# Patient Record
Sex: MALE | Race: WHITE | HISPANIC OR LATINO | Employment: FULL TIME | URBAN - METROPOLITAN AREA
[De-identification: names, ages, dates, MRNs, and addresses within clinical notes are randomized per-mention and may not be internally consistent; named-entity substitution may affect disease eponyms.]

---

## 2018-01-13 NOTE — MISCELLANEOUS
Provider Comments  Provider Comments:   Patient is a no-show for his 940 new patient appointment today        Signatures   Electronically signed by : [facility]; Oct 17 2016  9:46AM EST                       (Author)

## 2018-08-22 ENCOUNTER — HOSPITAL ENCOUNTER (EMERGENCY)
Facility: HOSPITAL | Age: 53
Discharge: HOME | End: 2018-08-22
Attending: EMERGENCY MEDICINE
Payer: COMMERCIAL

## 2018-08-22 VITALS
RESPIRATION RATE: 16 BRPM | WEIGHT: 189.6 LBS | DIASTOLIC BLOOD PRESSURE: 62 MMHG | OXYGEN SATURATION: 97 % | HEART RATE: 74 BPM | TEMPERATURE: 98.4 F | SYSTOLIC BLOOD PRESSURE: 132 MMHG

## 2018-08-22 DIAGNOSIS — B34.9 VIRAL ILLNESS: Primary | ICD-10-CM

## 2018-08-22 PROCEDURE — 99282 EMERGENCY DEPT VISIT SF MDM: CPT

## 2018-08-22 NOTE — DISCHARGE INSTRUCTIONS
Viral Syndrome   WHAT YOU NEED TO KNOW:   Viral syndrome is a term used for a viral infection that has no clear cause  Viruses are spread easily from person to person through the air and on shared items  DISCHARGE INSTRUCTIONS:   Call 911 for the following:   · You have a seizure  · You cannot be woken  · You have chest pain or trouble breathing  Return to the emergency department if:   · You have a stiff neck, a bad headache, and sensitivity to light  · You feel weak, dizzy, or confused  · You stop urinating or urinate a lot less than normal      · You cough up blood or thick, yellow or green, mucus  · You have severe abdominal pain or your abdomen is larger than usual   Contact your healthcare provider if:   · Your symptoms do not get better with treatment, or get worse, after 3 days  · You have a rash or ear pain  · You have burning when you urinate  · You have questions or concerns about your condition or care  Medicines: You may  need any of the following:  · Acetaminophen  decreases pain and fever  It is available without a doctor's order  Ask how much medicine to take and how often to take it  Follow directions  Acetaminophen can cause liver damage if not taken correctly  · NSAIDs , such as ibuprofen, help decrease swelling, pain, and fever  NSAIDs can cause stomach bleeding or kidney problems in certain people  If you take blood thinner medicine, always ask your healthcare provider if NSAIDs are safe for you  Always read the medicine label and follow directions  · Cold medicine  helps decrease swelling, control a cough, and relieve chest or nasal congestion  · Saline nasal spray  helps decrease nasal congestion  · Take your medicine as directed  Contact your healthcare provider if you think your medicine is not helping or if you have side effects  Tell him of her if you are allergic to any medicine   Keep a list of the medicines, vitamins, and herbs you take  Include the amounts, and when and why you take them  Bring the list or the pill bottles to follow-up visits  Carry your medicine list with you in case of an emergency  Manage your symptoms:   · Drink liquids as directed  to prevent dehydration  Ask how much liquid to drink each day and which liquids are best for you  Ask if you should drink an oral rehydration solution (ORS)  An ORS has the right amounts of water, salts, and sugar you need to replace body fluids  This may help prevent dehydration caused by vomiting or diarrhea  Do not drink liquids with caffeine  Drinks with caffeine can make dehydration worse  · Get plenty of rest  to help your body heal  Take naps throughout the day  Ask your healthcare provider when you can return to work and your normal activities  · Use a cool mist humidifier  to help you breathe easier if you have nasal or chest congestion  Ask your healthcare provider how to use a cool mist humidifier  · Eat honey or use cough drops  to help decrease throat discomfort  Ask your healthcare provider how much honey you should eat each day  Cough drops are available without a doctor's order  Follow directions for taking cough drops  · Do not smoke and stay away from others who smoke  Nicotine and other chemicals in cigarettes and cigars can cause lung damage  Smoking can also delay healing  Ask your healthcare provider for information if you currently smoke and need help to quit  E-cigarettes or smokeless tobacco still contain nicotine  Talk to your healthcare provider before you use these products  · Wash your hands frequently  to prevent the spread of germs to others  Use soap and water  Use gel hand  when soap and water are not available  Wash your hands after you use the bathroom, cough, or sneeze  Wash your hands before you prepare or eat food    Follow up with your healthcare provider as directed:  Write down your questions so you remember to ask them during your visits  © 2017 [employer] Information is for End User's use only and may not be sold, redistributed or otherwise used for commercial purposes  All illustrations and images included in CareNotes® are the copyrighted property of A D A M , Inc  or Damion Cortez  The above information is an  only  It is not intended as medical advice for individual conditions or treatments  Talk to your doctor, nurse or pharmacist before following any medical regimen to see if it is safe and effective for you

## 2018-08-22 NOTE — ED PROVIDER NOTES
History  Chief Complaint   Patient presents with    Chills     "Yesterday I just started to feel wierd, no appetite, chills, nauseated  I feel better today but still not right"  Denies abdominal pain, denies v/d       History provided by:  Patient  Malaise - 7 years or greater   Severity:  Moderate  Onset quality:  Gradual  Duration:  2 days  Timing:  Constant  Progression:  Improving  Context: not alcohol use (2)    Relieved by:  Drinking fluids and eating  Worsened by:  Nothing  Ineffective treatments:  None tried  Associated symptoms: nausea    Associated symptoms: no abdominal pain, no chest pain, no cough, no diarrhea, no dizziness, no dysuria, no fever, no headaches, no myalgias, no shortness of breath and no urgency    Associated symptoms comment:  Chills        None       Past Medical History:   Diagnosis Date    Substance abuse        History reviewed  No pertinent surgical history  History reviewed  No pertinent family history  I have reviewed and agree with the history as documented  Social History   Substance Use Topics    Smoking status: Current Every Day Smoker     Types: Cigarettes    Smokeless tobacco: Never Used    Alcohol use No        Review of Systems   Constitutional: Positive for chills and fatigue  Negative for fever  HENT: Negative for rhinorrhea, sore throat and trouble swallowing  Eyes: Negative for pain  Respiratory: Negative for cough, shortness of breath, wheezing and stridor  Cardiovascular: Negative for chest pain and leg swelling  Gastrointestinal: Positive for nausea  Negative for abdominal pain and diarrhea  Endocrine: Negative for polyuria  Genitourinary: Negative for dysuria, flank pain and urgency  Musculoskeletal: Negative for joint swelling, myalgias and neck stiffness  Skin: Negative for rash  Allergic/Immunologic: Negative for immunocompromised state  Neurological: Negative for dizziness, syncope, weakness, numbness and headaches  Psychiatric/Behavioral: Negative for confusion and suicidal ideas  All other systems reviewed and are negative  Physical Exam  Physical Exam   Constitutional: He is oriented to person, place, and time  He appears well-developed and well-nourished  HENT:   Head: Normocephalic and atraumatic  Eyes: EOM are normal  Pupils are equal, round, and reactive to light  Neck: Normal range of motion  Neck supple  Cardiovascular: Normal rate and regular rhythm  Exam reveals no friction rub  No murmur heard  Pulmonary/Chest: Breath sounds normal  No respiratory distress  He has no wheezes  He has no rales  Abdominal: Soft  Bowel sounds are normal  He exhibits no distension  There is no tenderness  Musculoskeletal: Normal range of motion  He exhibits no edema or tenderness  Neurological: He is alert and oriented to person, place, and time  Skin: Skin is warm  No rash noted  Psychiatric: He has a normal mood and affect  Nursing note and vitals reviewed  Vital Signs  ED Triage Vitals [08/22/18 1350]   Temperature Pulse Respirations Blood Pressure SpO2   98 4 °F (36 9 °C) 74 16 132/62 97 %      Temp Source Heart Rate Source Patient Position - Orthostatic VS BP Location FiO2 (%)   Oral Monitor -- -- --      Pain Score       --           Vitals:    08/22/18 1350   BP: 132/62   Pulse: 74       Visual Acuity      ED Medications  Medications - No data to display    Diagnostic Studies  Results Reviewed     None                 No orders to display              Procedures  Procedures       Phone Contacts  ED Phone Contact    ED Course                               MDM  Number of Diagnoses or Management Options  Viral illness: new and requires workup  Diagnosis management comments: 19-year-old male presents emergency department with Tylenol viral illness symptoms with noted nausea intermittent chills no documented fever here in the emergency department no other sick contacts    No active vomiting or diarrhea  The patient looks well hydrated on examination unremarkable exam   His vital signs are stable  Plan will be for outpatient management followup supportive care at this point time likely viral illness as the cause the patient's symptoms  Patient feeling much improved and improving as an outpatient  Will give a note for work and follow-up  Pt re-examined and evaluated after testing and treatment  Spoke with the patient and feeling improved and sxs have resolved  Will discharge home with close f/u with pcp and instructed to return to the ED if sxs worsen or continue  Pt agrees with the plan for discharge and feels comfortable to go home with proper f/u  Advised to return for worsening or additional problems  Diagnostic tests were reviewed and questions answered  Diagnosis, care plan and treatment options were discussed  The patient understand instructions and will follow up as directed  CritCare Time    Disposition  Final diagnoses:   Viral illness     Time reflects when diagnosis was documented in both MDM as applicable and the Disposition within this note     Time User Action Codes Description Comment    8/22/2018  2:34 PM Zain Kelley Add [B34 9] Viral illness       ED Disposition     ED Disposition Condition Comment    Discharge  Aylin Berry discharge to home/self care  Condition at discharge: Stable        Follow-up Information    None         There are no discharge medications for this patient  No discharge procedures on file      ED Provider  Electronically Signed by           Dayanara Lewis DO  08/22/18 3553

## 2018-08-22 NOTE — ED NOTES
Placed pt into exam room    Pt reports, "I just need an excuse for my job and I will get out of here"  Physician made aware of same     Melissa Evans RN  08/22/18 6245

## 2019-06-16 ENCOUNTER — HOSPITAL ENCOUNTER (EMERGENCY)
Facility: HOSPITAL | Age: 54
Discharge: HOME | End: 2019-06-17
Attending: EMERGENCY MEDICINE | Admitting: EMERGENCY MEDICINE
Payer: COMMERCIAL

## 2019-06-16 VITALS
WEIGHT: 184.97 LBS | TEMPERATURE: 98.2 F | HEART RATE: 90 BPM | RESPIRATION RATE: 14 BRPM | OXYGEN SATURATION: 99 % | DIASTOLIC BLOOD PRESSURE: 81 MMHG | SYSTOLIC BLOOD PRESSURE: 176 MMHG

## 2019-06-16 DIAGNOSIS — R42 LIGHTHEADEDNESS: Primary | ICD-10-CM

## 2019-06-16 DIAGNOSIS — R03.0 ELEVATED BLOOD PRESSURE READING: ICD-10-CM

## 2019-06-16 DIAGNOSIS — E11.65 HYPERGLYCEMIA DUE TO TYPE 2 DIABETES MELLITUS (HCC): ICD-10-CM

## 2019-06-16 LAB
ANION GAP SERPL CALCULATED.3IONS-SCNC: 7 MMOL/L (ref 4–13)
ATRIAL RATE: 64 BPM
BASOPHILS # BLD AUTO: 0.06 THOUSANDS/ΜL (ref 0–0.1)
BASOPHILS NFR BLD AUTO: 1 % (ref 0–1)
BUN SERPL-MCNC: 10 MG/DL (ref 5–25)
CALCIUM SERPL-MCNC: 9.3 MG/DL (ref 8.3–10.1)
CHLORIDE SERPL-SCNC: 99 MMOL/L (ref 100–108)
CO2 SERPL-SCNC: 31 MMOL/L (ref 21–32)
CREAT SERPL-MCNC: 0.94 MG/DL (ref 0.6–1.3)
EOSINOPHIL # BLD AUTO: 0.4 THOUSAND/ΜL (ref 0–0.61)
EOSINOPHIL NFR BLD AUTO: 3 % (ref 0–6)
ERYTHROCYTE [DISTWIDTH] IN BLOOD BY AUTOMATED COUNT: 12.5 % (ref 11.6–15.1)
GFR SERPL CREATININE-BSD FRML MDRD: 92 ML/MIN/1.73SQ M
GLUCOSE SERPL-MCNC: 271 MG/DL (ref 65–140)
GLUCOSE SERPL-MCNC: 276 MG/DL (ref 65–140)
HCT VFR BLD AUTO: 43.7 % (ref 36.5–49.3)
HGB BLD-MCNC: 14.9 G/DL (ref 12–17)
IMM GRANULOCYTES # BLD AUTO: 0.05 THOUSAND/UL (ref 0–0.2)
IMM GRANULOCYTES NFR BLD AUTO: 0 % (ref 0–2)
LYMPHOCYTES # BLD AUTO: 3.93 THOUSANDS/ΜL (ref 0.6–4.47)
LYMPHOCYTES NFR BLD AUTO: 30 % (ref 14–44)
MCH RBC QN AUTO: 31 PG (ref 26.8–34.3)
MCHC RBC AUTO-ENTMCNC: 34.1 G/DL (ref 31.4–37.4)
MCV RBC AUTO: 91 FL (ref 82–98)
MONOCYTES # BLD AUTO: 0.76 THOUSAND/ΜL (ref 0.17–1.22)
MONOCYTES NFR BLD AUTO: 6 % (ref 4–12)
NEUTROPHILS # BLD AUTO: 7.76 THOUSANDS/ΜL (ref 1.85–7.62)
NEUTS SEG NFR BLD AUTO: 60 % (ref 43–75)
NRBC BLD AUTO-RTO: 0 /100 WBCS
P AXIS: 51 DEGREES
PLATELET # BLD AUTO: 238 THOUSANDS/UL (ref 149–390)
PMV BLD AUTO: 10 FL (ref 8.9–12.7)
POTASSIUM SERPL-SCNC: 4.3 MMOL/L (ref 3.5–5.3)
PR INTERVAL: 190 MS
QRS AXIS: -13 DEGREES
QRSD INTERVAL: 84 MS
QT INTERVAL: 398 MS
QTC INTERVAL: 410 MS
RBC # BLD AUTO: 4.8 MILLION/UL (ref 3.88–5.62)
SODIUM SERPL-SCNC: 137 MMOL/L (ref 136–145)
T WAVE AXIS: -15 DEGREES
VENTRICULAR RATE: 64 BPM
WBC # BLD AUTO: 12.96 THOUSAND/UL (ref 4.31–10.16)

## 2019-06-16 PROCEDURE — 93005 ELECTROCARDIOGRAM TRACING: CPT

## 2019-06-16 PROCEDURE — 85025 COMPLETE CBC W/AUTO DIFF WBC: CPT | Performed by: EMERGENCY MEDICINE

## 2019-06-16 PROCEDURE — 36415 COLL VENOUS BLD VENIPUNCTURE: CPT | Performed by: EMERGENCY MEDICINE

## 2019-06-16 PROCEDURE — 82948 REAGENT STRIP/BLOOD GLUCOSE: CPT

## 2019-06-16 PROCEDURE — 99283 EMERGENCY DEPT VISIT LOW MDM: CPT | Performed by: EMERGENCY MEDICINE

## 2019-06-16 PROCEDURE — 80048 BASIC METABOLIC PNL TOTAL CA: CPT | Performed by: EMERGENCY MEDICINE

## 2019-06-16 PROCEDURE — 96360 HYDRATION IV INFUSION INIT: CPT

## 2019-06-16 PROCEDURE — 93010 ELECTROCARDIOGRAM REPORT: CPT | Performed by: INTERNAL MEDICINE

## 2019-06-16 PROCEDURE — 99284 EMERGENCY DEPT VISIT MOD MDM: CPT

## 2019-06-16 RX ADMIN — SODIUM CHLORIDE 1000 ML: 0.9 INJECTION, SOLUTION INTRAVENOUS at 23:05

## 2019-06-17 LAB
ATRIAL RATE: 59 BPM
P AXIS: 50 DEGREES
PR INTERVAL: 200 MS
QRS AXIS: -3 DEGREES
QRSD INTERVAL: 86 MS
QT INTERVAL: 422 MS
QTC INTERVAL: 417 MS
T WAVE AXIS: 18 DEGREES
VENTRICULAR RATE: 59 BPM

## 2019-06-17 PROCEDURE — 93010 ELECTROCARDIOGRAM REPORT: CPT | Performed by: INTERNAL MEDICINE

## 2019-06-18 ENCOUNTER — CLINICAL SUPPORT (OUTPATIENT)
Dept: FAMILY MEDICINE CLINIC | Facility: CLINIC | Age: 54
End: 2019-06-18

## 2019-06-18 ENCOUNTER — OFFICE VISIT (OUTPATIENT)
Dept: FAMILY MEDICINE CLINIC | Facility: CLINIC | Age: 54
End: 2019-06-18

## 2019-06-18 VITALS
HEIGHT: 67 IN | OXYGEN SATURATION: 95 % | TEMPERATURE: 97.4 F | SYSTOLIC BLOOD PRESSURE: 118 MMHG | BODY MASS INDEX: 28.25 KG/M2 | RESPIRATION RATE: 16 BRPM | DIASTOLIC BLOOD PRESSURE: 80 MMHG | HEART RATE: 99 BPM | WEIGHT: 180 LBS

## 2019-06-18 DIAGNOSIS — E11.8 TYPE 2 DIABETES MELLITUS WITH COMPLICATION, WITHOUT LONG-TERM CURRENT USE OF INSULIN (HCC): Primary | ICD-10-CM

## 2019-06-18 DIAGNOSIS — R73.09 HIGH GLUCOSE LEVEL: ICD-10-CM

## 2019-06-18 LAB
SL AMB POCT GLUCOSE BLD: 398
SL AMB POCT HEMOGLOBIN AIC: 11.8 (ref ?–6.5)

## 2019-06-18 PROCEDURE — 83036 HEMOGLOBIN GLYCOSYLATED A1C: CPT | Performed by: FAMILY MEDICINE

## 2019-06-18 PROCEDURE — 3725F SCREEN DEPRESSION PERFORMED: CPT | Performed by: STUDENT IN AN ORGANIZED HEALTH CARE EDUCATION/TRAINING PROGRAM

## 2019-06-18 PROCEDURE — 4010F ACE/ARB THERAPY RXD/TAKEN: CPT | Performed by: STUDENT IN AN ORGANIZED HEALTH CARE EDUCATION/TRAINING PROGRAM

## 2019-06-18 PROCEDURE — 99203 OFFICE O/P NEW LOW 30 MIN: CPT | Performed by: STUDENT IN AN ORGANIZED HEALTH CARE EDUCATION/TRAINING PROGRAM

## 2019-06-18 PROCEDURE — 82948 REAGENT STRIP/BLOOD GLUCOSE: CPT | Performed by: FAMILY MEDICINE

## 2019-06-18 RX ORDER — ASPIRIN 81 MG/1
81 TABLET ORAL ONCE
Status: DISCONTINUED | OUTPATIENT
Start: 2019-06-18 | End: 2019-06-18

## 2019-06-18 RX ORDER — ASPIRIN 81 MG/1
81 TABLET ORAL DAILY
Qty: 90 TABLET | Refills: 3 | Status: SHIPPED | OUTPATIENT
Start: 2019-06-18 | End: 2019-09-25 | Stop reason: SDUPTHER

## 2019-06-18 RX ORDER — LISINOPRIL 2.5 MG/1
2.5 TABLET ORAL DAILY
Qty: 90 TABLET | Refills: 3 | Status: SHIPPED | OUTPATIENT
Start: 2019-06-18 | End: 2019-09-25

## 2019-06-25 ENCOUNTER — TELEPHONE (OUTPATIENT)
Dept: FAMILY MEDICINE CLINIC | Facility: CLINIC | Age: 54
End: 2019-06-25

## 2019-06-25 DIAGNOSIS — E11.8 TYPE 2 DIABETES MELLITUS WITH COMPLICATION, WITHOUT LONG-TERM CURRENT USE OF INSULIN (HCC): Primary | ICD-10-CM

## 2019-08-01 ENCOUNTER — OFFICE VISIT (OUTPATIENT)
Dept: FAMILY MEDICINE CLINIC | Facility: CLINIC | Age: 54
End: 2019-08-01

## 2019-08-01 VITALS
DIASTOLIC BLOOD PRESSURE: 66 MMHG | SYSTOLIC BLOOD PRESSURE: 110 MMHG | WEIGHT: 172.4 LBS | HEART RATE: 102 BPM | RESPIRATION RATE: 20 BRPM | BODY MASS INDEX: 27 KG/M2 | TEMPERATURE: 98 F | OXYGEN SATURATION: 98 %

## 2019-08-01 DIAGNOSIS — Z12.11 SCREENING FOR COLON CANCER: Primary | ICD-10-CM

## 2019-08-01 DIAGNOSIS — E11.8 TYPE 2 DIABETES MELLITUS WITH COMPLICATION, WITHOUT LONG-TERM CURRENT USE OF INSULIN (HCC): ICD-10-CM

## 2019-08-01 PROCEDURE — 99213 OFFICE O/P EST LOW 20 MIN: CPT | Performed by: FAMILY MEDICINE

## 2019-08-01 RX ORDER — BUPRENORPHINE HYDROCHLORIDE AND NALOXONE HYDROCHLORIDE 5.7; 1.4 MG/1; MG/1
TABLET, ORALLY DISINTEGRATING SUBLINGUAL
Refills: 0 | Status: ON HOLD | COMMUNITY
Start: 2019-07-31

## 2019-08-01 RX ORDER — ATORVASTATIN CALCIUM 20 MG/1
20 TABLET, FILM COATED ORAL DAILY
Qty: 90 TABLET | Refills: 3 | Status: SHIPPED | OUTPATIENT
Start: 2019-08-01 | End: 2019-09-25

## 2019-08-01 RX ORDER — BLOOD-GLUCOSE METER
EACH MISCELLANEOUS
Status: ON HOLD | COMMUNITY
Start: 2019-06-18

## 2019-08-01 RX ORDER — LANCETS
EACH MISCELLANEOUS
Status: ON HOLD | COMMUNITY
Start: 2019-06-18

## 2019-08-01 RX ORDER — BUPRENORPHINE HYDROCHLORIDE AND NALOXONE HYDROCHLORIDE 8.6; 2.1 MG/1; MG/1
TABLET, ORALLY DISINTEGRATING SUBLINGUAL
Refills: 0 | Status: ON HOLD | COMMUNITY
Start: 2019-07-03

## 2019-08-01 NOTE — ASSESSMENT & PLAN NOTE
Lab Results   Component Value Date    HGBA1C 11 8 (A) 06/18/2019       No results for input(s): POCGLU in the last 72 hours  Blood Sugar Average: Last 72 hrs:     Recently started onnlantus with good results, rarely any hypoglycemia symptoms  There was only 1 episode and fasting hyperglycemia symptoms blood sugar in the 90s, symptoms resolved with glucose  Patient tolerating meds well including metformin  Will increase metformin from 500 b i d  To gradual titrate up to 1000 b i d    Continue lisinopril  Will start Lipitor 20 mg daily  Lipid profile  Microalbumin  Podiatry and Ophthalmology referral

## 2019-08-01 NOTE — PROGRESS NOTES
Assessment/Plan:    Type 2 diabetes mellitus with complication, without long-term current use of insulin (HCC)  Lab Results   Component Value Date    HGBA1C 11 8 (A) 06/18/2019       No results for input(s): POCGLU in the last 72 hours  Blood Sugar Average: Last 72 hrs:     Recently started onnlantus with good results, rarely any hypoglycemia symptoms  There was only 1 episode and fasting hyperglycemia symptoms blood sugar in the 90s, symptoms resolved with glucose  Patient tolerating meds well including metformin  Will increase metformin from 500 b i d  To gradual titrate up to 1000 b i d  Continue lisinopril  Will start Lipitor 20 mg daily  Lipid profile  Microalbumin  Podiatry and Ophthalmology referral       Diagnoses and all orders for this visit:    Screening for colon cancer  -     Ambulatory referral to Gastroenterology; Future    Type 2 diabetes mellitus with complication, without long-term current use of insulin (HCC)  -     atorvastatin (LIPITOR) 20 mg tablet; Take 1 tablet (20 mg total) by mouth daily  -     metFORMIN (GLUCOPHAGE) 1000 MG tablet; Take 1 tablet (1,000 mg total) by mouth 2 (two) times a day with meals Titrate up form your current 500mg pills BID to 1,000mg BID as we discussed  -     Ambulatory referral to Podiatry; Future  -     Ambulatory Referral to Ophthalmology; Future  -     Microalbumin / creatinine urine ratio; Future  -     Lipid panel; Future    Other orders  -     ACCU-CHEK FASTCLIX LANCETS MISC  -     ZUBSOLV 8 6-2 1 MG SUBL; SUBLINGUAL 1 AND 1 /2 TABLETS SUBLINGUALLY DAILY  -     ZUBSOLV 5 7-1 4 MG SUBL; SUBLINGUAL TWO TABLETS SUBLINGUALLY DAILY  -     Blood Glucose Monitoring Suppl (ACCU-CHEK GUIDE) w/Device KIT          Subjective:      Patient ID: Vicki Celaya is a 47 y o  male  Vicki Celaya is a 47 y o  male who presents to follow up with newly diagnosed DM  patient started on latus 10 units HS and metformin 500mg bid    Patient reports feeling overall well his previous symptoms have resolved  Including no more nausea, vomiting, polyuria, polydipsia  He reports his blood sugars on Lantus 10 units at night have been in the range below  He was discussing his better blood sugars and improved symptoms to a colleague of his who recommended to be off of insulin so that he does not get addicted to insulin  Patient took the recommendation and has been off of Lantus for the past 3 days  He reports the past 3 days he has been feeling otherwise well  Home log:  Fasting range in  110's  Mostly in mid 120's to 130's   Lowest symptomatic BS 90's and resolved with food      Patient denies any headache, chest pain, palpitation, shortness of breath, fevers, chills, nausea, vomiting, diarrhea, constipation  Patient denies any urinary symptoms  He presents for his follow up newly diagnosed diabetic visit  He has type 2 diabetes mellitus  The initial diagnosis of diabetes was made 3 months ago  Pertinent negatives for hypoglycemia include no confusion, dizziness, headaches, seizures, sweats or tremors  Associated symptoms  Have completely resolved include fatigue, foot paresthesias, polydipsia, polyphagia and polyuria  Pertinent negatives for diabetes include no blurred vision, no chest pain, no foot ulcerations, no visual change and no weakness  Pertinent negatives for hypoglycemia complications include no blackouts  Pertinent negatives for diabetic complications include no CVA, heart disease, impotence, nephropathy, peripheral neuropathy, PVD or retinopathy  Risk factors for coronary artery disease include diabetes mellitus, dyslipidemia, male sex, tobacco exposure and family history  When asked about current treatments, none were reported  When asked about meal planning, he reported none  He has not had a previous visit with a dietitian  He rarely participates in exercise  He is currently on ACE inhibitor, and metformin, latus, no currently on statin    He does not see a podiatrist Eye exam is not current  Diabetes   He presents for his follow-up diabetic visit  He has type 2 diabetes mellitus  No MedicAlert identification noted  The initial diagnosis of diabetes was made 3 months ago  Pertinent negatives for diabetes include no blurred vision, no chest pain, no fatigue, no foot paresthesias, no foot ulcerations, no polydipsia, no polyphagia, no polyuria, no visual change, no weakness and no weight loss  The following portions of the patient's history were reviewed and updated as appropriate: He  has a past medical history of Diabetes mellitus ([facility] ) and Substance abuse ([place] [id number] )  Patient Active Problem List    Diagnosis Date Noted    High glucose level 06/18/2019    Type 2 diabetes mellitus with complication, without long-term current use of insulin ([place] [id number] ) 06/18/2019     He  has a past surgical history that includes No past surgeries  His family history includes Alcohol abuse in his father; Asthma in his brother; Diabetes in his mother and sister  He  reports that he has been smoking cigarettes  He uses smokeless tobacco  He reports that he does not drink alcohol or use drugs    Current Outpatient Medications   Medication Sig Dispense Refill    ACCU-CHEK FASTCLIX LANCETS MISC       Blood Glucose Monitoring Suppl (ACCU-CHEK GUIDE) w/Device KIT       glucose blood test strip Accu check Vistro test strips (TEST 3XS DAILY) 100 each 0    insulin glargine (BASAGLAR KWIKPEN) 100 units/mL injection pen Inject 10 Units under the skin daily at bedtime 5 pen 10    Needle, Disp, 30G X 5/16" MISC by Does not apply route 3 (three) times a day 50 each 2    ZUBSOLV 5 7-1 4 MG SUBL SUBLINGUAL TWO TABLETS SUBLINGUALLY DAILY  0    ZUBSOLV 8 6-2 1 MG SUBL SUBLINGUAL 1 AND 1 /2 TABLETS SUBLINGUALLY DAILY  0    aspirin (ECOTRIN LOW STRENGTH) 81 mg EC tablet Take 1 tablet (81 mg total) by mouth daily (Patient not taking: Reported on 8/1/2019) 90 tablet 3    atorvastatin (LIPITOR) 20 mg tablet Take 1 tablet (20 mg total) by mouth daily 90 tablet 3    lisinopril (ZESTRIL) 2 5 mg tablet Take 1 tablet (2 5 mg total) by mouth daily 90 tablet 3    metFORMIN (GLUCOPHAGE) 1000 MG tablet Take 1 tablet (1,000 mg total) by mouth 2 (two) times a day with meals Titrate up form your current 500mg pills BID to 1,000mg BID as we discussed 90 tablet 3     No current facility-administered medications for this visit  Current Outpatient Medications on File Prior to Visit   Medication Sig    ACCU-CHEK FASTCLIX LANCETS MISC     Blood Glucose Monitoring Suppl (ACCU-CHEK GUIDE) w/Device KIT     glucose blood test strip Accu check Vistro test strips (TEST 3XS DAILY)    insulin glargine (BASAGLAR KWIKPEN) 100 units/mL injection pen Inject 10 Units under the skin daily at bedtime    Needle, Disp, 30G X 5/16" MISC by Does not apply route 3 (three) times a day    ZUBSOLV 5 7-1 4 MG SUBL SUBLINGUAL TWO TABLETS SUBLINGUALLY DAILY    ZUBSOLV 8 6-2 1 MG SUBL SUBLINGUAL 1 AND 1 /2 TABLETS SUBLINGUALLY DAILY    aspirin (ECOTRIN LOW STRENGTH) 81 mg EC tablet Take 1 tablet (81 mg total) by mouth daily (Patient not taking: Reported on 8/1/2019)    lisinopril (ZESTRIL) 2 5 mg tablet Take 1 tablet (2 5 mg total) by mouth daily    [DISCONTINUED] metFORMIN (GLUCOPHAGE) 500 mg tablet Take 1 tablet (500 mg total) by mouth 2 (two) times a day with meals (Patient not taking: Reported on 8/1/2019)     No current facility-administered medications on file prior to visit  He has No Known Allergies       Review of Systems   Constitutional: Negative  Negative for fatigue and weight loss  HENT: Negative  Eyes: Negative for blurred vision  Respiratory: Negative  Cardiovascular: Negative  Negative for chest pain  Gastrointestinal: Negative  Endocrine: Negative  Negative for polydipsia, polyphagia and polyuria  Genitourinary: Negative  Musculoskeletal: Negative  Skin: Negative  Neurological: Negative  Negative for weakness  Hematological: Negative  Psychiatric/Behavioral: Negative  Objective:      /66 (BP Location: Left arm, Patient Position: Sitting, Cuff Size: Standard)   Pulse 102   Temp 98 °F (36 7 °C) (Temporal)   Resp 20   Wt 78 2 kg (172 lb 6 4 oz)   SpO2 98%   BMI 27 00 kg/m²          Physical Exam   Constitutional: He is oriented to person, place, and time  He appears well-developed and well-nourished  No distress  HENT:   Head: Normocephalic and atraumatic  Mouth/Throat: No oropharyngeal exudate  Eyes: Pupils are equal, round, and reactive to light  Conjunctivae and EOM are normal  Right eye exhibits no discharge  Left eye exhibits no discharge  Neck: Normal range of motion  Neck supple  No JVD present  No thyromegaly present  Cardiovascular: Normal rate, regular rhythm and normal heart sounds  Exam reveals no gallop and no friction rub  Pulmonary/Chest: Effort normal and breath sounds normal  No stridor  No respiratory distress  He has no wheezes  He has no rales  He exhibits no tenderness  Abdominal: Soft  Bowel sounds are normal  He exhibits no distension  There is no tenderness  There is no rebound and no guarding  Musculoskeletal: Normal range of motion  He exhibits no edema, tenderness or deformity  Lymphadenopathy:     He has no cervical adenopathy  Neurological: He is alert and oriented to person, place, and time  No cranial nerve deficit  Skin: Skin is warm  No rash noted  He is not diaphoretic  No erythema  Psychiatric: He has a normal mood and affect   His behavior is normal  Thought content normal

## 2019-08-01 NOTE — LETTER
August 1, 2019     Patient: Carlo Palacios   YOB: 1965   Date of Visit: 8/1/2019       To Whom it May Concern:    Carlo Palacios is under my professional care  He was seen in my office on 8/1/2019  He may return to work on 8/2/19  If you have any questions or concerns, please don't hesitate to call           Sincerely,          Georgia Garza MD        CC: No Recipients

## 2019-08-09 DIAGNOSIS — E11.8 TYPE 2 DIABETES MELLITUS WITH COMPLICATION, WITHOUT LONG-TERM CURRENT USE OF INSULIN (HCC): Primary | ICD-10-CM

## 2019-08-09 NOTE — PROGRESS NOTES
referral to Medical Nutritional Therapy for DM2 , These classes occur at the [facility] office, which [facility]       Called patient and left message

## 2019-09-10 DIAGNOSIS — E11.8 TYPE 2 DIABETES MELLITUS WITH COMPLICATION, WITHOUT LONG-TERM CURRENT USE OF INSULIN (HCC): ICD-10-CM

## 2019-09-11 RX ORDER — BLOOD SUGAR DIAGNOSTIC
STRIP MISCELLANEOUS
Qty: 100 EACH | Refills: 15 | Status: SHIPPED | OUTPATIENT
Start: 2019-09-11 | End: 2019-10-23 | Stop reason: SDUPTHER

## 2019-09-16 ENCOUNTER — TELEPHONE (OUTPATIENT)
Dept: FAMILY MEDICINE CLINIC | Facility: CLINIC | Age: 54
End: 2019-09-16

## 2019-09-16 NOTE — TELEPHONE ENCOUNTER
Patient no showed eye dr appointment  Letter and orders mailed to patient, so they may contact the specialty office and reschedule missed appointment

## 2019-09-25 ENCOUNTER — OFFICE VISIT (OUTPATIENT)
Dept: FAMILY MEDICINE CLINIC | Facility: CLINIC | Age: 54
End: 2019-09-25

## 2019-09-25 VITALS
BODY MASS INDEX: 26.7 KG/M2 | HEART RATE: 95 BPM | DIASTOLIC BLOOD PRESSURE: 74 MMHG | OXYGEN SATURATION: 97 % | RESPIRATION RATE: 16 BRPM | WEIGHT: 170.1 LBS | SYSTOLIC BLOOD PRESSURE: 110 MMHG | TEMPERATURE: 98.3 F | HEIGHT: 67 IN

## 2019-09-25 DIAGNOSIS — E11.8 TYPE 2 DIABETES MELLITUS WITH COMPLICATION, WITHOUT LONG-TERM CURRENT USE OF INSULIN (HCC): Primary | ICD-10-CM

## 2019-09-25 DIAGNOSIS — Z78.9 NEED FOR FOLLOW-UP BY SOCIAL WORKER: ICD-10-CM

## 2019-09-25 LAB — SL AMB POCT HEMOGLOBIN AIC: 6.7 (ref ?–6.5)

## 2019-09-25 PROCEDURE — 3044F HG A1C LEVEL LT 7.0%: CPT | Performed by: FAMILY MEDICINE

## 2019-09-25 PROCEDURE — 3008F BODY MASS INDEX DOCD: CPT | Performed by: FAMILY MEDICINE

## 2019-09-25 PROCEDURE — 83036 HEMOGLOBIN GLYCOSYLATED A1C: CPT | Performed by: FAMILY MEDICINE

## 2019-09-25 PROCEDURE — 99213 OFFICE O/P EST LOW 20 MIN: CPT | Performed by: FAMILY MEDICINE

## 2019-09-25 PROCEDURE — 4010F ACE/ARB THERAPY RXD/TAKEN: CPT | Performed by: STUDENT IN AN ORGANIZED HEALTH CARE EDUCATION/TRAINING PROGRAM

## 2019-09-25 RX ORDER — LISINOPRIL 2.5 MG/1
2.5 TABLET ORAL DAILY
Qty: 90 TABLET | Refills: 5 | Status: SHIPPED | OUTPATIENT
Start: 2019-09-25 | End: 2020-05-08 | Stop reason: SDUPTHER

## 2019-09-25 RX ORDER — ATORVASTATIN CALCIUM 20 MG/1
20 TABLET, FILM COATED ORAL DAILY
Qty: 90 TABLET | Refills: 3 | Status: SHIPPED | OUTPATIENT
Start: 2019-09-25 | End: 2020-05-08 | Stop reason: SDUPTHER

## 2019-09-25 RX ORDER — ASPIRIN 81 MG/1
81 TABLET ORAL DAILY
Qty: 90 TABLET | Refills: 3 | Status: SHIPPED | OUTPATIENT
Start: 2019-09-25 | End: 2020-05-08 | Stop reason: SDUPTHER

## 2019-09-25 NOTE — PROGRESS NOTES
Assessment/Plan:    No problem-specific Assessment & Plan notes found for this encounter  Diagnoses and all orders for this visit:    Type 2 diabetes mellitus with complication, without long-term current use of insulin (HCC)  -     Microalbumin / creatinine urine ratio  -     POCT hemoglobin A1c  -     metFORMIN (GLUCOPHAGE) 1000 MG tablet; Take 1 tablet (1,000 mg total) by mouth 2 (two) times a day with meals Titrate up form your current 500mg pills BID to 1,000mg BID as we discussed  -     lisinopril (ZESTRIL) 2 5 mg tablet; Take 1 tablet (2 5 mg total) by mouth daily  -     atorvastatin (LIPITOR) 20 mg tablet; Take 1 tablet (20 mg total) by mouth daily  -     aspirin (ECOTRIN LOW STRENGTH) 81 mg EC tablet; Take 1 tablet (81 mg total) by mouth daily  -     Ambulatory referral to Podiatry; Future    Need for follow-up by   -     Ambulatory referral to social work care management program; Future      Patient has some memory deficit has difficulties keeping appointments in managing his healthcare  Patient would benefit from assistance from a program that would help him achieve those goals  Patient responds better to texts for  appointment with text to remind of appointments  Subjective:      Patient ID: Ulysses Chavez is a 47 y o  male  Has been dieting and exercising he also has lost significant amount weight  Patient has strong support system the that are diabetic and discussed with them and have gained some tips on control his diabetes  HB A1c 6 7 today from 11 8 over 3 months  To no patient has impaired memory and does missed some appointments  He reports to me that he is no longer with his wife who previously helped him with the schedule and appointments  Diabetes   He presents for his follow-up diabetic visit  He has type 2 diabetes mellitus  The initial diagnosis of diabetes was made 1 year ago  His disease course has been improving   Hypoglycemia symptoms include tremors (lowst BS fasting 90's and symtpoms resolved after eating )  Pertinent negatives for hypoglycemia include no confusion, dizziness, headaches, seizures or sweats  Associated symptoms include foot paresthesias and weight loss (intentional )  Pertinent negatives for diabetes include no blurred vision, no chest pain, no fatigue, no foot ulcerations, no polydipsia, no polyphagia, no polyuria, no visual change and no weakness  (PO intak with sugar ) Symptoms are improving  Diabetic complications include peripheral neuropathy  Pertinent negatives for diabetic complications include no CVA, heart disease, impotence, nephropathy, PVD or retinopathy  Risk factors for coronary artery disease include diabetes mellitus, dyslipidemia, male sex, tobacco exposure, family history and hypertension  When asked about current treatments, none were reported  He is compliant with treatment most of the time  He is currently taking insulin pre-breakfast  Insulin injections are given by patient  Rotation sites for injection include the abdominal wall (10 units of long acting insulin )  When asked about meal planning, he reported none  He has not had a previous visit with a dietitian  He rarely participates in exercise  His home blood glucose trend is decreasing steadily  An ACE inhibitor/angiotensin II receptor blocker is not being taken  He does not see a podiatrist Eye exam is not current  The following portions of the patient's history were reviewed and updated as appropriate: He  has a past medical history of Diabetes mellitus ([facility] ) and Substance abuse ([facility] )  Patient Active Problem List    Diagnosis Date Noted    High glucose level 06/18/2019    Type 2 diabetes mellitus with complication, without long-term current use of insulin ([facility] ) 06/18/2019     He  has a past surgical history that includes No past surgeries    His family history includes Alcohol abuse in his father; Asthma in his brother; Diabetes in his mother and sister  He  reports that he has been smoking cigarettes  He has been smoking about 0 50 packs per day  He has never used smokeless tobacco  He reports that he drank alcohol  He reports that he has current or past drug history  Current Outpatient Medications   Medication Sig Dispense Refill    ACCU-CHEK FASTCLIX LANCETS MISC       ACCU-CHEK GUIDE test strip TEST THREE TIMES DAILY AS DIRECTED 100 each 15    Blood Glucose Monitoring Suppl (ACCU-CHEK GUIDE) w/Device KIT       lisinopril (ZESTRIL) 2 5 mg tablet Take 1 tablet (2 5 mg total) by mouth daily 90 tablet 5    aspirin (ECOTRIN LOW STRENGTH) 81 mg EC tablet Take 1 tablet (81 mg total) by mouth daily 90 tablet 3    atorvastatin (LIPITOR) 20 mg tablet Take 1 tablet (20 mg total) by mouth daily 90 tablet 3    insulin glargine (BASAGLAR KWIKPEN) 100 units/mL injection pen Inject 10 Units under the skin daily at bedtime 5 pen 10    metFORMIN (GLUCOPHAGE) 1000 MG tablet Take 1 tablet (1,000 mg total) by mouth 2 (two) times a day with meals Titrate up form your current 500mg pills BID to 1,000mg BID as we discussed 90 tablet 5    Needle, Disp, 30G X 5/16" MISC by Does not apply route 3 (three) times a day 50 each 2    ZUBSOLV 5 7-1 4 MG SUBL SUBLINGUAL TWO TABLETS SUBLINGUALLY DAILY  0    ZUBSOLV 8 6-2 1 MG SUBL SUBLINGUAL 1 AND 1 /2 TABLETS SUBLINGUALLY DAILY  0     No current facility-administered medications for this visit        Current Outpatient Medications on File Prior to Visit   Medication Sig    ACCU-CHEK FASTCLIX LANCETS MISC     ACCU-CHEK GUIDE test strip TEST THREE TIMES DAILY AS DIRECTED    Blood Glucose Monitoring Suppl (ACCU-CHEK GUIDE) w/Device KIT     [DISCONTINUED] lisinopril (ZESTRIL) 2 5 mg tablet Take 1 tablet (2 5 mg total) by mouth daily    insulin glargine (BASAGLAR KWIKPEN) 100 units/mL injection pen Inject 10 Units under the skin daily at bedtime    Needle, Disp, 30G X 5/16" MISC by Does not apply route 3 (three) times a day  ZUBSOLV 5 7-1 4 MG SUBL SUBLINGUAL TWO TABLETS SUBLINGUALLY DAILY    ZUBSOLV 8 6-2 1 MG SUBL SUBLINGUAL 1 AND 1 /2 TABLETS SUBLINGUALLY DAILY    [DISCONTINUED] aspirin (ECOTRIN LOW STRENGTH) 81 mg EC tablet Take 1 tablet (81 mg total) by mouth daily (Patient not taking: Reported on 8/1/2019)    [DISCONTINUED] atorvastatin (LIPITOR) 20 mg tablet Take 1 tablet (20 mg total) by mouth daily    [DISCONTINUED] metFORMIN (GLUCOPHAGE) 1000 MG tablet Take 1 tablet (1,000 mg total) by mouth 2 (two) times a day with meals Titrate up form your current 500mg pills BID to 1,000mg BID as we discussed     No current facility-administered medications on file prior to visit  He has No Known Allergies       Review of Systems   Constitutional: Positive for weight loss (intentional )  Negative for fatigue  Eyes: Negative for blurred vision  Cardiovascular: Negative for chest pain  Endocrine: Negative for polydipsia, polyphagia and polyuria  Genitourinary: Negative for impotence  Neurological: Positive for tremors (lowst BS fasting 90's and symtpoms resolved after eating )  Negative for dizziness, seizures, weakness and headaches  Psychiatric/Behavioral: Negative for confusion  Objective:      /74 (BP Location: Right arm, Patient Position: Sitting, Cuff Size: Adult)   Pulse 95   Temp 98 3 °F (36 8 °C) (Tympanic)   Resp 16   Ht 5' 7" (1 702 m)   Wt 77 2 kg (170 lb 1 6 oz)   SpO2 97%   BMI 26 64 kg/m²          Physical Exam   Constitutional: He is oriented to person, place, and time  He appears well-developed and well-nourished  No distress  HENT:   Head: Normocephalic and atraumatic  Mouth/Throat: No oropharyngeal exudate  Eyes: Conjunctivae and EOM are normal  Right eye exhibits no discharge  Left eye exhibits no discharge  Neck: Normal range of motion  Neck supple  No JVD present  No thyromegaly present  Cardiovascular: Normal rate, regular rhythm and normal heart sounds   Exam reveals no gallop and no friction rub  Pulmonary/Chest: Effort normal and breath sounds normal  No stridor  No respiratory distress  He has no wheezes  He has no rales  He exhibits no tenderness  Abdominal: Soft  Bowel sounds are normal  He exhibits no distension  There is no tenderness  There is no rebound and no guarding  Musculoskeletal: Normal range of motion  He exhibits no edema, tenderness or deformity  Lymphadenopathy:     He has no cervical adenopathy  Neurological: He is alert and oriented to person, place, and time  No cranial nerve deficit  Skin: Skin is warm  Capillary refill takes less than 2 seconds  No rash noted  He is not diaphoretic  No erythema  Psychiatric: He has a normal mood and affect  His speech is normal and behavior is normal  Thought content is not paranoid and not delusional  He expresses no homicidal and no suicidal ideation  He expresses no suicidal plans and no homicidal plans  He exhibits abnormal recent memory and abnormal remote memory

## 2019-09-26 ENCOUNTER — TELEPHONE (OUTPATIENT)
Dept: FAMILY MEDICINE CLINIC | Facility: CLINIC | Age: 54
End: 2019-09-26

## 2019-09-26 NOTE — TELEPHONE ENCOUNTER
LM on     Podiatry [facility]([phone]) is on 10/10/2019 at 10 am at [address] [place] [address]

## 2019-10-11 ENCOUNTER — PATIENT OUTREACH (OUTPATIENT)
Dept: FAMILY MEDICINE CLINIC | Facility: CLINIC | Age: 54
End: 2019-10-11

## 2019-10-11 NOTE — LETTER
[facility]  [address]  [phone]    Re:    10/23/2019       Dear Octavia Tirado,    We tried to reach you by phone on 10/11/19, 10/15/19, and 10/23/19 and was unfortunately unable to reach you    It is important that you contact the [place] [address] as soon as possible at: Dept: [phone]     Sincerely,         Sil Lovell MSMIKE, [facility]     [phone]

## 2019-10-23 DIAGNOSIS — E11.8 TYPE 2 DIABETES MELLITUS WITH COMPLICATION, WITHOUT LONG-TERM CURRENT USE OF INSULIN (HCC): ICD-10-CM

## 2019-10-23 NOTE — PROGRESS NOTES
CHAU GOVEA received referral from Provider for Pt as Pt required further follow from  at time of visit  CHAU GOVEA attempted outreach to Pt by phone on 10/11/19, 10/15/19, and 10/23/19 however there was no response  CHAU GOVEA left vm for returned call  CHAU GOVEA will also send an unable to reach letter to Pt address due to 3 failed outreach attempts  CHAU GOVEA will close this referral however will remain available for any further assistance as needed

## 2019-10-23 NOTE — TELEPHONE ENCOUNTER
Pharmacy is calling requesting a new script for Guide test scripts  They stated there was an error in the pharmacy and the script has been discarded  Patient has no test strips

## 2019-10-25 NOTE — TELEPHONE ENCOUNTER
Patient no showed 10/10 & 10/22 Podiatry appointment  Letter and orders mailed to patient, so they may contact the specialty office and reschedule missed appointment

## 2019-11-19 DIAGNOSIS — E11.8 TYPE 2 DIABETES MELLITUS WITH COMPLICATION, WITHOUT LONG-TERM CURRENT USE OF INSULIN (HCC): ICD-10-CM

## 2019-12-30 ENCOUNTER — OFFICE VISIT (OUTPATIENT)
Dept: FAMILY MEDICINE CLINIC | Facility: CLINIC | Age: 54
End: 2019-12-30

## 2019-12-30 VITALS
BODY MASS INDEX: 27.51 KG/M2 | SYSTOLIC BLOOD PRESSURE: 112 MMHG | TEMPERATURE: 99.1 F | RESPIRATION RATE: 16 BRPM | WEIGHT: 175.3 LBS | HEART RATE: 52 BPM | OXYGEN SATURATION: 96 % | HEIGHT: 67 IN | DIASTOLIC BLOOD PRESSURE: 60 MMHG

## 2019-12-30 DIAGNOSIS — E11.8 TYPE 2 DIABETES MELLITUS WITH COMPLICATION, WITHOUT LONG-TERM CURRENT USE OF INSULIN (HCC): ICD-10-CM

## 2019-12-30 DIAGNOSIS — Z23 NEED FOR VACCINATION: Primary | ICD-10-CM

## 2019-12-30 LAB
CREAT UR-MCNC: 154 MG/DL
MICROALBUMIN UR-MCNC: 17 MG/L (ref 0–20)
MICROALBUMIN/CREAT 24H UR: 11 MG/G CREATININE (ref 0–30)
SL AMB POCT HEMOGLOBIN AIC: 6.7 (ref ?–6.5)

## 2019-12-30 PROCEDURE — 83036 HEMOGLOBIN GLYCOSYLATED A1C: CPT | Performed by: FAMILY MEDICINE

## 2019-12-30 PROCEDURE — 90472 IMMUNIZATION ADMIN EACH ADD: CPT | Performed by: FAMILY MEDICINE

## 2019-12-30 PROCEDURE — 90471 IMMUNIZATION ADMIN: CPT | Performed by: FAMILY MEDICINE

## 2019-12-30 PROCEDURE — 82043 UR ALBUMIN QUANTITATIVE: CPT | Performed by: STUDENT IN AN ORGANIZED HEALTH CARE EDUCATION/TRAINING PROGRAM

## 2019-12-30 PROCEDURE — 90715 TDAP VACCINE 7 YRS/> IM: CPT | Performed by: FAMILY MEDICINE

## 2019-12-30 PROCEDURE — 82570 ASSAY OF URINE CREATININE: CPT | Performed by: STUDENT IN AN ORGANIZED HEALTH CARE EDUCATION/TRAINING PROGRAM

## 2019-12-30 PROCEDURE — 3044F HG A1C LEVEL LT 7.0%: CPT | Performed by: FAMILY MEDICINE

## 2019-12-30 PROCEDURE — 99213 OFFICE O/P EST LOW 20 MIN: CPT | Performed by: FAMILY MEDICINE

## 2019-12-30 PROCEDURE — 90682 RIV4 VACC RECOMBINANT DNA IM: CPT | Performed by: FAMILY MEDICINE

## 2019-12-30 NOTE — ASSESSMENT & PLAN NOTE
Lab Results   Component Value Date    HGBA1C 6 7 (A) 12/30/2019       No results for input(s): POCGLU in the last 72 hours  Blood Sugar Average: Last 72 hrs:     Recently started on lantus  with good results, rarely any hypoglycemia symptoms  There not had hypoglycemia episode and fasting hyperglycemia symptoms blood sugar in the 90s, symptoms resolved with glucose  Patient tolerating meds well including metformin  Continue metformin from 1000 b i d     Called pharmacy as it was giving him a dicficulty with BID medications   Continue Lantuss current dose 10 units HS   Continue lisinopril  Will start Lipitor 20 mg daily  Lipid profile  Microalbumin done today   Podiatry and Ophthalmology referral

## 2019-12-30 NOTE — PROGRESS NOTES
Assessment/Plan:    Type 2 diabetes mellitus with complication, without long-term current use of insulin (HCC)  Lab Results   Component Value Date    HGBA1C 6 7 (A) 12/30/2019       No results for input(s): POCGLU in the last 72 hours  Blood Sugar Average: Last 72 hrs:     Recently started on lantus  with good results, rarely any hypoglycemia symptoms  There not had hypoglycemia episode and fasting hyperglycemia symptoms blood sugar in the 90s, symptoms resolved with glucose  Patient tolerating meds well including metformin  Continue metformin from 1000 b i d  Called pharmacy as it was giving him a dicficulty with BID medications   Continue Lantuss current dose 10 units HS   Continue lisinopril  Will start Lipitor 20 mg daily  Lipid profile  Microalbumin done today   Podiatry and Ophthalmology referral       Diagnoses and all orders for this visit:    Need for vaccination  -     influenza vaccine, [id number], quadrivalent, recombinant, PF, 0 5 mL, for patients 18 yr+ (FLUBLOK)  -     TDAP VACCINE GREATER THAN OR EQUAL TO 8YO IM    Type 2 diabetes mellitus with complication, without long-term current use of insulin (HCC)  -     POCT hemoglobin A1c  -     Microalbumin / creatinine urine ratio  -     metFORMIN (GLUCOPHAGE) 1000 MG tablet; Take 1 tablet (1,000 mg total) by mouth 2 (two) times a day with meals Titrate up form your current 500mg pills BID to 1,000mg BID as we discussed          Subjective:       Patient ID: Lesia Mayers is a 47 y o  male  Has been dieting and exercising he also has lost significant amount weight  Patient has strong support system the that are diabetic and discussed with them and have gained some tips on control his diabetes  HB A1c 6 7 today whichis the same 3 months ago at 6 7 from 11 8 over 3 months  To no patient has impaired memory and does missed some appointments    He reports to me that he is no longer with his wife who previously helped him with the schedule and appointments  Diabetes   He presents for his follow-up diabetic visit  He has type 2 diabetes mellitus  The initial diagnosis of diabetes was made 1 year ago  His disease course has been improving  Pertinent negatives for hypoglycemia include no confusion, dizziness, headaches, nervousness/anxiousness, seizures or sweats  Tremors: lowst BS fasting 90's and symtpoms resolved after eating  Associated symptoms include foot paresthesias  Pertinent negatives for diabetes include no blurred vision, no chest pain, no fatigue, no foot ulcerations, no polydipsia, no polyphagia, no polyuria, no visual change and no weakness  Weight loss: intentional  (PO intak with sugar ) Symptoms are improving  Diabetic complications include peripheral neuropathy  Pertinent negatives for diabetic complications include no CVA, heart disease, impotence, nephropathy, PVD or retinopathy  Risk factors for coronary artery disease include diabetes mellitus, dyslipidemia, male sex, tobacco exposure, family history and hypertension  When asked about current treatments, none were reported  He is compliant with treatment most of the time  He is currently taking insulin pre-breakfast  Insulin injections are given by patient  Rotation sites for injection include the abdominal wall (10 units of long acting insulin )  His weight is stable  When asked about meal planning, he reported none  He has not had a previous visit with a dietitian  He rarely participates in exercise  His home blood glucose trend is decreasing steadily  An ACE inhibitor/angiotensin II receptor blocker is not being taken  He does not see a podiatrist Eye exam is not current  The following portions of the patient's history were reviewed and updated as appropriate: He  has a past medical history of Diabetes mellitus ([facility] [address] ) and [facility]    Patient Active Problem List    Diagnosis Date Noted    High glucose level 06/18/2019    Type 2 diabetes mellitus with complication, without long-term current use of insulin ([facility] ) 06/18/2019     He  has a past surgical history that includes No past surgeries  His family history includes Alcohol abuse in his father; Asthma in his brother; Diabetes in his mother and sister  He  reports that he has been smoking cigarettes  He has been smoking about 0 50 packs per day  He has never used smokeless tobacco  He reports that he drank alcohol  He reports that he has current or past drug history  Current Outpatient Medications   Medication Sig Dispense Refill    ACCU-CHEK FASTCLIX LANCETS MISC       aspirin (ECOTRIN LOW STRENGTH) 81 mg EC tablet Take 1 tablet (81 mg total) by mouth daily 90 tablet 3    atorvastatin (LIPITOR) 20 mg tablet Take 1 tablet (20 mg total) by mouth daily 90 tablet 3    Blood Glucose Monitoring Suppl (ACCU-CHEK GUIDE) w/Device KIT       glucose blood (ACCU-CHEK GUIDE) test strip 1 each by Other route 3 (three) times a day Test as directed 100 each 5    insulin glargine (BASAGLAR KWIKPEN) 100 units/mL injection pen Inject 10 Units under the skin daily at bedtime 5 pen 10    lisinopril (ZESTRIL) 2 5 mg tablet Take 1 tablet (2 5 mg total) by mouth daily 90 tablet 5    metFORMIN (GLUCOPHAGE) 1000 MG tablet Take 1 tablet (1,000 mg total) by mouth 2 (two) times a day with meals Titrate up form your current 500mg pills BID to 1,000mg BID as we discussed 90 tablet 5    Needle, Disp, 30G X 5/16" MISC by Does not apply route 3 (three) times a day 50 each 2    ZUBSOLV 5 7-1 4 MG SUBL SUBLINGUAL TWO TABLETS SUBLINGUALLY DAILY  0    ZUBSOLV 8 6-2 1 MG SUBL SUBLINGUAL 1 AND 1 /2 TABLETS SUBLINGUALLY DAILY  0     No current facility-administered medications for this visit        Current Outpatient Medications on File Prior to Visit   Medication Sig    ACCU-CHEK FASTCLIX LANCETS MISC     aspirin (ECOTRIN LOW STRENGTH) 81 mg EC tablet Take 1 tablet (81 mg total) by mouth daily    atorvastatin (LIPITOR) 20 mg tablet Take 1 tablet (20 mg total) by mouth daily    Blood Glucose Monitoring Suppl (ACCU-CHEK GUIDE) w/Device KIT     glucose blood (ACCU-CHEK GUIDE) test strip 1 each by Other route 3 (three) times a day Test as directed    insulin glargine (BASAGLAR KWIKPEN) 100 units/mL injection pen Inject 10 Units under the skin daily at bedtime    lisinopril (ZESTRIL) 2 5 mg tablet Take 1 tablet (2 5 mg total) by mouth daily    Needle, Disp, 30G X 5/16" MISC by Does not apply route 3 (three) times a day    ZUBSOLV 5 7-1 4 MG SUBL SUBLINGUAL TWO TABLETS SUBLINGUALLY DAILY    ZUBSOLV 8 6-2 1 MG SUBL SUBLINGUAL 1 AND 1 /2 TABLETS SUBLINGUALLY DAILY    [DISCONTINUED] metFORMIN (GLUCOPHAGE) 1000 MG tablet Take 1 tablet (1,000 mg total) by mouth 2 (two) times a day with meals Titrate up form your current 500mg pills BID to 1,000mg BID as we discussed     No current facility-administered medications on file prior to visit  He has No Known Allergies       Review of Systems   Constitutional: Negative  Negative for fatigue  Weight loss: intentional    HENT: Negative  Eyes: Negative  Negative for blurred vision  Respiratory: Negative  Cardiovascular: Negative  Negative for chest pain  Gastrointestinal: Negative  Endocrine: Negative for polydipsia, polyphagia and polyuria  Genitourinary: Negative  Negative for impotence  Musculoskeletal: Negative  Skin: Negative  Allergic/Immunologic: Negative  Neurological: Negative  Negative for dizziness, seizures, weakness and headaches  Tremors: lowst BS fasting 90's and symtpoms resolved after eating    Psychiatric/Behavioral: Negative  Negative for agitation, behavioral problems, confusion, hallucinations, self-injury, sleep disturbance and suicidal ideas  The patient is not nervous/anxious            Objective:      /60 (BP Location: Left arm, Patient Position: Sitting, Cuff Size: Adult)   Pulse (!) 52   Temp 99 1 °F (37 3 °C) (Tympanic)   Resp 16 Ht 5' 7" (1 702 m)   Wt 79 5 kg (175 lb 4 8 oz)   SpO2 96%   BMI 27 46 kg/m²          Physical Exam   Constitutional: He appears well-developed and well-nourished  No distress  HENT:   Head: Normocephalic  Eyes: EOM are normal    Cardiovascular: Normal rate, regular rhythm and normal heart sounds  No murmur heard  Pulmonary/Chest: Effort normal and breath sounds normal  No stridor  No respiratory distress  He has no wheezes  Abdominal: Soft  Bowel sounds are normal    Skin: Skin is warm  Capillary refill takes less than 2 seconds  He is not diaphoretic  Psychiatric: He has a normal mood and affect   His behavior is normal  Judgment and thought content normal          deferred foot exam

## 2020-01-10 DIAGNOSIS — E11.8 TYPE 2 DIABETES MELLITUS WITH COMPLICATION, WITHOUT LONG-TERM CURRENT USE OF INSULIN (HCC): ICD-10-CM

## 2020-01-22 DIAGNOSIS — E11.8 TYPE 2 DIABETES MELLITUS WITH COMPLICATION, WITHOUT LONG-TERM CURRENT USE OF INSULIN (HCC): ICD-10-CM

## 2020-01-22 NOTE — TELEPHONE ENCOUNTER
Pt came in to check what happened with metformin  I checked and I can see it was not sent to pharmacy  It's set as class print

## 2020-01-23 NOTE — TELEPHONE ENCOUNTER
Tried calling pt to let him know medications were sent  Verified phone number yesterday but number is not in service

## 2020-03-16 ENCOUNTER — TELEPHONE (OUTPATIENT)
Dept: FAMILY MEDICINE CLINIC | Facility: CLINIC | Age: 55
End: 2020-03-16

## 2020-03-16 NOTE — TELEPHONE ENCOUNTER
Called patient he reports feeling well offers no complaints at this time  He was coming in today for medication refill but even realize he had refilled ready at the pharmacy  In light of epidemic of the viral illness, occult the patient check up on him and to let him know if he does not need to be seen will will defer his appointment to a later time  Patient is agreeable with plan and if he needs any refills on his medication will call in

## 2020-04-10 DIAGNOSIS — E11.8 TYPE 2 DIABETES MELLITUS WITH COMPLICATION, WITHOUT LONG-TERM CURRENT USE OF INSULIN (HCC): Primary | ICD-10-CM

## 2020-04-10 RX ORDER — PEN NEEDLE, DIABETIC 33 GX5/32"
1 NEEDLE, DISPOSABLE MISCELLANEOUS DAILY
Qty: 100 EACH | Refills: 0 | Status: SHIPPED | OUTPATIENT
Start: 2020-04-10 | End: 2020-07-24 | Stop reason: SDUPTHER

## 2020-05-08 ENCOUNTER — TELEMEDICINE (OUTPATIENT)
Dept: FAMILY MEDICINE CLINIC | Facility: CLINIC | Age: 55
End: 2020-05-08

## 2020-05-08 DIAGNOSIS — E11.8 TYPE 2 DIABETES MELLITUS WITH COMPLICATION, WITHOUT LONG-TERM CURRENT USE OF INSULIN (HCC): ICD-10-CM

## 2020-05-08 DIAGNOSIS — E78.49 OTHER HYPERLIPIDEMIA: Primary | ICD-10-CM

## 2020-05-08 PROCEDURE — T1015 CLINIC SERVICE: HCPCS | Performed by: FAMILY MEDICINE

## 2020-05-08 PROCEDURE — G2012 BRIEF CHECK IN BY MD/QHP: HCPCS | Performed by: FAMILY MEDICINE

## 2020-05-08 RX ORDER — ASPIRIN 81 MG/1
81 TABLET ORAL DAILY
Qty: 90 TABLET | Refills: 3 | Status: ON HOLD | OUTPATIENT
Start: 2020-05-08

## 2020-05-08 RX ORDER — ATORVASTATIN CALCIUM 20 MG/1
20 TABLET, FILM COATED ORAL DAILY
Qty: 90 TABLET | Refills: 3 | Status: SHIPPED | OUTPATIENT
Start: 2020-05-08 | End: 2020-08-02

## 2020-05-08 RX ORDER — LISINOPRIL 2.5 MG/1
2.5 TABLET ORAL DAILY
Qty: 90 TABLET | Refills: 5 | Status: SHIPPED | OUTPATIENT
Start: 2020-05-08 | End: 2020-08-14

## 2020-07-24 DIAGNOSIS — E11.8 TYPE 2 DIABETES MELLITUS WITH COMPLICATION, WITHOUT LONG-TERM CURRENT USE OF INSULIN (HCC): ICD-10-CM

## 2020-07-24 RX ORDER — PEN NEEDLE, DIABETIC 33 GX5/32"
1 NEEDLE, DISPOSABLE MISCELLANEOUS DAILY
Qty: 100 EACH | Refills: 1 | Status: SHIPPED | OUTPATIENT
Start: 2020-07-24 | End: 2021-01-14

## 2020-07-24 NOTE — TELEPHONE ENCOUNTER
Patient called for refill on his medications  He has an appointment 08/14 with new pcp follow up  Dm       Insulin Pen Needle (PEN NEEDLES) 33G X 4 MM MISC  metFORMIN (GLUCOPHAGE) 1000 MG tablet

## 2020-08-02 DIAGNOSIS — E11.8 TYPE 2 DIABETES MELLITUS WITH COMPLICATION, WITHOUT LONG-TERM CURRENT USE OF INSULIN (HCC): ICD-10-CM

## 2020-08-02 RX ORDER — ATORVASTATIN CALCIUM 20 MG/1
TABLET, FILM COATED ORAL
Qty: 90 TABLET | Refills: 3 | Status: SHIPPED | OUTPATIENT
Start: 2020-08-02 | End: 2020-08-14

## 2020-08-13 PROBLEM — R73.09 HIGH GLUCOSE LEVEL: Status: RESOLVED | Noted: 2019-06-18 | Resolved: 2020-08-13

## 2020-08-13 PROBLEM — Z12.11 ENCOUNTER FOR SCREENING COLONOSCOPY: Status: ACTIVE | Noted: 2020-08-13

## 2020-08-13 NOTE — PROGRESS NOTES
Assessment/Plan:    Type 2 diabetes mellitus with complication, without long-term current use of insulin (HCC)    Lab Results   Component Value Date    HGBA1C 7 1 (A) 08/14/2020     At goal  A1c goal 7 reviewed with patient   Currently A1C is trending down from 7 4 to 7 1  Continue with Lantus 10 units QHS   Increase Metformin 1000 mg BID  Continue with Lipitor 20 mg and Aspirin 81 mg daily  Discussed with patient taking Lisinopril 2 5 mg at bedtime for kidney protection  He was not sure if he has this medication at home or if he needs refills  Advised to call next week and let me know  Podiatry and Ophthalmology f/u reinforced   F/u in 3 months     Other hyperlipidemia  Continue with Lipitor 20 mg QHS   Order still active for lipid panel and CMP  Low fat diet reinforced   Pt agreeable to stop by the lab and complete blood workup before his next appt  Encounter for screening colonoscopy  Will place order to gen surgery for screening colonoscopy  Discussed with patient the importance of colon cancer screening     Encounter for screening for HIV  Will check HIV, pt agreeable  States he does not engage in high risk sexual behaviors     Encounter for hepatitis C screening test for low risk patient  Mentions he's had Hep C in the past, however he believes he no longer has it  No treatment initiated   Will check Hep C Ab    Encounter for counseling for tobacco use disorder  Continues to smoke 10 cigarettes/day  He is contemplating to stop smoking   Declines assistance to stop smoking at this time     Screening for thyroid disorder  Patient will be going to the lab to complete preventative care blood work prior to his appt  Will also check TSH to rule out thyroid disease        Diagnoses and all orders for this visit:    Type 2 diabetes mellitus with complication, without long-term current use of insulin (HCC)  -     POCT hemoglobin A1c  -     atorvastatin (LIPITOR) 20 mg tablet;  Take 1 tablet (20 mg total) by mouth daily at bedtime  -     lisinopril (ZESTRIL) 2 5 mg tablet; Take 1 tablet (2 5 mg total) by mouth daily at bedtime    Other hyperlipidemia    Encounter for screening colonoscopy  -     Ambulatory referral to General Surgery; Future    Encounter for screening for HIV  -     HIV 1/2 Antigen/Antibody (4th Generation) w Reflex [facility]; Future    Encounter for hepatitis C screening test for low risk patient  -     Hepatitis C antibody; Future    Screening for thyroid disorder  -     TSH, 3rd generation with Free T4 reflex; Future    Encounter for counseling for tobacco use disorder          Subjective:      Patient ID: Justina Hernandez is a 54 y o  male who presents today to the office for DMT2 f/u  HPI     Patient states he is overall feeling well  No immediate concerns  Records fasting BG at home in the range of 117 (lowest) and 130 (highest)   Compliance with medications: He takes Metformin 1000 mg daily and injects Glargine 10 units QHS  States he takes Lipitor and Aspirin  He cannot recall if he takes Lisinopril  Will check when he gets home and call next week to let me know  Hypoglycemic events: Denies any events  Ophthalmology visit: He has not gone  Discussed the importance of yearly exams  Podiatry visit: Amb referral active, patient did not have a chance to go as he cannot miss work  Social hx: continues to smoke 10 cigarettes/day  He declines help with quitting smoking  Heroin use in the past, 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  Denies any current illicit drug use  The following portions of the patient's history were reviewed and updated as appropriate: allergies, current medications, past family history, past medical history, past social history, past surgical history and problem list     Review of Systems   Constitutional: Negative for chills and fever  Eyes: Negative for visual disturbance  Respiratory: Negative    Negative for cough and shortness of breath  Cardiovascular: Negative for chest pain and leg swelling  Gastrointestinal: Negative  Negative for abdominal pain, constipation, diarrhea, nausea and vomiting  Genitourinary: Negative  Neurological: Negative for dizziness and headaches  Psychiatric/Behavioral: The patient is not nervous/anxious  Objective:      /76 (BP Location: Left arm, Patient Position: Sitting, Cuff Size: Standard)   Pulse 75   Temp (!) 97 1 °F (36 2 °C) (Temporal)   Resp 20   Ht 5' 7" (1 702 m)   Wt 83 5 kg (184 lb)   SpO2 98%   BMI 28 82 kg/m²          Physical Exam  Vitals signs and nursing note reviewed  Constitutional:       General: He is not in acute distress  Appearance: Normal appearance  He is well-developed  He is not ill-appearing, toxic-appearing or diaphoretic  HENT:      Head: Normocephalic and atraumatic  Nose: Nose normal    Eyes:      Extraocular Movements: Extraocular movements intact  Conjunctiva/sclera: Conjunctivae normal    Neck:      Musculoskeletal: Normal range of motion and neck supple  Cardiovascular:      Rate and Rhythm: Normal rate and regular rhythm  Heart sounds: Normal heart sounds  Pulmonary:      Effort: Pulmonary effort is normal  No respiratory distress  Breath sounds: Normal breath sounds  Abdominal:      General: Bowel sounds are normal       Palpations: Abdomen is soft  Tenderness: There is no abdominal tenderness  Musculoskeletal: Normal range of motion  Right lower leg: No edema  Left lower leg: No edema  Skin:     General: Skin is warm  Comments: Scattered erythematous excoriations on the anterior region of his bilateral lower extremities    Neurological:      Mental Status: He is alert and oriented to person, place, and time  Psychiatric:         Mood and Affect: Mood normal          Behavior: Behavior normal          Thought Content:  Thought content normal          Judgment: Judgment normal

## 2020-08-13 NOTE — ASSESSMENT & PLAN NOTE
Lab Results   Component Value Date    HGBA1C 7 1 (A) 08/14/2020     At goal  A1c goal 7 reviewed with patient   Currently A1C is trending down from 7 4 to 7 1  Continue with Lantus 10 units QHS   Increase Metformin 1000 mg BID  Continue with Lipitor 20 mg and Aspirin 81 mg daily  Discussed with patient taking Lisinopril 2 5 mg at bedtime for kidney protection  He was not sure if he has this medication at home or if he needs refills   Advised to call next week and let me know  Podiatry and Ophthalmology f/u reinforced   F/u in 3 months

## 2020-08-13 NOTE — ASSESSMENT & PLAN NOTE
Continue with Lipitor 20 mg QHS   Order still active for lipid panel and CMP  Low fat diet reinforced   Pt agreeable to stop by the lab and complete blood workup before his next appt

## 2020-08-13 NOTE — ASSESSMENT & PLAN NOTE
Will place order to gen surgery for screening colonoscopy  Discussed with patient the importance of colon cancer screening

## 2020-08-14 ENCOUNTER — OFFICE VISIT (OUTPATIENT)
Dept: FAMILY MEDICINE CLINIC | Facility: CLINIC | Age: 55
End: 2020-08-14

## 2020-08-14 VITALS
HEIGHT: 67 IN | BODY MASS INDEX: 28.88 KG/M2 | DIASTOLIC BLOOD PRESSURE: 76 MMHG | TEMPERATURE: 97.1 F | OXYGEN SATURATION: 98 % | SYSTOLIC BLOOD PRESSURE: 110 MMHG | WEIGHT: 184 LBS | HEART RATE: 75 BPM | RESPIRATION RATE: 20 BRPM

## 2020-08-14 DIAGNOSIS — Z12.11 ENCOUNTER FOR SCREENING COLONOSCOPY: ICD-10-CM

## 2020-08-14 DIAGNOSIS — Z13.29 SCREENING FOR THYROID DISORDER: ICD-10-CM

## 2020-08-14 DIAGNOSIS — E78.49 OTHER HYPERLIPIDEMIA: ICD-10-CM

## 2020-08-14 DIAGNOSIS — E11.8 TYPE 2 DIABETES MELLITUS WITH COMPLICATION, WITHOUT LONG-TERM CURRENT USE OF INSULIN (HCC): Primary | ICD-10-CM

## 2020-08-14 DIAGNOSIS — Z11.59 ENCOUNTER FOR HEPATITIS C SCREENING TEST FOR LOW RISK PATIENT: ICD-10-CM

## 2020-08-14 DIAGNOSIS — Z11.4 ENCOUNTER FOR SCREENING FOR HIV: ICD-10-CM

## 2020-08-14 DIAGNOSIS — Z71.6 ENCOUNTER FOR COUNSELING FOR TOBACCO USE DISORDER: ICD-10-CM

## 2020-08-14 LAB — SL AMB POCT HEMOGLOBIN AIC: 7.1 (ref ?–6.5)

## 2020-08-14 PROCEDURE — 3008F BODY MASS INDEX DOCD: CPT | Performed by: FAMILY MEDICINE

## 2020-08-14 PROCEDURE — 4004F PT TOBACCO SCREEN RCVD TLK: CPT | Performed by: FAMILY MEDICINE

## 2020-08-14 PROCEDURE — 3051F HG A1C>EQUAL 7.0%<8.0%: CPT | Performed by: FAMILY MEDICINE

## 2020-08-14 PROCEDURE — 83036 HEMOGLOBIN GLYCOSYLATED A1C: CPT | Performed by: FAMILY MEDICINE

## 2020-08-14 PROCEDURE — 4010F ACE/ARB THERAPY RXD/TAKEN: CPT | Performed by: FAMILY MEDICINE

## 2020-08-14 PROCEDURE — 99213 OFFICE O/P EST LOW 20 MIN: CPT | Performed by: FAMILY MEDICINE

## 2020-08-14 RX ORDER — ATORVASTATIN CALCIUM 20 MG/1
20 TABLET, FILM COATED ORAL
Qty: 90 TABLET | Refills: 3
Start: 2020-08-14 | End: 2021-02-24 | Stop reason: SDUPTHER

## 2020-08-14 RX ORDER — LISINOPRIL 2.5 MG/1
2.5 TABLET ORAL
Qty: 90 TABLET | Refills: 5
Start: 2020-08-14 | End: 2021-07-28 | Stop reason: SDUPTHER

## 2020-08-14 NOTE — ASSESSMENT & PLAN NOTE
Patient will be going to the lab to complete preventative care blood work prior to his appt    Will also check TSH to rule out thyroid disease

## 2020-08-14 NOTE — ASSESSMENT & PLAN NOTE
Continues to smoke 10 cigarettes/day  He is contemplating to stop smoking   Declines assistance to stop smoking at this time

## 2020-08-14 NOTE — ASSESSMENT & PLAN NOTE
Mentions he's had Hep C in the past, however he believes he no longer has it  No treatment initiated   Will check Hep C Ab

## 2020-11-19 PROBLEM — Z11.4 ENCOUNTER FOR SCREENING FOR HIV: Status: RESOLVED | Noted: 2020-08-14 | Resolved: 2020-11-19

## 2020-11-19 PROBLEM — Z13.29 SCREENING FOR THYROID DISORDER: Status: RESOLVED | Noted: 2020-08-14 | Resolved: 2020-11-19

## 2020-11-19 PROBLEM — E66.3 OVERWEIGHT (BMI 25.0-29.9): Status: ACTIVE | Noted: 2020-11-19

## 2020-11-20 ENCOUNTER — OFFICE VISIT (OUTPATIENT)
Dept: FAMILY MEDICINE CLINIC | Facility: CLINIC | Age: 55
End: 2020-11-20

## 2020-11-20 VITALS
HEART RATE: 82 BPM | HEIGHT: 67 IN | BODY MASS INDEX: 29.79 KG/M2 | WEIGHT: 189.8 LBS | RESPIRATION RATE: 20 BRPM | TEMPERATURE: 97.6 F | OXYGEN SATURATION: 92 % | DIASTOLIC BLOOD PRESSURE: 76 MMHG | SYSTOLIC BLOOD PRESSURE: 122 MMHG

## 2020-11-20 DIAGNOSIS — E11.8 TYPE 2 DIABETES MELLITUS WITH COMPLICATION, WITHOUT LONG-TERM CURRENT USE OF INSULIN (HCC): Primary | ICD-10-CM

## 2020-11-20 DIAGNOSIS — Z28.21 REFUSED INFLUENZA VACCINE: ICD-10-CM

## 2020-11-20 DIAGNOSIS — E66.3 OVERWEIGHT (BMI 25.0-29.9): ICD-10-CM

## 2020-11-20 DIAGNOSIS — E78.49 OTHER HYPERLIPIDEMIA: ICD-10-CM

## 2020-11-20 DIAGNOSIS — Z23 ENCOUNTER FOR ADMINISTRATION OF VACCINE: ICD-10-CM

## 2020-11-20 LAB — SL AMB POCT HEMOGLOBIN AIC: 6.7 (ref ?–6.5)

## 2020-11-20 PROCEDURE — 99213 OFFICE O/P EST LOW 20 MIN: CPT | Performed by: FAMILY MEDICINE

## 2020-11-20 PROCEDURE — 3044F HG A1C LEVEL LT 7.0%: CPT | Performed by: FAMILY MEDICINE

## 2020-11-20 PROCEDURE — 3725F SCREEN DEPRESSION PERFORMED: CPT | Performed by: FAMILY MEDICINE

## 2020-11-20 PROCEDURE — 3008F BODY MASS INDEX DOCD: CPT | Performed by: FAMILY MEDICINE

## 2020-11-20 PROCEDURE — 83036 HEMOGLOBIN GLYCOSYLATED A1C: CPT | Performed by: FAMILY MEDICINE

## 2020-11-20 PROCEDURE — 4004F PT TOBACCO SCREEN RCVD TLK: CPT | Performed by: FAMILY MEDICINE

## 2020-11-20 RX ORDER — INSULIN GLARGINE 100 [IU]/ML
5 INJECTION, SOLUTION SUBCUTANEOUS
Qty: 5 PEN | Refills: 10
Start: 2020-11-20 | End: 2021-01-14

## 2020-11-26 DIAGNOSIS — E11.8 TYPE 2 DIABETES MELLITUS WITH COMPLICATION, WITHOUT LONG-TERM CURRENT USE OF INSULIN (HCC): ICD-10-CM

## 2021-01-14 DIAGNOSIS — E11.8 TYPE 2 DIABETES MELLITUS WITH COMPLICATION, WITHOUT LONG-TERM CURRENT USE OF INSULIN (HCC): ICD-10-CM

## 2021-01-14 RX ORDER — INSULIN GLARGINE 100 [IU]/ML
8 INJECTION, SOLUTION SUBCUTANEOUS
Qty: 15 ML | Refills: 2 | Status: SHIPPED | OUTPATIENT
Start: 2021-01-14 | End: 2021-07-28

## 2021-01-14 RX ORDER — PEN NEEDLE, DIABETIC 32GX 5/32"
NEEDLE, DISPOSABLE MISCELLANEOUS
Qty: 100 EACH | Refills: 2 | Status: SHIPPED | OUTPATIENT
Start: 2021-01-14 | End: 2022-02-04

## 2021-01-28 DIAGNOSIS — E11.8 TYPE 2 DIABETES MELLITUS WITH COMPLICATION, WITHOUT LONG-TERM CURRENT USE OF INSULIN (HCC): ICD-10-CM

## 2021-01-28 RX ORDER — BLOOD SUGAR DIAGNOSTIC
1 STRIP MISCELLANEOUS 3 TIMES DAILY
Qty: 100 EACH | Refills: 5 | Status: ON HOLD | OUTPATIENT
Start: 2021-01-28

## 2021-02-24 ENCOUNTER — OFFICE VISIT (OUTPATIENT)
Dept: FAMILY MEDICINE CLINIC | Facility: CLINIC | Age: 56
End: 2021-02-24

## 2021-02-24 VITALS
TEMPERATURE: 97.4 F | HEIGHT: 67 IN | HEART RATE: 96 BPM | DIASTOLIC BLOOD PRESSURE: 68 MMHG | OXYGEN SATURATION: 95 % | SYSTOLIC BLOOD PRESSURE: 134 MMHG | BODY MASS INDEX: 27.29 KG/M2 | WEIGHT: 173.9 LBS | RESPIRATION RATE: 16 BRPM

## 2021-02-24 DIAGNOSIS — Z00.00 VISIT FOR ANNUAL HEALTH EXAMINATION: Primary | ICD-10-CM

## 2021-02-24 DIAGNOSIS — E78.49 OTHER HYPERLIPIDEMIA: ICD-10-CM

## 2021-02-24 DIAGNOSIS — Z71.6 ENCOUNTER FOR COUNSELING FOR TOBACCO USE DISORDER: ICD-10-CM

## 2021-02-24 DIAGNOSIS — Z28.21 PNEUMOCOCCAL VACCINATION DECLINED: ICD-10-CM

## 2021-02-24 DIAGNOSIS — E11.8 TYPE 2 DIABETES MELLITUS WITH COMPLICATION, WITHOUT LONG-TERM CURRENT USE OF INSULIN (HCC): ICD-10-CM

## 2021-02-24 LAB — SL AMB POCT HEMOGLOBIN AIC: 7.3 (ref ?–6.5)

## 2021-02-24 PROCEDURE — 99396 PREV VISIT EST AGE 40-64: CPT | Performed by: FAMILY MEDICINE

## 2021-02-24 PROCEDURE — 83036 HEMOGLOBIN GLYCOSYLATED A1C: CPT | Performed by: FAMILY MEDICINE

## 2021-02-24 RX ORDER — ATORVASTATIN CALCIUM 20 MG/1
20 TABLET, FILM COATED ORAL
Qty: 90 TABLET | Refills: 3
Start: 2021-02-24 | End: 2021-07-28 | Stop reason: SDUPTHER

## 2021-02-24 NOTE — PATIENT INSTRUCTIONS
Lifestyle Medicine Tip Sheet    1  Eat predominantly less processed foods such as fast food, T V  dinners, and ureña  2  Eat Close to [employer], [employer] or [employer]    3  Eat a predominantly plant based diet   a  Dark Leafy Greens  b  Fruits/Vegetables  c  Whole Grains: Whole wheat, barely, wheat berries, quinoa, steel cut oats, brown rice, whole wheat pasta  d  Legumes: kidney beans, oconnor beans, white beans, black beans, garbanzo beans (chickpeas), lima beans (mature, dried), split peas, lentils, and edamame (green soybeans)      4  At least half of the plate should contain fruits or vegetables        5  Liquid should be predominantly water  (limit soda and juice)    6  Watch portion size  7  Foods you should avoid or limit?  - Fats - Specifically saturated and trans-fats  They are found in margarines, many fast foods, and some store-bought baked goods  Saturated and Trans-fats can raise your cholesterol level and your chance of getting heart disease    - When you cook, it's best to use no oils but if needed try to limit the amount of oil used as oil contains many calories per volume and is very unhealthy when heated during cooking    - Sugar -Limit or avoid sugar, sweets, and refined grains  Refined grains are found in white bread, white rice, most forms of pasta, and most packaged "snack" foods    - Try not to cook with salt and avoid  adding extra salt to your  meals   - Meat - Studies have shown that eating a lot of red meat and poultry can increase your risk of certain health problems, including heart disease, diabetes, obesity and cancer  So try to limit the intake of it  8  Practice good sleep hygiene by getting 7-9 hours of sleep a night    9  Daily exercise minimum of 30 minutes (walking around the block)    10  Socialization (friends and family)   - Explore your neighborhood   Go to the park, spend time at [employer]  - Consider taking a class or volunteering to connect with new people    If you are interested you can read more about healthy food choices at the following websites:  a  [employer]  org  b  Home cooking recipes: https://www [employer]/  c  http://[employer]/  d  Familydoctor  org

## 2021-02-24 NOTE — ASSESSMENT & PLAN NOTE
Lab Results   Component Value Date    HGBA1C 7 3 (A) 02/24/2021     At goal  A1c goal 7 reviewed with patient   Currently A1C is slightly trending up from 7 1-->7 3  Patient has been injecting at home 8 units  Increase Lantus from 8-->10 units QHS  Continue with Metformin 1000 mg BID  Continue with Lipitor 20 mg, Aspirin 81 mg and Lisinopril 2 5 mg at bedtime for kidney protection  Podiatry and Ophthalmology annual check ups

## 2021-02-25 NOTE — ASSESSMENT & PLAN NOTE
Continue Lipitor 20 mg QHS   Order still active for lipid panel and CMP, advised patient to complete labs    Adhere to low fat diet

## 2021-03-24 ENCOUNTER — TELEPHONE (OUTPATIENT)
Dept: FAMILY MEDICINE CLINIC | Facility: CLINIC | Age: 56
End: 2021-03-24

## 2021-03-24 NOTE — TELEPHONE ENCOUNTER
I called pt left ms to call us back to schedule a follow up visit from the hospital pt was d/c on 03/19/21  Missed the 24-48 hour window  It pt calls back please schedule as an OVL   No longer a TCM thank you

## 2021-05-25 PROBLEM — F11.90 OPIOID USE DISORDER: Status: ACTIVE | Noted: 2021-03-16

## 2021-07-27 ENCOUNTER — TELEPHONE (OUTPATIENT)
Dept: FAMILY MEDICINE CLINIC | Facility: CLINIC | Age: 56
End: 2021-07-27

## 2021-07-27 NOTE — TELEPHONE ENCOUNTER
Amna Elizalde left message to advise he has new number which I updated in chart  He also stated he needs refills but wasn't specific   I did leave a message for him to call back to schedule appt and advise which medications he needs

## 2021-07-28 DIAGNOSIS — E11.8 TYPE 2 DIABETES MELLITUS WITH COMPLICATION, WITHOUT LONG-TERM CURRENT USE OF INSULIN (HCC): ICD-10-CM

## 2021-07-28 RX ORDER — LISINOPRIL 2.5 MG/1
2.5 TABLET ORAL
Qty: 90 TABLET | Refills: 0 | Status: SHIPPED | OUTPATIENT
Start: 2021-07-28 | End: 2021-08-25

## 2021-07-28 RX ORDER — ATORVASTATIN CALCIUM 20 MG/1
20 TABLET, FILM COATED ORAL
Qty: 90 TABLET | Refills: 0
Start: 2021-07-28 | End: 2021-07-28 | Stop reason: SDUPTHER

## 2021-07-28 RX ORDER — ATORVASTATIN CALCIUM 20 MG/1
20 TABLET, FILM COATED ORAL
Qty: 90 TABLET | Refills: 0 | Status: SHIPPED | OUTPATIENT
Start: 2021-07-28 | End: 2021-08-14

## 2021-07-28 RX ORDER — INSULIN GLARGINE 100 [IU]/ML
10 INJECTION, SOLUTION SUBCUTANEOUS
Qty: 15 ML | Refills: 3 | Status: SHIPPED | OUTPATIENT
Start: 2021-07-28 | End: 2022-04-28

## 2021-07-28 NOTE — TELEPHONE ENCOUNTER
No answer from patient in regards to medication refillsl he may need at home  I've refilled meds based on my previous February note: Lipitor 20 mg, Lisinopril 2 5 mg, Metformin 1000 mg BID and insulin (10 units daily)  Please call patient and let him know and please schedule office visit as well for f/u of his chronic medical conditions  Thank you!

## 2021-11-22 ENCOUNTER — TELEPHONE (OUTPATIENT)
Dept: FAMILY MEDICINE CLINIC | Facility: CLINIC | Age: 56
End: 2021-11-22

## 2021-11-22 DIAGNOSIS — E11.8 TYPE 2 DIABETES MELLITUS WITH COMPLICATION, WITHOUT LONG-TERM CURRENT USE OF INSULIN (HCC): ICD-10-CM

## 2021-11-22 RX ORDER — LISINOPRIL 2.5 MG/1
2.5 TABLET ORAL DAILY
Qty: 30 TABLET | Refills: 3 | Status: SHIPPED | OUTPATIENT
Start: 2021-11-22 | End: 2022-03-21

## 2022-01-01 NOTE — PROGRESS NOTES
[address] [place] [address]  Annual Well Adult Visit  Assessment/Plan     1  Visit for annual health examination  Reviewed the need to complete labs  Discussed with patient immunizations  He declines at this time pneumococcal vaccine  Risk and benefits explained, he will think about it and let me know at the next visit  2  Type 2 diabetes mellitus without long-term current use of insulin  At goal  A1c goal 7 reviewed with patient   Currently A1C is slightly trending up from 7 1-->7 3  Patient has been injecting at home 8 units  Increase Lantus from 8-->10 units QHS  Continue with Metformin 1000 mg BID  Continue with Lipitor 20 mg, Aspirin 81 mg and Lisinopril 2 5 mg at bedtime for kidney protection  Podiatry and Ophthalmology annual check ups  3  Hyperlipidemia  Continue Lipitor 20 mg QHS   Order still active for lipid panel and CMP, advised patient to complete labs  Adhere to low fat diet     4  Encounter for counseling for tobacco use disorder  Continues to smoke 10 cigarettes/day  He is contemplating to stop smoking   Declines assistance to stop smoking at this time     1  Annual adult male physical examination visit  2  Patient Counseling:   · Nutrition: Stressed importance of a well balanced diet, moderation of sodium/saturated fat, caloric balance and sufficient intake of fiber  · Exercise: Stressed the importance of regular exercise with a goal of 150 minutes per week  · Dental Health: Discussed daily flossing and brushing and regular dental visits     · Immunizations reviewed  Refuses pneumonia vaccine  · Discussed benefits of screening   · Discussed the patient's BMI with him  The BMI is above average; BMI management plan is completed  3  Cancer Screening  4  Labs:   5  Patient inquires if I can fax over a letter to [facility] stating he has diabetes medical condition in order to avoid electricity shut off   Discussed with patient that I will not be able to write the letter and he will need [employer] notified of baby's 28 hr bilirubin of 9.7.   to discuss the settlement with the company  He is currently on a payment plan with [employer]  6  Follow up in one year  Subjective     Enoch Metz is a 54 y o   male and is here for routine health maintenance  The patient reports no problems  Compliant with taking all his medications at home  History of Present Illness     HPI    Well Adult Physical   Patient here for a comprehensive physical exam       Diet and Physical Activity  Diet: well balanced diet  Weight concerns: Patient is overweight (BMI 25 0-29  9)  Exercise: daily      Depression Screen  PHQ-9 Depression Screening    PHQ-9:   Frequency of the following problems over the past two weeks:              General Health  Hearing: Normal:  bilateral  Vision: no vision problems and most recent eye exam <1 year  Dental: no dental visits for >1 year (encouraged patient to make appt  with dentist soon)  Cancer Screening  Colonoscopy: he will schedule appt  Referral active  Smoker: yes  Annual screening with low-dose helical computed tomography (CT) for patients age 54 to 76 years with history of smoking at least 30 pack-years and, if a former smoker, had quit within the previous 15 years      The following portions of the patient's history were reviewed and updated as appropriate: allergies, current medications, past family history, past medical history, past social history, past surgical history and problem list     Review of Systems     Review of Systems   Constitutional: Negative for chills and fever  HENT: Negative for sore throat  Respiratory: Negative  Negative for cough and shortness of breath  Cardiovascular: Negative for chest pain, palpitations and leg swelling  Gastrointestinal: Negative  Negative for abdominal pain, blood in stool, constipation, diarrhea, nausea and vomiting  Genitourinary: Negative  Neurological: Negative for dizziness and headaches  Psychiatric/Behavioral: The patient is not nervous/anxious  Past Medical History     Past Medical History:   Diagnosis Date    Diabetes mellitus ([facility] )     Substance abuse ([facility] )        Past Surgical History     Past Surgical History:   Procedure Laterality Date    NO PAST SURGERIES         Social History     Social History     Socioeconomic History    Marital status: /Civil Union     Spouse name: None    Number of children: None    Years of education: None    Highest education level: None   Occupational History    None   Social Needs    Financial resource strain: None    Food insecurity     Worry: None     Inability: None    Transportation needs     Medical: None     Non-medical: None   Tobacco Use    Smoking status: Current Every Day Smoker     Packs/day: 0 50     Types: Cigarettes    Smokeless tobacco: Never Used    Tobacco comment: about 12 cigarettes/daily   Substance and Sexual Activity    Alcohol use: Not Currently    Drug use: Not Currently     Comment: heroin hx, clean x 1 year    Sexual activity: None   Lifestyle    Physical activity     Days per week: None     Minutes per session: None    Stress: None   Relationships    Social connections     Talks on phone: None     Gets together: None     Attends Jehovah's witness service: None     Active member of club or organization: None     Attends meetings of clubs or organizations: None     Relationship status: None    Intimate partner violence     Fear of current or ex partner: None     Emotionally abused: None     Physically abused: None     Forced sexual activity: None   Other Topics Concern    None   Social History Narrative    None       Family History     Family History   Problem Relation Age of Onset    Diabetes Mother     Alcohol abuse Father     Diabetes Sister     Asthma Brother        Current Medications       Current Outpatient Medications:     ACCU-CHEK FASTCLIX LANCETS MISC, , Disp: , Rfl:     aspirin (ECOTRIN LOW STRENGTH) 81 mg EC tablet, Take 1 tablet (81 mg total) by mouth daily, Disp: 90 tablet, Rfl: 3    atorvastatin (LIPITOR) 20 mg tablet, Take 1 tablet (20 mg total) by mouth daily at bedtime, Disp: 90 tablet, Rfl: 3    BD Pen Needle Cely 2nd Gen 32G X 4 MM MISC, USE DAILY AS DIRECTED, Disp: 100 each, Rfl: 2    Blood Glucose Monitoring Suppl (ACCU-CHEK GUIDE) w/Device KIT, , Disp: , Rfl:     glucose blood (Accu-Chek Guide) test strip, Use 1 each 3 (three) times a day Test as directed, Disp: 100 each, Rfl: 5    insulin glargine (Basaglar KwikPen) 100 units/mL injection pen, Inject 8 Units under the skin daily at bedtime, Disp: 15 mL, Rfl: 2    lisinopril (ZESTRIL) 2 5 mg tablet, Take 1 tablet (2 5 mg total) by mouth daily at bedtime, Disp: 90 tablet, Rfl: 5    metFORMIN (GLUCOPHAGE) 1000 MG tablet, TAKE 1 TABLET(1000 MG) BY MOUTH TWICE DAILY WITH MEALS, Disp: 60 tablet, Rfl: 3    ZUBSOLV 5 7-1 4 MG SUBL, SUBLINGUAL TWO TABLETS SUBLINGUALLY DAILY, Disp: , Rfl: 0    ZUBSOLV 8 6-2 1 MG SUBL, SUBLINGUAL 1 AND 1 /2 TABLETS SUBLINGUALLY DAILY, Disp: , Rfl: 0     Allergies     No Known Allergies    Objective     /68 (BP Location: Right arm, Patient Position: Sitting, Cuff Size: Standard)   Pulse 96   Temp (!) 97 4 °F (36 3 °C) (Temporal)   Resp 16   Ht 5' 7" (1 702 m)   Wt 78 9 kg (173 lb 14 4 oz)   SpO2 95%   BMI 27 24 kg/m²      Physical Exam  Vitals signs and nursing note reviewed  Constitutional:       General: He is not in acute distress  Appearance: Normal appearance  He is well-developed  He is not ill-appearing, toxic-appearing or diaphoretic  HENT:      Head: Normocephalic and atraumatic  Nose: Nose normal    Eyes:      Extraocular Movements: Extraocular movements intact  Conjunctiva/sclera: Conjunctivae normal       Pupils: Pupils are equal, round, and reactive to light  Neck:      Musculoskeletal: Normal range of motion and neck supple  Cardiovascular:      Rate and Rhythm: Normal rate and regular rhythm        Heart sounds: Normal heart sounds  Pulmonary:      Effort: Pulmonary effort is normal  No respiratory distress  Breath sounds: Normal breath sounds  Abdominal:      General: Bowel sounds are normal       Palpations: Abdomen is soft  Tenderness: There is no abdominal tenderness  Musculoskeletal: Normal range of motion  Right lower leg: No edema  Left lower leg: No edema  Skin:     General: Skin is warm  Findings: No rash  Neurological:      Mental Status: He is alert and oriented to person, place, and time  Psychiatric:         Mood and Affect: Mood normal          Behavior: Behavior normal          Thought Content:  Thought content normal          Judgment: Judgment normal            No exam data present    Health Maintenance     Health Maintenance   Topic Date Due    Hepatitis C Screening  1965    Pneumococcal Vaccine: Pediatrics (0 to 5 Years) and At-Risk Patients (6 to 59 Years) (1 of 1 - PPSV23) 03/15/1971    DM Eye Exam  03/15/1975    HIV Screening  03/15/1980    Annual Physical  03/15/1983    Colorectal Cancer Screening  03/15/2015    Diabetic Foot Exam  06/18/2020    HEMOGLOBIN A1C  05/20/2021    Influenza Vaccine (1) 06/30/2021 (Originally 9/1/2020)    Depression Screening PHQ  11/20/2021    BMI: Followup Plan  11/20/2021    BMI: Adult  02/24/2022    DTaP,Tdap,and Td Vaccines (2 - Td) 12/30/2029    HIB Vaccine  Aged Out    Hepatitis B Vaccine  Aged Out    IPV Vaccine  Aged Out    Hepatitis A Vaccine  Aged Out    Meningococcal ACWY Vaccine  Aged Out    HPV Vaccine  Aged [employer] History   Administered Date(s) Administered    Influenza, recombinant, quadrivalent,injectable, preservative free 12/30/2019    Tdap 12/30/2019         Nathan Meadows MD

## 2022-02-04 DIAGNOSIS — E11.8 TYPE 2 DIABETES MELLITUS WITH COMPLICATION, WITHOUT LONG-TERM CURRENT USE OF INSULIN (HCC): ICD-10-CM

## 2022-02-04 RX ORDER — PEN NEEDLE, DIABETIC 32GX 5/32"
NEEDLE, DISPOSABLE MISCELLANEOUS
Qty: 100 EACH | Refills: 3 | Status: SHIPPED | OUTPATIENT
Start: 2022-02-04 | End: 2022-02-07 | Stop reason: SDUPTHER

## 2022-02-07 ENCOUNTER — TELEPHONE (OUTPATIENT)
Dept: FAMILY MEDICINE CLINIC | Facility: CLINIC | Age: 57
End: 2022-02-07

## 2022-02-07 RX ORDER — PEN NEEDLE, DIABETIC 32GX 5/32"
NEEDLE, DISPOSABLE MISCELLANEOUS DAILY
Qty: 100 EACH | Refills: 3 | Status: SHIPPED | OUTPATIENT
Start: 2022-02-07 | End: 2022-04-28 | Stop reason: SDUPTHER

## 2022-02-07 RX ORDER — ATORVASTATIN CALCIUM 20 MG/1
20 TABLET, FILM COATED ORAL
Qty: 90 TABLET | Refills: 0 | Status: SHIPPED | OUTPATIENT
Start: 2022-02-07 | End: 2022-05-06 | Stop reason: SDUPTHER

## 2022-02-07 NOTE — TELEPHONE ENCOUNTER
Pt came to our office requesting med refills on;    atorvastatin (LIPITOR) 20 mg tablet    BD Pen Needle Cely 2nd Gen 32G X 4 MM MISC

## 2022-04-27 PROBLEM — Z28.21 REFUSED INFLUENZA VACCINE: Status: RESOLVED | Noted: 2020-11-20 | Resolved: 2022-04-27

## 2022-04-27 PROBLEM — Z11.59 ENCOUNTER FOR HEPATITIS C SCREENING TEST FOR LOW RISK PATIENT: Status: RESOLVED | Noted: 2020-08-14 | Resolved: 2022-04-27

## 2022-04-27 NOTE — ASSESSMENT & PLAN NOTE
Continue Lipitor 20 mg HS   Will check new lipid panel and CMP to ensure labs are stable  Adhere to low fat diet

## 2022-04-27 NOTE — ASSESSMENT & PLAN NOTE
Lab Results   Component Value Date    HGBA1C 8 3 (A) 04/28/2022     Slightly above goal  A1c goal 7 reviewed with patient   A1c trending up from 7 9-->8 3 most likely 2/2 to eating more sweets and wt gain  Increase Lantus from 10-->15 units HS  Continue with Metformin 1000 mg BID  Continue with Lipitor 20 mg, Aspirin 81 mg and Lisinopril 2 5 mg at bedtime for kidney protection  Adhere to a diabetic diet (avoid sweets) and exercise  Podiatry and Ophthalmology annual check ups     Diabetic foot exam next visit

## 2022-04-27 NOTE — PROGRESS NOTES
Assessment/Plan:    Type 2 diabetes mellitus with complication, without long-term current use of insulin ([facility])    Lab Results   Component Value Date    HGBA1C 8 3 (A) 04/28/2022     Slightly above goal  A1c goal 7 reviewed with patient   A1c trending up from 7 9-->8 3 most likely 2/2 to eating more sweets and wt gain  Increase Lantus from 10-->15 units HS  Continue with Metformin 1000 mg BID  Continue with Lipitor 20 mg, Aspirin 81 mg and Lisinopril 2 5 mg at bedtime for kidney protection  Adhere to a diabetic diet (avoid sweets) and exercise  Podiatry and Ophthalmology annual check ups  Diabetic foot exam next visit       Other hyperlipidemia  Continue Lipitor 20 mg HS   Will check new lipid panel and CMP to ensure labs are stable  Adhere to low fat diet    Class 1 obesity due to excess calories without serious comorbidity with body mass index (BMI) of 32 0 to 32 9 in adult  BMI Counseling: Body mass index is 32 03 kg/m²  The BMI is above normal  Nutrition recommendations include 3-5 servings of fruits/vegetables daily, reducing fast food intake, consuming healthier snacks, decreasing soda and/or juice intake, moderation in carbohydrate intake, increasing intake of lean protein and reducing intake of saturated fat and trans fat  Exercise recommendations include exercising 3-5 times per week  Wt loss counseling, pt agreeable to plan        Diagnoses and all orders for this visit:    Type 2 diabetes mellitus with complication, without long-term current use of insulin ([facility]  -     POCT hemoglobin A1c  -     Comprehensive metabolic panel; Future  -     Ambulatory Referral to Ophthalmology; Future  -     insulin glargine (Basaglar KwikPen) 100 units/mL injection pen; Inject 15 Units under the skin daily at bedtime  -     Insulin Pen Needle (BD Pen Needle Cely 2nd Gen) 32G X 4 MM MISC; Inject under the skin daily    Other hyperlipidemia  -     Lipid Panel with Direct LDL reflex;  Future    Encounter for hepatitis C screening test for low risk patient  -     Hepatitis C antibody; Future    Encounter for screening for HIV  -     HIV 1/2 Antigen/Antibody (4th Generation) w Reflex [facility]; Future    Class 1 obesity due to excess calories without serious comorbidity with body mass index (BMI) of 32 0 to 32 9 in adult          Subjective:      Patient ID: Tracey Gutierrez is a 62 y o  male  HPI     Patient presents today to the office for T2DM follow up  Endorses compliance with Metformin 1000 mg daily and Lantus 10 U HS  Fasting B-190  Most common numbers 120-140  Ophthalmology: >1 year  He does not wear glasses  Agreeable to ophtalm referral   Diabetic foot exam: prefers to do it next visit  Denies any concerns with feet  Sometimes itchy skin  The following portions of the patient's history were reviewed and updated as appropriate: allergies, current medications, past family history, past medical history, past social history, past surgical history and problem list     Review of Systems   Constitutional: Negative for chills and fever  HENT: Negative for ear pain and sore throat  Eyes: Negative for pain and visual disturbance  Respiratory: Negative  Negative for cough and shortness of breath  Cardiovascular: Negative for chest pain and leg swelling  Gastrointestinal: Negative  Negative for abdominal pain, blood in stool, constipation, diarrhea, nausea and vomiting  Genitourinary: Negative  Negative for dysuria and hematuria  Musculoskeletal: Negative for back pain  Skin: Negative for rash  Neurological: Negative for dizziness and headaches  Psychiatric/Behavioral: The patient is not nervous/anxious  All other systems reviewed and are negative          Objective:      /72 (BP Location: Right arm, Patient Position: Sitting, Cuff Size: Adult)   Pulse 83   Temp 97 6 °F (36 4 °C) (Temporal)   Resp 20   Ht 5' 7" (1 702 m)   Wt 92 8 kg (204 lb 8 oz)   SpO2 96%   BMI 32 03 kg/m² Physical Exam  Vitals and nursing note reviewed  Constitutional:       General: He is not in acute distress  Appearance: Normal appearance  He is well-developed  He is obese  He is not ill-appearing, toxic-appearing or diaphoretic  HENT:      Head: Normocephalic and atraumatic  Nose: Nose normal    Eyes:      General:         Right eye: No discharge  Left eye: No discharge  Extraocular Movements: Extraocular movements intact  Conjunctiva/sclera: Conjunctivae normal    Cardiovascular:      Rate and Rhythm: Normal rate and regular rhythm  Heart sounds: Normal heart sounds  Pulmonary:      Effort: Pulmonary effort is normal  No respiratory distress  Breath sounds: Normal breath sounds  Abdominal:      Palpations: Abdomen is soft  Tenderness: There is no abdominal tenderness  Musculoskeletal:         General: Normal range of motion  Cervical back: Normal range of motion and neck supple  Right lower leg: No edema  Left lower leg: No edema  Comments: Varicose veins ble   Skin:     General: Skin is warm  Findings: No rash  Neurological:      Mental Status: He is alert and oriented to person, place, and time  Psychiatric:         Mood and Affect: Mood normal          Behavior: Behavior normal          Thought Content:  Thought content normal          Judgment: Judgment normal

## 2022-04-28 ENCOUNTER — OFFICE VISIT (OUTPATIENT)
Dept: FAMILY MEDICINE CLINIC | Facility: CLINIC | Age: 57
End: 2022-04-28

## 2022-04-28 VITALS
HEIGHT: 67 IN | SYSTOLIC BLOOD PRESSURE: 110 MMHG | BODY MASS INDEX: 32.1 KG/M2 | HEART RATE: 83 BPM | DIASTOLIC BLOOD PRESSURE: 72 MMHG | WEIGHT: 204.5 LBS | OXYGEN SATURATION: 96 % | RESPIRATION RATE: 20 BRPM | TEMPERATURE: 97.6 F

## 2022-04-28 DIAGNOSIS — E11.8 TYPE 2 DIABETES MELLITUS WITH COMPLICATION, WITHOUT LONG-TERM CURRENT USE OF INSULIN (HCC): Primary | ICD-10-CM

## 2022-04-28 DIAGNOSIS — Z11.4 ENCOUNTER FOR SCREENING FOR HIV: ICD-10-CM

## 2022-04-28 DIAGNOSIS — Z11.59 ENCOUNTER FOR HEPATITIS C SCREENING TEST FOR LOW RISK PATIENT: ICD-10-CM

## 2022-04-28 DIAGNOSIS — E78.49 OTHER HYPERLIPIDEMIA: ICD-10-CM

## 2022-04-28 DIAGNOSIS — E66.09 CLASS 1 OBESITY DUE TO EXCESS CALORIES WITHOUT SERIOUS COMORBIDITY WITH BODY MASS INDEX (BMI) OF 32.0 TO 32.9 IN ADULT: ICD-10-CM

## 2022-04-28 PROBLEM — E66.811 CLASS 1 OBESITY DUE TO EXCESS CALORIES WITHOUT SERIOUS COMORBIDITY WITH BODY MASS INDEX (BMI) OF 32.0 TO 32.9 IN ADULT: Status: ACTIVE | Noted: 2020-11-19

## 2022-04-28 LAB — SL AMB POCT HEMOGLOBIN AIC: 8.3 (ref ?–6.5)

## 2022-04-28 PROCEDURE — 83036 HEMOGLOBIN GLYCOSYLATED A1C: CPT | Performed by: FAMILY MEDICINE

## 2022-04-28 PROCEDURE — 99213 OFFICE O/P EST LOW 20 MIN: CPT | Performed by: FAMILY MEDICINE

## 2022-04-28 RX ORDER — INSULIN GLARGINE 100 [IU]/ML
15 INJECTION, SOLUTION SUBCUTANEOUS
Qty: 15 ML | Refills: 3 | Status: ON HOLD | OUTPATIENT
Start: 2022-04-28

## 2022-04-28 RX ORDER — PEN NEEDLE, DIABETIC 32GX 5/32"
NEEDLE, DISPOSABLE MISCELLANEOUS DAILY
Qty: 100 EACH | Refills: 3 | Status: ON HOLD | OUTPATIENT
Start: 2022-04-28

## 2022-04-28 NOTE — PATIENT INSTRUCTIONS
Lifestyle Medicine Tip Sheet    1  Eat predominantly less processed foods such as fast food, T V  dinners, and ureña  2  Eat Close to [employer], [employer] or [employer]    3  Eat a predominantly plant based diet   a  Dark Leafy Greens  b  Fruits/Vegetables  c  Whole Grains: Whole wheat, barely, wheat berries, quinoa, steel cut oats, brown rice, whole wheat pasta  d  Legumes: kidney beans, oconnor beans, white beans, black beans, garbanzo beans (chickpeas), lima beans (mature, dried), split peas, lentils, and edamame (green soybeans)      4  At least half of the plate should contain fruits or vegetables        5  Liquid should be predominantly water  (limit soda and juice)    6  Watch portion size  7  Foods you should avoid or limit?  - Fats - Specifically saturated and trans-fats  They are found in margarines, many fast foods, and some store-bought baked goods  Saturated and Trans-fats can raise your cholesterol level and your chance of getting heart disease    - When you cook, it's best to use no oils but if needed try to limit the amount of oil used as oil contains many calories per volume and is very unhealthy when heated during cooking    - Sugar -Limit or avoid sugar, sweets, and refined grains  Refined grains are found in white bread, white rice, most forms of pasta, and most packaged "snack" foods    - Try not to cook with salt and avoid  adding extra salt to your  meals   - Meat - Studies have shown that eating a lot of red meat and poultry can increase your risk of certain health problems, including heart disease, diabetes, obesity and cancer  So try to limit the intake of it  8  Practice good sleep hygiene by getting 7-9 hours of sleep a night    9  Daily exercise minimum of 30 minutes (walking around the block)    10  Socialization (friends and family)   - Explore your neighborhood   Go to the park, spend time at [employer]  - Consider taking a class or volunteering to connect with new people    If you are interested you can read more about healthy food choices at the following websites:  a  NutritionBoom.fm  org  b  Home cooking recipes: https://www [employer]/  c  http://rbeel info/  d  Familydoctor  org

## 2022-04-28 NOTE — ASSESSMENT & PLAN NOTE
BMI Counseling: Body mass index is 32 03 kg/m²  The BMI is above normal  Nutrition recommendations include 3-5 servings of fruits/vegetables daily, reducing fast food intake, consuming healthier snacks, decreasing soda and/or juice intake, moderation in carbohydrate intake, increasing intake of lean protein and reducing intake of saturated fat and trans fat  Exercise recommendations include exercising 3-5 times per week     Wt loss counseling, pt agreeable to plan

## 2022-05-06 DIAGNOSIS — E11.8 TYPE 2 DIABETES MELLITUS WITH COMPLICATION, WITHOUT LONG-TERM CURRENT USE OF INSULIN (HCC): ICD-10-CM

## 2022-05-06 RX ORDER — ATORVASTATIN CALCIUM 20 MG/1
TABLET, FILM COATED ORAL
Qty: 90 TABLET | Refills: 0 | Status: ON HOLD | OUTPATIENT
Start: 2022-05-06

## 2022-06-19 DIAGNOSIS — E11.8 TYPE 2 DIABETES MELLITUS WITH COMPLICATION, WITHOUT LONG-TERM CURRENT USE OF INSULIN (HCC): ICD-10-CM

## 2022-06-21 RX ORDER — LISINOPRIL 2.5 MG/1
TABLET ORAL
Qty: 30 TABLET | Refills: 3 | Status: ON HOLD | OUTPATIENT
Start: 2022-06-21

## 2022-08-05 ENCOUNTER — APPOINTMENT (EMERGENCY)
Dept: RADIOLOGY | Facility: HOSPITAL | Age: 57
DRG: 316 | End: 2022-08-05
Payer: COMMERCIAL

## 2022-08-05 ENCOUNTER — HOSPITAL ENCOUNTER (EMERGENCY)
Facility: HOSPITAL | Age: 57
DRG: 316 | End: 2022-08-06
Attending: EMERGENCY MEDICINE
Payer: COMMERCIAL

## 2022-08-05 DIAGNOSIS — F19.90 IVDU (INTRAVENOUS DRUG USER): ICD-10-CM

## 2022-08-05 DIAGNOSIS — M00.831 ARTHRITIS OF RIGHT WRIST DUE TO OTHER BACTERIA (HCC): Primary | ICD-10-CM

## 2022-08-05 LAB
ALBUMIN SERPL BCP-MCNC: 3.5 G/DL (ref 3.5–5)
ALP SERPL-CCNC: 85 U/L (ref 46–116)
ALT SERPL W P-5'-P-CCNC: 32 U/L (ref 12–78)
ANION GAP SERPL CALCULATED.3IONS-SCNC: 5 MMOL/L (ref 4–13)
APTT PPP: 36 SECONDS (ref 23–37)
AST SERPL W P-5'-P-CCNC: 43 U/L (ref 5–45)
BASOPHILS # BLD AUTO: 0.03 THOUSANDS/ΜL (ref 0–0.1)
BASOPHILS NFR BLD AUTO: 0 % (ref 0–1)
BILIRUB DIRECT SERPL-MCNC: 0.08 MG/DL (ref 0–0.2)
BILIRUB SERPL-MCNC: 0.56 MG/DL (ref 0.2–1)
BUN SERPL-MCNC: 15 MG/DL (ref 5–25)
CALCIUM SERPL-MCNC: 9.1 MG/DL (ref 8.3–10.1)
CHLORIDE SERPL-SCNC: 94 MMOL/L (ref 96–108)
CO2 SERPL-SCNC: 29 MMOL/L (ref 21–32)
CREAT SERPL-MCNC: 0.85 MG/DL (ref 0.6–1.3)
CRP SERPL HS-MCNC: >90 MG/L
EOSINOPHIL # BLD AUTO: 0.05 THOUSAND/ΜL (ref 0–0.61)
EOSINOPHIL NFR BLD AUTO: 0 % (ref 0–6)
ERYTHROCYTE [DISTWIDTH] IN BLOOD BY AUTOMATED COUNT: 13.1 % (ref 11.6–15.1)
ERYTHROCYTE [SEDIMENTATION RATE] IN BLOOD: 78 MM/HOUR (ref 0–19)
GFR SERPL CREATININE-BSD FRML MDRD: 96 ML/MIN/1.73SQ M
GLUCOSE SERPL-MCNC: 151 MG/DL (ref 65–140)
HCT VFR BLD AUTO: 40.8 % (ref 36.5–49.3)
HGB BLD-MCNC: 13.6 G/DL (ref 12–17)
IMM GRANULOCYTES # BLD AUTO: 0.07 THOUSAND/UL (ref 0–0.2)
IMM GRANULOCYTES NFR BLD AUTO: 0 % (ref 0–2)
INR PPP: 1.03 (ref 0.84–1.19)
LACTATE SERPL-SCNC: 0.6 MMOL/L (ref 0.5–2)
LYMPHOCYTES # BLD AUTO: 2.35 THOUSANDS/ΜL (ref 0.6–4.47)
LYMPHOCYTES NFR BLD AUTO: 13 % (ref 14–44)
MCH RBC QN AUTO: 29.8 PG (ref 26.8–34.3)
MCHC RBC AUTO-ENTMCNC: 33.3 G/DL (ref 31.4–37.4)
MCV RBC AUTO: 90 FL (ref 82–98)
MONOCYTES # BLD AUTO: 1.29 THOUSAND/ΜL (ref 0.17–1.22)
MONOCYTES NFR BLD AUTO: 7 % (ref 4–12)
NEUTROPHILS # BLD AUTO: 14.52 THOUSANDS/ΜL (ref 1.85–7.62)
NEUTS SEG NFR BLD AUTO: 80 % (ref 43–75)
NRBC BLD AUTO-RTO: 0 /100 WBCS
PLATELET # BLD AUTO: 306 THOUSANDS/UL (ref 149–390)
PMV BLD AUTO: 9.3 FL (ref 8.9–12.7)
POTASSIUM SERPL-SCNC: 5.1 MMOL/L (ref 3.5–5.3)
PROT SERPL-MCNC: 8.9 G/DL (ref 6.4–8.4)
PROTHROMBIN TIME: 13.5 SECONDS (ref 11.6–14.5)
RBC # BLD AUTO: 4.56 MILLION/UL (ref 3.88–5.62)
SODIUM SERPL-SCNC: 128 MMOL/L (ref 135–147)
WBC # BLD AUTO: 18.31 THOUSAND/UL (ref 4.31–10.16)

## 2022-08-05 PROCEDURE — 87154 CUL TYP ID BLD PTHGN 6+ TRGT: CPT | Performed by: EMERGENCY MEDICINE

## 2022-08-05 PROCEDURE — 87040 BLOOD CULTURE FOR BACTERIA: CPT | Performed by: EMERGENCY MEDICINE

## 2022-08-05 PROCEDURE — 96361 HYDRATE IV INFUSION ADD-ON: CPT

## 2022-08-05 PROCEDURE — 85025 COMPLETE CBC W/AUTO DIFF WBC: CPT | Performed by: EMERGENCY MEDICINE

## 2022-08-05 PROCEDURE — 85652 RBC SED RATE AUTOMATED: CPT | Performed by: EMERGENCY MEDICINE

## 2022-08-05 PROCEDURE — 99284 EMERGENCY DEPT VISIT MOD MDM: CPT

## 2022-08-05 PROCEDURE — 85730 THROMBOPLASTIN TIME PARTIAL: CPT | Performed by: EMERGENCY MEDICINE

## 2022-08-05 PROCEDURE — 73110 X-RAY EXAM OF WRIST: CPT

## 2022-08-05 PROCEDURE — 80048 BASIC METABOLIC PNL TOTAL CA: CPT | Performed by: EMERGENCY MEDICINE

## 2022-08-05 PROCEDURE — 96375 TX/PRO/DX INJ NEW DRUG ADDON: CPT

## 2022-08-05 PROCEDURE — 83605 ASSAY OF LACTIC ACID: CPT | Performed by: EMERGENCY MEDICINE

## 2022-08-05 PROCEDURE — 99285 EMERGENCY DEPT VISIT HI MDM: CPT | Performed by: EMERGENCY MEDICINE

## 2022-08-05 PROCEDURE — 85610 PROTHROMBIN TIME: CPT | Performed by: EMERGENCY MEDICINE

## 2022-08-05 PROCEDURE — 36415 COLL VENOUS BLD VENIPUNCTURE: CPT | Performed by: EMERGENCY MEDICINE

## 2022-08-05 PROCEDURE — 80076 HEPATIC FUNCTION PANEL: CPT | Performed by: EMERGENCY MEDICINE

## 2022-08-05 PROCEDURE — 86141 C-REACTIVE PROTEIN HS: CPT | Performed by: EMERGENCY MEDICINE

## 2022-08-05 PROCEDURE — 87186 SC STD MICRODIL/AGAR DIL: CPT | Performed by: EMERGENCY MEDICINE

## 2022-08-05 PROCEDURE — 84145 PROCALCITONIN (PCT): CPT | Performed by: EMERGENCY MEDICINE

## 2022-08-05 RX ORDER — LIDOCAINE HYDROCHLORIDE AND EPINEPHRINE 10; 10 MG/ML; UG/ML
10 INJECTION, SOLUTION INFILTRATION; PERINEURAL ONCE
Status: COMPLETED | OUTPATIENT
Start: 2022-08-05 | End: 2022-08-05

## 2022-08-05 RX ORDER — KETOROLAC TROMETHAMINE 30 MG/ML
15 INJECTION, SOLUTION INTRAMUSCULAR; INTRAVENOUS ONCE
Status: COMPLETED | OUTPATIENT
Start: 2022-08-05 | End: 2022-08-05

## 2022-08-05 RX ADMIN — KETOROLAC TROMETHAMINE 15 MG: 30 INJECTION, SOLUTION INTRAMUSCULAR at 22:22

## 2022-08-05 RX ADMIN — SODIUM CHLORIDE 1000 ML: 0.9 INJECTION, SOLUTION INTRAVENOUS at 22:21

## 2022-08-05 RX ADMIN — LIDOCAINE HYDROCHLORIDE,EPINEPHRINE BITARTRATE 10 ML: 10; .01 INJECTION, SOLUTION INFILTRATION; PERINEURAL at 23:05

## 2022-08-06 ENCOUNTER — ANESTHESIA EVENT (EMERGENCY)
Dept: PERIOP | Facility: HOSPITAL | Age: 57
DRG: 316 | End: 2022-08-06
Payer: COMMERCIAL

## 2022-08-06 ENCOUNTER — APPOINTMENT (EMERGENCY)
Dept: RADIOLOGY | Facility: HOSPITAL | Age: 57
DRG: 316 | End: 2022-08-06
Payer: COMMERCIAL

## 2022-08-06 ENCOUNTER — ANESTHESIA (EMERGENCY)
Dept: PERIOP | Facility: HOSPITAL | Age: 57
DRG: 316 | End: 2022-08-06
Payer: COMMERCIAL

## 2022-08-06 ENCOUNTER — HOSPITAL ENCOUNTER (INPATIENT)
Facility: HOSPITAL | Age: 57
LOS: 11 days | Discharge: OTHER INSTITUTION | DRG: 316 | End: 2022-08-17
Attending: ORTHOPAEDIC SURGERY | Admitting: FAMILY MEDICINE
Payer: COMMERCIAL

## 2022-08-06 VITALS
OXYGEN SATURATION: 99 % | HEIGHT: 66 IN | HEART RATE: 55 BPM | RESPIRATION RATE: 16 BRPM | BODY MASS INDEX: 28.56 KG/M2 | TEMPERATURE: 99.5 F | DIASTOLIC BLOOD PRESSURE: 72 MMHG | SYSTOLIC BLOOD PRESSURE: 158 MMHG | WEIGHT: 177.69 LBS

## 2022-08-06 DIAGNOSIS — F11.10 HEROIN ABUSE (HCC): ICD-10-CM

## 2022-08-06 DIAGNOSIS — E11.8 TYPE 2 DIABETES MELLITUS WITH COMPLICATION, WITHOUT LONG-TERM CURRENT USE OF INSULIN (HCC): ICD-10-CM

## 2022-08-06 DIAGNOSIS — M00.831 ARTHRITIS OF RIGHT WRIST DUE TO OTHER BACTERIA (HCC): ICD-10-CM

## 2022-08-06 DIAGNOSIS — M25.531 RIGHT WRIST PAIN: Primary | ICD-10-CM

## 2022-08-06 DIAGNOSIS — F11.90 OPIOID USE DISORDER: ICD-10-CM

## 2022-08-06 LAB
GLUCOSE SERPL-MCNC: 107 MG/DL (ref 65–140)
PROCALCITONIN SERPL-MCNC: 0.1 NG/ML

## 2022-08-06 PROCEDURE — 99219 PR INITIAL OBSERVATION CARE/DAY 50 MINUTES: CPT | Performed by: FAMILY MEDICINE

## 2022-08-06 PROCEDURE — 87206 SMEAR FLUORESCENT/ACID STAI: CPT | Performed by: ORTHOPAEDIC SURGERY

## 2022-08-06 PROCEDURE — 25040 ARTHRT RDCRPL/MIDCARPL JT: CPT | Performed by: ORTHOPAEDIC SURGERY

## 2022-08-06 PROCEDURE — 96361 HYDRATE IV INFUSION ADD-ON: CPT

## 2022-08-06 PROCEDURE — 87077 CULTURE AEROBIC IDENTIFY: CPT | Performed by: ORTHOPAEDIC SURGERY

## 2022-08-06 PROCEDURE — 87186 SC STD MICRODIL/AGAR DIL: CPT | Performed by: EMERGENCY MEDICINE

## 2022-08-06 PROCEDURE — 99255 IP/OBS CONSLTJ NEW/EST HI 80: CPT | Performed by: ORTHOPAEDIC SURGERY

## 2022-08-06 PROCEDURE — 82948 REAGENT STRIP/BLOOD GLUCOSE: CPT

## 2022-08-06 PROCEDURE — 99284 EMERGENCY DEPT VISIT MOD MDM: CPT

## 2022-08-06 PROCEDURE — 96365 THER/PROPH/DIAG IV INF INIT: CPT

## 2022-08-06 PROCEDURE — 87075 CULTR BACTERIA EXCEPT BLOOD: CPT | Performed by: ORTHOPAEDIC SURGERY

## 2022-08-06 PROCEDURE — 96366 THER/PROPH/DIAG IV INF ADDON: CPT

## 2022-08-06 PROCEDURE — 87186 SC STD MICRODIL/AGAR DIL: CPT | Performed by: ORTHOPAEDIC SURGERY

## 2022-08-06 PROCEDURE — 87070 CULTURE OTHR SPECIMN AEROBIC: CPT | Performed by: ORTHOPAEDIC SURGERY

## 2022-08-06 PROCEDURE — 87102 FUNGUS ISOLATION CULTURE: CPT | Performed by: ORTHOPAEDIC SURGERY

## 2022-08-06 PROCEDURE — 87116 MYCOBACTERIA CULTURE: CPT | Performed by: ORTHOPAEDIC SURGERY

## 2022-08-06 PROCEDURE — 87070 CULTURE OTHR SPECIMN AEROBIC: CPT | Performed by: EMERGENCY MEDICINE

## 2022-08-06 PROCEDURE — 87205 SMEAR GRAM STAIN: CPT | Performed by: ORTHOPAEDIC SURGERY

## 2022-08-06 PROCEDURE — 96367 TX/PROPH/DG ADDL SEQ IV INF: CPT

## 2022-08-06 PROCEDURE — 0R9N0ZZ DRAINAGE OF RIGHT WRIST JOINT, OPEN APPROACH: ICD-10-PCS | Performed by: ORTHOPAEDIC SURGERY

## 2022-08-06 PROCEDURE — 87205 SMEAR GRAM STAIN: CPT | Performed by: EMERGENCY MEDICINE

## 2022-08-06 RX ORDER — ACETAMINOPHEN 325 MG/1
975 TABLET ORAL EVERY 8 HOURS SCHEDULED
Status: DISCONTINUED | OUTPATIENT
Start: 2022-08-06 | End: 2022-08-17 | Stop reason: HOSPADM

## 2022-08-06 RX ORDER — SODIUM CHLORIDE AND POTASSIUM CHLORIDE .9; .15 G/100ML; G/100ML
100 SOLUTION INTRAVENOUS CONTINUOUS
Status: DISCONTINUED | OUTPATIENT
Start: 2022-08-06 | End: 2022-08-07

## 2022-08-06 RX ORDER — PROPOFOL 10 MG/ML
INJECTION, EMULSION INTRAVENOUS AS NEEDED
Status: DISCONTINUED | OUTPATIENT
Start: 2022-08-06 | End: 2022-08-06

## 2022-08-06 RX ORDER — ATORVASTATIN CALCIUM 20 MG/1
20 TABLET, FILM COATED ORAL
Status: DISCONTINUED | OUTPATIENT
Start: 2022-08-06 | End: 2022-08-17 | Stop reason: HOSPADM

## 2022-08-06 RX ORDER — SODIUM CHLORIDE 9 MG/ML
75 INJECTION, SOLUTION INTRAVENOUS CONTINUOUS
Status: CANCELLED | OUTPATIENT
Start: 2022-08-06

## 2022-08-06 RX ORDER — MIDAZOLAM HYDROCHLORIDE 2 MG/2ML
INJECTION, SOLUTION INTRAMUSCULAR; INTRAVENOUS AS NEEDED
Status: DISCONTINUED | OUTPATIENT
Start: 2022-08-06 | End: 2022-08-06

## 2022-08-06 RX ORDER — ONDANSETRON 2 MG/ML
4 INJECTION INTRAMUSCULAR; INTRAVENOUS EVERY 6 HOURS PRN
Status: DISCONTINUED | OUTPATIENT
Start: 2022-08-06 | End: 2022-08-17 | Stop reason: HOSPADM

## 2022-08-06 RX ORDER — LISINOPRIL 2.5 MG/1
2.5 TABLET ORAL DAILY
Status: DISCONTINUED | OUTPATIENT
Start: 2022-08-07 | End: 2022-08-17 | Stop reason: HOSPADM

## 2022-08-06 RX ORDER — SODIUM CHLORIDE 9 MG/ML
INJECTION, SOLUTION INTRAVENOUS CONTINUOUS PRN
Status: DISCONTINUED | OUTPATIENT
Start: 2022-08-06 | End: 2022-08-06

## 2022-08-06 RX ORDER — HYDROMORPHONE HCL/PF 1 MG/ML
SYRINGE (ML) INJECTION AS NEEDED
Status: DISCONTINUED | OUTPATIENT
Start: 2022-08-06 | End: 2022-08-06

## 2022-08-06 RX ORDER — OXYCODONE HYDROCHLORIDE 5 MG/1
5 TABLET ORAL EVERY 4 HOURS PRN
Status: DISCONTINUED | OUTPATIENT
Start: 2022-08-06 | End: 2022-08-08

## 2022-08-06 RX ORDER — ONDANSETRON 2 MG/ML
4 INJECTION INTRAMUSCULAR; INTRAVENOUS ONCE AS NEEDED
Status: DISCONTINUED | OUTPATIENT
Start: 2022-08-06 | End: 2022-08-06 | Stop reason: HOSPADM

## 2022-08-06 RX ORDER — MAGNESIUM HYDROXIDE 1200 MG/15ML
LIQUID ORAL AS NEEDED
Status: DISCONTINUED | OUTPATIENT
Start: 2022-08-06 | End: 2022-08-06 | Stop reason: HOSPADM

## 2022-08-06 RX ORDER — DEXMEDETOMIDINE HYDROCHLORIDE 100 UG/ML
INJECTION, SOLUTION INTRAVENOUS AS NEEDED
Status: DISCONTINUED | OUTPATIENT
Start: 2022-08-06 | End: 2022-08-06

## 2022-08-06 RX ORDER — CEFAZOLIN SODIUM 1 G/3ML
INJECTION, POWDER, FOR SOLUTION INTRAMUSCULAR; INTRAVENOUS AS NEEDED
Status: DISCONTINUED | OUTPATIENT
Start: 2022-08-06 | End: 2022-08-06

## 2022-08-06 RX ORDER — OXYCODONE HYDROCHLORIDE 5 MG/1
2.5 TABLET ORAL EVERY 4 HOURS PRN
Status: DISCONTINUED | OUTPATIENT
Start: 2022-08-06 | End: 2022-08-08

## 2022-08-06 RX ORDER — SODIUM CHLORIDE, SODIUM LACTATE, POTASSIUM CHLORIDE, CALCIUM CHLORIDE 600; 310; 30; 20 MG/100ML; MG/100ML; MG/100ML; MG/100ML
125 INJECTION, SOLUTION INTRAVENOUS CONTINUOUS
Status: DISCONTINUED | OUTPATIENT
Start: 2022-08-06 | End: 2022-08-06

## 2022-08-06 RX ORDER — DEXAMETHASONE SODIUM PHOSPHATE 4 MG/ML
10 INJECTION, SOLUTION INTRA-ARTICULAR; INTRALESIONAL; INTRAMUSCULAR; INTRAVENOUS; SOFT TISSUE ONCE AS NEEDED
Status: DISCONTINUED | OUTPATIENT
Start: 2022-08-06 | End: 2022-08-06 | Stop reason: HOSPADM

## 2022-08-06 RX ORDER — LIDOCAINE HYDROCHLORIDE 10 MG/ML
INJECTION, SOLUTION EPIDURAL; INFILTRATION; INTRACAUDAL; PERINEURAL AS NEEDED
Status: DISCONTINUED | OUTPATIENT
Start: 2022-08-06 | End: 2022-08-06

## 2022-08-06 RX ORDER — BUPIVACAINE HYDROCHLORIDE 2.5 MG/ML
INJECTION, SOLUTION EPIDURAL; INFILTRATION; INTRACAUDAL AS NEEDED
Status: DISCONTINUED | OUTPATIENT
Start: 2022-08-06 | End: 2022-08-06 | Stop reason: HOSPADM

## 2022-08-06 RX ORDER — INSULIN LISPRO 100 [IU]/ML
1-6 INJECTION, SOLUTION INTRAVENOUS; SUBCUTANEOUS
Status: DISCONTINUED | OUTPATIENT
Start: 2022-08-06 | End: 2022-08-17 | Stop reason: HOSPADM

## 2022-08-06 RX ORDER — CEFAZOLIN SODIUM 2 G/50ML
2000 SOLUTION INTRAVENOUS EVERY 8 HOURS
Status: DISCONTINUED | OUTPATIENT
Start: 2022-08-06 | End: 2022-08-17 | Stop reason: HOSPADM

## 2022-08-06 RX ORDER — FENTANYL CITRATE/PF 50 MCG/ML
50 SYRINGE (ML) INJECTION
Status: DISCONTINUED | OUTPATIENT
Start: 2022-08-06 | End: 2022-08-06 | Stop reason: HOSPADM

## 2022-08-06 RX ORDER — HYDROMORPHONE HCL/PF 1 MG/ML
1 SYRINGE (ML) INJECTION
Status: COMPLETED | OUTPATIENT
Start: 2022-08-06 | End: 2022-08-06

## 2022-08-06 RX ORDER — KETOROLAC TROMETHAMINE 30 MG/ML
30 INJECTION, SOLUTION INTRAMUSCULAR; INTRAVENOUS EVERY 6 HOURS PRN
Status: DISCONTINUED | OUTPATIENT
Start: 2022-08-06 | End: 2022-08-08

## 2022-08-06 RX ADMIN — HYDROMORPHONE HYDROCHLORIDE 1 MG: 1 INJECTION, SOLUTION INTRAMUSCULAR; INTRAVENOUS; SUBCUTANEOUS at 17:05

## 2022-08-06 RX ADMIN — HYDROMORPHONE HYDROCHLORIDE 1 MG: 1 INJECTION, SOLUTION INTRAMUSCULAR; INTRAVENOUS; SUBCUTANEOUS at 14:46

## 2022-08-06 RX ADMIN — VANCOMYCIN HYDROCHLORIDE 1250 MG: 1 INJECTION, POWDER, LYOPHILIZED, FOR SOLUTION INTRAVENOUS at 01:59

## 2022-08-06 RX ADMIN — DEXMEDETOMIDINE HYDROCHLORIDE 8 MCG: 100 INJECTION, SOLUTION INTRAVENOUS at 16:02

## 2022-08-06 RX ADMIN — OXYCODONE HYDROCHLORIDE 5 MG: 5 TABLET ORAL at 18:26

## 2022-08-06 RX ADMIN — CEFAZOLIN SODIUM 2000 MG: 2 SOLUTION INTRAVENOUS at 23:44

## 2022-08-06 RX ADMIN — CEFEPIME HYDROCHLORIDE 2000 MG: 2 INJECTION, POWDER, FOR SOLUTION INTRAVENOUS at 01:14

## 2022-08-06 RX ADMIN — HYDROMORPHONE HYDROCHLORIDE 1 MG: 1 INJECTION, SOLUTION INTRAMUSCULAR; INTRAVENOUS; SUBCUTANEOUS at 16:47

## 2022-08-06 RX ADMIN — VANCOMYCIN HYDROCHLORIDE 1750 MG: 10 INJECTION, POWDER, LYOPHILIZED, FOR SOLUTION INTRAVENOUS at 19:13

## 2022-08-06 RX ADMIN — SODIUM CHLORIDE AND POTASSIUM CHLORIDE 100 ML/HR: .9; .15 SOLUTION INTRAVENOUS at 21:48

## 2022-08-06 RX ADMIN — ATORVASTATIN CALCIUM 20 MG: 20 TABLET, FILM COATED ORAL at 21:47

## 2022-08-06 RX ADMIN — ACETAMINOPHEN 975 MG: 325 TABLET ORAL at 21:47

## 2022-08-06 RX ADMIN — SODIUM CHLORIDE: 0.9 INJECTION, SOLUTION INTRAVENOUS at 14:35

## 2022-08-06 RX ADMIN — MIDAZOLAM 2 MG: 1 INJECTION INTRAMUSCULAR; INTRAVENOUS at 14:35

## 2022-08-06 RX ADMIN — PROPOFOL 200 MG: 10 INJECTION, EMULSION INTRAVENOUS at 14:42

## 2022-08-06 RX ADMIN — Medication 30 MG: at 14:46

## 2022-08-06 RX ADMIN — Medication 20 MG: at 14:42

## 2022-08-06 RX ADMIN — LIDOCAINE HYDROCHLORIDE 100 MG: 10 INJECTION, SOLUTION EPIDURAL; INFILTRATION; INTRACAUDAL; PERINEURAL at 14:42

## 2022-08-06 RX ADMIN — CEFAZOLIN 2000 MG: 1 INJECTION, POWDER, FOR SOLUTION INTRAMUSCULAR; INTRAVENOUS at 14:53

## 2022-08-06 RX ADMIN — HYDROMORPHONE HYDROCHLORIDE 1 MG: 1 INJECTION, SOLUTION INTRAMUSCULAR; INTRAVENOUS; SUBCUTANEOUS at 16:04

## 2022-08-06 RX ADMIN — HYDROMORPHONE HYDROCHLORIDE 1 MG: 1 INJECTION, SOLUTION INTRAMUSCULAR; INTRAVENOUS; SUBCUTANEOUS at 16:34

## 2022-08-06 RX ADMIN — Medication 50 MCG: at 16:20

## 2022-08-06 RX ADMIN — Medication 50 MCG: at 16:26

## 2022-08-06 NOTE — ASSESSMENT & PLAN NOTE
Lab Results   Component Value Date    HGBA1C 8 3 (A) 04/28/2022       Recent Labs     08/06/22  1629   POCGLU 107       Blood Sugar Average: Last 72 hrs:  (P) 107     PTA meds:  Lantus 15 units HS, Metformin 1000 mg BID    - Hold both for SSI

## 2022-08-06 NOTE — ANESTHESIA POSTPROCEDURE EVALUATION
Post-Op Assessment Note    CV Status:  Stable  Pain Score: 0    Pain management: adequate     Mental Status:  Alert and awake   Hydration Status:  Euvolemic   PONV Controlled:  Controlled   Airway Patency:  Patent      Post Op Vitals Reviewed: Yes      Staff: CRNA         No complications documented      BP   155/92   Temp  87   Pulse  97 7   Resp   16   SpO2   100

## 2022-08-06 NOTE — ED PROVIDER NOTES
History  Chief Complaint   Patient presents with    Hand Swelling     R hand swelling since yesterday  No known injury  This is a 79-year-old male with a history of diabetes, hyperlipidemia, IVDU who presents with right wrist pain and swelling  Starting yesterday after work, patient started to experience right wrist swelling, redness, pain  Pain is made worse with movement  He has not taken anything for the pain  Denies any inciting event or injury  States that he does have a history of similar pain  Issue started approximately 1 year ago  Patient was admitted to [facility] in March 2021 due to Klebsiella sepsis  Patient had right hand swelling at that time  However, it was thought that his sepsis was due to bilateral pyelonephritis seen on CT scan  Patient does have a history of IV drug use  He had an unremarkable echocardiogram at that time  Patient had x-rays then performed May 2021 which are unremarkable  States that he has been experiencing intermittent right wrist swelling since this time  He was wearing a wrist brace until approximately 4 months ago  The brace was a Velcro brace that he can take on and off  Most recently, patient had similar swelling approximately 1 month ago  States that he was applying hydrocortisone cream in the symptoms resolved  Denies any history of gout  Denies fever/chills, nausea/vomiting, lightheadedness/dizziness, numbness/weakness, headache, change in vision, URI symptoms, neck pain, chest pain, palpitations, shortness of breath, cough, back pain, flank pain, abdominal pain, diarrhea, hematochezia, melena, dysuria, hematuria  Of note, patient still does use IV heroin  Last used heroin approximately 1 week ago  States that he injected in the dorsum of the right wrist   However, did not have any issues with this wrist until yesterday  Patient does not lick needles prior to use            Prior to Admission Medications   Prescriptions Last Dose Informant Patient Reported? Taking? ACCU-CHEK FASTCLIX LANCETS MISC   Yes No   Blood Glucose Monitoring Suppl (ACCU-CHEK GUIDE) w/Device KIT   Yes No   Insulin Pen Needle (BD Pen Needle Cely 2nd Gen) 32G X 4 MM MISC   No No   Sig: Inject under the skin daily   ZUBSOLV 5 7-1 4 MG SUBL   Yes No   Sig: SUBLINGUAL TWO TABLETS SUBLINGUALLY DAILY   ZUBSOLV 8 6-2 1 MG SUBL   Yes No   Sig: SUBLINGUAL 1 AND 1 /2 TABLETS SUBLINGUALLY DAILY   aspirin (ECOTRIN LOW STRENGTH) 81 mg EC tablet   No No   Sig: Take 1 tablet (81 mg total) by mouth daily   atorvastatin (LIPITOR) 20 mg tablet   No No   Sig: TAKE 1 TABLET(20 MG) BY MOUTH DAILY AT BEDTIME   glucose blood (Accu-Chek Guide) test strip   No No   Sig: Use 1 each 3 (three) times a day Test as directed   insulin glargine (Basaglar KwikPen) 100 units/mL injection pen   No No   Sig: Inject 15 Units under the skin daily at bedtime   lisinopril (ZESTRIL) 2 5 mg tablet   No No   Sig: TAKE 1 TABLET(2 5 MG) BY MOUTH DAILY   metFORMIN (GLUCOPHAGE) 1000 MG tablet   No No   Sig: TAKE 1 TABLET(1000 MG) BY MOUTH TWICE DAILY WITH MEALS      Facility-Administered Medications: None       Past Medical History:   Diagnosis Date    Diabetes mellitus (HCC)     Substance abuse (HCC)        Past Surgical History:   Procedure Laterality Date    NO PAST SURGERIES         Family History   Problem Relation Age of Onset    Diabetes Mother     Alcohol abuse Father     Diabetes Sister     Asthma Brother      I have reviewed and agree with the history as documented      E-Cigarette/Vaping     E-Cigarette/Vaping Substances     Social History     Tobacco Use    Smoking status: Current Every Day Smoker     Packs/day: 0 50     Types: Cigarettes    Smokeless tobacco: Never Used    Tobacco comment: about 12 cigarettes/daily   Substance Use Topics    Alcohol use: Not Currently    Drug use: Not Currently     Comment: heroin hx, clean x 1 year       Review of Systems   Constitutional: Negative for chills, fatigue and fever  HENT: Negative for rhinorrhea, sore throat and trouble swallowing  Eyes: Negative for photophobia and visual disturbance  Respiratory: Negative for cough, chest tightness and shortness of breath  Cardiovascular: Negative for chest pain, palpitations and leg swelling  Gastrointestinal: Negative for abdominal pain, blood in stool, diarrhea, nausea and vomiting  Endocrine: Negative for polyuria  Genitourinary: Negative for dysuria, flank pain and hematuria  Musculoskeletal: Positive for arthralgias  Negative for back pain and neck pain  Skin: Negative for color change and rash  Allergic/Immunologic: Negative for immunocompromised state  Neurological: Negative for dizziness, weakness, light-headedness, numbness and headaches  All other systems reviewed and are negative  Physical Exam  Physical Exam  Constitutional:       General: He is not in acute distress  Appearance: Normal appearance  He is well-developed  HENT:      Mouth/Throat:      Pharynx: Uvula midline  Eyes:      General: Lids are normal       Conjunctiva/sclera: Conjunctivae normal       Pupils: Pupils are equal, round, and reactive to light  Neck:      Thyroid: No thyroid mass or thyromegaly  Trachea: Trachea normal    Cardiovascular:      Rate and Rhythm: Normal rate and regular rhythm  Heart sounds: Normal heart sounds  No murmur heard  Pulmonary:      Effort: Pulmonary effort is normal       Breath sounds: Normal breath sounds  Abdominal:      General: Bowel sounds are normal       Palpations: Abdomen is soft  Tenderness: There is no abdominal tenderness  There is no guarding or rebound  Musculoskeletal:      Comments: Swelling, erythema noted to the right wrist and extending into the hand  Right wrist is diffusely tender, mainly over the radial aspect  Wrist is warm    Severe pain with small movements of the right wrist   Radial, ulnar, median nerve sensation fully intact  Radial, ulnar, median, ain nerve motor function fully intact  Right hand is pink, warm, perfusing well  Radial pulse is 2 +  Skin:     General: Skin is warm and dry  Neurological:      Mental Status: He is alert  Psychiatric:         Speech: Speech normal          Behavior: Behavior normal  Behavior is cooperative  Thought Content: Thought content normal          Vital Signs  ED Triage Vitals [08/05/22 2010]   Temperature Pulse Respirations Blood Pressure SpO2   99 5 °F (37 5 °C) 84 18 155/82 98 %      Temp Source Heart Rate Source Patient Position - Orthostatic VS BP Location FiO2 (%)   Oral Monitor Sitting Left arm --      Pain Score       8           Vitals:    08/05/22 2223 08/05/22 2330 08/06/22 0030 08/06/22 0130   BP: 131/67 127/68 120/59 119/55   Pulse: 61 66 (!) 52 (!) 50   Patient Position - Orthostatic VS: Lying Lying Lying Lying         Visual Acuity      ED Medications  Medications   vancomycin (VANCOCIN) 1,250 mg in sodium chloride 0 9 % 250 mL IVPB (1,250 mg Intravenous New Bag 8/6/22 0159)   ketorolac (TORADOL) injection 15 mg (15 mg Intravenous Given 8/5/22 2222)   sodium chloride 0 9 % bolus 1,000 mL (0 mL Intravenous Stopped 8/6/22 0159)   lidocaine-epinephrine (XYLOCAINE/EPINEPHRINE) 1 %-1:100,000 injection 10 mL (10 mL Infiltration Given by Other 8/5/22 2305)   cefepime (MAXIPIME) 2 g/50 mL dextrose IVPB (0 mg Intravenous Stopped 8/6/22 0157)       Diagnostic Studies  Results Reviewed     Procedure Component Value Units Date/Time    Synovial fluid, Culture and Gram stain [[id number]] Collected: 08/06/22 0137    Lab Status: In process Specimen:  Body Fluid from Joint, Right Wrist Updated: 08/06/22 0140    Hepatic function panel [[id number]]  (Abnormal) Collected: 08/05/22 2155    Lab Status: Final result Specimen: Blood from Arm, Left Updated: 08/05/22 2325     Total Bilirubin 0 56 mg/dL      Bilirubin, Direct 0 08 mg/dL      Alkaline Phosphatase 85 U/L      AST 43 U/L ALT 32 U/L      Total Protein 8 9 g/dL      Albumin 3 5 g/dL     High sensitivity CRP [[id number]] Collected: 08/05/22 2155    Lab Status: Final result Specimen: Blood from Arm, Left Updated: 08/05/22 2312     CRP, High Sensitivity >90 00 mg/L     Narrative:            HsCRP Level       Relative Risk           <1 0 mg/L          Low           1 0 to 3 0 mg/L    Average           >3 0 mg/L          High        Protime-INR [[id number]]  (Normal) Collected: 08/05/22 2253    Lab Status: Final result Specimen: Blood from Arm, Left Updated: 08/05/22 2312     Protime 13 5 seconds      INR 1 03    APTT [[id number]]  (Normal) Collected: 08/05/22 2253    Lab Status: Final result Specimen: Blood from Arm, Left Updated: 08/05/22 2312     PTT 36 seconds     Basic metabolic panel [[id number]]  (Abnormal) Collected: 08/05/22 2155    Lab Status: Final result Specimen: Blood from Arm, Left Updated: 08/05/22 2312     Sodium 128 mmol/L      Potassium 5 1 mmol/L      Chloride 94 mmol/L      CO2 29 mmol/L      ANION GAP 5 mmol/L      BUN 15 mg/dL      Creatinine 0 85 mg/dL      Glucose 151 mg/dL      Calcium 9 1 mg/dL      eGFR 96 ml/min/1 73sq m     Narrative:      [place] guidelines for Chronic Kidney Disease (CKD):     Stage 1 with normal or high GFR (GFR > 90 mL/min/1 73 square meters)    Stage 2 Mild CKD (GFR = 60-89 mL/min/1 73 square meters)    Stage 3A Moderate CKD (GFR = 45-59 mL/min/1 73 square meters)    Stage 3B Moderate CKD (GFR = 30-44 mL/min/1 73 square meters)    Stage 4 Severe CKD (GFR = 15-29 mL/min/1 73 square meters)    Stage 5 End Stage CKD (GFR <15 mL/min/1 73 square meters)  Note: GFR calculation is accurate only with a steady state creatinine    Lactic acid [[id number]]  (Normal) Collected: 08/05/22 2215    Lab Status: Final result Specimen: Blood from Hand, Left Updated: 08/05/22 2245     LACTIC ACID 0 6 mmol/L     Narrative:      Result may be elevated if tourniquet was used during collection  Blood culture #2 [[id number]] Collected: 08/05/22 2217    Lab Status: In process Specimen: Blood from Arm, Left Updated: 08/05/22 2226    Blood culture #1 [[id number]] Collected: 08/05/22 2215    Lab Status: In process Specimen: Blood from Hand, Left Updated: 08/05/22 2226    Sedimentation rate, automated [[id number]]  (Abnormal) Collected: 08/05/22 2155    Lab Status: Final result Specimen: Blood from Arm, Left Updated: 08/05/22 2213     Sed Rate 78 mm/hour     Procalcitonin [[id number]]     Lab Status: No result Specimen: Blood     CBC and differential [[id number]]  (Abnormal) Collected: 08/05/22 2155    Lab Status: Final result Specimen: Blood from Arm, Left Updated: 08/05/22 2207     WBC 18 31 Thousand/uL      RBC 4 56 Million/uL      Hemoglobin 13 6 g/dL      Hematocrit 40 8 %      MCV 90 fL      MCH 29 8 pg      MCHC 33 3 g/dL      RDW 13 1 %      MPV 9 3 fL      Platelets 574 Thousands/uL      nRBC 0 /100 WBCs      Neutrophils Relative 80 %      Immat GRANS % 0 %      Lymphocytes Relative 13 %      Monocytes Relative 7 %      Eosinophils Relative 0 %      Basophils Relative 0 %      Neutrophils Absolute 14 52 Thousands/µL      Immature Grans Absolute 0 07 Thousand/uL      Lymphocytes Absolute 2 35 Thousands/µL      Monocytes Absolute 1 29 Thousand/µL      Eosinophils Absolute 0 05 Thousand/µL      Basophils Absolute 0 03 Thousands/µL                  XR wrist 3+ views RIGHT   ED Interpretation by Francisco Klein MD (08/05 6849)   Bony erosions of the distal radius and carpal bones as interpreted by myself  Septic arthritis versus inflammatory arthritis?                  Procedures  Arthrocentesis    Date/Time: 8/6/2022 12:58 AM  Performed by: Francisco Klein MD  Authorized by: Francisco Klein MD     Location:  ED  Verbal consent obtained?: Yes    Risks and benefits: Risks, benefits and alternatives were discussed    Consent given by:  Patient  Patient states understanding of procedure being performed: Yes    Patient identity confirmed:  Verbally with patient  Indications:  Possible septic joint  Body area: Wrist  Joint:  Right wrist  Local anesthesia used?: Yes    Anesthesia:  Local infiltration  Local anesthetic:  Lidocaine 1% with epinephrine  Preparation: Patient was prepped and draped in usual sterile fashion    Needle size:  18 G  Ultrasound guidance: Yes    Approach:  Posterior  Aspirate amount (ml):  3  Aspirate:  Purulent  Patient tolerance:  Patient tolerated the procedure well with no immediate complications             ED Course  ED Course as of 08/06/22 0237   Sat Aug 06, 2022   0143 Reached out to Dr Calvin Douglass with hand surgery  Awaiting response  SBIRT 22yo+    Flowsheet Row Most Recent Value   SBIRT (25 yo +)    In order to provide better care to our patients, we are screening all of our patients for alcohol and drug use  Would it be okay to ask you these screening questions? 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 ([facility] )     8/6/2022 12:58 AM Danial Grajeda Add [F19 90] IVDU (intravenous drug user)       ED Disposition     ED Disposition   Transfer to Another Facility-In Network    Condition   --    Date/Time   Sat Aug 6, 2022 12:58 AM    Comment   Janett Bonner should be transferred out to [facility]             MD Documentation    Flowsheet Row Most Recent Value   Patient Condition The patient has been stabilized such that within reasonable medical probability, no material deterioration of the patient condition or the condition of the unborn child(italia) is likely to result from the transfer   Reason for Transfer Level of Care needed not available at this facility   Benefits of Transfer Specialized equipment and/or services available at the receiving facility (Include comment)________________________   Risks of Transfer Potential for delay in receiving treatment, Potential deterioration of medical condition, Loss of IV, Increased discomfort during transfer, Possible worsening of condition or death during transfer   Accepting Physician Diallo Ojeda MD, Dr   Provider Certification General risk, such as traffic hazards, adverse weather conditions, rough terrain or turbulence, possible failure of equipment (including vehicle or aircraft), or consequences of actions of persons outside the control of the transport personnel, Unanticipated needs of medical equipment and personnel during transport, Risk of worsening condition, The possibility of a transport vehicle being unavailable      RN Documentation    72 Diallo Royal      Follow-up Information    None         Patient's Medications   Discharge Prescriptions    No medications on file       No discharge procedures on file      PDMP Review     None          ED Provider  Electronically Signed by           Kishore Fernandez MD  08/06/22 5011

## 2022-08-06 NOTE — EMTALA/ACUTE CARE TRANSFER
[facility] 1076  [address] [facility] [address]  Dept: [phone]      EMTALA TRANSFER CONSENT    NAME Elvis PEREIRA 1965                              MRN [id number]    I have been informed of my rights regarding examination, treatment, and transfer   by Dr Bryce Hernandez MD    Benefits: Specialized equipment and/or services available at the receiving facility (Include comment)________________________    Risks: Potential for delay in receiving treatment, Potential deterioration of medical condition, Loss of IV, Increased discomfort during transfer, Possible worsening of condition or death during transfer      Consent for Transfer:  I acknowledge that my medical condition has been evaluated and explained to me by the emergency department physician or other qualified medical person and/or my attending physician, who has recommended that I be transferred to the service of  Accepting Physician: Dr Stefania Jaun at [address] [facility] Name, [address] : [facility]  The above potential benefits of such transfer, the potential risks associated with such transfer, and the probable risks of not being transferred have been explained to me, and I fully understand them  The doctor has explained that, in my case, the benefits of transfer outweigh the risks  I agree to be transferred  I authorize the performance of emergency medical procedures and treatments upon me in both transit and upon arrival at the receiving facility  Additionally, I authorize the release of any and all medical records to the receiving facility and request they be transported with me, if possible  I understand that the safest mode of transportation during a medical emergency is an ambulance and that the Hospital advocates the use of this mode of transport   Risks of traveling to the receiving facility by car, including absence of medical control, life sustaining equipment, such as oxygen, and medical personnel has been explained to me and I fully understand them  (OSWALDO CORRECT BOX BELOW)  [ x ]  I consent to the stated transfer and to be transported by ambulance/helicopter  [  ]  I consent to the stated transfer, but refuse transportation by ambulance and accept full responsibility for my transportation by car  I understand the risks of non-ambulance transfers and I exonerate the Hospital and its staff from any deterioration in my condition that results from this refusal     X___________________________________________    DATE  22  TIME________  Signature of patient or legally responsible individual signing on patient behalf           RELATIONSHIP TO PATIENT_________________________          Provider Certification    NAME Dhruv Garcia                                         1965                              MRN [id number]    A medical screening exam was performed on the above named patient  Based on the examination:    Condition Necessitating Transfer The primary encounter diagnosis was 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 ([facility] [address] )  A diagnosis of IVDU (intravenous drug user) was also pertinent to this visit      Patient Condition: The patient has been stabilized such that within reasonable medical probability, no material deterioration of the patient condition or the condition of the unborn child(italia) is likely to result from the transfer    Reason for Transfer: Level of Care needed not available at this facility    Transfer Requirements: [place]   · Space available and qualified personnel available for treatment as acknowledged by    · Agreed to accept transfer and to provide appropriate medical treatment as acknowledged by       Dr Shailesh Appiah  · Appropriate medical records of the examination and treatment of the patient are provided at the time of transfer   [address] _______  · Transfer will be performed by qualified personnel from    and appropriate transfer equipment as required, including the use of necessary and appropriate life support measures  Provider Certification: I have examined the patient and explained the following risks and benefits of being transferred/refusing transfer to the patient/family:  General risk, such as traffic hazards, adverse weather conditions, rough terrain or turbulence, possible failure of equipment (including vehicle or aircraft), or consequences of actions of persons outside the control of the transport personnel, Unanticipated needs of medical equipment and personnel during transport, Risk of worsening condition, The possibility of a transport vehicle being unavailable      Based on these reasonable risks and benefits to the patient and/or the unborn child(italia), and based upon the information available at the time of the patients examination, I certify that the medical benefits reasonably to be expected from the provision of appropriate medical treatments at another medical facility outweigh the increasing risks, if any, to the individuals medical condition, and in the case of labor to the unborn child, from effecting the transfer      X____________________________________________ DATE 08/06/22        TIME_______      ORIGINAL - SEND TO MEDICAL RECORDS   COPY - SEND WITH PATIENT DURING TRANSFER

## 2022-08-06 NOTE — ANESTHESIA PREPROCEDURE EVALUATION
Procedure:  DEBRIDEMENT RIGHT UPPER EXTREMITY ([address]) SEPTIC WRIST (Right Arm Lower)    Relevant Problems   CARDIO   (+) Other hyperlipidemia      ENDO   (+) Type 2 diabetes mellitus with complication, without long-term current use of insulin (HCC)        Physical Exam    Airway    Mallampati score: III  TM Distance: >3 FB  Neck ROM: full     Dental       Cardiovascular      Pulmonary      Other Findings  Very poor dentition with multiple chipped and cracked teeth  Anesthesia Plan  ASA Score- 3     Anesthesia Type- IV sedation with anesthesia with ASA Monitors  Additional Monitors:   Airway Plan:     Comment: Active heroin user - last use yesterday  Plan sedation with GA backup    Plan Factors-Exercise tolerance (METS): >4 METS  Chart reviewed  Patient is not a current smoker  Patient did not smoke on day of surgery  Obstructive sleep apnea risk education given perioperatively  Induction- intravenous  Postoperative Plan-     Informed Consent- Anesthetic plan and risks discussed with patient  I personally reviewed this patient with the CRNA  Discussed and agreed on the Anesthesia Plan with the EMLIEE [place] Anesthesia plan and consent discussed with Pradeep Chaidez who expressed understanding and agreement  Risks/benefits and alternatives discussed with patient including possible PONV, sore throat, damage to teeth/lips/gums and possibility of rare anesthetic and surgical emergencies

## 2022-08-06 NOTE — ASSESSMENT & PLAN NOTE
Presented to [facility] ED, R wrist swelling and erythema x1 day, painful  VSS normal, no SIRS met besides WBC 18k  CRP >90, ESR 78, wrist aspiration gram stain 4+ polys and 2+ GPCs, HbA1c 8 3  Did receive 1 dose vancomycin and cefepime in ED  Procal negative  - transfer to [facility] for washout, status post washout postop day 1    - consult ortho  - continue vanc and Ancef  -monitor vital signs  -pain control  -follow-up on labs from washout

## 2022-08-06 NOTE — QUICK NOTE
Pt arrived from [place] ED to [address] [place] [address] ED, went to OR pool for R hand debridement  Pt cleared and stable for washout of septic wrist   Will evaluate further the patient status post debridement on the floor      Synovial fluid grew 4+ polys and 2 + GPCs    Ousmane Gallegos DO  Family Medicine Resident, PGY3  1:56 PM

## 2022-08-06 NOTE — PROGRESS NOTES
Vancomycin Assessment    Violet Cox is a 62 y o  male who is currently receiving vancomycin 1750 mg IV Q12H for skin-soft tissue infection     Relevant clinical data and objective history reviewed:  Creatinine   Date Value Ref Range Status   08/05/2022 0 85 0 60 - 1 30 mg/dL Final     Comment:     Standardized to IDMS reference method   06/16/2019 0 94 0 60 - 1 30 mg/dL Final     Comment:     Standardized to IDMS reference method     /65   Pulse (!) 54   Temp 97 7 °F (36 5 °C) (Temporal)   Resp 18   Ht 5' 7" (1 702 m)   Wt 81 6 kg (180 lb)   SpO2 100%   BMI 28 19 kg/m²   No intake/output data recorded  Lab Results   Component Value Date/Time    BUN 15 08/05/2022 09:55 PM    WBC 18 31 (H) 08/05/2022 09:55 PM    HGB 13 6 08/05/2022 09:55 PM    HCT 40 8 08/05/2022 09:55 PM    MCV 90 08/05/2022 09:55 PM     08/05/2022 09:55 PM     Temp Readings from Last 3 Encounters:   08/06/22 97 7 °F (36 5 °C) (Temporal)   08/05/22 99 5 °F (37 5 °C) (Oral)   04/28/22 97 6 °F (36 4 °C) (Temporal)     Vancomycin Days of Therapy: Day 2    Assessment/Plan  The patient is currently on vancomycin utilizing scheduled dosing based on actual body weight  Baseline risks associated with therapy include:  None   The patient is currently receiving 1750 mg IV Q12H and is clinically appropriate and dose will be continued  Pharmacy will also follow closely for s/sx of nephrotoxicity, infusion reactions, and appropriateness of therapy  BMP and CBC will be ordered per protocol  Plan for trough as patient approaches steady state, prior to the 4th  dose at approximately 0630 on 8-8-2022  Due to infection severity, will target a trough of 15-20 (appropriate for most indications)   Pharmacy will continue to follow the patients culture results and clinical progress daily      Joe Graves, Pharmacist

## 2022-08-06 NOTE — OP NOTE
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 ([address] ) [M00 831]    Procedure(s) (LRB):  DEBRIDEMENT RIGHT UPPER EXTREMITY ([address]) SEPTIC WRIST (Right)    Specimen(s):  ID Type Source Tests Collected by Time Destination   A :  Tissue Joint, Right Wrist ANAEROBIC CULTURE AND GRAM STAIN, FUNGAL CULTURE, CULTURE, TISSUE AND GRAM STAIN, AFB CULTURE WITH STAIN Stephanie Santos MD 2022 1509        Estimated Blood Loss:   Minimal    Drains:  * No LDAs found *    Anesthesia Type:   General    Operative Indications:  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 ([address] ) [M00 831]      Operative Findings:  Significant purulence within the wrist joint and synovitis     Complications:   None    Procedure and Technique:  The patient's operative site, laterality, procedure, consent were verified in the preoperative area and the patient was transitioned to the operating room  General anesthesia was provided by the anesthesia team  A tourniquet was applied to the right arm and the arm was preppeed in the normal sterile fashion  A universal time out was performed identifying the patient, operative site, and procedure again  The right arm was exsanguinated and tourniquet taken up  A longitudinal incision overlying the dorsal wrist was made in line with the long finger metacarpal, incision was taken down to the level of the wrist joint between the 3rd and 4th extensor compartments  A small vent was made into the extensor retinaculum to aid in tendon retraction  The joint capsule was incised and immediately significant purulent fluid was noted    At this time cultures were obtained  The joint was copiously irrigated with 3L of sterile saline  The joint capsule and retinaculum were repaired and the skin closed with monocryl and nylon sutures, sterile dressings and a volar splint were applied  Dr Nathan Chapin was present for all key portions of the procedure       Patient Disposition:  PACU       SIGNATURE: Ashia Guerrero MD  DATE: August 6, 2022  TIME: 6:58 PM

## 2022-08-06 NOTE — EMTALA/ACUTE CARE TRANSFER
[facility] [address]  [address] [facility] [address]  Dept: [phone]      EMTALA TRANSFER CONSENT    NAME Andrea Quevedo                                         1965                              MRN [id number]    I have been informed of my rights regarding examination, treatment, and transfer   by Dr Kayla Cornejo DO    Benefits: Specialized equipment and/or services available at the receiving facility (Include comment)________________________    Risks: Potential for delay in receiving treatment, Potential deterioration of medical condition, Loss of IV, Increased discomfort during transfer, Possible worsening of condition or death during transfer      Consent for Transfer:  I acknowledge that my medical condition has been evaluated and explained to me by the emergency department physician or other qualified medical person and/or my attending physician, who has recommended that I be transferred to the service of  Accepting Physician: Dr Pérez [place] at [address] [place] [address] Name, [address] : [facility]  The above potential benefits of such transfer, the potential risks associated with such transfer, and the probable risks of not being transferred have been explained to me, and I fully understand them  The doctor has explained that, in my case, the benefits of transfer outweigh the risks  I agree to be transferred  I authorize the performance of emergency medical procedures and treatments upon me in both transit and upon arrival at the receiving facility  Additionally, I authorize the release of any and all medical records to the receiving facility and request they be transported with me, if possible  I understand that the safest mode of transportation during a medical emergency is an ambulance and that the Hospital advocates the use of this mode of transport   Risks of traveling to the receiving facility by car, including absence of medical control, life sustaining equipment, such as oxygen, and medical personnel has been explained to me and I fully understand them  (OSWALDO CORRECT BOX BELOW)  [  ]  I consent to the stated transfer and to be transported by ambulance/helicopter  [  ]  I consent to the stated transfer, but refuse transportation by ambulance and accept full responsibility for my transportation by car  I understand the risks of non-ambulance transfers and I exonerate the Hospital and its staff from any deterioration in my condition that results from this refusal     X___________________________________________    DATE  22  TIME________  Signature of patient or legally responsible individual signing on patient behalf           RELATIONSHIP TO PATIENT_________________________          Provider Certification    NAME Ami Dickerson                                        [facility] 1965                              MRN [id number]    A medical screening exam was performed on the above named patient  Based on the examination:    Condition Necessitating Transfer The primary encounter diagnosis was 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 ([facility] [address] )  A diagnosis of IVDU (intravenous drug user) was also pertinent to this visit      Patient Condition: The patient has been stabilized such that within reasonable medical probability, no material deterioration of the patient condition or the condition of the unborn child(italia) is likely to result from the transfer    Reason for Transfer: Level of Care needed not available at this facility    Transfer Requirements: [place]   · Space available and qualified personnel available for treatment as acknowledged by    · Agreed to accept transfer and to provide appropriate medical treatment as acknowledged by       Dr Santos Briscoe  · Appropriate medical records of the examination and treatment of the patient are provided at the time of transfer   [address] _______  · Transfer will be performed by qualified personnel from    and appropriate transfer equipment as required, including the use of necessary and appropriate life support measures  Provider Certification: I have examined the patient and explained the following risks and benefits of being transferred/refusing transfer to the patient/family:  General risk, such as traffic hazards, adverse weather conditions, rough terrain or turbulence, possible failure of equipment (including vehicle or aircraft), or consequences of actions of persons outside the control of the transport personnel, Unanticipated needs of medical equipment and personnel during transport, Risk of worsening condition, The possibility of a transport vehicle being unavailable      Based on these reasonable risks and benefits to the patient and/or the unborn child(italia), and based upon the information available at the time of the patients examination, I certify that the medical benefits reasonably to be expected from the provision of appropriate medical treatments at another medical facility outweigh the increasing risks, if any, to the individuals medical condition, and in the case of labor to the unborn child, from effecting the transfer      X____________________________________________ DATE 08/06/22        TIME_______      ORIGINAL - SEND TO MEDICAL RECORDS   COPY - SEND WITH PATIENT DURING TRANSFER

## 2022-08-06 NOTE — H&P
Latricia Villatoro 1965, 62 y o  male MRN: [id number]  Unit/Bed#: -01 Encounter: [id number]  Primary Care Provider: Brandin Rodriguez MD   Date and time admitted to hospital: 8/6/2022 12:22 PM    * Right wrist pain  Overview  Pt presents to [facility] ED with 2 days of R wrist pain and swelling  Hx of injecting heroin 1 week ago  I&D + aspiration performed in ED  Imaging shows bony erosions  Pt transferred to [facility] on 8/6/22 for consult w hand surgery and debridement in OR  Assessment & Plan  Presented to [facility] ED, R wrist swelling and erythema x1 day, painful  VSS normal, no SIRS met besides WBC 18k  CRP >90, ESR 78, wrist aspiration gram stain 4+ polys and 2+ GPCs, HbA1c 8 3  Did receive 1 dose vancomycin and cefepime in ED  Procal negative  - transfer to [facility] for washout, status post washout postop day 1    - consult ortho  - continue vanc and Ancef  -monitor vital signs  -pain control  -follow-up on labs from washout  Opioid use disorder  Assessment & Plan  Hx of heroin injected one week ago into right wrist  Hx of opioid abuse    Other hyperlipidemia  Assessment & Plan  Stable  Continue atorvastatin 20 mg daily    Type 2 diabetes mellitus with complication, without long-term current use of insulin [facility])  Assessment & Plan  Lab Results   Component Value Date    HGBA1C 8 3 (A) 04/28/2022       Recent Labs     08/06/22  1629   POCGLU 107       Blood Sugar Average: Last 72 hrs:  (P) 107     PTA meds:  Lantus 15 units HS, Metformin 1000 mg BID    - Hold both for SSI      DVT Prophylaxis- low risk, SCDs ok  S/p washout as well  Diet- House regular  Code Status- Level 1  Disposition- OR debridement, POD#0    Discussed with attending physician, Dr Pascual Joel, and [facility] team   SUBJECTIVE  HPI:  62 y o  male with past medical history significant for IVDU/DM2 presenting to [facility] ED with 2 days of R wrist pain and swelling  Hx of injecting heroin 1 week ago  I&D + aspiration performed in ED  Imaging shows bony erosions  Pt transferred to [facility] on 8/6/22 for consult w hand surgery and debridement in OR  POD#0  Per ortho eval initially at [facility], "Pain is sharp in character, Located wrist, acute in onset, constant in duration, severe in intensity, Exacerbating factors wrist movement, Remitting factors immobilization, nonradiating, no numbness or tingling, no open wounds noted"  ED: s/p 1 dose Vanc/Cef, 1 dose in OR w/ Ancef, pain control, xray of right wrist        Review of Systems   Constitutional: Positive for appetite change and chills  Negative for fever  HENT: Negative for ear pain and sore throat  Eyes: Negative for pain and visual disturbance  Respiratory: Negative for cough and shortness of breath  Cardiovascular: Negative for chest pain and palpitations  Gastrointestinal: Negative for abdominal pain and vomiting  Genitourinary: Negative for dysuria and hematuria  Musculoskeletal: Positive for arthralgias, joint swelling and myalgias  Negative for back pain  Skin: Positive for pallor and wound  Negative for color change and rash  Neurological: Negative for seizures and syncope  All other systems reviewed and are negative          Historical Information   Past Medical History:   Diagnosis Date    Diabetes mellitus ([facility] )     Substance abuse ([facility] )      Past Surgical History:   Procedure Laterality Date    NO PAST SURGERIES       Social History   Social History     Substance and Sexual Activity   Alcohol Use Not Currently     Social History     Substance and Sexual Activity   Drug Use Not Currently    Comment: heroin hx, clean x 1 year     Social History     Tobacco Use   Smoking Status Current Every Day Smoker    Packs/day: 0 50    Types: Cigarettes   Smokeless Tobacco Never Used   Tobacco Comment    about 12 cigarettes/daily     Family History:   Family History   Problem Relation Age of Onset    Diabetes Mother     Alcohol abuse Father  Diabetes Sister     Asthma Brother        Medications:  Meds/Allergies   PTA meds:   Prior to Admission Medications   Prescriptions Last Dose Informant Patient Reported? Taking?    ACCU-CHEK FASTCLIX LANCETS MISC   Yes No   Blood Glucose Monitoring Suppl (ACCU-CHEK GUIDE) w/Device KIT   Yes No   Insulin Pen Needle (BD Pen Needle Cely 2nd Gen) 32G X 4 MM MISC 8/5/2022 at Unknown time  No Yes   Sig: Inject under the skin daily   ZUBSOLV 5 7-1 4 MG SUBL Not Taking at Unknown time  Yes No   Sig: SUBLINGUAL TWO TABLETS SUBLINGUALLY DAILY   Patient not taking: Reported on 8/6/2022   ZUBSOLV 8 6-2 1 MG SUBL Not Taking at Unknown time  Yes No   Sig: SUBLINGUAL 1 AND 1 /2 TABLETS SUBLINGUALLY DAILY   Patient not taking: Reported on 8/6/2022   aspirin (ECOTRIN LOW STRENGTH) 81 mg EC tablet 8/5/2022 at Unknown time  No Yes   Sig: Take 1 tablet (81 mg total) by mouth daily   atorvastatin (LIPITOR) 20 mg tablet 8/5/2022 at Unknown time  No Yes   Sig: TAKE 1 TABLET(20 MG) BY MOUTH DAILY AT BEDTIME   glucose blood (Accu-Chek Guide) test strip   No No   Sig: Use 1 each 3 (three) times a day Test as directed   insulin glargine (Basaglar KwikPen) 100 units/mL injection pen 8/5/2022 at Unknown time  No Yes   Sig: Inject 15 Units under the skin daily at bedtime   lisinopril (ZESTRIL) 2 5 mg tablet 8/5/2022 at Unknown time  No Yes   Sig: TAKE 1 TABLET(2 5 MG) BY MOUTH DAILY   metFORMIN (GLUCOPHAGE) 1000 MG tablet 8/5/2022 at Unknown time  No Yes   Sig: TAKE 1 TABLET(1000 MG) BY MOUTH TWICE DAILY WITH MEALS      Facility-Administered Medications: None     No Known Allergies    OBJECTIVE  Vitals:   Vitals:    08/06/22 1608 08/06/22 1615 08/06/22 1630 08/06/22 1700   BP: 155/92 142/74 154/70 149/65   Pulse: 60 56 (!) 54 (!) 54   Resp: 18 17 13 18   Temp: 97 7 °F (36 5 °C)      TempSrc: Temporal      SpO2: 100% 100% 100% 100%   Weight:       Height:             Intake/Output Summary (Last 24 hours) at 8/6/2022 8719  Last data filed at 8/6/2022 1701  Gross per 24 hour   Intake --   Output 700 ml   Net -700 ml       Invasive Devices  Report    Peripheral Intravenous Line  Duration           Peripheral IV -- days    Peripheral IV 08/05/22 Left Hand <1 day                Physical Exam  Vitals reviewed  Constitutional:       General: He is not in acute distress  Appearance: He is normal weight  He is ill-appearing  Comments: uncomfortable   HENT:      Head: Normocephalic and atraumatic  Nose: Nose normal  No congestion  Eyes:      General: No scleral icterus  Conjunctiva/sclera: Conjunctivae normal       Pupils: Pupils are equal, round, and reactive to light  Cardiovascular:      Rate and Rhythm: Normal rate and regular rhythm  Pulses: Normal pulses  Heart sounds: Normal heart sounds  No murmur heard  Pulmonary:      Effort: Pulmonary effort is normal       Breath sounds: Normal breath sounds  No wheezing  Chest:      Chest wall: No tenderness  Abdominal:      General: Abdomen is flat  Bowel sounds are normal       Palpations: There is no mass  Tenderness: There is no abdominal tenderness  Musculoskeletal:         General: Swelling, tenderness and signs of injury present  Cervical back: Normal range of motion  Right lower leg: No edema  Left lower leg: No edema  Comments: R dorsal wrist ace bandaged  C/D/I   Skin:     General: Skin is warm  Capillary Refill: Capillary refill takes less than 2 seconds  Findings: No bruising or rash  Neurological:      Mental Status: He is oriented to person, place, and time  Psychiatric:         Mood and Affect: Mood normal          Behavior: Behavior normal           Lab:  I have personally reviewed all pertinent results     Admission on 08/06/2022   Component Date Value Ref Range Status    POC Glucose 08/06/2022 107  65 - 140 mg/dl Final   Admission on 08/05/2022, Discharged on 08/06/2022   Component Date Value Ref Range Status    WBC 08/05/2022 18 31 (A) 4 31 - 10 16 Thousand/uL Final    RBC 08/05/2022 4 56  3 88 - 5 62 Million/uL Final    Hemoglobin 08/05/2022 13 6  12 0 - 17 0 g/dL Final    Hematocrit 08/05/2022 40 8  36 5 - 49 3 % Final    MCV 08/05/2022 90  82 - 98 fL Final    MCH 08/05/2022 29 8  26 8 - 34 3 pg Final    MCHC 08/05/2022 33 3  31 4 - 37 4 g/dL Final    RDW 08/05/2022 13 1  11 6 - 15 1 % Final    MPV 08/05/2022 9 3  8 9 - 12 7 fL Final    Platelets 91/29/5046 306  149 - 390 Thousands/uL Final    nRBC 08/05/2022 0  /100 WBCs Final    Neutrophils Relative 08/05/2022 80 (A) 43 - 75 % Final    Immat GRANS % 08/05/2022 0  0 - 2 % Final    Lymphocytes Relative 08/05/2022 13 (A) 14 - 44 % Final    Monocytes Relative 08/05/2022 7  4 - 12 % Final    Eosinophils Relative 08/05/2022 0  0 - 6 % Final    Basophils Relative 08/05/2022 0  0 - 1 % Final    Neutrophils Absolute 08/05/2022 14 52 (A) 1 85 - 7 62 Thousands/µL Final    Immature Grans Absolute 08/05/2022 0 07  0 00 - 0 20 Thousand/uL Final    Lymphocytes Absolute 08/05/2022 2 35  0 60 - 4 47 Thousands/µL Final    Monocytes Absolute 08/05/2022 1 29 (A) 0 17 - 1 22 Thousand/µL Final    Eosinophils Absolute 08/05/2022 0 05  0 00 - 0 61 Thousand/µL Final    Basophils Absolute 08/05/2022 0 03  0 00 - 0 10 Thousands/µL Final    Sodium 08/05/2022 128 (A) 135 - 147 mmol/L Final    Potassium 08/05/2022 5 1  3 5 - 5 3 mmol/L Final    Chloride 08/05/2022 94 (A) 96 - 108 mmol/L Final    CO2 08/05/2022 29  21 - 32 mmol/L Final    ANION GAP 08/05/2022 5  4 - 13 mmol/L Final    BUN 08/05/2022 15  5 - 25 mg/dL Final    Creatinine 08/05/2022 0 85  0 60 - 1 30 mg/dL Final    Standardized to IDMS reference method    Glucose 08/05/2022 151 (A) 65 - 140 mg/dL Final    If the patient is fasting, the ADA then defines impaired fasting glucose as > 100 mg/dL and diabetes as > or equal to 123 mg/dL    Specimen collection should occur prior to Sulfasalazine administration due to the potential for falsely depressed results  Specimen collection should occur prior to Sulfapyridine administration due to the potential for falsely elevated results   Calcium 08/05/2022 9 1  8 3 - 10 1 mg/dL Final    eGFR 08/05/2022 96  ml/min/1 73sq m Final    Sed Rate 08/05/2022 78 (A) 0 - 19 mm/hour Final    CRP, High Sensitivity 08/05/2022 >90 00  mg/L Final    Blood Culture 08/05/2022 Received in Microbiology Lab  Culture in Progress  Preliminary    Blood Culture 08/05/2022 Received in Microbiology Lab  Culture in Progress  Preliminary    Total Bilirubin 08/05/2022 0 56  0 20 - 1 00 mg/dL Final    Use of this assay is not recommended for patients undergoing treatment with eltrombopag due to the potential for falsely elevated results   Bilirubin, Direct 08/05/2022 0 08  0 00 - 0 20 mg/dL Final    13Moderately Hemolyzed; Results May be Affected    Alkaline Phosphatase 08/05/2022 85  46 - 116 U/L Final    AST 08/05/2022 43  5 - 45 U/L Final    Moderately Hemolyzed; Results May be Affected  Specimen collection should occur prior to Sulfasalazine administration due to the potential for falsely depressed results   ALT 08/05/2022 32  12 - 78 U/L Final    Specimen collection should occur prior to Sulfasalazine administration due to the potential for falsely depressed results       Total Protein 08/05/2022 8 9 (A) 6 4 - 8 4 g/dL Final    Albumin 08/05/2022 3 5  3 5 - 5 0 g/dL Final    LACTIC ACID 08/05/2022 0 6  0 5 - 2 0 mmol/L Final    Protime 08/05/2022 13 5  11 6 - 14 5 seconds Final    INR 08/05/2022 1 03  0 84 - 1 19 Final    PTT 08/05/2022 36  23 - 37 seconds Final    Therapeutic Heparin Range =  60-90 seconds    Procalcitonin 08/05/2022 0 10  <=0 25 ng/ml Final    Comment: Suspected Lower Respiratory Tract Infection (LRTI):  - LESS than or EQUAL to 0 25 ng/mL:   low likelihood for bacterial LRTI; antibiotics DISCOURAGED   - GREATER than 0 25 ng/mL:   increased likelihood for bacterial LRTI; antibiotics ENCOURAGED  Suspected Sepsis:  - Strongly consider initiating antibiotics in ALL UNSTABLE patients  - LESS than or EQUAL to 0 5 ng/mL:   low likelihood for bacterial sepsis; antibiotics DISCOURAGED   - GREATER than 0 5 ng/mL:   increased likelihood for bacterial sepsis; antibiotics ENCOURAGED   - GREATER than 2 ng/mL:   high risk for severe sepsis / septic shock; antibiotics strongly ENCOURAGED  Decisions on antibiotic use should not be based solely on Procalcitonin (PCT) levels  If PCT is low but uncertainty exists with stopping antibiotics, repeat PCT in 6-24 hours to confirm the low level  If antibiotics are administered (regardless if initial PCT was high or low), repeat PCT every 1-2 days to consider early antibiotic cessation (when GREATER                            than 80% decrease from the peak OR when PCT drops below designated cutoffs, whichever comes first), so long as the infection is NOT one that typically requires prolonged treatment durations (e g , bone/joint infections, endocarditis, Staph  aureus bacteremia)      Situations of FALSE-POSITIVE Procalcitonin values:  1) Newborns < 67 hours old  2) Massive stress from severe trauma / burns, major surgery, acute pancreatitis, cardiogenic / hemorrhagic shock, sickle cell crisis, or other organ perfusion abnormalities  3) Malaria and some Candidal infections  4) Treatment with agents that stimulate cytokines (e g , OKT3, anti-lymphocyte globulins, alemtuzumab, IL-2, granulocyte transfusion [NOT GCSFs])  5) Chronic renal disease causes elevated baseline levels (consider GREATER than 0 75 ng/mL as an abnormal cut-off); initiating HD/CRRT may cause transient decreases  6) Paraneoplastic syndromes from medullary thyroid or SCLC, some forms of vasculitis, and acute [id number]                            disease    Situations of FALSE-NEGATIVE Procalcitonin values:  1) Too early in clinical course for PCT to have reached its peak (may repeat in 6-24 hours to confirm low level)  2) Localized infection WITHOUT systemic (SIRS / sepsis) response (e g , an abscess, osteomyelitis, cystitis)  3) Mycobacteria (e g , Tuberculosis, MAC)  4) Cystic fibrosis exacerbations      Gram Stain Result 08/06/2022 4+ Polys (A)  Preliminary    Gram Stain Result 08/06/2022 2+ Gram positive cocci in clusters (A)  Preliminary     Results from last 7 days   Lab Units 08/05/22  2155   POTASSIUM mmol/L 5 1   CHLORIDE mmol/L 94*   CO2 mmol/L 29   BUN mg/dL 15   CREATININE mg/dL 0 85   EGFR ml/min/1 73sq m 96   CALCIUM mg/dL 9 1   AST U/L 43   ALT U/L 32   ALK PHOS U/L 85     Results from last 7 days   Lab Units 08/05/22  2155   WBC Thousand/uL 18 31*   HEMOGLOBIN g/dL 13 6   PLATELETS Thousands/uL 306     Results from last 7 days   Lab Units 08/06/22  0137 08/05/22  2217 08/05/22  2215   BLOOD CULTURE   --  Received in Microbiology Lab  Culture in Progress  Received in Microbiology Lab  Culture in Progress  GRAM STAIN RESULT  4+ Polys*  2+ Gram positive cocci in clusters*  --   --        Imaging:  I have personally reviewed all pertinent results  XR wrist 3+ views RIGHT    Result Date: 8/6/2022  1  Degenerative changes in the hand and wrist  2   Multiple cystic lesions throughout most of the carpal bones  Correlate for rheumatoid arthritis versus infection  If clinically appropriate, MRI may be helpful  Workstation performed: [id number]       EKG, Pathology, and Other Studies:   I have personally reviewed all pertinent results          Abiola Ceron DO, PGY-3  [facility]   8/6/2022

## 2022-08-06 NOTE — CONSULTS
Orthopedics   Shalonda Polk 62 y o  male MRN: [id number]  Unit/Bed#: ED 29      Chief Complaint:   right wrist pain    HPI:   62 y  o male RHD pmhx of IVDU and DM complaining of right wrist erythema and pain for the past 2 days  Hes had similar pain in the past year but has resolved with IV abx  Hes been  wearing a wrist brace on and off  Denies hx of gout  Last used heroin 1w ago and injects in the dorsum of the hand   Pain is sharp in character, Located wrist, acute in onset, constant in duration, severe in intensity, Exacerbating factors wrist movement, Remitting factors immobilization, nonradiating, no numbness or tingling, no open wounds noted      Review Of Systems:   · Skin: see HPI  · Neuro: See HPI  · Musculoskeletal: See HPI  · 14 point review of systems negative except as stated above     Past Medical History:   Past Medical History:   Diagnosis Date    Diabetes mellitus ([facility] )     Substance abuse ([facility] )        Past Surgical History:   Past Surgical History:   Procedure Laterality Date    NO PAST SURGERIES         Family History:  Family history reviewed and non-contributory  Family History   Problem Relation Age of Onset    Diabetes Mother     Alcohol abuse Father     Diabetes Sister     Asthma Brother        Social History:  Social History     Socioeconomic History    Marital status: /Civil Union     Spouse name: Not on file    Number of children: Not on file    Years of education: Not on file    Highest education level: Not on file   Occupational History    Not on file   Tobacco Use    Smoking status: Current Every Day Smoker     Packs/day: 0 50     Types: Cigarettes    Smokeless tobacco: Never Used    Tobacco comment: about 12 cigarettes/daily   Substance and Sexual Activity    Alcohol use: Not Currently    Drug use: Not Currently     Comment: heroin hx, clean x 1 year    Sexual activity: Not on file   Other Topics Concern    Not on file   Social History Narrative    Not on file     Social Determinants of Health     Financial Resource Strain: Unknown    Difficulty of Paying Living Expenses: Patient refused   Food Insecurity: Unknown    Worried About Running Out of Food in the Last Year: Patient refused    [address] in the Last Year: Patient refused   Transportation Needs: No Transportation Needs    Lack of Transportation (Medical): No    Lack of Transportation (Non-Medical): No   Physical Activity: Not on file   Stress: Not on file   Social Connections: Not on file   Intimate Partner Violence: Not on file   Housing Stability: Not on file       Allergies:   No Known Allergies        Labs:  0   Lab Value Date/Time    HCT 40 8 08/05/2022 2155    HCT 43 7 06/16/2019 2305    HGB 13 6 08/05/2022 2155    HGB 14 9 06/16/2019 2305    INR 1 03 08/05/2022 2253    WBC 18 31 (H) 08/05/2022 2155    WBC 12 96 (H) 06/16/2019 2305    ESR 78 (H) 08/05/2022 2155       Meds:  No current facility-administered medications for this encounter      Current Outpatient Medications:     ACCU-CHEK FASTCLIX LANCETS MISC, , Disp: , Rfl:     aspirin (ECOTRIN LOW STRENGTH) 81 mg EC tablet, Take 1 tablet (81 mg total) by mouth daily, Disp: 90 tablet, Rfl: 3    atorvastatin (LIPITOR) 20 mg tablet, TAKE 1 TABLET(20 MG) BY MOUTH DAILY AT BEDTIME, Disp: 90 tablet, Rfl: 0    Blood Glucose Monitoring Suppl (ACCU-CHEK GUIDE) w/Device KIT, , Disp: , Rfl:     glucose blood (Accu-Chek Guide) test strip, Use 1 each 3 (three) times a day Test as directed, Disp: 100 each, Rfl: 5    insulin glargine (Basaglar KwikPen) 100 units/mL injection pen, Inject 15 Units under the skin daily at bedtime, Disp: 15 mL, Rfl: 3    Insulin Pen Needle (BD Pen Needle Cely 2nd Gen) 32G X 4 MM MISC, Inject under the skin daily, Disp: 100 each, Rfl: 3    lisinopril (ZESTRIL) 2 5 mg tablet, TAKE 1 TABLET(2 5 MG) BY MOUTH DAILY, Disp: 30 tablet, Rfl: 3    metFORMIN (GLUCOPHAGE) 1000 MG tablet, TAKE 1 TABLET(1000 MG) BY MOUTH TWICE DAILY WITH MEALS, Disp: 60 tablet, Rfl: 3    ZUBSOLV 5 7-1 4 MG SUBL, SUBLINGUAL TWO TABLETS SUBLINGUALLY DAILY, Disp: , Rfl: 0    ZUBSOLV 8 6-2 1 MG SUBL, SUBLINGUAL 1 AND 1 /2 TABLETS SUBLINGUALLY DAILY, Disp: , Rfl: 0    Blood Culture:   Lab Results   Component Value Date    BLOODCX Received in Microbiology Lab  Culture in Progress  08/05/2022       Wound Culture:   No results found for: WOUNDCULT    Ins and Outs:  No intake/output data recorded  Physical Exam:   There were no vitals taken for this visit  Gen: No acute distress, resting comfortably in bed  HEENT: Eyes clear, moist mucus membranes, hearing intact  Respiratory: No audible wheezing or stridor  Cardiovascular: Well Perfused peripherally, 2+ radial pulse  Abdomen: nondistended, no peritoneal signs  Musculoskeletal: right Upper Extremity  · Skin: Erythema, swelling, and warmth over dorsum of hand and wrist  · Painful range of motion of at wrist joint, nonpainful ROM of MCPJs  · Able to make 70% composite fist  · Nail clubbing  · Sensation intact Axillary, Musculocutaneous, Radial, Ulna and Median  · 5/5 motor to Axillary, Musculocutaneous, Radial, Ulna and Median        Radiology:   I personally reviewed the films  3 views X-rays of right wrist show cystic changes in the carpal bones, narrowing of radiocarpal joint  CRP >90, ESR 78, wrist aspiration gram stain 4+ polys and 2+ GPCs, HbA1c 8 3    Assessment:  62 y  o male with  right swelling and pain concern for septic wrist  He will require operative washout  Plan:   · Cont  abx per primary team  · Nonweight bearing to right wrist in splint  · Heat to affected area  · NPO  · To OR for washout  · preop labs in ED  · Analgesics for pain  · There is no height or weight on file to calculate BMI     · Dispo: Ortho will follow      Lillian Blanco MD

## 2022-08-06 NOTE — ED NOTES
Pt sleeping all morning  RN periodically assessing pt  Respiration rate equal and unlabored  RN assessed vitals at this time and pt reports feeling tired from being up most of the night   RN respecting pt wishes and allowing pt to go back to sleep     Mychal Muir RN  08/06/22 2559

## 2022-08-07 LAB
BASOPHILS # BLD AUTO: 0.02 THOUSANDS/ΜL (ref 0–0.1)
BASOPHILS NFR BLD AUTO: 0 % (ref 0–1)
EOSINOPHIL # BLD AUTO: 0 THOUSAND/ΜL (ref 0–0.61)
EOSINOPHIL NFR BLD AUTO: 0 % (ref 0–6)
ERYTHROCYTE [DISTWIDTH] IN BLOOD BY AUTOMATED COUNT: 12.7 % (ref 11.6–15.1)
FLUAV RNA RESP QL NAA+PROBE: NEGATIVE
FLUBV RNA RESP QL NAA+PROBE: NEGATIVE
GLUCOSE SERPL-MCNC: 131 MG/DL (ref 65–140)
GLUCOSE SERPL-MCNC: 148 MG/DL (ref 65–140)
GLUCOSE SERPL-MCNC: 172 MG/DL (ref 65–140)
GLUCOSE SERPL-MCNC: 184 MG/DL (ref 65–140)
HCT VFR BLD AUTO: 38.2 % (ref 36.5–49.3)
HGB BLD-MCNC: 13 G/DL (ref 12–17)
HIV 1+2 AB+HIV1 P24 AG SERPL QL IA: NORMAL
HIV1 P24 AG SER QL: NORMAL
IMM GRANULOCYTES # BLD AUTO: 0.11 THOUSAND/UL (ref 0–0.2)
IMM GRANULOCYTES NFR BLD AUTO: 1 % (ref 0–2)
LYMPHOCYTES # BLD AUTO: 1.11 THOUSANDS/ΜL (ref 0.6–4.47)
LYMPHOCYTES NFR BLD AUTO: 7 % (ref 14–44)
MCH RBC QN AUTO: 29.5 PG (ref 26.8–34.3)
MCHC RBC AUTO-ENTMCNC: 34 G/DL (ref 31.4–37.4)
MCV RBC AUTO: 87 FL (ref 82–98)
MONOCYTES # BLD AUTO: 0.79 THOUSAND/ΜL (ref 0.17–1.22)
MONOCYTES NFR BLD AUTO: 5 % (ref 4–12)
NEUTROPHILS # BLD AUTO: 13.77 THOUSANDS/ΜL (ref 1.85–7.62)
NEUTS SEG NFR BLD AUTO: 87 % (ref 43–75)
NRBC BLD AUTO-RTO: 0 /100 WBCS
PLATELET # BLD AUTO: 315 THOUSANDS/UL (ref 149–390)
PMV BLD AUTO: 9.5 FL (ref 8.9–12.7)
RBC # BLD AUTO: 4.41 MILLION/UL (ref 3.88–5.62)
RSV RNA RESP QL NAA+PROBE: NEGATIVE
SARS-COV-2 RNA RESP QL NAA+PROBE: NEGATIVE
WBC # BLD AUTO: 15.8 THOUSAND/UL (ref 4.31–10.16)

## 2022-08-07 PROCEDURE — NC001 PR NO CHARGE: Performed by: ORTHOPAEDIC SURGERY

## 2022-08-07 PROCEDURE — 86803 HEPATITIS C AB TEST: CPT | Performed by: FAMILY MEDICINE

## 2022-08-07 PROCEDURE — 99255 IP/OBS CONSLTJ NEW/EST HI 80: CPT | Performed by: INTERNAL MEDICINE

## 2022-08-07 PROCEDURE — 82948 REAGENT STRIP/BLOOD GLUCOSE: CPT

## 2022-08-07 PROCEDURE — 0241U HB NFCT DS VIR RESP RNA 4 TRGT: CPT

## 2022-08-07 PROCEDURE — 99449 NTRPROF PH1/NTRNET/EHR 31/>: CPT | Performed by: EMERGENCY MEDICINE

## 2022-08-07 PROCEDURE — 99232 SBSQ HOSP IP/OBS MODERATE 35: CPT | Performed by: FAMILY MEDICINE

## 2022-08-07 PROCEDURE — 87340 HEPATITIS B SURFACE AG IA: CPT | Performed by: FAMILY MEDICINE

## 2022-08-07 PROCEDURE — 87806 HIV AG W/HIV1&2 ANTB W/OPTIC: CPT | Performed by: FAMILY MEDICINE

## 2022-08-07 PROCEDURE — 85025 COMPLETE CBC W/AUTO DIFF WBC: CPT | Performed by: FAMILY MEDICINE

## 2022-08-07 RX ORDER — QUETIAPINE FUMARATE 25 MG/1
25 TABLET, FILM COATED ORAL
Status: DISCONTINUED | OUTPATIENT
Start: 2022-08-07 | End: 2022-08-07

## 2022-08-07 RX ORDER — QUETIAPINE FUMARATE 25 MG/1
25 TABLET, FILM COATED ORAL ONCE
Status: COMPLETED | OUTPATIENT
Start: 2022-08-07 | End: 2022-08-07

## 2022-08-07 RX ORDER — GABAPENTIN 100 MG/1
100 CAPSULE ORAL 3 TIMES DAILY
Status: DISCONTINUED | OUTPATIENT
Start: 2022-08-07 | End: 2022-08-17 | Stop reason: HOSPADM

## 2022-08-07 RX ORDER — ONDANSETRON 2 MG/ML
4 INJECTION INTRAMUSCULAR; INTRAVENOUS 3 TIMES DAILY
Status: DISCONTINUED | OUTPATIENT
Start: 2022-08-07 | End: 2022-08-17 | Stop reason: HOSPADM

## 2022-08-07 RX ORDER — CLONIDINE HYDROCHLORIDE 0.1 MG/1
0.1 TABLET ORAL EVERY 8 HOURS SCHEDULED
Status: DISCONTINUED | OUTPATIENT
Start: 2022-08-07 | End: 2022-08-17 | Stop reason: HOSPADM

## 2022-08-07 RX ADMIN — ONDANSETRON 4 MG: 2 INJECTION INTRAMUSCULAR; INTRAVENOUS at 22:02

## 2022-08-07 RX ADMIN — CEFAZOLIN SODIUM 2000 MG: 2 SOLUTION INTRAVENOUS at 15:54

## 2022-08-07 RX ADMIN — CLONIDINE HYDROCHLORIDE 0.1 MG: 0.1 TABLET ORAL at 13:12

## 2022-08-07 RX ADMIN — ACETAMINOPHEN 975 MG: 325 TABLET ORAL at 22:03

## 2022-08-07 RX ADMIN — ONDANSETRON 4 MG: 2 INJECTION INTRAMUSCULAR; INTRAVENOUS at 09:25

## 2022-08-07 RX ADMIN — CLONIDINE HYDROCHLORIDE 0.1 MG: 0.1 TABLET ORAL at 22:03

## 2022-08-07 RX ADMIN — CEFAZOLIN SODIUM 2000 MG: 2 SOLUTION INTRAVENOUS at 22:08

## 2022-08-07 RX ADMIN — ACETAMINOPHEN 975 MG: 325 TABLET ORAL at 13:08

## 2022-08-07 RX ADMIN — GABAPENTIN 100 MG: 100 CAPSULE ORAL at 13:07

## 2022-08-07 RX ADMIN — VANCOMYCIN HYDROCHLORIDE 1750 MG: 10 INJECTION, POWDER, LYOPHILIZED, FOR SOLUTION INTRAVENOUS at 06:00

## 2022-08-07 RX ADMIN — QUETIAPINE FUMARATE 25 MG: 25 TABLET ORAL at 13:07

## 2022-08-07 RX ADMIN — ATORVASTATIN CALCIUM 20 MG: 20 TABLET, FILM COATED ORAL at 22:03

## 2022-08-07 RX ADMIN — ACETAMINOPHEN 975 MG: 325 TABLET ORAL at 06:00

## 2022-08-07 RX ADMIN — LISINOPRIL 2.5 MG: 2.5 TABLET ORAL at 09:35

## 2022-08-07 RX ADMIN — ONDANSETRON 4 MG: 2 INJECTION INTRAMUSCULAR; INTRAVENOUS at 15:54

## 2022-08-07 RX ADMIN — OXYCODONE HYDROCHLORIDE 5 MG: 5 TABLET ORAL at 00:28

## 2022-08-07 RX ADMIN — INSULIN LISPRO 1 UNITS: 100 INJECTION, SOLUTION INTRAVENOUS; SUBCUTANEOUS at 11:03

## 2022-08-07 RX ADMIN — CEFAZOLIN SODIUM 2000 MG: 2 SOLUTION INTRAVENOUS at 07:09

## 2022-08-07 RX ADMIN — INSULIN LISPRO 1 UNITS: 100 INJECTION, SOLUTION INTRAVENOUS; SUBCUTANEOUS at 07:10

## 2022-08-07 RX ADMIN — GABAPENTIN 100 MG: 100 CAPSULE ORAL at 22:03

## 2022-08-07 RX ADMIN — OXYCODONE HYDROCHLORIDE 5 MG: 5 TABLET ORAL at 09:28

## 2022-08-07 NOTE — ASSESSMENT & PLAN NOTE
Presented to [facility] ED, R wrist swelling and erythema x1 day, painful  VSS normal, no SIRS met besides WBC 18k  CRP >90, ESR 78, wrist aspiration gram stain 4+ polys and 2+ GPCs, HbA1c 8 3  Did receive 1 dose vancomycin and cefepime in ED  Procal negative  - transfer to B for washout, S/P I&D/Washout (8/6)  - Ortho Following OP  - continue vanc and Ancef  - monitor vital signs  - pain control  - follow-up on labs from washout

## 2022-08-07 NOTE — ASSESSMENT & PLAN NOTE
Lab Results   Component Value Date    HGBA1C 8 3 (A) 04/28/2022       Recent Labs     08/06/22  1629 08/07/22  0705   POCGLU 107 172*       Blood Sugar Average: Last 72 hrs:  (P) 139 5     PTA meds: Lantus 15 units HS, Metformin 1000 mg BID    - Hold both for SSI

## 2022-08-07 NOTE — UTILIZATION REVIEW
Initial Clinical Review    [place] [facility]  Pt was transferred by EMS to [address] [place] [address] for hand surgery consult  Pt initially admitted as Observation on 8/6  Changed to Inpatient on 8/7  Pt requiring continued stay d/t ongoing IV ABX in the setting of active IV drug use  Admission: Date/Time/Statement:   Admission Orders (From admission, onward)     Ordered        08/07/22 1319  Inpatient Admission  Once            08/06/22 1751  Place in Observation  Once                      Orders Placed This Encounter   Procedures    Inpatient Admission     Standing Status:   Standing     Number of Occurrences:   1     Order Specific Question:   Level of Care     Answer:   Med Surg [16]     Order Specific Question:   Estimated length of stay     Answer:   More than 2 Midnights     Order Specific Question:   Certification     Answer:   I certify that inpatient services are medically necessary for this patient for a duration of greater than two midnights  See H&P and MD Progress Notes for additional information about the patient's course of treatment  ED Arrival Information     Expected   8/6/2022     Arrival   8/6/2022 12:22    Acuity   Urgent            Means of arrival   Ambulance    Escorted by   MARCUS Pineda    Service   General Medicine    Admission type   Urgent            Arrival complaint   septic wrist - ortho private - [address] p/u from [place] [address]   Patient presents with    Wrist Swelling     Pt transfer from [facility] with right wrist swelling/diagnosis of septic joint  Ortho private exam         Initial Presentation: 62 y o  male who [place] [facility], was transferred by EMS to [address] [place] [address] ED  Admitted in observation status for evaluation and treatment of R wrist pain  PMHx: Opioid use disorder, T2DM, HLD  Presented w/ R wrist pain and swelling after IV drug use   On exam, R wrist ACE bandaged w/ swelling and tenderness  Plan: Washout of wrist, IV ABX, analgesics, follow cultures, continue home meds, SSI w/ accuchecks ACHS  Ortho consulted  Ortho Consult: Pt w/ R septic wrist arthritis  On exam, erythema and warmth at wrist, neurovascularly intact  Plan: NWB RUE in splint, NPO, OR for debridement and washout, obtain intraop cultures, IV ABX     8/6 Surgery  Procedure: DEBRIDEMENT RIGHT UPPER EXTREMITY ([address]) SEPTIC WRIST (Right)  Indication: 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 [facility]) [M00 831]  Anesthesia: General  ASA Score: 3  Findings: Significant purulence within the wrist joint and synovitis     08/07/22 Changed to Inpatient Status  Pt uncooperative with morning lab draws  On exam, agitated, splint on RUE, tenderness appropriate, sensation/motor intact, well-perfused  Plan: NWB RUE, follow cultures, analgesics, will need long term IV ABX for joint involvement in the setting of active IV drug use, start clonidine and gabapentin to help with withdrawal  Infectious Diseases consulted  Infectious Disease Consult: Pt w/ R wrist septic arthritis and MSSA bacteremia  Plan: IV ABX, follow blood cultures, repeat blood cultures, check echo, follow OR cultures          Wt Readings from Last 1 Encounters:   08/06/22 81 6 kg (180 lb)     Additional Vital Signs:   Date/Time Temp Pulse Resp BP MAP (mmHg) SpO2 Calculated FIO2 (%) - Nasal Cannula Nasal Cannula O2 Flow Rate (L/min) O2 Device   08/07/22 13:11:15 -- 51 Abnormal  18 141/57 85 94 % -- -- None (Room air)   08/07/22 1200 -- 54 Abnormal -- -- -- -- -- -- --   08/07/22 10:56:29 98 9 °F (37 2 °C) 48 Abnormal  18 136/90 105 98 % -- -- None (Room air)   08/07/22 0935 98 3 °F (36 8 °C) 67 18 123/93 103 99 % -- -- None (Room air)   08/07/22 09:34:15 -- 49 Abnormal  -- 123/93 103 99 % -- -- --   08/06/22 1700 -- 54 Abnormal  18 149/65 91 100 % -- -- --   08/06/22 1630 -- 54 Abnormal  13 154/70 98 100 % -- -- --   08/06/22 1617 -- 56 17 142/74 93 100 % -- -- --   08/06/22 1608 97 7 °F (36 5 °C) 60 18 155/92 -- 100 % 28 2 L/min Nasal cannula   08/06/22 1224 97 2 °F (36 2 °C) 65 16 144/72 -- 100 % -- -- None (Room air)       Pertinent Labs/Diagnostic Test Results:   Results from last 7 days   Lab Units 08/07/22  1029   SARS-COV-2  Negative     Results from last 7 days   Lab Units 08/05/22  2155   WBC Thousand/uL 18 31*   HEMOGLOBIN g/dL 13 6   HEMATOCRIT % 40 8   PLATELETS Thousands/uL 306   NEUTROS ABS Thousands/µL 14 52*         Results from last 7 days   Lab Units 08/05/22  2155   SODIUM mmol/L 128*   POTASSIUM mmol/L 5 1   CHLORIDE mmol/L 94*   CO2 mmol/L 29   ANION GAP mmol/L 5   BUN mg/dL 15   CREATININE mg/dL 0 85   EGFR ml/min/1 73sq m 96   CALCIUM mg/dL 9 1     Results from last 7 days   Lab Units 08/05/22  2155   AST U/L 43   ALT U/L 32   ALK PHOS U/L 85   TOTAL PROTEIN g/dL 8 9*   ALBUMIN g/dL 3 5   TOTAL BILIRUBIN mg/dL 0 56   BILIRUBIN DIRECT mg/dL 0 08     Results from last 7 days   Lab Units 08/07/22  1054 08/07/22  0705 08/06/22  1629   POC GLUCOSE mg/dl 184* 172* 107     Results from last 7 days   Lab Units 08/05/22  2155   GLUCOSE RANDOM mg/dL 151*       Results from last 7 days   Lab Units 08/05/22  2253   PROTIME seconds 13 5   INR  1 03   PTT seconds 36         Results from last 7 days   Lab Units 08/05/22  2155   PROCALCITONIN ng/ml 0 10     Results from last 7 days   Lab Units 08/05/22  2215   LACTIC ACID mmol/L 0 6     Results from last 7 days   Lab Units 08/05/22  2155   SED RATE mm/hour 78*     Results from last 7 days   Lab Units 08/07/22  1029   INFLUENZA A PCR  Negative   INFLUENZA B PCR  Negative   RSV PCR  Negative     Results from last 7 days   Lab Units 08/06/22  0137 08/05/22  2217 08/05/22  2215   BLOOD CULTURE   --  No Growth at 24 hrs   --    GRAM STAIN RESULT  4+ Polys*  2+ Gram positive cocci in clusters*  --  Gram positive cocci in clusters*   BODY FLUID CULTURE, STERILE  3+ Growth of Staphylococcus aureus*  --   --        Past Medical History:   Diagnosis Date    Diabetes mellitus ([facility] [address] )     Substance abuse ([facility] )      Present on Admission:   Right wrist pain   Opioid use disorder   Other hyperlipidemia   Type 2 diabetes mellitus with complication, without long-term current use of insulin ([facility])      Admitting Diagnosis: Wrist joint infection ([facility] ) [M00 9]  Age/Sex: 62 y o  male  Admission Orders:  Regular Diet  NWB RUE  Scheduled Medications:  acetaminophen, 975 mg, Oral, Q8H GUILLE  atorvastatin, 20 mg, Oral, HS  cefazolin, 2,000 mg, Intravenous, Q8H  cloNIDine, 0 1 mg, Oral, Q8H GUILLE  gabapentin, 100 mg, Oral, TID  insulin lispro, 1-6 Units, Subcutaneous, TID AC  lisinopril, 2 5 mg, Oral, Daily  ondansetron, 4 mg, Intravenous, TID    Continuous IV Infusions:   sodium chloride 0 9 % w/ KCl 20 mEq/L, 100 mL/hr, Intravenous, Continuous; Start: 08/06/22 1800 End: 08/07/22 0747    PRN Meds:  ketorolac, 30 mg, Intramuscular, Q6H PRN  ondansetron, 4 mg, Intravenous, Q6H PRN; 8/7 x1  oxyCODONE, 2 5 mg, Oral, Q4H PRN  oxyCODONE, 5 mg, Oral, Q4H PRN; 8/6 x1, 8/7 x2  QUEtiapine, 25 mg, Oral; 8/7 x1  vancomycin, 1,750 mg, Intravenous; 8/6 x1, 8/7 x1        IP CONSULT TO CASE MANAGEMENT  IP CONSULT TO INFECTIOUS DISEASES  IP CONSULT TO TOXICOLOGY  CONSULT TO CERTIFIED     Network Utilization Review Department  ATTENTION: Please call with any questions or concerns to [phone] and carefully listen to the prompts so that you are directed to the right person  All voicemails are confidential   Genia Bee all requests for admission clinical reviews, approved or denied determinations and any other requests to dedicated fax number below belonging to the campus where the patient is receiving treatment   List of dedicated fax numbers for the Facilities:  [address] [place] [address] DENIALS (Administrative/Medical Necessity) [phone]   [address] (Maternity/NICU/Pediatrics) [phone] [address] 40 125 [facility]  [phone]   [address] [place] [address] [address] [place] [address] Savanah Herrera [address] [facility] [phone]

## 2022-08-07 NOTE — PHYSICAL THERAPY NOTE
Physical Therapy Cancellation Note    Patient's Name: Violet Danger       08/07/22 4322   PT Last Visit   PT Visit Date 08/07/22   Note Type   Note type Evaluation; Cancelled Session       Orders received, chart reviewed  Pt admit with right wrist pain, now s/p RUE washout/debridement  Pt adamantly refusing to work with therapy  Will hold PT at this time and follow for evaluation/treatment as medically appropriate  Thank you       Mook Arellano, PT, DPT

## 2022-08-07 NOTE — NURSING NOTE
Patient verbally aggressive with the staff  Patient refused morning labs from night shift  When day shift tech attempted to draw his labs patient again became non compliant and aggressive with multiple staff members causing concerns for safety while drawing up blood  Family medicine was notified that labs could not be drawn due to patient's non compliance and behavior issues  Patient currently has a COW score of 12  Same result was shared with family medicine  Patient to be moved to a telemetry floor (CW2) for further care  Made family medicine aware that patient's heart rate had been in the upper 40s-mid 50s on [facility]  PAC notified for bed placement  Patient will need to be swabbed for flu/RSV/Covid prior to transfer   SD 8/7/2022

## 2022-08-07 NOTE — PROGRESS NOTES
Progress Note - Orthopedics   Maria R Jay 62 y o  male MRN: [id number]  Unit/Bed#: -01      Subjective:    No acute issues overnight, patient comfortable this morning  Labs:  0   Lab Value Date/Time    HCT 40 8 08/05/2022 2155    HCT 43 7 06/16/2019 2305    HGB 13 6 08/05/2022 2155    HGB 14 9 06/16/2019 2305    INR 1 03 08/05/2022 2253    WBC 18 31 (H) 08/05/2022 2155    WBC 12 96 (H) 06/16/2019 2305    ESR 78 (H) 08/05/2022 2155       Meds:    Current Facility-Administered Medications:     acetaminophen (TYLENOL) tablet 975 mg, 975 mg, Oral, Q8H [address], Jaap Jacob, DO, 975 mg at 08/07/22 0600    atorvastatin (LIPITOR) tablet 20 mg, 20 mg, Oral, HS, Jaap Jacob, DO, 20 mg at 08/06/22 2147    ceFAZolin (ANCEF) IVPB (premix in dextrose) 2,000 mg 50 mL, 2,000 mg, Intravenous, Q8H, Zoë Jacob DO, Last Rate: 100 mL/hr at 08/06/22 2344, 2,000 mg at 08/06/22 2344    insulin lispro (HumaLOG) 100 units/mL subcutaneous injection 1-6 Units, 1-6 Units, Subcutaneous, TID AC **AND** Fingerstick Glucose (POCT), , , TID AC, Gerardo Delacruz DO    ketorolac (TORADOL) injection 30 mg, 30 mg, Intramuscular, Q6H PRN, Gerardo Delacruz DO    lisinopril (ZESTRIL) tablet 2 5 mg, 2 5 mg, Oral, Daily, Gerardo Delacruz DO    ondansetron (ZOFRAN) injection 4 mg, 4 mg, Intravenous, Q6H PRN, Gerardo Delacruz DO    oxyCODONE (ROXICODONE) IR tablet 2 5 mg, 2 5 mg, Oral, Q4H PRN, Gerardo Delacruz DO    oxyCODONE (ROXICODONE) IR tablet 5 mg, 5 mg, Oral, Q4H PRN, Gerardo Delacruz DO, 5 mg at 08/07/22 0028    sodium chloride 0 9 % with KCl 20 mEq/L infusion (premix), 100 mL/hr, Intravenous, Continuous, Gerardo Delacruz DO, Last Rate: 100 mL/hr at 08/06/22 2148, 100 mL/hr at 08/06/22 2148    vancomycin (VANCOCIN) 1,750 mg in sodium chloride 0 9 % 500 mL IVPB, 20 mg/kg, Intravenous, Q12H, Gerardo Delacruz DO, Last Rate: 250 mL/hr at 08/07/22 0600, 1,750 mg at 08/07/22 0600    Blood Culture:   Lab Results   Component Value Date    BLOODCX No Growth at 24 hrs   08/05/2022 Wound Culture:   No results found for: WOUNDCULT    Ins and Outs:  I/O last 24 hours: In: -   Out: 1200 [Urine:1200]          Physical:  Vitals:    08/06/22 1700   BP: 149/65   Pulse: (!) 54   Resp: 18   Temp:    SpO2: 100%     Musculoskeletal: right Upper Extremity  · Splint in place  · TTP appropriately over surgical site to the dorsal wrist  · Sensation intact to light touch m/r/u  · Motor intact m/r/u/ain/pin  · Fingers warm and well perused      Assessment:    62 y  o male sp I&D of his right wrist for septic arthritis, will need long term antibiotic regimen for joint involvement, will await final cultures and sensitivities/ID recommendations for regimen        Plan:  · NWB RUE in splint  · Will monitor for ABLA   · Follow cultures  · PT/OT  · Pain control  · DVT PPX: per primary team  · Dispo: Ortho will follow    Sadie Moffett MD

## 2022-08-07 NOTE — QUICK NOTE
BCx x1 (+) for GPC, continue vanc  Await 2nd BCx to confirm, if (+), consult ID  Pt stable      Prince Frederick Gutting, DO  Family Medicine Resident, PGY3  11:08 PM

## 2022-08-07 NOTE — CASE MANAGEMENT
Case Management Assessment & Discharge Planning Note    Patient name Violet Cox  Location /-27 MRN [id number]  : 1965 Date 2022       Current Admission Date: 2022  Current Admission Diagnosis:Right wrist pain   Patient Active Problem List    Diagnosis Date Noted    Right wrist pain 2022    Opioid use disorder 2021    Class 1 obesity due to excess calories without serious comorbidity with body mass index (BMI) of 32 0 to 32 9 in adult 2020    Encounter for counseling for tobacco use disorder 2020    Encounter for screening colonoscopy 2020    Other hyperlipidemia 2020    Type 2 diabetes mellitus with complication, without long-term current use of insulin ([facility] ) 2019      LOS (days): 1  Geometric Mean LOS (GMLOS) (days):   Days to GMLOS:     OBJECTIVE:              Current admission status: Observation       Preferred Pharmacy:   [facility]  [address] [place] [id number]  Phone: [phone] Fax: [phone]    Primary Care Provider: Tierra Nesbitt MD    Primary Insurance: [address] [place] [address]  Secondary Insurance:     ASSESSMENT:  Gisele Perea Proxies    There are no active Health Care Proxies on file  Patient Information  Admitted from[de-identified] Home  Mental Status: Alert  During Assessment patient was accompanied by: Not accompanied during assessment  Assessment information provided by[de-identified] Patient  Primary Caregiver: Self  Support Systems: Self  What city [place] you [place] in[place] New entry access options   Select all that apply : No steps to enter home  Type of Current Residence: [place]  In the last 12 months, was there a time when you were not able to pay the mortgage or rent on time?: No  In the last 12 months, how many places have you lived?: 1  In the last 12 months, was there a time when you did not have a steady place to sleep or slept in a shelter (including now)?: No  Living Arrangements: Lives Alone    Activities of Daily Living Prior to Admission  Functional Status: Independent  Completes ADLs independently?: Yes  Ambulates independently?: Yes  Does patient use assisted devices?: No  Does patient currently own DME?: No  Does patient have a history of Outpatient Therapy (PT/OT)?: No  Does the patient have a history of Short-Term Rehab?: No  Does patient have a history of HHC?: No  Does patient currently have [facility]?: No         Patient Information Continued  Income Source: Employed  Does patient have prescription coverage?: No  Within the past 12 months, you worried that your food would run out before you got the money to buy more : Patient refused  Within the past 12 months, the food you bought just didn't last and you didn't have money to get more : Patient refused  Does patient receive dialysis treatments?: No  Does patient have a history of substance abuse?: Yes  Historical substance use preference: Fentanyl, Heroin (pt informed CM that he is currently using fentanyl  chart review states that he injected heroin 1 week ago)  History of Withdrawal Symptoms:  (states "I puke")  Is patient currently in treatment for substance abuse?: No  Patient declined treatment information    Does patient have a history of Mental Health Diagnosis?: No         Means of Transportation  Means of Transport to Appts[de-identified]  (patient states "I walk")  In the past 12 months, has lack of transportation kept you from medical appointments or from getting medications?: Patient refused  In the past 12 months, has lack of transportation kept you from meetings, work, or from getting things needed for daily living?: Patient refused  Was application for public transport provided?: Refused        DISCHARGE DETAILS:    Discharge planning discussed with[de-identified] Patient  Freedom of Choice: Yes        Were Treatment Team discharge recommendations reviewed with patient/caregiver?: Yes  Did patient/caregiver verbalize understanding of patient care needs?: Yes  Were patient/caregiver advised of the risks associated with not following Treatment Team discharge recommendations?: Yes         Other Referral/Resources/Interventions Provided:  Referral Comments: Patient currently adamantly refusing to work with therapy  He is currently NWB to his RUE  Additional Comments: Per the patient, he states that he is COVID vaccinated x 4  He states that he plans on walking home  CM did receive consult for the patient for ETOH/drug/MH  CM did attempt to speak with the patient about resources however he is currently refusing all resources from CM at this time  CM reviewed d/c planning process including the following: identifying help at home, patient preference for d/c planning needs, Discharge Lounge, [facility] Meds to Bed program, availability of treatment team to discuss questions or concerns patient and/or family may have regarding understanding medications and recognizing signs and symptoms once discharged  CM also encouraged patient to follow up with all recommended appointments after discharge  Patient advised of importance for patient and family to participate in managing patients medical well being  Pulses equal bilaterally, no edema present.

## 2022-08-07 NOTE — PROGRESS NOTES
[facility]  Progress Note - [place] 1965, 62 y o  male MRN: [id number]  Unit/Bed#: MS Yakov-Suzette Encounter: [id number]  Primary Care Provider: Tierra Nesbitt MD   Date and time admitted to hospital: 8/6/2022 12:22 PM    * Right wrist pain  Assessment & Plan  Presented to [facility] ED, R wrist swelling and erythema x1 day, painful  VSS normal, no SIRS met besides WBC 18k  CRP >90, ESR 78, wrist aspiration gram stain 4+ polys and 2+ GPCs, HbA1c 8 3  Did receive 1 dose vancomycin and cefepime in ED  Procal negative  - transfer to [facility] for washout, S/P I&D/Washout (8/6)  - Ortho Following OP  - continue vanc and Ancef  - monitor vital signs  - pain control  - follow-up on labs from washout  Opioid use disorder  Assessment & Plan  Hx of heroin injected one week ago into right wrist  Hx of opioid abuse     Other hyperlipidemia  Assessment & Plan  Stable  Continue atorvastatin 20 mg daily     Type 2 diabetes mellitus with complication, without long-term current use of insulin [facility])  Assessment & Plan  Lab Results   Component Value Date    HGBA1C 8 3 (A) 04/28/2022       Recent Labs     08/06/22  1629 08/07/22  0705   POCGLU 107 172*       Blood Sugar Average: Last 72 hrs:  (P) 139 5     PTA meds: Lantus 15 units HS, Metformin 1000 mg BID    - Hold both for SSI       PPX: SCDs  Diet: Regular House  Code Status: Full  Dispo:     Plan D/W Dr Kadi Santoro and [facility] Team    Subjective:   Pt assessed at bedside this AM, complaining of pain, nausea, and vomiting  ON patient had one episode of emesis and was combative with nursing staff causing one needlestick accident  States he feels his Pain is still a 10/10 and would like more pain medication  Denies headache, chest pain, SOB, diarrhea, fevers or chills  No other complaints at this time  Objective:     Vitals: Blood pressure 149/65, pulse (!) 54, temperature 97 7 °F (36 5 °C), temperature source Temporal, resp   rate 18, height 5' 7" (1 702 m), weight 81 6 kg (180 lb), SpO2 100 %  ,Body mass index is 28 19 kg/m²  Intake/Output Summary (Last 24 hours) at 8/7/2022 0844  Last data filed at 8/7/2022 0709  Gross per 24 hour   Intake --   Output 1350 ml   Net -1350 ml       Physical Exam:   Physical Exam  Constitutional:       Appearance: Normal appearance  HENT:      Head: Normocephalic and atraumatic  Right Ear: External ear normal       Left Ear: External ear normal       Mouth/Throat:      Pharynx: Oropharynx is clear  Cardiovascular:      Rate and Rhythm: Normal rate and regular rhythm  Pulses: Normal pulses  Heart sounds: Normal heart sounds  Pulmonary:      Effort: Pulmonary effort is normal       Breath sounds: Normal breath sounds  Abdominal:      General: Bowel sounds are normal       Palpations: Abdomen is soft  Musculoskeletal:      Comments: Right Upper extremity Splint in Place, C/D/I  Neurological:      General: No focal deficit present  Mental Status: He is alert  Invasive Devices  Report    Peripheral Intravenous Line  Duration           Peripheral IV -- days    Peripheral IV 08/05/22 Left Hand 1 day                           Lab and other studies:  I have personally reviewed pertinent reports  Admission on 08/06/2022   Component Date Value    POC Glucose 08/06/2022 107     POC Glucose 08/07/2022 172 (A)       Recent Results (from the past 24 hour(s))   Fingerstick Glucose (POCT)    Collection Time: 08/06/22  4:29 PM   Result Value Ref Range    POC Glucose 107 65 - 140 mg/dl   Fingerstick Glucose (POCT)    Collection Time: 08/07/22  7:05 AM   Result Value Ref Range    POC Glucose 172 (H) 65 - 140 mg/dl     Blood Culture:   Lab Results   Component Value Date    BLOODCX No Growth at 24 hrs   08/05/2022   ,   Urinalysis:   Lab Results   Component Value Date    COLORU Yellow 08/31/2015    CLARITYU Clear 08/31/2015    SPECGRAV 1 020 08/31/2015    PHUR 5 5 08/31/2015    LEUKOCYTESUR Negative 08/31/2015    NITRITE Negative 08/31/2015    PROTEINUA Trace (A) 08/31/2015    GLUCOSEU 500 (1/2%) (A) 08/31/2015    KETONESU Negative 08/31/2015    BILIRUBINUR Negative 08/31/2015    BLOODU Negative 08/31/2015   ,   Urine Culture: No results found for: URINECX,   Wound Culure: No results found for: WOUNDCULT      Imaging:   No orders to display     VTE Mechanical Prophylaxis: sequential compression device    Current Facility-Administered Medications   Medication Dose Route Frequency    acetaminophen (TYLENOL) tablet 975 mg  975 mg Oral Q8H [facility]    atorvastatin (LIPITOR) tablet 20 mg  20 mg Oral HS    ceFAZolin (ANCEF) IVPB (premix in dextrose) 2,000 mg 50 mL  2,000 mg Intravenous Q8H    insulin lispro (HumaLOG) 100 units/mL subcutaneous injection 1-6 Units  1-6 Units Subcutaneous TID AC    ketorolac (TORADOL) injection 30 mg  30 mg Intramuscular Q6H PRN    lisinopril (ZESTRIL) tablet 2 5 mg  2 5 mg Oral Daily    ondansetron (ZOFRAN) injection 4 mg  4 mg Intravenous Q6H PRN    oxyCODONE (ROXICODONE) IR tablet 2 5 mg  2 5 mg Oral Q4H PRN    oxyCODONE (ROXICODONE) IR tablet 5 mg  5 mg Oral Q4H PRN    vancomycin (VANCOCIN) 1,750 mg in sodium chloride 0 9 % 500 mL IVPB  20 mg/kg Intravenous Q12H       Natalya Maxwell MD  Family Medicine Resident PGY1

## 2022-08-07 NOTE — CONSULTS
Consultation - Infectious Disease   Karon Nichole 62 y o  male MRN: [id number]  Unit/Bed#: -01 Encounter: [id number]      IMPRESSION & RECOMMENDATIONS:   Impression/Recommendations:  1  MSSA bacteremia  Due to #2  No other appreciable source  Clinically stable without formal sepsis criteria  Rec:  · Continue cefazolin for now  · D/C vancomycine  · Follow up final blood cultures   · Check repeat blood cultures in AM  · Check TTE    2  Right wrist septic arthritis  Likely due to IVDU  Status post I&D 8/6/22  Intraoperative findings notable for purulence in the joint space  OR Gram stain with GPCs in clusters  Suspect MSSA given bacteremia as above  Rec:  · Continue cefazolin for now  · Follow up OR cultures and tailor antibiotics as indicated    3  OUD  IV heroin  Risk factor for infection as above  Uses 6 bags/day  Exhibiting withdrawal symptoms  Rec:  · Consult APS versus toxicology toxicology  · Consult CRS  · Consider STAR program  · Check HCV, HIV    4  Poorly controlled DM  REcent A1c 4/2022 8 3  Risk factor for infection as above  The above impression and plan was discussed in detail with the patient and Dr Blanc Reusing  Antibiotics:  Cefazolin #1  Vancomycin #2  Antibiotics #2    Thank you for this consultation  We will follow along with you  HISTORY OF PRESENT ILLNESS:  Reason for Consult: Septic arthritis    HPI: Karon Nichole is a 62 y o  male with OUD using IV heroin, DM  He presented to the ED at [facility] on 8/5 with right wrist pain and swelling  Had a similar problem one year ago  He does inject into the dorsum of the right hand  Upon presentation he was noted to be afebrile but did have a leukocytosis  He was given a dose of cefepime and started on cefazolin and vancomycin  He was transferred to [facility]  HE ws seen by ORtho and taken to the OR yesterday for I&D  Intraoperative findings were notable for purulence in the joint space    Blood cultures have come back growing MSSA   OR Gram stain has GPCs in clusters  We are asked to comment in further evaluation and management  Patient extremely nauseated and endorses withdrawal   Emesis in basin at bedside  States us uses 6 bags a day, last used 2 days ago  In performing this consult, I have reviewed prior admission and outpatient visit records in detail  REVIEW OF SYSTEMS:  Denies fevers or diarrhea  A complete system-based review of systems is otherwise negative  PAST MEDICAL HISTORY:  Past Medical History:   Diagnosis Date    [facility] mellitus ([facility] )     Substance abuse ([facility] )      Past Surgical History:   Procedure Laterality Date    NO PAST SURGERIES         FAMILY HISTORY:  Non-contributory    SOCIAL HISTORY:  Social History     Substance and Sexual Activity   Alcohol Use Not Currently     Social History     Substance and Sexual Activity   Drug Use Not Currently    Comment: heroin hx, clean x 1 year     Social History     Tobacco Use   Smoking Status Current Every Day Smoker    Packs/day: 0 50    Types: Cigarettes   Smokeless Tobacco Never Used   Tobacco Comment    about 12 cigarettes/daily       ALLERGIES:  No Known Allergies    MEDICATIONS:  All current active medications have been reviewed      PHYSICAL EXAM:  Vitals:  Temp:  [97 2 °F (36 2 °C)-98 3 °F (36 8 °C)] 98 3 °F (36 8 °C)  HR:  [47-67] 67  Resp:  [13-18] 18  BP: (123-158)/(65-93) 123/93  SpO2:  [99 %-100 %] 99 %  Temp (24hrs), Av 7 °F (36 5 °C), Min:97 2 °F (36 2 °C), Max:98 3 °F (36 8 °C)  Current: Temperature: 98 3 °F (36 8 °C)     Physical Exam:  General:  Well-nourished, well-developed, in no acute distress  Eyes:  Conjunctive clear with no hemorrhages or effusions  Oropharynx:  No ulcers, no lesions  Neck:  Supple, no lymphadenopathy  Lungs:  Normal respiratory excursion, no accessory muscle use  Cardiac:  Regular rate and rhythm, extremities well perfused  Abdomen:  Soft, non-tender, non-distended  Extremities:  No peripheral cyanosis, clubbing, right wrist ACE intact  Skin:  No rashes, no ulcers  Neurological:  Moves all four extremities spontaneously, sensation grossly intact    LABS, IMAGING, & OTHER STUDIES:  Lab Results:  I have personally reviewed pertinent labs  Results from last 7 days   Lab Units 08/05/22 2155   POTASSIUM mmol/L 5 1   CHLORIDE mmol/L 94*   CO2 mmol/L 29   BUN mg/dL 15   CREATININE mg/dL 0 85   EGFR ml/min/1 73sq m 96   CALCIUM mg/dL 9 1   AST U/L 43   ALT U/L 32   ALK PHOS U/L 85     Results from last 7 days   Lab Units 08/05/22  2155   WBC Thousand/uL 18 31*   HEMOGLOBIN g/dL 13 6   PLATELETS Thousands/uL 306     Results from last 7 days   Lab Units 08/06/22  0137 08/05/22  2217 08/05/22  2215   BLOOD CULTURE   --  No Growth at 24 hrs   --    GRAM STAIN RESULT  4+ Polys*  2+ Gram positive cocci in clusters*  --  Gram positive cocci in clusters*       Imaging Studies:   I have personally reviewed pertinent imaging study reports and images in PACS  Xray right wrist reviewed personally no fracture  EKG, Pathology, and Other Studies:   I have personally reviewed pertinent reports

## 2022-08-07 NOTE — NURSING NOTE
Report called to [facility] in [place]  Patient expected to be transferred to room 213   SD 8/7/2022

## 2022-08-07 NOTE — PLAN OF CARE
Problem: PAIN - ADULT  Goal: Verbalizes/displays adequate comfort level or baseline comfort level  Description: Interventions:  - Encourage patient to monitor pain and request assistance  - Assess pain using appropriate pain scale  - Administer analgesics based on type and severity of pain and evaluate response  - Implement non-pharmacological measures as appropriate and evaluate response  - Consider cultural and social influences on pain and pain management  - Notify physician/advanced practitioner if interventions unsuccessful or patient reports new pain  Outcome: Progressing     Problem: INFECTION - ADULT  Goal: Absence or prevention of progression during hospitalization  Description: INTERVENTIONS:  - Assess and monitor for signs and symptoms of infection  - Monitor lab/diagnostic results  - Monitor all insertion sites, i e  indwelling lines, tubes, and drains  - Monitor endotracheal if appropriate and nasal secretions for changes in amount and color  - Gatzke appropriate cooling/warming therapies per order  - Administer medications as ordered  - Instruct and encourage patient and family to use good hand hygiene technique  - Identify and instruct in appropriate isolation precautions for identified infection/condition  Outcome: Progressing     Problem: INFECTION - ADULT  Goal: Absence of fever/infection during neutropenic period  Description: INTERVENTIONS:  - Monitor WBC    Outcome: Progressing

## 2022-08-07 NOTE — PROGRESS NOTES
[facility] is a 62 y o  M who was receiving Vancomycin IV with management by the Pharmacy Consult service  The patients Vancomycin therapy has been discontinued  Thank you for allowing us to take part in this patient's care  Pharmacy will sign-off now; please call or re-consult if there are any questions        Dixon Morocho PharmD  Emergency Medicine Clinical Pharmacist    or [place]

## 2022-08-07 NOTE — CONSULTS
INTERPROFESSIONAL (PHONE) [facility] Toxicology  Ev Orosco 62 y o  male MRN: [id number]  Unit/Bed#: -01 Encounter: [id number]      Reason for Consult / Principal Problem: opioid use diosrder   Inpatient consult to Toxicology  Consult performed by: Mohinder Washington DO  Consult ordered by: Jayleen Dykes DO        08/07/22      ASSESSMENT:  77-year-old male with opioid use disorder and bacteremia    RECOMMENDATIONS:  Please continue supportive care and routine medical management  Please continue pain management through the acute pain process  Once he is no longer in need of opioid pain medications, he can be considered for medication assisted therapy with Suboxone if he desires  Please reach out at that time to formulate a plan  Options will include induction with pain management, induction with Toxicology in consult, or if not in need of long-term IV antibiotics, potentially at medical detox unit  Initially, start program may be helpful if long-term antibiotics are needed  Please keep us updated  Thank you  For further questions, please contact the medical  on call via [place] Text or throughl the [employer] or [employer]  Please see additional teaching note below:    Hx and PE limited by the dynamics of a phone consultation  I have not personally interviewed or evaluated the patient, but only advised based on the information provided to me  Primary provider is responsible for all clinical decisions  Pertinent history, physical exam and clinical findings and course discussed: Ev Orosco is a 62y o  year old male who presents with possible osteomyelitis of wrist from IVDA  On opioid pain meds now so cannot start buprenorphine today  Review of systems and physical exam not performed by me      Historical Information   Past Medical History:   Diagnosis Date    Diabetes mellitus ([facility] )     Substance abuse ([facility] )      Past Surgical History:   Procedure Laterality Date    NO PAST SURGERIES       Social History   Social History     Substance and Sexual Activity   Alcohol Use Not Currently     Social History     Substance and Sexual Activity   Drug Use Not Currently    Comment: heroin hx, clean x 1 year     Social History     Tobacco Use   Smoking Status Current Every Day Smoker    Packs/day: 0 50    Types: Cigarettes   Smokeless Tobacco Never Used   Tobacco Comment    about 12 cigarettes/daily     Family History   Problem Relation Age of Onset    Diabetes Mother     Alcohol abuse Father     Diabetes Sister     Asthma Brother         Prior to Admission medications    Medication Sig Start Date End Date Taking?  Authorizing Provider   aspirin (ECOTRIN LOW STRENGTH) 81 mg EC tablet Take 1 tablet (81 mg total) by mouth daily 5/8/20  Yes Francisco Javier Chicas MD   atorvastatin (LIPITOR) 20 mg tablet TAKE 1 TABLET(20 MG) BY MOUTH DAILY AT BEDTIME 5/6/22  Yes Rick Robledo MD   insulin glargine (Basaglar KwikPen) 100 units/mL injection pen Inject 15 Units under the skin daily at bedtime 4/28/22  Yes Rick Robledo MD   Insulin Pen Needle (BD Pen Needle Cely 2nd Gen) 32G X 4 MM MISC Inject under the skin daily 4/28/22  Yes Rick Robledo MD   lisinopril (ZESTRIL) 2 5 mg tablet TAKE 1 TABLET(2 5 MG) BY MOUTH DAILY 6/21/22  Yes Rick Robledo MD   metFORMIN (GLUCOPHAGE) 1000 MG tablet TAKE 1 TABLET(1000 MG) BY MOUTH TWICE DAILY WITH MEALS 5/16/22  Yes Rick Robledo MD   ACCU-CHEK FASTCLIX LANCETS [id number]  6/18/19   Historical Provider, MD   Blood Glucose Monitoring Suppl (ACCU-CHEK GUIDE) w/Device KIT  6/18/19   Historical Provider, MD   glucose blood (Accu-Chek Guide) test strip Use 1 each 3 (three) times a day Test as directed 1/28/21   MD NIYAH Corcoran 5 7-1 4 MG SUBL SUBLINGUAL TWO TABLETS SUBLINGUALLY DAILY  Patient not taking: Reported on 8/6/2022 7/31/19   Historical Provider, MD   ZUBSOLV 8 6-2 1 MG SUBL SUBLINGUAL 1 AND 1 /2 TABLETS SUBLINGUALLY DAILY  Patient not taking: Reported on 8/6/2022 7/3/19   Historical Provider, MD       Current Facility-Administered Medications   Medication Dose Route Frequency    acetaminophen (TYLENOL) tablet 975 mg  975 mg Oral Q8H [facility]    atorvastatin (LIPITOR) tablet 20 mg  20 mg Oral HS    ceFAZolin (ANCEF) IVPB (premix in dextrose) 2,000 mg 50 mL  2,000 mg Intravenous Q8H    cloNIDine (CATAPRES) tablet 0 1 mg  0 1 mg Oral Q8H [facility]    gabapentin (NEURONTIN) capsule 100 mg  100 mg Oral TID    insulin lispro (HumaLOG) 100 units/mL subcutaneous injection 1-6 Units  1-6 Units Subcutaneous TID AC    ketorolac (TORADOL) injection 30 mg  30 mg Intramuscular Q6H PRN    lisinopril (ZESTRIL) tablet 2 5 mg  2 5 mg Oral Daily    ondansetron (ZOFRAN) injection 4 mg  4 mg Intravenous Q6H PRN    ondansetron (ZOFRAN) injection 4 mg  4 mg Intravenous TID    oxyCODONE (ROXICODONE) IR tablet 2 5 mg  2 5 mg Oral Q4H PRN    oxyCODONE (ROXICODONE) IR tablet 5 mg  5 mg Oral Q4H PRN       No Known Allergies    Objective       Intake/Output Summary (Last 24 hours) at 8/7/2022 1424  Last data filed at 8/7/2022 1309  Gross per 24 hour   Intake 1530 ml   Output 1650 ml   Net -120 ml       Invasive Devices:   Peripheral IV 08/05/22 Left Hand (Active)   Site Assessment Clean;Dry; Intact 08/06/22 2300   Dressing Type Transparent 08/06/22 2330   Line Status Infusing 08/06/22 2330   Dressing Status Clean;Dry; Intact 08/06/22 2330       Vitals   Vitals:    08/07/22 0935 08/07/22 1056 08/07/22 1200 08/07/22 1311   BP: 123/93 136/90  141/57   TempSrc: Oral Oral     Pulse: 67 (!) 48 (!) 54 (!) 51   Resp: 18 18  18   Patient Position - Orthostatic VS: Lying Lying  Lying   Temp: 98 3 °F (36 8 °C) 98 9 °F (37 2 °C)         Lab Results: I have personally reviewed pertinent reports        Labs:    Results from last 7 days   Lab Units 08/07/22  1331   WBC Thousand/uL 15 80*   HEMOGLOBIN g/dL 13 0   HEMATOCRIT % 38 2   PLATELETS Thousands/uL 315   NEUTROS PCT % 87*   LYMPHS PCT % 7*   MONOS PCT % 5      Results from last 7 days   Lab Units 08/05/22  2155   SODIUM mmol/L 128*   POTASSIUM mmol/L 5 1   CHLORIDE mmol/L 94*   CO2 mmol/L 29   BUN mg/dL 15   CREATININE mg/dL 0 85   CALCIUM mg/dL 9 1   ALK PHOS U/L 85   ALT U/L 32   AST U/L 43      Results from last 7 days   Lab Units 08/05/22  2253   INR  1 03   PTT seconds 36     Results from last 7 days   Lab Units 08/05/22  2215   LACTIC ACID mmol/L 0 6     No results found for: TROPONINI          Invalid input(s): EXTPREGUR      Imaging Studies: I have personally reviewed pertinent reports  Counseling / Coordination of Care  Total time spent today 36 minutes  This was a phone consultation

## 2022-08-07 NOTE — OCCUPATIONAL THERAPY NOTE
Occupational Therapy Cancellation Note        Patient Name: Lincoln Pan  IHPTO'N Date: 8/7/2022 08/07/22 0935   OT Last Visit   OT Visit Date 08/07/22   Note Type   Note type Evaluation; Cancelled Session   Cancel Reasons Other       OT orders received, chart reviewed  Pt refusing to participate in therapy at this time  OT will continue to follow and see as medically appropriate and able       Richelle Chapin, GISELE, OTR/L

## 2022-08-08 LAB
ANION GAP SERPL CALCULATED.3IONS-SCNC: 10 MMOL/L (ref 4–13)
BACTERIA SPEC BFLD CULT: ABNORMAL
BASOPHILS # BLD AUTO: 0.01 THOUSANDS/ΜL (ref 0–0.1)
BASOPHILS NFR BLD AUTO: 0 % (ref 0–1)
BUN SERPL-MCNC: 18 MG/DL (ref 5–25)
CALCIUM SERPL-MCNC: 8.3 MG/DL (ref 8.3–10.1)
CHLORIDE SERPL-SCNC: 103 MMOL/L (ref 96–108)
CO2 SERPL-SCNC: 25 MMOL/L (ref 21–32)
CREAT SERPL-MCNC: 0.83 MG/DL (ref 0.6–1.3)
EOSINOPHIL # BLD AUTO: 0.01 THOUSAND/ΜL (ref 0–0.61)
EOSINOPHIL NFR BLD AUTO: 0 % (ref 0–6)
ERYTHROCYTE [DISTWIDTH] IN BLOOD BY AUTOMATED COUNT: 12.7 % (ref 11.6–15.1)
GFR SERPL CREATININE-BSD FRML MDRD: 97 ML/MIN/1.73SQ M
GLUCOSE SERPL-MCNC: 160 MG/DL (ref 65–140)
GLUCOSE SERPL-MCNC: 181 MG/DL (ref 65–140)
GLUCOSE SERPL-MCNC: 190 MG/DL (ref 65–140)
GLUCOSE SERPL-MCNC: 207 MG/DL (ref 65–140)
GLUCOSE SERPL-MCNC: 213 MG/DL (ref 65–140)
GRAM STN SPEC: ABNORMAL
GRAM STN SPEC: ABNORMAL
HBV CORE AB SER QL: ABNORMAL
HBV CORE IGM SER QL: ABNORMAL
HBV SURFACE AG SER QL: ABNORMAL
HBV SURFACE AG SER QL: NORMAL
HCT VFR BLD AUTO: 36.5 % (ref 36.5–49.3)
HCV AB SER QL: ABNORMAL
HCV AB SER QL: ABNORMAL
HGB BLD-MCNC: 12.3 G/DL (ref 12–17)
IMM GRANULOCYTES # BLD AUTO: 0.06 THOUSAND/UL (ref 0–0.2)
IMM GRANULOCYTES NFR BLD AUTO: 0 % (ref 0–2)
LYMPHOCYTES # BLD AUTO: 1.29 THOUSANDS/ΜL (ref 0.6–4.47)
LYMPHOCYTES NFR BLD AUTO: 9 % (ref 14–44)
MCH RBC QN AUTO: 29.1 PG (ref 26.8–34.3)
MCHC RBC AUTO-ENTMCNC: 33.7 G/DL (ref 31.4–37.4)
MCV RBC AUTO: 86 FL (ref 82–98)
MONOCYTES # BLD AUTO: 0.81 THOUSAND/ΜL (ref 0.17–1.22)
MONOCYTES NFR BLD AUTO: 6 % (ref 4–12)
NEUTROPHILS # BLD AUTO: 11.69 THOUSANDS/ΜL (ref 1.85–7.62)
NEUTS SEG NFR BLD AUTO: 85 % (ref 43–75)
NRBC BLD AUTO-RTO: 0 /100 WBCS
PLATELET # BLD AUTO: 358 THOUSANDS/UL (ref 149–390)
PMV BLD AUTO: 9.4 FL (ref 8.9–12.7)
POTASSIUM SERPL-SCNC: 2.9 MMOL/L (ref 3.5–5.3)
RBC # BLD AUTO: 4.23 MILLION/UL (ref 3.88–5.62)
SODIUM SERPL-SCNC: 138 MMOL/L (ref 135–147)
WBC # BLD AUTO: 13.87 THOUSAND/UL (ref 4.31–10.16)

## 2022-08-08 PROCEDURE — 97163 PT EVAL HIGH COMPLEX 45 MIN: CPT

## 2022-08-08 PROCEDURE — 85025 COMPLETE CBC W/AUTO DIFF WBC: CPT | Performed by: FAMILY MEDICINE

## 2022-08-08 PROCEDURE — 86704 HEP B CORE ANTIBODY TOTAL: CPT | Performed by: INTERNAL MEDICINE

## 2022-08-08 PROCEDURE — 99232 SBSQ HOSP IP/OBS MODERATE 35: CPT | Performed by: INTERNAL MEDICINE

## 2022-08-08 PROCEDURE — 99232 SBSQ HOSP IP/OBS MODERATE 35: CPT | Performed by: FAMILY MEDICINE

## 2022-08-08 PROCEDURE — 87389 HIV-1 AG W/HIV-1&-2 AB AG IA: CPT | Performed by: INTERNAL MEDICINE

## 2022-08-08 PROCEDURE — 86803 HEPATITIS C AB TEST: CPT | Performed by: INTERNAL MEDICINE

## 2022-08-08 PROCEDURE — 86705 HEP B CORE ANTIBODY IGM: CPT | Performed by: INTERNAL MEDICINE

## 2022-08-08 PROCEDURE — 97166 OT EVAL MOD COMPLEX 45 MIN: CPT

## 2022-08-08 PROCEDURE — NC001 PR NO CHARGE: Performed by: ORTHOPAEDIC SURGERY

## 2022-08-08 PROCEDURE — 80048 BASIC METABOLIC PNL TOTAL CA: CPT | Performed by: FAMILY MEDICINE

## 2022-08-08 PROCEDURE — 87340 HEPATITIS B SURFACE AG IA: CPT | Performed by: INTERNAL MEDICINE

## 2022-08-08 PROCEDURE — 82948 REAGENT STRIP/BLOOD GLUCOSE: CPT

## 2022-08-08 RX ORDER — POTASSIUM CHLORIDE 20 MEQ/1
40 TABLET, EXTENDED RELEASE ORAL ONCE
Status: COMPLETED | OUTPATIENT
Start: 2022-08-08 | End: 2022-08-08

## 2022-08-08 RX ADMIN — ACETAMINOPHEN 975 MG: 325 TABLET ORAL at 06:26

## 2022-08-08 RX ADMIN — CLONIDINE HYDROCHLORIDE 0.1 MG: 0.1 TABLET ORAL at 22:05

## 2022-08-08 RX ADMIN — CEFAZOLIN SODIUM 2000 MG: 2 SOLUTION INTRAVENOUS at 06:26

## 2022-08-08 RX ADMIN — GABAPENTIN 100 MG: 100 CAPSULE ORAL at 22:05

## 2022-08-08 RX ADMIN — INSULIN LISPRO 2 UNITS: 100 INJECTION, SOLUTION INTRAVENOUS; SUBCUTANEOUS at 16:38

## 2022-08-08 RX ADMIN — CLONIDINE HYDROCHLORIDE 0.1 MG: 0.1 TABLET ORAL at 15:16

## 2022-08-08 RX ADMIN — POTASSIUM CHLORIDE 40 MEQ: 1500 TABLET, EXTENDED RELEASE ORAL at 12:04

## 2022-08-08 RX ADMIN — CEFAZOLIN SODIUM 2000 MG: 2 SOLUTION INTRAVENOUS at 15:14

## 2022-08-08 RX ADMIN — GABAPENTIN 100 MG: 100 CAPSULE ORAL at 15:15

## 2022-08-08 RX ADMIN — POTASSIUM CHLORIDE 40 MEQ: 1500 TABLET, EXTENDED RELEASE ORAL at 08:27

## 2022-08-08 RX ADMIN — ONDANSETRON 4 MG: 2 INJECTION INTRAMUSCULAR; INTRAVENOUS at 15:16

## 2022-08-08 RX ADMIN — CEFAZOLIN SODIUM 2000 MG: 2 SOLUTION INTRAVENOUS at 22:04

## 2022-08-08 RX ADMIN — ATORVASTATIN CALCIUM 20 MG: 20 TABLET, FILM COATED ORAL at 22:05

## 2022-08-08 RX ADMIN — ONDANSETRON 4 MG: 2 INJECTION INTRAMUSCULAR; INTRAVENOUS at 22:04

## 2022-08-08 RX ADMIN — ONDANSETRON 4 MG: 2 INJECTION INTRAMUSCULAR; INTRAVENOUS at 06:25

## 2022-08-08 RX ADMIN — CLONIDINE HYDROCHLORIDE 0.1 MG: 0.1 TABLET ORAL at 06:26

## 2022-08-08 RX ADMIN — OXYCODONE HYDROCHLORIDE 5 MG: 5 TABLET ORAL at 06:25

## 2022-08-08 RX ADMIN — LISINOPRIL 2.5 MG: 2.5 TABLET ORAL at 08:27

## 2022-08-08 RX ADMIN — GABAPENTIN 100 MG: 100 CAPSULE ORAL at 08:27

## 2022-08-08 RX ADMIN — ONDANSETRON 4 MG: 2 INJECTION INTRAMUSCULAR; INTRAVENOUS at 08:26

## 2022-08-08 RX ADMIN — ACETAMINOPHEN 975 MG: 325 TABLET ORAL at 15:16

## 2022-08-08 RX ADMIN — ACETAMINOPHEN 975 MG: 325 TABLET ORAL at 22:04

## 2022-08-08 NOTE — QUICK NOTE
Patient assessed at bedside as COWS - 7 for persistent vomiting, mild tremors and some yawning  Will continue to treat symptomatically as needed

## 2022-08-08 NOTE — PLAN OF CARE
Problem: PAIN - ADULT  Goal: Verbalizes/displays adequate comfort level or baseline comfort level  Description: Interventions:  - Encourage patient to monitor pain and request assistance  - Assess pain using appropriate pain scale  - Administer analgesics based on type and severity of pain and evaluate response  - Implement non-pharmacological measures as appropriate and evaluate response  - Consider cultural and social influences on pain and pain management  - Notify physician/advanced practitioner if interventions unsuccessful or patient reports new pain  Outcome: Progressing     Problem: INFECTION - ADULT  Goal: Absence or prevention of progression during hospitalization  Description: INTERVENTIONS:  - Assess and monitor for signs and symptoms of infection  - Monitor lab/diagnostic results  - Monitor all insertion sites, i e  indwelling lines, tubes, and drains  - Monitor endotracheal if appropriate and nasal secretions for changes in amount and color  - Windber appropriate cooling/warming therapies per order  - Administer medications as ordered  - Instruct and encourage patient and family to use good hand hygiene technique  - Identify and instruct in appropriate isolation precautions for identified infection/condition  Outcome: Progressing  Goal: Absence of fever/infection during neutropenic period  Description: INTERVENTIONS:  - Monitor WBC    Outcome: Progressing     Problem: SAFETY ADULT  Goal: Patient will remain free of falls  Description: INTERVENTIONS:  - Educate patient/family on patient safety including physical limitations  - Instruct patient to call for assistance with activity   - Consult OT/PT to assist with strengthening/mobility   - Keep Call bell within reach  - Keep bed low and locked with side rails adjusted as appropriate  - Keep care items and personal belongings within reach  - Initiate and maintain comfort rounds  - Make Fall Risk Sign visible to staff  - Offer Toileting every   Hours, in advance of need  - Initiate/Maintain  alarm  - Obtain necessary fall risk management equipment:    - Apply yellow socks and bracelet for high fall risk patients  - Consider moving patient to room near nurses station  Outcome: Progressing  Goal: Maintain or return to baseline ADL function  Description: INTERVENTIONS:  -  Assess patient's ability to carry out ADLs; assess patient's baseline for ADL function and identify physical deficits which impact ability to perform ADLs (bathing, care of mouth/teeth, toileting, grooming, dressing, etc )  - Assess/evaluate cause of self-care deficits   - Assess range of motion  - Assess patient's mobility; develop plan if impaired  - Assess patient's need for assistive devices and provide as appropriate  - Encourage maximum independence but intervene and supervise when necessary  - Involve family in performance of ADLs  - Assess for home care needs following discharge   - Consider OT consult to assist with ADL evaluation and planning for discharge  - Provide patient education as appropriate  Outcome: Progressing  Goal: Maintains/Returns to pre admission functional level  Description: INTERVENTIONS:  - Perform BMAT or MOVE assessment daily    - Set and communicate daily mobility goal to care team and patient/family/caregiver  - Collaborate with rehabilitation services on mobility goals if consulted  - Perform Range of Motion   times a day  - Reposition patient every   hours    - Dangle patient   times a day  - Stand patient   times a day  - Ambulate patient   times a day  - Out of bed to chair   times a day   - Out of bed for meals   times a day  - Out of bed for toileting  - Record patient progress and toleration of activity level   Outcome: Progressing     Problem: DISCHARGE PLANNING  Goal: Discharge to home or other facility with appropriate resources  Description: INTERVENTIONS:  - Identify barriers to discharge w/patient and caregiver  - Arrange for needed discharge resources and transportation as appropriate  - Identify discharge learning needs (meds, wound care, etc )  - Arrange for interpretive services to assist at discharge as needed  - Refer to Case Management Department for coordinating discharge planning if the patient needs post-hospital services based on physician/advanced practitioner order or complex needs related to functional status, cognitive ability, or social support system  Outcome: Progressing     Problem: Knowledge Deficit  Goal: Patient/family/caregiver demonstrates understanding of disease process, treatment plan, medications, and discharge instructions  Description: Complete learning assessment and assess knowledge base  Interventions:  - Provide teaching at level of understanding  - Provide teaching via preferred learning methods  Outcome: Progressing     Problem: MOBILITY - ADULT  Goal: Maintain or return to baseline ADL function  Description: INTERVENTIONS:  -  Assess patient's ability to carry out ADLs; assess patient's baseline for ADL function and identify physical deficits which impact ability to perform ADLs (bathing, care of mouth/teeth, toileting, grooming, dressing, etc )  - Assess/evaluate cause of self-care deficits   - Assess range of motion  - Assess patient's mobility; develop plan if impaired  - Assess patient's need for assistive devices and provide as appropriate  - Encourage maximum independence but intervene and supervise when necessary  - Involve family in performance of ADLs  - Assess for home care needs following discharge   - Consider OT consult to assist with ADL evaluation and planning for discharge  - Provide patient education as appropriate  Outcome: Progressing  Goal: Maintains/Returns to pre admission functional level  Description: INTERVENTIONS:  - Perform BMAT or MOVE assessment daily    - Set and communicate daily mobility goal to care team and patient/family/caregiver     - Collaborate with rehabilitation services on mobility goals if consulted  - Perform Range of Motion   times a day  - Reposition patient every   hours    - Dangle patient   times a day  - Stand patient   times a day  - Ambulate patient   times a day  - Out of bed to chair   times a day   - Out of bed for meals   times a day  - Out of bed for toileting  - Record patient progress and toleration of activity level   Outcome: Progressing     Problem: Potential for Falls  Goal: Patient will remain free of falls  Description: INTERVENTIONS:  - Educate patient/family on patient safety including physical limitations  - Instruct patient to call for assistance with activity   - Consult OT/PT to assist with strengthening/mobility   - Keep Call bell within reach  - Keep bed low and locked with side rails adjusted as appropriate  - Keep care items and personal belongings within reach  - Initiate and maintain comfort rounds  - Make Fall Risk Sign visible to staff  - Offer Toileting every   Hours, in advance of need  - Initiate/Maintain  alarm  - Obtain necessary fall risk management equipment:    - Apply yellow socks and bracelet for high fall risk patients  - Consider moving patient to room near nurses station  Outcome: Progressing

## 2022-08-08 NOTE — PLAN OF CARE
Problem: PHYSICAL THERAPY ADULT  Goal: Performs mobility at highest level of function for planned discharge setting  See evaluation for individualized goals  Description: Treatment/Interventions: Functional transfer training, LE strengthening/ROM, Therapeutic exercise, Endurance training, Gait training, Bed mobility, Equipment eval/education, OT  Equipment Recommended: Cane (TBD)       See flowsheet documentation for full assessment, interventions and recommendations  Note: Prognosis: Good  Problem List: Decreased strength, Decreased endurance, Impaired balance, Decreased mobility, Decreased safety awareness, Orthopedic restrictions, Pain  Assessment: Pt is a 61 yo male admitted to [place] [id number] on 8/6/2022 s/p R hand swelling  DX: R wrist pain, opioid use disorder, hyperlipidemia, DM  Pt had surgyer on 8/6 for debridement of RUE washout  Two patient identifiers were used to confirm  Pt lives in a [facility] with no [facility] with two other people  Pt was I for ADL's and mobility prior  Pt does not drive and works full time  Pt's impairments include reduced mobility, poor endurance, high risk of falling, reduced balance, N+V, gait abnormalities, NWB on RUE  These impairments limit the ability of the patient to perform mobility without increased assistance, return to PLOF and participate in everyday life activities  Pt would benefit from continued skilled therapy while in the hospital to improve overall mobility and work towards a safe d/c  Recommend discharge to home with OPPT  At the end of the session the patient was left in supine position with call bell and phone within reach  The patient's AM-PAC Basic Mobility Inpatient Short Form Raw Score is 17  A Raw score of greater than 16 suggests the patient may benefit from discharge to home  Please also refer to the recommendation of the Physical Therapist for safe discharge planning  Pt had multiple LOB requiring min A for assistance  Pt educated about NWB on RUE   Pt will need sling or RUE per ortho  Barriers to Discharge: Decreased caregiver support     PT Discharge Recommendation: Home with outpatient rehabilitation    See flowsheet documentation for full assessment

## 2022-08-08 NOTE — Clinical Note
Pt is a 63 yo male admitted to [place] [id number] on 8/6/2022 s/p R hand swelling  DX:     Two patient identifiers were used to confirm  Imaging includes: ***  Pt's impairments include***  These impairments limit the ability of the patient to perform mobility without increased assistance, return to PLOF and participate in everyday life activities  Pt would benefit from continued skilled therapy while in the hospital to improve***  Recommend discharge to ***  At the end of the session the patient was left in *** position with call bell and phone within reach

## 2022-08-08 NOTE — PROGRESS NOTES
Progress Note - Infectious Disease   Shalonda Polk 62 y o  male MRN: [id number]  Unit/Bed#: CW2 213-01 Encounter: [id number]      Impression/Recommendations:  1  MSSA bacteremia  Due to #2  No other appreciable source  Clinically stable without formal sepsis criteria  Rec:  · Continue cefazolin for now  · Follow up final blood cultures   · Check repeat blood cultures in AM  · Check TTE     2  Right wrist septic arthritis  Likely due to IVDU  Status post I&D 22  Intraoperative findings notable for purulence in the joint space  OR cultures with S  aureus  Suspect MSSA given bacteremia as above  Rec:  · Continue cefazolin for now  · Follow up OR cultures and tailor antibiotics as indicated     3  OUD  IV heroin  Risk factor for infection as above  Uses 6 bags/day  Continues to exhibit withdrawal symptoms  Rec:  · Consult APS for withdrawal management  · Consult CRS  · Consider STAR program  · Follow up HIV     4   Poorly controlled DM  Recent A1c 2022 8 3  Risk factor for infection as above  5   Positive HCV antibody  Consider chronic infection  HIV negative  Rec:  · Check HCV RNA in AM     The above impression and plan was discussed in detail with the patient      Antibiotics:  Cefazolin #2  Antibiotics #3    Subjective:  Patient seen on AM rounds  Still experiencing withdrawal with nausea and vomiting  No pain in wrist   Has been on suboxone in the past     24 Hour Events:  No documented fevers, chills, sweats, or diarrhea  Seen by Toxicology who is deferring any treatment until off narcotic pain medication      Objective:  Vitals:  Temp:  [98 9 °F (37 2 °C)-99 3 °F (37 4 °C)] 98 9 °F (37 2 °C)  HR:  [47-56] 56  Resp:  [18-20] 18  BP: (138-151)/(71-75) 138/75  SpO2:  [91 %-96 %] 96 %  Temp (24hrs), Av 1 °F (37 3 °C), Min:98 9 °F (37 2 °C), Max:99 3 °F (37 4 °C)  Current: Temperature: 98 9 °F (37 2 °C)    Physical Exam:   General:  No acute distress  Psychiatric:  Awake and alert  Pulmonary:  Normal respiratory excursion without accessory muscle use  Abdomen:  Soft, nontender  Extremities:  No edema  Skin:  No rashes    Lab Results:  I have personally reviewed pertinent labs  Results from last 7 days   Lab Units 08/08/22  0637 08/05/22  2155   POTASSIUM mmol/L 2 9* 5 1   CHLORIDE mmol/L 103 94*   CO2 mmol/L 25 29   BUN mg/dL 18 15   CREATININE mg/dL 0 83 0 85   EGFR ml/min/1 73sq m 97 96   CALCIUM mg/dL 8 3 9 1   AST U/L  --  43   ALT U/L  --  32   ALK PHOS U/L  --  85     Results from last 7 days   Lab Units 08/08/22  0637 08/07/22  1331 08/05/22  2155   WBC Thousand/uL 13 87* 15 80* 18 31*   HEMOGLOBIN g/dL 12 3 13 0 13 6   PLATELETS Thousands/uL 358 315 306     Results from last 7 days   Lab Units 08/06/22  1509 08/06/22  0137 08/05/22  2217 08/05/22  2215   BLOOD CULTURE   --   --  No Growth at 48 hrs  Staphylococcus aureus*   GRAM STAIN RESULT  No Polys or Bacteria seen 4+ Polys*  2+ Gram positive cocci in clusters*  --  Gram positive cocci in clusters*   BODY FLUID CULTURE, STERILE   --  3+ Growth of Staphylococcus aureus*  --   --        Imaging Studies:   I have personally reviewed pertinent imaging study reports and images in PACS  EKG, Pathology, and Other Studies:   I have personally reviewed pertinent reports

## 2022-08-08 NOTE — ASSESSMENT & PLAN NOTE
· States his right wrist is minimally painful currently  · Has not required opioid pain medication since early morning  · Continue Tylenol 975 mg p o  q 8 hours scheduled  · Continue gabapentin 100 mg p o  t i d  scheduled  · Suggest discontinue Toradol PRN  · Suggest start Toradol 15 mg IV q 6 hours times 48 hours  · Per medical toxicology note, opioid pain medications being discontinued

## 2022-08-08 NOTE — QUICK NOTE
Opioids have now been discontinued when last dose of oxycodone IR received early this morning  Discussed plan for Suboxone induction with Dr Saavedra Service  Recommendations as follows:      1) When COWS score is 12 or greater, give Suboxone, 2 mg and reassess in 30-60 minutes  If no precipitated withdrawal from the initial 2 mg test dose, then administer additional 8 mg  2) Thereafter, reassess COWS score every hour and give an additional 4 mg of Suboxone for continued COWS score of 12 or higher  3) When COWS score less than 12 OR when patient has received 26 mg of Suboxone for induction, then the Suboxone induction is complete and maintenance dosing can be started  4) When induction complete, start Suboxone maintenance dosing of 8 mg BID  5) Please engage case management to find an outpatient Suboxone prescriber for the patient  He needs to have an appointment within 3 weeks of discharge  We can provide a bridging prescription for up to 3 weeks at time of discharge  6) Please contact me at least 24 hours prior to discharge to arrange the Suboxone bridging prescription  7) COWS scores should be done by a provider only, not by RN  If there are any complications with induction or any further questions or concerns, please feel free to reach out to me

## 2022-08-08 NOTE — PROGRESS NOTES
Progress Note - Orthopedics   Justina Hernandez 62 y o  male MRN: [id number]  Unit/Bed#: CW2 213-01      Subjective:    No acute issues overnight, patient comfortable this morning       Labs:  0   Lab Value Date/Time    HCT 38 2 08/07/2022 1331    HCT 40 8 08/05/2022 2155    HCT 43 7 06/16/2019 2305    HGB 13 0 08/07/2022 1331    HGB 13 6 08/05/2022 2155    HGB 14 9 06/16/2019 2305    INR 1 03 08/05/2022 2253    WBC 15 80 (H) 08/07/2022 1331    WBC 18 31 (H) 08/05/2022 2155    WBC 12 96 (H) 06/16/2019 2305    ESR 78 (H) 08/05/2022 2155       Meds:    Current Facility-Administered Medications:     acetaminophen (TYLENOL) tablet 975 mg, 975 mg, Oral, Q8H [address], Jaap DO Cecil, 975 mg at 08/07/22 2203    atorvastatin (LIPITOR) tablet 20 mg, 20 mg, Oral, HS, Jaap DO Cecil, 20 mg at 08/07/22 2203    ceFAZolin (ANCEF) IVPB (premix in dextrose) 2,000 mg 50 mL, 2,000 mg, Intravenous, Q8H, Zoë Jacob DO, Last Rate: 100 mL/hr at 08/07/22 2208, 2,000 mg at 08/07/22 2208    cloNIDine (CATAPRES) tablet 0 1 mg, 0 1 mg, Oral, Q8H [address], Dell Belcher MD, 0 1 mg at 08/07/22 2203    gabapentin (NEURONTIN) capsule 100 mg, 100 mg, Oral, TID, Benjie Burgos MD, 100 mg at 08/07/22 2203    insulin lispro (HumaLOG) 100 units/mL subcutaneous injection 1-6 Units, 1-6 Units, Subcutaneous, TID AC, 1 Units at 08/07/22 1103 **AND** Fingerstick Glucose (POCT), , , TID AC, Curtissandy Hernandez DO    ketorolac (TORADOL) injection 30 mg, 30 mg, Intramuscular, Q6H PRN, Rodney David, DO    lisinopril (ZESTRIL) tablet 2 5 mg, 2 5 mg, Oral, Daily, Rodney Hernandez DO, 2 5 mg at 08/07/22 0935    ondansetron (ZOFRAN) injection 4 mg, 4 mg, Intravenous, Q6H PRN, Rodney Hernandez DO, 4 mg at 08/07/22 0925    ondansetron (ZOFRAN) injection 4 mg, 4 mg, Intravenous, TID, Benjie Burgos MD, 4 mg at 08/07/22 2202    oxyCODONE (ROXICODONE) IR tablet 2 5 mg, 2 5 mg, Oral, Q4H PRN, Rodney Hernandez DO    oxyCODONE (ROXICODONE) IR tablet 5 mg, 5 mg, Oral, Q4H PRN, Natalie Driver DO Cecil, 5 mg at 08/07/22 7670    Blood Culture:   Lab Results   Component Value Date    BLOODCX No Growth at 48 hrs  08/05/2022       Wound Culture:   No results found for: WOUNDCULT    Ins and Outs:  I/O last 24 hours: In: 0 [P O :480; I V :1000; IV Piggyback:50]  Out: 950 [Urine:800; Emesis/NG output:150]          Physical:  Vitals:    08/07/22 1855   BP: 151/73   Pulse: (!) 50   Resp: 20   Temp: 99 3 °F (37 4 °C)   SpO2: 95%     Musculoskeletal: right Upper Extremity  · Dressings changed today, incision is well appearing, no drainage or expressible fluid from incision site, mild dependent swelling to the dorsum of the hand  · TTP appropriately over surgical site to the dorsal wrist, no pain with passive motion of the wrist 30 extension to 30 flexion, limited afterwards by stiffness   · Sensation intact to light touch m/r/u  · Motor intact m/r/u/ain/pin  · Fingers warm and well perused      Assessment:    62 y  o male sp I&D of his right wrist for septic arthritis, will need long term antibiotic regimen for joint involvement, will await final cultures and sensitivities/ID recommendations for regimen  No plans for further operative management so long as clinical exam remains benign       Plan:  · NWB RUE in splint  · Will monitor for ABLA   · Follow cultures  · PT/OT  · Pain control  · DVT PPX: per primary team  · Dispo: Ortho will follow    Cassia Young MD

## 2022-08-08 NOTE — PROGRESS NOTES
[facility]  Progress Note - Sean Gordon 1965, 62 y o  male MRN: [id number]  Unit/Bed#: CW2 213-01 Encounter: [id number]  Primary Care Provider: Dora Lott MD   Date and time admitted to hospital: 8/6/2022 12:22 PM    * Right wrist pain  Assessment & Plan  Presented to [facility] ED, R wrist swelling and erythema x1 day, painful  VSS normal, no SIRS met besides WBC 18k  CRP >90, ESR 78, wrist aspiration gram stain 4+ polys and 2+ GPCs, HbA1c 8 3  Did receive 1 dose vancomycin and cefepime in ED  Procal negative  - transfer to [facility] for washout, S/P I&D/[place] (8/6)  - Ortho Following OP  - Per ID - D/C Vanc, Continue Cefazolin  - F/U BCx - Tailor ABx to Sensitivity  - monitor vital signs  - pain control  - follow-up on labs from washout  Opioid use disorder  Assessment & Plan  Hx of heroin injected one week ago into right wrist  Hx of opioid abuse (~6 bags/day)  Toxicology on Consult - Patient should go on Suboxone when he's willing (Cannot while on pain medicine)  -F/U with Toxicology with ID recommendations concerning Suboxone    Other hyperlipidemia  Assessment & Plan  Stable  Continue atorvastatin 20 mg daily      Type 2 diabetes mellitus with complication, without long-term current use of insulin [facility])  Assessment & Plan  Lab Results   Component Value Date    HGBA1C 8 3 (A) 04/28/2022       Recent Labs     08/07/22  0705 08/07/22  1054 08/07/22  1610 08/07/22  2118   POCGLU 172* 184* 131 148*       Blood Sugar Average: Last 72 hrs:  (P) 148 4     PTA meds: Lantus 15 units HS, Metformin 1000 mg BID    - Hold both for SSI        PPX: SCDs  Diet:House  Code Status: Full  Dispo: Home     Plan D/W Dr Zena Stroud and [facility] Team    Subjective:   Pt seen and assessed at bedside this AM, complaining of nausea, vomiting, and anxiety  Patient says he had 3 episodes of emesis overnight and has been anxious to return home   Opioid withdrawal protocol discussed with patient and he seemed receptive  Would like to try Suboxone if he can tolerate being off the pain medication  Denies any headache, chest pain, SOB, muscle pain, weakness, numbness or tingling  No other complaints at this time  Objective:     Vitals: Blood pressure 151/71, pulse (!) 47, temperature 98 9 °F (37 2 °C), resp  rate 20, height 5' 7" (1 702 m), weight 81 6 kg (180 lb), SpO2 96 %  ,Body mass index is 28 19 kg/m²  Intake/Output Summary (Last 24 hours) at 8/8/2022 0913  Last data filed at 8/7/2022 1309  Gross per 24 hour   Intake 480 ml   Output 300 ml   Net 180 ml       Physical Exam:   Physical Exam  Constitutional:       Appearance: Normal appearance  He is ill-appearing  HENT:      Head: Normocephalic and atraumatic  Right Ear: External ear normal       Left Ear: External ear normal       Nose: Nose normal    Eyes:      Conjunctiva/sclera: Conjunctivae normal    Cardiovascular:      Rate and Rhythm: Normal rate and regular rhythm  Pulses: Normal pulses  Heart sounds: Normal heart sounds  Pulmonary:      Effort: Pulmonary effort is normal       Breath sounds: Normal breath sounds  Abdominal:      General: Bowel sounds are normal       Palpations: Abdomen is soft  Tenderness: There is no abdominal tenderness  Musculoskeletal:         General: No tenderness  Skin:     General: Skin is warm and dry  Neurological:      General: No focal deficit present  Mental Status: He is alert and oriented to person, place, and time  Psychiatric:         Behavior: Behavior is agitated  Behavior is cooperative  Invasive Devices  Report    Peripheral Intravenous Line  Duration           Peripheral IV 08/05/22 Left Hand 2 days                           Lab and other studies:  I have personally reviewed pertinent reports  Admission on 08/06/2022   Component Date Value    Anaerobic Culture 08/06/2022 Culture results to follow       Tissue Culture 08/06/2022 No growth     Gram Stain Result 08/06/2022 No Polys or Bacteria seen     AFB Stain 08/06/2022 No acid fast bacilli seen     POC Glucose 08/06/2022 107     POC Glucose 08/07/2022 172 (A)    SARS-CoV-2 08/07/2022 Negative     INFLUENZA A PCR 08/07/2022 Negative     INFLUENZA B PCR 08/07/2022 Negative     RSV PCR 08/07/2022 Negative     POC Glucose 08/07/2022 184 (A)    Hepatitis B Surface Ag 08/07/2022 Non-reactive     Hepatitis C Ab 08/07/2022 High Reactive (A)    Rapid HIV 1 AND 2 08/07/2022 Non-Reactive     HIV-1 P24 Ag Screen 08/07/2022 Non-Reactive     WBC 08/07/2022 15 80 (A)    RBC 08/07/2022 4 41     Hemoglobin 08/07/2022 13 0     Hematocrit 08/07/2022 38 2     MCV 08/07/2022 87     MCH 08/07/2022 29 5     MCHC 08/07/2022 34 0     RDW 08/07/2022 12 7     MPV 08/07/2022 9 5     Platelets 77/41/9244 315     nRBC 08/07/2022 0     Neutrophils Relative 08/07/2022 87 (A)    Immat GRANS % 08/07/2022 1     Lymphocytes Relative 08/07/2022 7 (A)    Monocytes Relative 08/07/2022 5     Eosinophils Relative 08/07/2022 0     Basophils Relative 08/07/2022 0     Neutrophils Absolute 08/07/2022 13 77 (A)    Immature Grans Absolute 08/07/2022 0 11     Lymphocytes Absolute 08/07/2022 1 11     Monocytes Absolute 08/07/2022 0 79     Eosinophils Absolute 08/07/2022 0 00     Basophils Absolute 08/07/2022 0 02     POC Glucose 08/07/2022 131     POC Glucose 08/07/2022 148 (A)    Sodium 08/08/2022 138     Potassium 08/08/2022 2 9 (A)    Chloride 08/08/2022 103     CO2 08/08/2022 25     ANION GAP 08/08/2022 10     BUN 08/08/2022 18     Creatinine 08/08/2022 0 83     Glucose 08/08/2022 160 (A)    Calcium 08/08/2022 8 3     eGFR 08/08/2022 97     WBC 08/08/2022 13 87 (A)    RBC 08/08/2022 4 23     Hemoglobin 08/08/2022 12 3     Hematocrit 08/08/2022 36 5     MCV 08/08/2022 86     MCH 08/08/2022 29 1     MCHC 08/08/2022 33 7     RDW 08/08/2022 12 7     MPV 08/08/2022 9 4     Platelets 34/29/1529 358  nRBC 08/08/2022 0     Neutrophils Relative 08/08/2022 85 (A)    Immat GRANS % 08/08/2022 0     Lymphocytes Relative 08/08/2022 9 (A)    Monocytes Relative 08/08/2022 6     Eosinophils Relative 08/08/2022 0     Basophils Relative 08/08/2022 0     Neutrophils Absolute 08/08/2022 11 69 (A)    Immature Grans Absolute 08/08/2022 0 06     Lymphocytes Absolute 08/08/2022 1 29     Monocytes Absolute 08/08/2022 0 81     Eosinophils Absolute 08/08/2022 0 01     Basophils Absolute 08/08/2022 0 01        Recent Results (from the past 24 hour(s))   FLU/RSV/COVID - if FLU/RSV clinically relevant    Collection Time: 08/07/22 10:29 AM    Specimen: Nasopharyngeal Swab; Nares   Result Value Ref Range    SARS-CoV-2 Negative Negative    INFLUENZA A PCR Negative Negative    INFLUENZA B PCR Negative Negative    RSV PCR Negative Negative   Fingerstick Glucose (POCT)    Collection Time: 08/07/22 10:54 AM   Result Value Ref Range    POC Glucose 184 (H) 65 - 140 mg/dl   Hepatitis B surface antigen    Collection Time: 08/07/22  1:28 PM   Result Value Ref Range    Hepatitis B Surface Ag Non-reactive Non-reactive, NonReactive - Confirmed   Hepatitis C antibody    Collection Time: 08/07/22  1:28 PM   Result Value Ref Range    Hepatitis C Ab High Reactive (A) Non-reactive   Rapid HIV 1/2 AB-AG Combo    Collection Time: 08/07/22  1:28 PM   Result Value Ref Range    Rapid HIV 1 AND 2 Non-Reactive Non-Reactive    HIV-1 P24 Ag Screen Non-Reactive Non-Reactive   CBC and differential    Collection Time: 08/07/22  1:31 PM   Result Value Ref Range    WBC 15 80 (H) 4 31 - 10 16 Thousand/uL    RBC 4 41 3 88 - 5 62 Million/uL    Hemoglobin 13 0 12 0 - 17 0 g/dL    Hematocrit 38 2 36 5 - 49 3 %    MCV 87 82 - 98 fL    MCH 29 5 26 8 - 34 3 pg    MCHC 34 0 31 4 - 37 4 g/dL    RDW 12 7 11 6 - 15 1 %    MPV 9 5 8 9 - 12 7 fL    Platelets 637 388 - 738 Thousands/uL    nRBC 0 /100 WBCs    Neutrophils Relative 87 (H) 43 - 75 %    Immat GRANS % 1 0 - 2 %    Lymphocytes Relative 7 (L) 14 - 44 %    Monocytes Relative 5 4 - 12 %    Eosinophils Relative 0 0 - 6 %    Basophils Relative 0 0 - 1 %    Neutrophils Absolute 13 77 (H) 1 85 - 7 62 Thousands/µL    Immature Grans Absolute 0 11 0 00 - 0 20 Thousand/uL    Lymphocytes Absolute 1 11 0 60 - 4 47 Thousands/µL    Monocytes Absolute 0 79 0 17 - 1 22 Thousand/µL    Eosinophils Absolute 0 00 0 00 - 0 61 Thousand/µL    Basophils Absolute 0 02 0 00 - 0 10 Thousands/µL   Fingerstick Glucose (POCT)    Collection Time: 08/07/22  4:10 PM   Result Value Ref Range    POC Glucose 131 65 - 140 mg/dl   Fingerstick Glucose (POCT)    Collection Time: 08/07/22  9:18 PM   Result Value Ref Range    POC Glucose 148 (H) 65 - 140 mg/dl   Basic metabolic panel    Collection Time: 08/08/22  6:37 AM   Result Value Ref Range    Sodium 138 135 - 147 mmol/L    Potassium 2 9 (L) 3 5 - 5 3 mmol/L    Chloride 103 96 - 108 mmol/L    CO2 25 21 - 32 mmol/L    ANION GAP 10 4 - 13 mmol/L    BUN 18 5 - 25 mg/dL    Creatinine 0 83 0 60 - 1 30 mg/dL    Glucose 160 (H) 65 - 140 mg/dL    Calcium 8 3 8 3 - 10 1 mg/dL    eGFR 97 ml/min/1 73sq m   CBC and differential    Collection Time: 08/08/22  6:37 AM   Result Value Ref Range    WBC 13 87 (H) 4 31 - 10 16 Thousand/uL    RBC 4 23 3 88 - 5 62 Million/uL    Hemoglobin 12 3 12 0 - 17 0 g/dL    Hematocrit 36 5 36 5 - 49 3 %    MCV 86 82 - 98 fL    MCH 29 1 26 8 - 34 3 pg    MCHC 33 7 31 4 - 37 4 g/dL    RDW 12 7 11 6 - 15 1 %    MPV 9 4 8 9 - 12 7 fL    Platelets 365 559 - 495 Thousands/uL    nRBC 0 /100 WBCs    Neutrophils Relative 85 (H) 43 - 75 %    Immat GRANS % 0 0 - 2 %    Lymphocytes Relative 9 (L) 14 - 44 %    Monocytes Relative 6 4 - 12 %    Eosinophils Relative 0 0 - 6 %    Basophils Relative 0 0 - 1 %    Neutrophils Absolute 11 69 (H) 1 85 - 7 62 Thousands/µL    Immature Grans Absolute 0 06 0 00 - 0 20 Thousand/uL    Lymphocytes Absolute 1 29 0 60 - 4 47 Thousands/µL    Monocytes Absolute 0 81 0 17 - 1 22 Thousand/µL    Eosinophils Absolute 0 01 0 00 - 0 61 Thousand/µL    Basophils Absolute 0 01 0 00 - 0 10 Thousands/µL     Blood Culture:   Lab Results   Component Value Date    BLOODCX No Growth at 48 hrs   08/05/2022   ,   Urinalysis:   Lab Results   Component Value Date    COLORU Yellow 08/31/2015    CLARITYU Clear 08/31/2015    SPECGRAV 1 020 08/31/2015    PHUR 5 5 08/31/2015    LEUKOCYTESUR Negative 08/31/2015    NITRITE Negative 08/31/2015    PROTEINUA Trace (A) 08/31/2015    GLUCOSEU 500 (1/2%) (A) 08/31/2015    KETONESU Negative 08/31/2015    BILIRUBINUR Negative 08/31/2015    BLOODU Negative 08/31/2015   ,   Urine Culture: No results found for: URINECX,   Wound Culure: No results found for: WOUNDCULT      Imaging:   No orders to display     VTE Mechanical Prophylaxis: sequential compression device    Current Facility-Administered Medications   Medication Dose Route Frequency    acetaminophen (TYLENOL) tablet 975 mg  975 mg Oral Q8H [facility]    atorvastatin (LIPITOR) tablet 20 mg  20 mg Oral HS    ceFAZolin (ANCEF) IVPB (premix in dextrose) 2,000 mg 50 mL  2,000 mg Intravenous Q8H    cloNIDine (CATAPRES) tablet 0 1 mg  0 1 mg Oral Q8H [facility]    gabapentin (NEURONTIN) capsule 100 mg  100 mg Oral TID    insulin lispro (HumaLOG) 100 units/mL subcutaneous injection 1-6 Units  1-6 Units Subcutaneous TID AC    ketorolac (TORADOL) injection 30 mg  30 mg Intramuscular Q6H PRN    lisinopril (ZESTRIL) tablet 2 5 mg  2 5 mg Oral Daily    ondansetron (ZOFRAN) injection 4 mg  4 mg Intravenous Q6H PRN    ondansetron (ZOFRAN) injection 4 mg  4 mg Intravenous TID    oxyCODONE (ROXICODONE) IR tablet 2 5 mg  2 5 mg Oral Q4H PRN    oxyCODONE (ROXICODONE) IR tablet 5 mg  5 mg Oral Q4H PRN    potassium chloride (K-DUR,KLOR-CON) CR tablet 40 mEq  40 mEq Oral Once       Essence Lopez MD  Family Medicine Resident PGY1

## 2022-08-08 NOTE — PLAN OF CARE
Problem: OCCUPATIONAL THERAPY ADULT  Goal: Performs self-care activities at highest level of function for planned discharge setting  See evaluation for individualized goals  Description: Treatment Interventions: ADL retraining, Functional transfer training, UE strengthening/ROM, Endurance training, Cognitive reorientation, Patient/family training, Equipment evaluation/education, Compensatory technique education, Energy conservation, Activityengagement  Equipment Recommended: Bedside commode, Shower/Tub chair with back ($) (if continues with standing balance deficits)       See flowsheet documentation for full assessment, interventions and recommendations  Note: Limitation: Decreased ADL status, Decreased UE ROM, Decreased UE strength, Decreased endurance, Decreased self-care trans, Decreased high-level ADLs, Decreased fine motor control  Prognosis: Fair  Assessment: Pt is a 62 y o  male who was admitted to [facility] on 8/6/2022 with hand swelling and  Right wrist pain/right wrist for septic arthritis s/p I & D to right wrist and now NWB to RUE in splint   Pt's problem list also includes PMH of  has a past medical history of Diabetes mellitus ([address] ) and Substance abuse ([address] )    At baseline pt was completing I with ADL's/iaDL's, no AD with functional ambulation (-) drives, walks  Pt lives with two roommates in a ranch style home with 0STE  Currently pt requires set-up self feeding, min a grooming, mod a UB/LB  for overall ADLS and min a without AD for functional mobility/transfers  Pt currently presents with impairments in the following categories -difficulty performing ADLS, difficulty performing IADLS , limited insight into deficits and health management  activity tolerance and endurance   These impairments, as well as pt's fatigue, WBS , decreased caregiver support and risk for falls  limit pt's ability to safely engage in all baseline areas of occupation, includingeating, grooming, bathing, dressing, toileting, functional mobility/transfers, community mobility, laundry , house maintenance, medication management, meal prep, cleaning, social participation  and leisure activities  From OT standpoint, recommend home with increased family support and DME upon D/C  OT will continue to follow to address the below stated goals       OT Discharge Recommendation: No rehabilitation needs (Will need outpatient OT for hand therapy once medically cleared)

## 2022-08-08 NOTE — UTILIZATION REVIEW
Continued Stay Review    Please see initial review completed on 8/7/22 by CANDELARIA Murguia RN  Date: 8/8/22  Day 2                         Current Patient Class: inpatient  Current Level of Care: med surg    HPI:57 y o  male initially admitted on 8/7/22  right wrist for septic arthritis    Assessment/Plan:  Patient assessed COWS - 7 for persistent vomiting, mild tremors and some yawning  Will continue to treat symptomatically as needed  Pt will need long term antibiotic regimen for joint involvement, will await final cultures and sensitivities/ID recommendations for regimen  No plans for further operative management so long as clinical exam remains benign  NWB RUE in splint, monitor for ABLA, f/u on cxs, PT/OT eval, pain control  8/8 Toxicology Consult:  Opioids have now been discontinued when last dose of oxycodone IR received early this morning  1) When COWS score is 12 or greater, give Suboxone, 2 mg and reassess in 30-60 minutes  If no precipitated withdrawal from the initial 2 mg test dose, then administer additional 8 mg  2) Thereafter, reassess COWS score every hour and give an additional 4 mg of Suboxone for continued COWS score of 12 or higher  3) When COWS score less than 12 OR when patient has received 26 mg of Suboxone for induction, then the Suboxone induction is complete and maintenance dosing can be started  4) When induction complete, start Suboxone maintenance dosing of 8 mg BID  5) Please engage case management to find an outpatient Suboxone prescriber for the patient  He needs to have an appointment within 3 weeks of discharge  We can provide a bridging prescription for up to 3 weeks at time of discharge  6) Please contact me at least 24 hours prior to discharge to arrange the Suboxone bridging prescription  7) COWS scores should be done by a provider only, not by RN      Vital Signs:   Date/Time Temp Pulse Resp BP MAP (mmHg) SpO2 Calculated FIO2 (%) - Nasal Cannula Nasal Cannula O2 Flow Rate (L/min) O2 Device Cardiac (WDL) Patient Position - Orthostatic VS   08/08/22 1516 -- -- -- 160/79 -- -- -- -- -- -- --   08/08/22 1350 -- 50 Abnormal  -- -- -- -- -- -- -- -- --   08/08/22 13:00:45 -- 56 -- 138/75 96 96 % -- -- -- -- --   08/08/22 06:31:30 98 9 °F (37 2 °C) 47 Abnormal  18 151/71 98 96 % -- -- None (Room air) -- Lying   08/07/22 18:55:59 99 3 °F (37 4 °C) 50 Abnormal  20 151/73 99 95 % -- -- -- -- --   08/07/22 1616 -- -- -- -- -- 96 % -- -- -- -- --   08/07/22 14:51:29 99 2 °F (37 3 °C) 56 -- 151/74 100 91 % -- -- -- -- --       Pertinent Labs/Diagnostic Results:   Results from last 7 days   Lab Units 08/07/22  1029   SARS-COV-2  Negative     Results from last 7 days   Lab Units 08/08/22  0637 08/07/22  1331 08/05/22  2155   WBC Thousand/uL 13 87* 15 80* 18 31*   HEMOGLOBIN g/dL 12 3 13 0 13 6   HEMATOCRIT % 36 5 38 2 40 8   PLATELETS Thousands/uL 358 315 306   NEUTROS ABS Thousands/µL 11 69* 13 77* 14 52*     Results from last 7 days   Lab Units 08/08/22  0637 08/05/22  2155   SODIUM mmol/L 138 128*   POTASSIUM mmol/L 2 9* 5 1   CHLORIDE mmol/L 103 94*   CO2 mmol/L 25 29   ANION GAP mmol/L 10 5   BUN mg/dL 18 15   CREATININE mg/dL 0 83 0 85   EGFR ml/min/1 73sq m 97 96   CALCIUM mg/dL 8 3 9 1     Results from last 7 days   Lab Units 08/05/22  2155   AST U/L 43   ALT U/L 32   ALK PHOS U/L 85   TOTAL PROTEIN g/dL 8 9*   ALBUMIN g/dL 3 5   TOTAL BILIRUBIN mg/dL 0 56   BILIRUBIN DIRECT mg/dL 0 08     Results from last 7 days   Lab Units 08/08/22  1134 08/08/22  0832 08/07/22  2118 08/07/22  1610 08/07/22  1054 08/07/22  0705 08/06/22  1629   POC GLUCOSE mg/dl 190* 181* 148* 131 184* 172* 107     Results from last 7 days   Lab Units 08/08/22  0637 08/05/22  2155   GLUCOSE RANDOM mg/dL 160* 151*     Results from last 7 days   Lab Units 08/05/22  2253   PROTIME seconds 13 5   INR  1 03   PTT seconds 36     Results from last 7 days   Lab Units 08/05/22  2155   PROCALCITONIN ng/ml 0 10 Results from last 7 days   Lab Units 08/05/22  2215   LACTIC ACID mmol/L 0 6     Results from last 7 days   Lab Units 08/08/22  1234 08/07/22  1328   HEP B S AG  Non-reactive Non-reactive   HEP C AB  High Reactive* High Reactive*   HEP B C IGM  Non-reactive  --    HEP B C TOTAL AB  Non-reactive  --      Results from last 7 days   Lab Units 08/05/22  2155   SED RATE mm/hour 78*     Results from last 7 days   Lab Units 08/07/22  1029   INFLUENZA A PCR  Negative   INFLUENZA B PCR  Negative   RSV PCR  Negative     Results from last 7 days   Lab Units 08/06/22  1509 08/06/22  0137 08/05/22  2217 08/05/22  2215   BLOOD CULTURE   --   --  No Growth at 48 hrs  Staphylococcus aureus*   GRAM STAIN RESULT  No Polys or Bacteria seen 4+ Polys*  2+ Gram positive cocci in clusters*  --  Gram positive cocci in clusters*   BODY FLUID CULTURE, STERILE   --  3+ Growth of Staphylococcus aureus*  --   --      Medications:   Scheduled Medications:  acetaminophen, 975 mg, Oral, Q8H GUILLE  atorvastatin, 20 mg, Oral, HS  cefazolin, 2,000 mg, Intravenous, Q8H  cloNIDine, 0 1 mg, Oral, Q8H GUILLE  gabapentin, 100 mg, Oral, TID  insulin lispro, 1-6 Units, Subcutaneous, TID AC  lisinopril, 2 5 mg, Oral, Daily  ondansetron, 4 mg, Intravenous, TID      Continuous IV Infusions: none     PRN Meds:  ondansetron, 4 mg, Intravenous, Q6H PRN        Discharge Plan: d    Network Utilization Review Department  ATTENTION: Please call with any questions or concerns to [phone] and carefully listen to the prompts so that you are directed to the right person  All voicemails are confidential   Sabi Salazar all requests for admission clinical reviews, approved or denied determinations and any other requests to dedicated fax number below belonging to the campus where the patient is receiving treatment   List of dedicated fax numbers for the Facilities:  FACILITY NAME UR FAX NUMBER   ADMISSION DENIALS (Administrative/Medical Necessity) [phone]   PARENT CHILD HEALTH (Maternity/NICU/Pediatrics) [address] [facility] [address] [facility]  [phone]   Adria Hernandez [address] [place] [address] [id number] [place] [address] Savanah Herrera [address] [facility] [phone]

## 2022-08-08 NOTE — ASSESSMENT & PLAN NOTE
Presented to [facility] ED, R wrist swelling and erythema x1 day, painful  VSS normal, no SIRS met besides WBC 18k  CRP >90, ESR 78, wrist aspiration gram stain 4+ polys and 2+ GPCs, HbA1c 8 3  Did receive 1 dose vancomycin and cefepime in ED  Procal negative  - transfer to B for washout, S/P I&D/Washout (8/6)  - Ortho Following OP  - Per ID - D/C Vanc, Continue Cefazolin  - F/U BCx - Tailor ABx to Sensitivity  - monitor vital signs  - pain control  - follow-up on labs from washout

## 2022-08-08 NOTE — ASSESSMENT & PLAN NOTE
Lab Results   Component Value Date    HGBA1C 8 3 (A) 04/28/2022       Recent Labs     08/07/22  0705 08/07/22  1054 08/07/22  1610 08/07/22 2118   POCGLU 172* 184* 131 148*       Blood Sugar Average: Last 72 hrs:  (P) 148 4     PTA meds: Lantus 15 units HS, Metformin 1000 mg BID    - Hold both for SSI

## 2022-08-08 NOTE — ASSESSMENT & PLAN NOTE
Hx of heroin injected one week ago into right wrist  Hx of opioid abuse (~6 bags/day)  Toxicology on Consult - Patient should go on Suboxone when he's willing (Cannot while on pain medicine)      -F/U with Toxicology with ID recommendations concerning Suboxone

## 2022-08-08 NOTE — OCCUPATIONAL THERAPY NOTE
Pt is a 62 y o  male who was admitted to [facility] on 8/6/2022 with Right wrist pain/right wrist for septic arthritis s/p I & D to right wrist and now NWB to RUE in splint   Pt's problem list also includes PMH of  has a past medical history of Diabetes mellitus ([facility] [address] ) and Substance abuse ([facility] [address] )    At baseline pt was completing I with ADL's/iaDL's, no AD with functional ambulation (-) drives, walks  Pt lives with two roommates in a ranch style home with 0STE  Currently pt requires set-up self feeding, min a grooming, mod a UB/LB  for overall ADLS and min a without AD for functional mobility/transfers  Pt currently presents with impairments in the following categories -difficulty performing ADLS, difficulty performing IADLS , limited insight into deficits and health management  activity tolerance and endurance  These impairments, as well as pt's fatigue, WBS , decreased caregiver support and risk for falls  limit pt's ability to safely engage in all baseline areas of occupation, includingeating, grooming, bathing, dressing, toileting, functional mobility/transfers, community mobility, laundry , house maintenance, medication management, meal prep, cleaning, social participation  and leisure activities  From OT standpoint, recommend home with increased family support and DME upon D/C  OT will continue to follow to address the below stated goals

## 2022-08-08 NOTE — CONSULTS
[address] [facility]  Consult Note - Jaci Chavez 1965, 62 y o  male MRN: [id number]  Unit/Bed#: 2 213-01 Encounter: [id number]  Primary Care Provider: Meeta Schulz MD   Date and time admitted to hospital: 8/6/2022 12:22 PM    Opioid use disorder  Assessment & Plan  · Complains of withdrawal symptoms  · Being managed by medical toxicology service  * Right wrist pain  Assessment & Plan  · States his right wrist is minimally painful currently  · Has not required opioid pain medication since early morning  · Continue Tylenol 975 mg p o  q 8 hours scheduled  · Continue gabapentin 100 mg p o  t i d  scheduled  · Suggest discontinue Toradol PRN  · Suggest start Toradol 15 mg IV q 6 hours times 48 hours  · Per medical toxicology note, opioid pain medications being discontinued  Jaci Chavez is a 62 y o  male  admitted with right wrist infection now status post washout  APS will continue to follow  Please contact Acute Pain Service - [facility] via bewarket from [phone] with additional questions or concerns  See [employer] or Dorita for additional contacts and after hours information  History of Present Illness    Admit Date:  8/6/2022  Hospital Day:  2 days  Primary Service:  General Medicine  Attending Provider:  Cindy Stephens DO  Reason for Consult / Principal Problem:  Right wrist pain  HPI: Jaci Chavez is a 62 y o  male who presents with right wrist pain occurring for several days prior to admission  Was seen by Orthopedic surgery and underwent operative washout debridement  Per ID, patient will need an extended course of IV antibiotics  Patient has history of opioid use disorder and is complaining primarily of withdrawal symptoms  States his wrist pain is minimal     Current pain location(s):  Right wrist  Pain Scale:   3 to 4/10  Quality:  Sore  Current Analgesic regimen:    Tylenol 975 mg p o  q 8 hours scheduled    Gabapentin 100 mg p o  t i d  scheduled  Pain History:  None  Pain Management Provider:  None    I have reviewed the patient's controlled substance dispensing history in the Prescription Drug Monitoring Program in compliance with the [facility] regulations before prescribing any controlled substances       Past 1 year review of PDMP:  Fill Date ID   Written Drug Qty Days Prescriber Rx # Pharmacy Refill   Daily Dose* Pymt Type      07/23/2022  1   07/22/2022  Zubsolv 5 7-1 4 MG Tablet SL    30 00  15 [place]   [id number]   Mely ([id number])    11 40 [place]   07/11/2022  1   07/11/2022  Zubsolv 5 7-1 4 MG Tablet SL    30 00  15 [place]   [id number]   Mely ([id number])    11 40 [place]   06/28/2022  1   06/28/2022  Zubsolv 5 7-1 4 MG Tablet SL    30 00  15 [place]   [id number]   Mely ([id number])    11 40 mg  [place]   06/15/2022  1   06/15/2022  Zubsolv 5 7-1 4 MG Tablet SL    30 00  15 [place]   [id number]   Mely ([id number])    11 40 mg  [place]   06/03/2022  1   06/03/2022  Zubsolv 5 7-1 4 MG Tablet SL    30 00  15 [place]   [id number]   Mely ([id number])    11 40 mg  [place]   05/21/2022  1   05/21/2022  Zubsolv 5 7-1 4 MG Tablet SL    30 00  15 [place]   [id number]   Mely ([id number])    11 40 mg  [place]   05/09/2022  1   05/09/2022  Zubsolv 5 7-1 4 MG Tablet SL    30 00  15 [place]   [id number]   Mely ([id number])    11 40 mg  [place]   04/26/2022  1   04/26/2022  Zubsolv 5 7-1 4 MG Tablet SL    30 00  15 Ma [place]   [id number]   Mely ([id number])    11 40 mg  [place]   04/14/2022  1   04/13/2022  Zubsolv 5 7-1 4 MG Tablet SL    30 00  15 Ma [place]   [id number]   Mely ([id number])    11 40 mg  [place]   04/02/2022  1   03/31/2022  Zubsolv 5 7-1 4 MG Tablet SL    30 00  15 [place]   [id number]   Mely ([id number])    11 40 mg  [place]   03/21/2022  1   03/18/2022  Zubsolv 5 7-1 4 MG Tablet SL    30 00  15 Ma [place]   [id number]   Mely ([id number])    11 40 mg  [place]   03/07/2022  1   03/07/2022  Zubsolv 5 7-1 4 MG Tablet SL    30 00  15 [place]   [id number]   Mely ([id number])    11 40 mg  [place]   02/21/2022 1   02/21/2022  Zubsolv 5 7-1 4 MG Tablet SL    30 00  15 [place]   [id number]   Mely ([id number])    11 40 mg  [place]   02/08/2022  1   02/08/2022  Zubsolv 5 7-1 4 MG Tablet SL    30 00  15 [place]   [id number]   Mely ([id number])    11 40 mg  [place]   01/27/2022  1   01/27/2022  Zubsolv 5 7-1 4 MG Tablet SL    30 00  15 Ma [place]   [id number]   Mely ([id number])    11 40 mg  [place]   01/17/2022  1   01/15/2022  Zubsolv 5 7-1 4 MG Tablet SL    30 00  15 [place]   [id number]   Mely ([id number])    11 40 mg  [place]   01/03/2022  1   01/03/2022  Zubsolv 5 7-1 4 MG Tablet SL    30 00  15 [place]   [id number]   Mely ([id number])    11 40 mg  [place]   12/22/2021  1   12/22/2021  Zubsolv 5 7-1 4 MG Tablet SL    30 00  15 [place]   [id number]   Mely ([id number])    11 40 mg  [place]   12/10/2021  1   12/09/2021  Zubsolv 5 7-1 4 MG Tablet SL    30 00  15 [place]   [id number]   Mely ([id number])    11 40 mg  [place]   11/28/2021  1   11/27/2021  Zubsolv 5 7-1 4 MG Tablet SL    30 00  15 [place]   [id number]   Mely ([id number])    11 40 mg  [place]   11/16/2021  1   11/16/2021  Zubsolv 5 7-1 4 MG Tablet SL    30 00  15 [place]   [id number]   Mely ([id number])    11 40 mg  [place]   11/04/2021  1   11/04/2021  Zubsolv 5 7-1 4 MG Tablet SL    30 00  15 Ma [place]   [id number]   Mely ([id number])    11 40 mg  [place]   10/22/2021  1   10/22/2021  Zubsolv 5 7-1 4 MG Tablet SL    30 00  15 [place]   [id number]   Mely ([id number])    11 40 mg  [place]   10/09/2021  1   10/09/2021  Zubsolv 5 7-1 4 MG Tablet SL    30 00  15 [place]   [id number]   Mely ([id number])    11 40 mg  [place]   09/27/2021  1   09/15/2021  Zubsolv 5 7-1 4 MG Tablet SL    30 00  15 [place]   [id number]   Mely ([id number])    11 40 mg  [place]   09/15/2021  1   09/03/2021  Zubsolv 5 7-1 4 MG Tablet SL    30 00  15 Ma [place]   [id number]   Mely ([id number])    11 40 mg  [place]   09/03/2021  1   06/18/2021  Zubsolv 5 7-1 4 MG Tablet SL    30 00  15 Ma [place]   [id number]   Mely ([id number])    11 40 mg  [place]   08/22/2021  1   08/22/2021 Zubsolv 5 7-1 4 MG Tablet SL    30 00  15 Ma [place]   [id number]   Mely ([id number])    11 40 mg  [place]   08/09/2021  1   08/09/2021  Zubsolv 5 7-1 4 MG Tablet SL    30 00  15 Ma [place]   [id number]   Mely ([id number])    11 40 mg  [place]         Consults    Review of Systems   Constitutional: Positive for chills and diaphoresis  HENT: Positive for rhinorrhea  Gastrointestinal: Positive for nausea  Musculoskeletal: Positive for arthralgias  Neurological: Positive for tremors  All other systems reviewed and are negative        Historical Information   Past Medical History:   Diagnosis Date    Diabetes mellitus ([facility] [address] )     Substance abuse ([facility] [address] )      Past Surgical History:   Procedure Laterality Date    NO PAST SURGERIES      WOUND DEBRIDEMENT Right 8/6/2022    Procedure: DEBRIDEMENT RIGHT UPPER EXTREMITY ([facility] OUT) SEPTIC WRIST;  Surgeon: Telma Ware MD;  Location: [facility];  Service: Orthopedics     Social History   Social History     Substance and Sexual Activity   Alcohol Use Not Currently     Social History     Substance and Sexual Activity   Drug Use Not Currently    Comment: heroin hx, clean x 1 year     Social History     Tobacco Use   Smoking Status Current Every Day Smoker    Packs/day: 0 50    Types: Cigarettes   Smokeless Tobacco Never Used   Tobacco Comment    about 12 cigarettes/daily     Family History:   Family History   Problem Relation Age of Onset    Diabetes Mother     Alcohol abuse Father     Diabetes Sister     Asthma Brother        Meds/Allergies   all current active meds have been reviewed, current meds:   Current Facility-Administered Medications   Medication Dose Route Frequency    acetaminophen (TYLENOL) tablet 975 mg  975 mg Oral Q8H [facility]    atorvastatin (LIPITOR) tablet 20 mg  20 mg Oral HS    ceFAZolin (ANCEF) IVPB (premix in dextrose) 2,000 mg 50 mL  2,000 mg Intravenous Q8H    cloNIDine (CATAPRES) tablet 0 1 mg  0 1 mg Oral Q8H [facility]    gabapentin (NEURONTIN) capsule 100 mg  100 mg Oral TID    insulin lispro (HumaLOG) 100 units/mL subcutaneous injection 1-6 Units  1-6 Units Subcutaneous TID AC    lisinopril (ZESTRIL) tablet 2 5 mg  2 5 mg Oral Daily    ondansetron (ZOFRAN) injection 4 mg  4 mg Intravenous Q6H PRN    ondansetron (ZOFRAN) injection 4 mg  4 mg Intravenous TID    and PTA meds:   Prior to Admission Medications   Prescriptions Last Dose Informant Patient Reported? Taking?    ACCU-CHEK FASTCLIX LANCETS MISC   Yes No   Blood Glucose Monitoring Suppl (ACCU-CHEK GUIDE) w/Device KIT   Yes No   Insulin Pen Needle (BD Pen Needle Cely 2nd Gen) 32G X 4 MM MISC 8/5/2022 at Unknown time  No Yes   Sig: Inject under the skin daily   ZUBSOLV 5 7-1 4 MG SUBL Not Taking at Unknown time  Yes No   Sig: SUBLINGUAL TWO TABLETS SUBLINGUALLY DAILY   Patient not taking: Reported on 8/6/2022   ZUBSOLV 8 6-2 1 MG SUBL Not Taking at Unknown time  Yes No   Sig: SUBLINGUAL 1 AND 1 /2 TABLETS SUBLINGUALLY DAILY   Patient not taking: Reported on 8/6/2022   aspirin (ECOTRIN LOW STRENGTH) 81 mg EC tablet 8/5/2022 at Unknown time  No Yes   Sig: Take 1 tablet (81 mg total) by mouth daily   atorvastatin (LIPITOR) 20 mg tablet 8/5/2022 at Unknown time  No Yes   Sig: TAKE 1 TABLET(20 MG) BY MOUTH DAILY AT BEDTIME   glucose blood (Accu-Chek Guide) test strip   No No   Sig: Use 1 each 3 (three) times a day Test as directed   insulin glargine (Basaglar KwikPen) 100 units/mL injection pen 8/5/2022 at Unknown time  No Yes   Sig: Inject 15 Units under the skin daily at bedtime   lisinopril (ZESTRIL) 2 5 mg tablet 8/5/2022 at Unknown time  No Yes   Sig: TAKE 1 TABLET(2 5 MG) BY MOUTH DAILY   metFORMIN (GLUCOPHAGE) 1000 MG tablet 8/5/2022 at Unknown time  No Yes   Sig: TAKE 1 TABLET(1000 MG) BY MOUTH TWICE DAILY WITH MEALS      Facility-Administered Medications: None       No Known Allergies    Objective   Temp:  [98 9 °F (37 2 °C)-99 3 °F (37 4 °C)] 98 9 °F (37 2 °C)  HR:  [47-56] 50  Resp:  [18-20] 18  BP: (138-151)/(71-75) 138/75    Intake/Output Summary (Last 24 hours) at 8/8/2022 1502  Last data filed at 8/8/2022 0631  Gross per 24 hour   Intake 0 ml   Output --   Net 0 ml       Physical Exam  Vitals and nursing note reviewed  Constitutional:       General: He is awake  He is not in acute distress  Appearance: He is not ill-appearing, toxic-appearing or diaphoretic  Comments: Appears mildly uncomfortable and restless   Cardiovascular:      Rate and Rhythm: Regular rhythm  Bradycardia present  Pulmonary:      Effort: Pulmonary effort is normal       Breath sounds: Normal breath sounds  Musculoskeletal:      Comments: Right wrist dressing with Ace wrap intact  Tender to palpation  Increased pain with range of motion  Skin:     General: Skin is warm and dry  Capillary Refill: Capillary refill takes less than 2 seconds  Neurological:      Mental Status: He is alert and oriented to person, place, and time  GCS: GCS eye subscore is 4  GCS verbal subscore is 5  GCS motor subscore is 6  Psychiatric:         Attention and Perception: Attention normal          Speech: Speech normal          Behavior: Behavior normal  Behavior is cooperative  Lab Results:   I have personally reviewed pertinent labs  , CBC:   Lab Results   Component Value Date    WBC 13 87 (H) 08/08/2022    HGB 12 3 08/08/2022    HCT 36 5 08/08/2022    MCV 86 08/08/2022     08/08/2022    MCH 29 1 08/08/2022    MCHC 33 7 08/08/2022    RDW 12 7 08/08/2022    MPV 9 4 08/08/2022    NRBC 0 08/08/2022   , CMP:   Lab Results   Component Value Date    SODIUM 138 08/08/2022    K 2 9 (L) 08/08/2022     08/08/2022    CO2 25 08/08/2022    BUN 18 08/08/2022    CREATININE 0 83 08/08/2022    CALCIUM 8 3 08/08/2022    EGFR 97 08/08/2022   , BMP:  Lab Results   Component Value Date    SODIUM 138 08/08/2022    K 2 9 (L) 08/08/2022     08/08/2022    CO2 25 08/08/2022    BUN 18 08/08/2022    CREATININE 0 83 08/08/2022 GLUC 160 (H) 08/08/2022    CALCIUM 8 3 08/08/2022    AGAP 10 08/08/2022    EGFR 97 08/08/2022   , PT/PTT:No results found for: PT, PTT Estimated Creatinine Clearance: 100 4 mL/min (by C-G formula based on SCr of 0 83 mg/dL)  Imaging Studies: I have personally reviewed pertinent reports  EKG, Pathology, and Other Studies: I have personally reviewed pertinent reports  Counseling / Coordination of Care  Greater than 50% of total time was spent with the patient and / or family counseling and / or coordination of care  A description of the counseling / coordination of care:  Patient interview, physical examination, review of PDMP, review of medical record, review of imaging and laboratory data, development of pain management plan, discussion of pain management plan with patient and primary service  Please note that the APS provides consultative services regarding pain management only  With the exception of ketamine and epidural infusions and except when indicated, final decisions regarding starting or changing doses of analgesic medications are at the discretion of the consulting service  Off hours consultation and/or medication management is generally not available      Kamar Heck PA-C  Acute Pain Service

## 2022-08-08 NOTE — PHYSICAL THERAPY NOTE
Physical Therapy evaluation note     Patient Name: Chris Beckham    Today's Date: 8/8/2022     Problem List  Principal Problem:    Right wrist pain  Active Problems:    Type 2 diabetes mellitus with complication, without long-term current use of insulin (HCC)    Other hyperlipidemia    Opioid use disorder       Past Medical History  Past Medical History:   Diagnosis Date    Diabetes mellitus ([facility] [address] )     Substance abuse ([facility] [address] )         Past Surgical History  Past Surgical History:   Procedure Laterality Date    NO PAST SURGERIES      WOUND DEBRIDEMENT Right 8/6/2022    Procedure: DEBRIDEMENT RIGHT UPPER EXTREMITY ([address]) SEPTIC WRIST;  Surgeon: Sia Shelton MD;  [facility]: [facility];  Service: Orthopedics      08/08/22 1317   PT Last Visit   PT Visit Date 08/08/22   Note Type   Note type Evaluation   Pain Assessment   Pain Assessment Tool FLACC   Pain Rating: FLACC (Rest) - Face 1   Pain Rating: FLACC (Rest) - Legs 1   Pain Rating: FLACC (Rest) - Activity 0   Pain Rating: FLACC (Rest) - Cry 0   Pain Rating: FLACC (Rest) - Consolability 0   Score: FLACC (Rest) 2   Pain Rating: FLACC (Activity) - Face 1   Pain Rating: FLACC (Activity) - Legs 1   Pain Rating: FLACC (Activity) - Activity 0   Pain Rating: FLACC (Activity) - Cry 0   Pain Rating: FLACC (Activity) - Consolability 0   Score: FLACC (Activity) 2   Restrictions/Precautions   Weight Bearing Precautions Per Order Yes   RUE Weight Bearing Per Order NWB   Braces or Orthoses Sling  (per ortho)   Other Precautions Chair Alarm; Bed Alarm;Pain  (N+V per patient he believes he is withdrawling)   Home Living   Type of [address] [place] [address] One level   Additional Comments Pt lives with two other people in a [facility] with no MACARIO  Pt works full time and does not drive  Pt was I for ADL's and mobility prior  Pt does not own or use a RW or SPC     Prior Function   Level of [place] Independent with ADLs and functional mobility   Lives With Other (Comment)  (roomates)   Receives Help From Friend(s)   ADL Assistance Independent   IADLs Independent   Vocational Full time employment   General   Family/Caregiver Present No   Cognition   Overall Cognitive Status WFL   Arousal/Participation Alert   RLE Assessment   RLE Assessment   (grossly 3/5 observed with mobility)   LLE Assessment   LLE Assessment   (grossly 3/5 observed with mobility)   Coordination   Sensation WFL   Light Touch   RLE Light Touch Grossly intact   LLE Light Touch Grossly intact   Bed Mobility   Supine to Sit 5  Supervision   Sit to Supine 5  Supervision   Additional Comments sitting EOB S level   Transfers   Sit to Stand 5  Supervision   Stand to Sit 5  Supervision   Ambulation/Elevation   Gait pattern Excessively slow; Foward flexed; Shuffling  (multiple LOB to R and L, unsteady)   Gait Assistance 4  Minimal assist   Additional items Assist x 1   Assistive Device None   Distance [id number]   Balance   Static Sitting Good   Dynamic Sitting Fair +   Static Standing Fair   Dynamic Standing Fair   Ambulatory Poor +   Endurance Deficit   Endurance Deficit Yes   Activity Tolerance   Activity Tolerance Patient limited by fatigue;Patient limited by pain   Medical Staff Made Aware OT   Nurse Made Aware nurse approved therapy session   Assessment   Prognosis Good   Problem List Decreased strength;Decreased endurance; Impaired balance;Decreased mobility; Decreased safety awareness;Orthopedic restrictions;Pain   Assessment Pt is a 63 yo male admitted to [place] [address] on 8/6/2022 s/p R hand swelling  DX: R wrist pain, opioid use disorder, hyperlipidemia, DM  Pt had surgyer on 8/6 for debridement of RUE washout  Two patient identifiers were used to confirm  Pt lives in a [facility] with no MACARIO with two other people  Pt was I for ADL's and mobility prior  Pt does not drive and works full time   Pt's impairments include reduced mobility, poor endurance, high risk of falling, reduced balance, N+V, gait abnormalities, NWB on RUE  These impairments limit the ability of the patient to perform mobility without increased assistance, return to PLOF and participate in everyday life activities  Pt would benefit from continued skilled therapy while in the hospital to improve overall mobility and work towards a safe d/c  Recommend discharge to home with OPPT  At the end of the session the patient was left in supine position with call bell and phone within reach  The patient's AM-[facility] Basic Mobility Inpatient Short Form Raw Score is 17  A Raw score of greater than 16 suggests the patient may benefit from discharge to home  Please also refer to the recommendation of the Physical Therapist for safe discharge planning  Pt had multiple LOB requiring min A for assistance  Pt educated about NWB on RUE  Pt will need sling or RUE per ortho  Barriers to Discharge Decreased caregiver support   Goals   STG Expiration Date 08/22/22   Short Term Goal #1 STG 1: Pt will perform transfers at a MI/I level to return to baseline of function  STG 2: Pt will ambulate 300ft with SPC/least restrictive device at a MI  I level to reduce the level of assistance needed upon d/c home  STG 3:  STG 3: Pt will perform bed mobility at a I to safety return to PLOF  PT Treatment Day 0   Plan   Treatment/Interventions Functional transfer training;LE strengthening/ROM; Therapeutic exercise; Endurance training;Gait training;Bed mobility; Equipment eval/education;OT   PT Frequency 3-5x/wk   Recommendation   PT Discharge Recommendation Home with outpatient rehabilitation   Equipment Recommended Cane  (TBD)   AM-[facility] Basic Mobility Inpatient   Turning in Bed Without Bedrails 3   Lying on Back to Sitting on Edge of Flat Bed 3   Moving Bed to Chair 3   Standing Up From Chair 3   Walk in Room 3   Climb 3-5 Stairs 2   Basic Mobility Inpatient Raw Score 17   Basic Mobility Standardized Score 39 67   Highest Level Of Mobility   -[facility] Goal 5: Stand one or more mins NAVID-HLM Achieved 7: Walk 25 feet or more   Yumiko Hooper, PT, DPT

## 2022-08-08 NOTE — OCCUPATIONAL THERAPY NOTE
Occupational Therapy Evaluation     Patient Name: Ramón Barragan  Today's Date: 8/8/2022  Problem List  Principal Problem:    Right wrist pain  Active Problems:    Type 2 diabetes mellitus with complication, without long-term current use of insulin ([facility] [address] )    Other hyperlipidemia    Opioid use disorder    Past Medical History  Past Medical History:   Diagnosis Date    Diabetes mellitus ([facility] [address] )     [facility]      Past Surgical History  Past Surgical History:   Procedure Laterality Date    NO PAST SURGERIES      WOUND DEBRIDEMENT Right 8/6/2022    Procedure: DEBRIDEMENT RIGHT UPPER EXTREMITY ([address]) SEPTIC WRIST;  Surgeon: Jake Perez MD;  [facility];  Service: Orthopedics         08/08/22 1305   OT Last Visit   OT Visit Date 08/08/22   Note Type   Note type Evaluation   Restrictions/Precautions   Weight Bearing Precautions Per Order Yes   RUE Weight Bearing Per Order NWB   Other Precautions Fall Risk;Pain;WBS   Pain Assessment   Pain Assessment Tool FLACC   Pain Location/Orientation Orientation: Right;Location: Arm   Hospital Pain Intervention(s) Repositioned; Ambulation/increased activity; Emotional support; Rest   Pain Rating: FLACC (Rest) - Face 1   Pain Rating: FLACC (Rest) - Legs 0   Pain Rating: FLACC (Rest) - Activity 0   Pain Rating: FLACC (Rest) - Cry 0   Pain Rating: FLACC (Rest) - Consolability 0   Score: FLACC (Rest) 1   Pain Rating: FLACC (Activity) - Face 1   Pain Rating: FLACC (Activity) - Legs 1   Pain Rating: FLACC (Activity) - Activity 0   Pain Rating: FLACC (Activity) - Cry 0   Pain Rating: FLACC (Activity) - Consolability 0   Score: FLACC (Activity) 2   Home Living   Type of Home House   [facility]   Bathroom Shower/Tub Tub/shower unit   Bathroom Toilet Standard   Bathroom Equipment (No DME at baseline)   Bathroom Accessibility Accessible   Prior Function   Level of [place] Independent with ADLs and functional mobility Lives With Other (Comment)  (two roommates)   Receives Help From Friend(s)/roommates   ADL Assistance Independent   IADLs Independent   Falls in the last 6 months 0   Vocational (employed)   Lifestyle   Autonomy I with ADL's/IADL's, no AD with functional ambulation, does not drive-walks to grocery store  Reciprocal Relationships roommates   Service to Others employed at [address] [employer] 5  Supervision/Setup   Eating Deficit (using left hand to feed self)   Grooming Assistance 4  Minimal Assistance   UB Bathing Assistance 3  Moderate Assistance   LB Bathing Assistance 3  Moderate Assistance   UB Dressing Assistance 5  Supervision/Setup   LB Dressing Assistance 3  Moderate [address]  4  Minimal Assistance   Bed Mobility   Supine to Sit 6  Modified independent   Sit to Supine 6  Modified independent   Transfers   Sit to Stand 5  Supervision   Additional items Assist x 1   Stand to Sit 5  Supervision   Additional items Assist x 1   Additional Comments No AD with functional transfers   Functional Mobility   Functional Mobility 4  Minimal assistance   Additional Comments min a without AD short distance functional mobility into hallway, several minor LOB with min a to correct   Additional items   (no Ad)   Balance   Static Sitting Normal   Dynamic Sitting Good   Static Standing Fair +   Dynamic Standing Fair -   Ambulatory Poor +   Activity Tolerance   Activity Tolerance Patient limited by fatigue;Patient limited by pain   Medical Staff Made Aware PT Sherri 98   Nurse Made Aware RN cleared pt for therapy   RUE Assessment   RUE Assessment X right hand dominant  (shoulder, elbow WFL, no AROM/PROM to wrist 2* to pain/NWB status, decreased finger flexion/extension-pt would benefit from a HEP with gentle ROM to finger exercises )   LUE Assessment   LUE Assessment WFL-pt reports using left hand at times with functional tasks, especially at work  Hand Function   Gross Motor Coordination Impaired  (right hand)   Fine Motor Coordination Impaired  (right hand -able to oppose first finger to thumb, unable to oppose other fingers 2* to pain  Cognition   Arousal/Participation Alert; Cooperative   Attention Attends with cues to redirect   Orientation Level Oriented to person;Oriented to place;Oriented to time   Memory Within functional limits   Following Commands Follows multistep commands with increased time or repetition   Comments pt agreeable to therapy, slow processing with decreased attention at times withdrawing symptoms contributing (?), followed directives, fair/vague historian  Assessment   Limitation Decreased ADL status; Decreased UE ROM; Decreased UE strength;Decreased endurance;Decreased self-care trans;Decreased high-level ADLs; Decreased fine motor control   Prognosis Fair   Assessment Pt is a 62 y o  male who was admitted to [facility] on 8/6/2022 with hand swelling and  Right wrist pain/right wrist for septic arthritis s/p I & D to right wrist and now NWB to RUE in splint   Pt's problem list also includes PMH of  has a past medical history of Diabetes mellitus ([facility] [address] ) and Substance abuse ([facility] [address] )    At baseline pt was completing I with ADL's/iaDL's, no AD with functional ambulation (-) drives, walks  Pt lives with two roommates in a ranch style home with 0STE  Currently pt requires set-up self feeding, min a grooming, mod a UB/LB  for overall ADLS and min a without AD for functional mobility/transfers  Pt currently presents with impairments in the following categories -difficulty performing ADLS, difficulty performing IADLS , limited insight into deficits and health management  activity tolerance and endurance   These impairments, as well as pt's fatigue, WBS , decreased caregiver support and risk for falls  limit pt's ability to safely engage in all baseline areas of occupation, includingeating, grooming, bathing, dressing, toileting, functional mobility/transfers, community mobility, laundry , house maintenance, medication management, meal prep, cleaning, social participation  and leisure activities  From OT standpoint, recommend home with increased family support and DME upon D/C  OT will continue to follow to address the below stated goals  Goals   Patient Goals less nausea   LTG Time Frame 10-14   Long Term Goal #1 see goals below   Plan   Treatment Interventions ADL retraining;Functional transfer training;UE strengthening/ROM; Endurance training;Cognitive reorientation;Patient/family training;Equipment evaluation/education; Compensatory technique education; Energy conservation; Activityengagement   Goal Expiration Date 08/22/22   OT Frequency 3-5x/wk   Recommendation   OT Discharge Recommendation No rehabilitation needs  (Recommend outpatient OT hand therapy once medically cleared)   Equipment Recommended Bedside commode; Shower/Tub chair with back ($)  (if continues with standing balance deficits)   Commode Type Standard   AM-PAC Daily Activity Inpatient   Lower Body Dressing 3   Bathing 3   Toileting 3   Upper Body Dressing 3   Grooming 3   Eating 4   Daily Activity Raw Score 19   Daily Activity Standardized Score (Calc for Raw Score >=11) 40 22   AM-PAC Applied Cognition Inpatient   Following a Speech/Presentation 3   Understanding Ordinary Conversation 4   Taking Medications 4   Remembering Where Things Are Placed or Put Away 4   Remembering List of 4-5 Errands 4   Taking Care of Complicated Tasks 3   Applied Cognition Raw Score 22   Applied Cognition Standardized Score 47 83      Occupational Therapy Goals:    *Mod I with bed mobility to engage in functional tasks  *Mod I Adl's after setup with use of one handed techniques    *Mod I toileting and clothing management   *Mod I functional mobility and transfers to/from all surfaces with Fair + dynamic balance and safety for participation in dynamic adls and iadl tasks   *Assess DME needs *Increase activity tolerance to 25-30 minutes for participation in adls and enjoyable activities  *Demonstrate good carryover of pt/family education and training with good tolerance for increased safety and independence with ADL's/ADl's  *Pt will improve standing balance to 4-5 minutes with functional tasks to increase I with toileting/transfers  *Patient will demonstrate 100% carryover of energy conservation techniques t/o functional I/ADL/leisure tasks w/o cues s/p skilled education to increase endurance during functional tasks  *Pt will participate in fine/gross motor coordination/strenthening/dexterity exercises to right UE for HEP in order to increase participation in functional activities  Pt will improved ROM to [facility] to left hand/fingers to increase I with functional tasks  Pt will improved attention/concentration for 10-15 minutes to good to prepare for returning to work    Ulices Hernández MOT, OTR/L

## 2022-08-09 ENCOUNTER — APPOINTMENT (INPATIENT)
Dept: NON INVASIVE DIAGNOSTICS | Facility: HOSPITAL | Age: 57
DRG: 316 | End: 2022-08-09
Payer: COMMERCIAL

## 2022-08-09 LAB
ANION GAP SERPL CALCULATED.3IONS-SCNC: 1 MMOL/L (ref 4–13)
AORTIC ROOT: 3.2 CM
APICAL FOUR CHAMBER EJECTION FRACTION: 66 %
ASCENDING AORTA: 3.5 CM
BACTERIA BLD CULT: ABNORMAL
BACTERIA SPEC ANAEROBE CULT: NO GROWTH
BASOPHILS # BLD AUTO: 0.03 THOUSANDS/ΜL (ref 0–0.1)
BASOPHILS NFR BLD AUTO: 0 % (ref 0–1)
BUN SERPL-MCNC: 20 MG/DL (ref 5–25)
CALCIUM SERPL-MCNC: 9.1 MG/DL (ref 8.3–10.1)
CHLORIDE SERPL-SCNC: 103 MMOL/L (ref 96–108)
CO2 SERPL-SCNC: 28 MMOL/L (ref 21–32)
CREAT SERPL-MCNC: 1.03 MG/DL (ref 0.6–1.3)
E WAVE DECELERATION TIME: 175 MS
EOSINOPHIL # BLD AUTO: 0.03 THOUSAND/ΜL (ref 0–0.61)
EOSINOPHIL NFR BLD AUTO: 0 % (ref 0–6)
ERYTHROCYTE [DISTWIDTH] IN BLOOD BY AUTOMATED COUNT: 12.6 % (ref 11.6–15.1)
FRACTIONAL SHORTENING: 36 (ref 28–44)
GFR SERPL CREATININE-BSD FRML MDRD: 80 ML/MIN/1.73SQ M
GLUCOSE SERPL-MCNC: 172 MG/DL (ref 65–140)
GLUCOSE SERPL-MCNC: 180 MG/DL (ref 65–140)
GLUCOSE SERPL-MCNC: 182 MG/DL (ref 65–140)
GLUCOSE SERPL-MCNC: 184 MG/DL (ref 65–140)
GLUCOSE SERPL-MCNC: 197 MG/DL (ref 65–140)
GRAM STN SPEC: ABNORMAL
HCT VFR BLD AUTO: 42.7 % (ref 36.5–49.3)
HGB BLD-MCNC: 14.3 G/DL (ref 12–17)
HIV 1+2 AB+HIV1 P24 AG SERPL QL IA: NORMAL
IMM GRANULOCYTES # BLD AUTO: 0.09 THOUSAND/UL (ref 0–0.2)
IMM GRANULOCYTES NFR BLD AUTO: 1 % (ref 0–2)
INTERVENTRICULAR SEPTUM IN DIASTOLE (PARASTERNAL SHORT AXIS VIEW): 0.8 CM
INTERVENTRICULAR SEPTUM: 0.8 CM (ref 0.6–1.1)
LAAS-AP2: 14.8 CM2
LAAS-AP4: 12.5 CM2
LEFT ATRIUM SIZE: 3.2 CM
LEFT INTERNAL DIMENSION IN SYSTOLE: 3.5 CM (ref 2.1–4)
LEFT VENTRICULAR INTERNAL DIMENSION IN DIASTOLE: 5.5 CM (ref 3.5–6)
LEFT VENTRICULAR POSTERIOR WALL IN END DIASTOLE: 0.6 CM
LEFT VENTRICULAR STROKE VOLUME: 100 ML
LVSV (TEICH): 100 ML
LYMPHOCYTES # BLD AUTO: 2.19 THOUSANDS/ΜL (ref 0.6–4.47)
LYMPHOCYTES NFR BLD AUTO: 13 % (ref 14–44)
MCH RBC QN AUTO: 28.7 PG (ref 26.8–34.3)
MCHC RBC AUTO-ENTMCNC: 33.5 G/DL (ref 31.4–37.4)
MCV RBC AUTO: 86 FL (ref 82–98)
MONOCYTES # BLD AUTO: 0.84 THOUSAND/ΜL (ref 0.17–1.22)
MONOCYTES NFR BLD AUTO: 5 % (ref 4–12)
MV E'TISSUE VEL-SEP: 10 CM/S
MV PEAK A VEL: 0.69 M/S
MV PEAK E VEL: 83 CM/S
MV STENOSIS PRESSURE HALF TIME: 51 MS
MV VALVE AREA P 1/2 METHOD: 4.31
NEUTROPHILS # BLD AUTO: 13.42 THOUSANDS/ΜL (ref 1.85–7.62)
NEUTS SEG NFR BLD AUTO: 81 % (ref 43–75)
NRBC BLD AUTO-RTO: 0 /100 WBCS
PLATELET # BLD AUTO: 408 THOUSANDS/UL (ref 149–390)
PMV BLD AUTO: 8.9 FL (ref 8.9–12.7)
POTASSIUM SERPL-SCNC: 3.9 MMOL/L (ref 3.5–5.3)
RBC # BLD AUTO: 4.98 MILLION/UL (ref 3.88–5.62)
RIGHT ATRIAL 2D VOLUME: 22 ML
RIGHT ATRIUM AREA SYSTOLE A4C: 10.7 CM2
RIGHT VENTRICLE ID DIMENSION: 2.8 CM
S AUREUS DNA BLD POS QL NAA+NON-PROBE: DETECTED
SL CV LEFT ATRIUM LENGTH A2C: 4.3 CM
SL CV LV EF: 60
SL CV PED ECHO LEFT VENTRICLE DIASTOLIC VOLUME (MOD BIPLANE) 2D: 150 ML
SL CV PED ECHO LEFT VENTRICLE SYSTOLIC VOLUME (MOD BIPLANE) 2D: 50 ML
SODIUM SERPL-SCNC: 132 MMOL/L (ref 135–147)
WBC # BLD AUTO: 16.6 THOUSAND/UL (ref 4.31–10.16)

## 2022-08-09 PROCEDURE — 99232 SBSQ HOSP IP/OBS MODERATE 35: CPT | Performed by: ANESTHESIOLOGY

## 2022-08-09 PROCEDURE — 85025 COMPLETE CBC W/AUTO DIFF WBC: CPT | Performed by: FAMILY MEDICINE

## 2022-08-09 PROCEDURE — 80048 BASIC METABOLIC PNL TOTAL CA: CPT | Performed by: FAMILY MEDICINE

## 2022-08-09 PROCEDURE — 82948 REAGENT STRIP/BLOOD GLUCOSE: CPT

## 2022-08-09 PROCEDURE — 99232 SBSQ HOSP IP/OBS MODERATE 35: CPT | Performed by: INTERNAL MEDICINE

## 2022-08-09 PROCEDURE — 87040 BLOOD CULTURE FOR BACTERIA: CPT | Performed by: INTERNAL MEDICINE

## 2022-08-09 PROCEDURE — 99232 SBSQ HOSP IP/OBS MODERATE 35: CPT | Performed by: FAMILY MEDICINE

## 2022-08-09 PROCEDURE — 93306 TTE W/DOPPLER COMPLETE: CPT | Performed by: INTERNAL MEDICINE

## 2022-08-09 PROCEDURE — 93306 TTE W/DOPPLER COMPLETE: CPT

## 2022-08-09 PROCEDURE — 87522 HEPATITIS C REVRS TRNSCRPJ: CPT | Performed by: INTERNAL MEDICINE

## 2022-08-09 RX ADMIN — GABAPENTIN 100 MG: 100 CAPSULE ORAL at 09:10

## 2022-08-09 RX ADMIN — INSULIN LISPRO 1 UNITS: 100 INJECTION, SOLUTION INTRAVENOUS; SUBCUTANEOUS at 11:41

## 2022-08-09 RX ADMIN — ACETAMINOPHEN 975 MG: 325 TABLET ORAL at 07:10

## 2022-08-09 RX ADMIN — ONDANSETRON 4 MG: 2 INJECTION INTRAMUSCULAR; INTRAVENOUS at 22:15

## 2022-08-09 RX ADMIN — CEFAZOLIN SODIUM 2000 MG: 2 SOLUTION INTRAVENOUS at 15:43

## 2022-08-09 RX ADMIN — INSULIN LISPRO 1 UNITS: 100 INJECTION, SOLUTION INTRAVENOUS; SUBCUTANEOUS at 15:49

## 2022-08-09 RX ADMIN — ATORVASTATIN CALCIUM 20 MG: 20 TABLET, FILM COATED ORAL at 22:12

## 2022-08-09 RX ADMIN — ONDANSETRON 4 MG: 2 INJECTION INTRAMUSCULAR; INTRAVENOUS at 15:42

## 2022-08-09 RX ADMIN — CLONIDINE HYDROCHLORIDE 0.1 MG: 0.1 TABLET ORAL at 22:12

## 2022-08-09 RX ADMIN — CEFAZOLIN SODIUM 2000 MG: 2 SOLUTION INTRAVENOUS at 09:33

## 2022-08-09 RX ADMIN — ACETAMINOPHEN 975 MG: 325 TABLET ORAL at 15:42

## 2022-08-09 RX ADMIN — GABAPENTIN 100 MG: 100 CAPSULE ORAL at 22:12

## 2022-08-09 RX ADMIN — ONDANSETRON 4 MG: 2 INJECTION INTRAMUSCULAR; INTRAVENOUS at 09:10

## 2022-08-09 RX ADMIN — CLONIDINE HYDROCHLORIDE 0.1 MG: 0.1 TABLET ORAL at 07:10

## 2022-08-09 RX ADMIN — GABAPENTIN 100 MG: 100 CAPSULE ORAL at 15:42

## 2022-08-09 RX ADMIN — CEFAZOLIN SODIUM 2000 MG: 2 SOLUTION INTRAVENOUS at 23:53

## 2022-08-09 RX ADMIN — ACETAMINOPHEN 975 MG: 325 TABLET ORAL at 22:12

## 2022-08-09 RX ADMIN — LISINOPRIL 2.5 MG: 2.5 TABLET ORAL at 09:10

## 2022-08-09 RX ADMIN — CLONIDINE HYDROCHLORIDE 0.1 MG: 0.1 TABLET ORAL at 15:42

## 2022-08-09 NOTE — ASSESSMENT & PLAN NOTE
· States his right wrist is minimally painful currently  · Has not required opioid pain medication since early yesterday morning  · Continue Tylenol 975 mg p o  q 8 hours scheduled  · Continue gabapentin 100 mg p o  t i d  scheduled  · Patient will require 4 weeks of IV antibiotics, through 9/2/22  · Patient is likely a good candidate for [facility] program   Will follow peripherally to arrange this

## 2022-08-09 NOTE — PLAN OF CARE
Problem: PAIN - ADULT  Goal: Verbalizes/displays adequate comfort level or baseline comfort level  Description: Interventions:  - Encourage patient to monitor pain and request assistance  - Assess pain using appropriate pain scale  - Administer analgesics based on type and severity of pain and evaluate response  - Implement non-pharmacological measures as appropriate and evaluate response  - Consider cultural and social influences on pain and pain management  - Notify physician/advanced practitioner if interventions unsuccessful or patient reports new pain  Outcome: Progressing     Problem: INFECTION - ADULT  Goal: Absence or prevention of progression during hospitalization  Description: INTERVENTIONS:  - Assess and monitor for signs and symptoms of infection  - Monitor lab/diagnostic results  - Monitor all insertion sites, i e  indwelling lines, tubes, and drains  - Monitor endotracheal if appropriate and nasal secretions for changes in amount and color  - [place] appropriate cooling/warming therapies per order  - Administer medications as ordered  - Instruct and encourage patient and family to use good hand hygiene technique  - Identify and instruct in appropriate isolation precautions for identified infection/condition  Outcome: Progressing  Goal: Absence of fever/infection during neutropenic period  Description: INTERVENTIONS:  - Monitor WBC    Outcome: Progressing     Problem: SAFETY ADULT  Goal: Patient will remain free of falls  Description: INTERVENTIONS:  - Educate patient/family on patient safety including physical limitations  - Instruct patient to call for assistance with activity   - Consult OT/PT to assist with strengthening/mobility   - Keep Call bell within reach  - Keep bed low and locked with side rails adjusted as appropriate  - Keep care items and personal belongings within reach  - Initiate and maintain comfort rounds  - Make Fall Risk Sign visible to staff  - Offer Toileting every   Hours, in advance of need  - Initiate/Maintain  alarm  - Obtain necessary fall risk management equipment:    - Apply yellow socks and bracelet for high fall risk patients  - Consider moving patient to room near nurses station  Outcome: Progressing  Goal: Maintain or return to baseline ADL function  Description: INTERVENTIONS:  -  Assess patient's ability to carry out ADLs; assess patient's baseline for ADL function and identify physical deficits which impact ability to perform ADLs (bathing, care of mouth/teeth, toileting, grooming, dressing, etc )  - Assess/evaluate cause of self-care deficits   - Assess range of motion  - Assess patient's mobility; develop plan if impaired  - Assess patient's need for assistive devices and provide as appropriate  - Encourage maximum independence but intervene and supervise when necessary  - Involve family in performance of ADLs  - Assess for home care needs following discharge   - Consider OT consult to assist with ADL evaluation and planning for discharge  - Provide patient education as appropriate  Outcome: Progressing  Goal: Maintains/Returns to pre admission functional level  Description: INTERVENTIONS:  - Perform BMAT or MOVE assessment daily    - Set and communicate daily mobility goal to care team and patient/family/caregiver  - Collaborate with rehabilitation services on mobility goals if consulted  - Perform Range of Motion   times a day  - Reposition patient every   hours    - Dangle patient   times a day  - Stand patient   times a day  - Ambulate patient   times a day  - Out of bed to chair   times a day   - Out of bed for meals   times a day  - Out of bed for toileting  - Record patient progress and toleration of activity level   Outcome: Progressing     Problem: DISCHARGE PLANNING  Goal: Discharge to home or other facility with appropriate resources  Description: INTERVENTIONS:  - Identify barriers to discharge w/patient and caregiver  - Arrange for needed discharge resources and transportation as appropriate  - Identify discharge learning needs (meds, wound care, etc )  - Arrange for interpretive services to assist at discharge as needed  - Refer to Case Management Department for coordinating discharge planning if the patient needs post-hospital services based on physician/advanced practitioner order or complex needs related to functional status, cognitive ability, or social support system  Outcome: Progressing     Problem: Knowledge Deficit  Goal: Patient/family/caregiver demonstrates understanding of disease process, treatment plan, medications, and discharge instructions  Description: Complete learning assessment and assess knowledge base  Interventions:  - Provide teaching at level of understanding  - Provide teaching via preferred learning methods  Outcome: Progressing     Problem: MOBILITY - ADULT  Goal: Maintain or return to baseline ADL function  Description: INTERVENTIONS:  -  Assess patient's ability to carry out ADLs; assess patient's baseline for ADL function and identify physical deficits which impact ability to perform ADLs (bathing, care of mouth/teeth, toileting, grooming, dressing, etc )  - Assess/evaluate cause of self-care deficits   - Assess range of motion  - Assess patient's mobility; develop plan if impaired  - Assess patient's need for assistive devices and provide as appropriate  - Encourage maximum independence but intervene and supervise when necessary  - Involve family in performance of ADLs  - Assess for home care needs following discharge   - Consider OT consult to assist with ADL evaluation and planning for discharge  - Provide patient education as appropriate  Outcome: Progressing  Goal: Maintains/Returns to pre admission functional level  Description: INTERVENTIONS:  - Perform BMAT or MOVE assessment daily    - Set and communicate daily mobility goal to care team and patient/family/caregiver     - Collaborate with rehabilitation services on mobility goals if consulted  - Perform Range of Motion   times a day  - Reposition patient every   hours    - Dangle patient   times a day  - Stand patient   times a day  - Ambulate patient   times a day  - Out of bed to chair   times a day   - Out of bed for meals   times a day  - Out of bed for toileting  - Record patient progress and toleration of activity level   Outcome: Progressing     Problem: Potential for Falls  Goal: Patient will remain free of falls  Description: INTERVENTIONS:  - Educate patient/family on patient safety including physical limitations  - Instruct patient to call for assistance with activity   - Consult OT/PT to assist with strengthening/mobility   - Keep Call bell within reach  - Keep bed low and locked with side rails adjusted as appropriate  - Keep care items and personal belongings within reach  - Initiate and maintain comfort rounds  - Make Fall Risk Sign visible to staff  - Offer Toileting every   Hours, in advance of need  - Initiate/Maintain  alarm  - Obtain necessary fall risk management equipment:       - Apply yellow socks and bracelet for high fall risk patients  - Consider moving patient to room near nurses station  Outcome: Progressing     Problem: Prexisting or High Potential for Compromised Skin Integrity  Goal: Skin integrity is maintained or improved  Description: INTERVENTIONS:  - Identify patients at risk for skin breakdown  - Assess and monitor skin integrity  - Assess and monitor nutrition and hydration status  - Monitor labs   - Assess for incontinence   - Turn and reposition patient  - Assist with mobility/ambulation  - Relieve pressure over bony prominences  - Avoid friction and shearing  - Provide appropriate hygiene as needed including keeping skin clean and dry  - Evaluate need for skin moisturizer/barrier cream  - Collaborate with interdisciplinary team   - Patient/family teaching  - Consider wound care consult   Outcome: Progressing

## 2022-08-09 NOTE — ASSESSMENT & PLAN NOTE
Presented to [address] [facility] ED, R wrist swelling and erythema x1 day, painful  VSS normal, no SIRS met besides WBC 18k  CRP >90, ESR 78, wrist aspiration gram stain 4+ polys and 2+ GPCs, HbA1c 8 3  Did receive 1 dose vancomycin and cefepime in ED  Procal negative  - transfer to B for washout, S/P I&D/Washout (8/6)  - Ortho Following OP  - Continue Cefazolin 2mg Q8   - F/U ID recommendations  - F/U BCx - Tailor ABx to Sensitivity  - monitor vital signs  - pain control  - follow-up on labs from washout

## 2022-08-09 NOTE — PROGRESS NOTES
[facility]  Progress Note - Andrea Quevedo 1965, 62 y o  male MRN: [id number]  Unit/Bed#: CW2 213-01 Encounter: [id number]  Primary Care Provider: Colleen Diggs MD   Date and time admitted to hospital: 8/6/2022 12:22 PM    Opioid use disorder  Assessment & Plan  · Complains of improving withdrawal symptoms  · Being managed by medical toxicology service  * Right wrist pain  Assessment & Plan  · States his right wrist is minimally painful currently  · Has not required opioid pain medication since early yesterday morning  · Continue Tylenol 975 mg p o  q 8 hours scheduled  · Continue gabapentin 100 mg p o  t i d  scheduled  · Patient will require 4 weeks of IV antibiotics, through 9/2/22  · Patient is likely a good candidate for STAR program   Will follow peripherally to arrange this            Acute pain service will sign off this time, however will continue to follow peripherally for evaluation for and initiation of STAR program     Pain History  Current pain location(s):  No pain  Pain Scale:   No pain  Quality:  No pain  24 hour history:  Patient continues to have minimal to no pain in his right wrist   Will be requiring long-term IV antibiotics and would likely benefit from [facility] program     Opioid requirement previous 24 hours:  None    Meds/Allergies   all current active meds have been reviewed, current meds:   Current Facility-Administered Medications   Medication Dose Route Frequency    acetaminophen (TYLENOL) tablet 975 mg  975 mg Oral Q8H [facility]    atorvastatin (LIPITOR) tablet 20 mg  20 mg Oral HS    ceFAZolin (ANCEF) IVPB (premix in dextrose) 2,000 mg 50 mL  2,000 mg Intravenous Q8H    cloNIDine (CATAPRES) tablet 0 1 mg  0 1 mg Oral Q8H [facility]    gabapentin (NEURONTIN) capsule 100 mg  100 mg Oral TID    insulin lispro (HumaLOG) 100 units/mL subcutaneous injection 1-6 Units  1-6 Units Subcutaneous TID AC    lisinopril (ZESTRIL) tablet 2 5 mg  2 5 mg Oral Daily    ondansetron (ZOFRAN) injection 4 mg  4 mg Intravenous Q6H PRN    ondansetron (ZOFRAN) injection 4 mg  4 mg Intravenous TID    and PTA meds:   Prior to Admission Medications   Prescriptions Last Dose Informant Patient Reported? Taking? ACCU-CHEK FASTCLIX LANCETS MISC   Yes No   Blood Glucose Monitoring Suppl (ACCU-CHEK GUIDE) w/Device KIT   Yes No   Insulin Pen Needle (BD Pen Needle Cely 2nd Gen) 32G X 4 MM MISC 8/5/2022 at Unknown time  No Yes   Sig: Inject under the skin daily   ZUBSOLV 5 7-1 4 MG SUBL Not Taking at Unknown time  Yes No   Sig: SUBLINGUAL TWO TABLETS SUBLINGUALLY DAILY   Patient not taking: Reported on 8/6/2022   ZUBSOLV 8 6-2 1 MG SUBL Not Taking at Unknown time  Yes No   Sig: SUBLINGUAL 1 AND 1 /2 TABLETS SUBLINGUALLY DAILY   Patient not taking: Reported on 8/6/2022   aspirin (ECOTRIN LOW STRENGTH) 81 mg EC tablet 8/5/2022 at Unknown time  No Yes   Sig: Take 1 tablet (81 mg total) by mouth daily   atorvastatin (LIPITOR) 20 mg tablet 8/5/2022 at Unknown time  No Yes   Sig: TAKE 1 TABLET(20 MG) BY MOUTH DAILY AT BEDTIME   glucose blood (Accu-Chek Guide) test strip   No No   Sig: Use 1 each 3 (three) times a day Test as directed   insulin glargine (Basaglar KwikPen) 100 units/mL injection pen 8/5/2022 at Unknown time  No Yes   Sig: Inject 15 Units under the skin daily at bedtime   lisinopril (ZESTRIL) 2 5 mg tablet 8/5/2022 at Unknown time  No Yes   Sig: TAKE 1 TABLET(2 5 MG) BY MOUTH DAILY   metFORMIN (GLUCOPHAGE) 1000 MG tablet 8/5/2022 at Unknown time  No Yes   Sig: TAKE 1 TABLET(1000 MG) BY MOUTH TWICE DAILY WITH MEALS      Facility-Administered Medications: None       No Known Allergies    Objective     Temp:  [97 5 °F (36 4 °C)-98 9 °F (37 2 °C)] 98 9 °F (37 2 °C)  HR:  [49-57] 55  Resp:  [16-18] 18  BP: (152-160)/(79-82) 156/82    Physical Exam  Gen: Awake and alert, NAD, Does not appear ill  Vital signs reviewed  Nursing notes reviewed    Eyes: PERRL, sclera/conjunctiva normal   ENT: No JVD, trachea midline, moist mucus membranes  MSK:  Right wrist tender to palpation  Increased pain with range of motion  CV: Heart normal rhythm and normal rate  No extra systoles  Lungs:  CTA bilaterally  No wheeze  No rhonchi  Skin: Warm, dry  Cap refill <2 seconds  No diaphoresis  Neuro: A&O x 3, GCS 15 (E4, V5, M6)  No obvious focal motor deficit  Psych: Normal speech, normal attention, normal behavior  Lab Results:   Results from last 7 days   Lab Units 08/09/22  0925   WBC Thousand/uL 16 60*   HEMOGLOBIN g/dL 14 3   HEMATOCRIT % 42 7   PLATELETS Thousands/uL 408*      Results from last 7 days   Lab Units 08/09/22  0925 08/08/22  0637 08/05/22  2155   POTASSIUM mmol/L 3 9   < > 5 1   CHLORIDE mmol/L 103   < > 94*   CO2 mmol/L 28   < > 29   BUN mg/dL 20   < > 15   CREATININE mg/dL 1 03   < > 0 85   CALCIUM mg/dL 9 1   < > 9 1   ALK PHOS U/L  --   --  85   ALT U/L  --   --  32   AST U/L  --   --  43    < > = values in this interval not displayed  Estimated Creatinine Clearance: 80 9 mL/min (by C-G formula based on SCr of 1 03 mg/dL)  Imaging Studies: I have personally reviewed pertinent reports  Counseling / Coordination of Care  Total floor / unit time spent today Level 2 = 40 minutes  Greater than 50% of total time was spent with the patient and / or family counseling and / or coordination of care  A description of the counseling / coordination of care: Patient interview, physical examination, review of medical record, review of imaging and laboratory data, development of pain management plan, discussion of pain management plan with patient and primary service  Explanation of risks and benefits of suggested pain medications  Please note that the APS provides consultative services regarding pain management only    With the exception of ketamine and epidural infusions and except when indicated, final decisions regarding starting or changing doses of analgesic medications are at the discretion of the consulting service  Off hours consultation and/or medication management is generally not available  Portions of the record may have been created with voice recognition software  Occasional wrong-word or 'sound-a-like' substitutions may have occurred due to the inherent limitations of voice recognition software       Bennett Bueno PA-C  Acute Pain Service

## 2022-08-09 NOTE — ASSESSMENT & PLAN NOTE
Lab Results   Component Value Date    HGBA1C 8 3 (A) 04/28/2022       Recent Labs     08/08/22  1134 08/08/22  1626 08/08/22  2104 08/09/22  0619   POCGLU 190* 207* 213* 172*       Blood Sugar Average: Last 72 hrs:  (P) 170 5     PTA meds: Lantus 15 units HS, Metformin 1000 mg BID    - Hold both for SSI

## 2022-08-09 NOTE — PROGRESS NOTES
Progress Note - Infectious Disease   Clementeen Alonzo 62 y o  male MRN: [id number]  Unit/Bed#: CW2 213-01 Encounter: [id number]      Impression/Recommendations:  1   MSSA bacteremia   Due to #2   No other appreciable source   Clinically stable without formal sepsis criteria  Repeat blood cultures  pending, TTE negative  Rec:  · Continue cefazolin for now  · Follow up repeat blood cultures  · Will need at least 4 weeks IV antibiotics given #2     2   Right wrist septic arthritis   Likely due to IVDU   Status post I&D 22   Intraoperative findings notable for purulence in the joint space   OR cultures with 3+ MSSA  Doubt significance of single colony of coagulase-negative Staph  Rec:  · Continue cefazolin for 4 weeks total through 22  · Serial exams     3   OUD   IV heroin   Risk factor for infection as above   Uses 6 bags/day   Withdrawal symptoms improving  Rec:  · Toxicology and APS follow-up ongoing - possible Suboxone initiation  · Await CRS consult  · Consider STAR program   · Not a candidate for home IV antibiotics  · Follow up HIV     4   Poorly controlled DM   Recent A1c 2022 8  3   Risk factor for infection as above      5  Positive HCV antibody  Consider chronic infection  HIV negative  Rec:  · Follow up HCV RNA     The above impression and plan was discussed in detail with the patient      Antibiotics:  Cefazolin #3  Antibiotics #4    Subjective:  Patient seen on AM rounds  Feeling a little better  No more vomiting but poor appetite, did not eat breakfast   Minimal pain  Denies fevers  24 Hour Events:  No documented fevers, chills, vomiting, or diarrhea      Objective:  Vitals:  Temp:  [97 5 °F (36 4 °C)-98 9 °F (37 2 °C)] 98 9 °F (37 2 °C)  HR:  [49-57] 55  Resp:  [16-18] 18  BP: (138-160)/(75-82) 156/82  SpO2:  [92 %-96 %] 95 %  Temp (24hrs), Av 2 °F (36 8 °C), Min:97 5 °F (36 4 °C), Max:98 9 °F (37 2 °C)  Current: Temperature: 98 9 °F (37 2 °C)    Physical Exam:   General:  No acute distress  Psychiatric:  Awake and alert, flat affect, makes minimal eye contact during conversation  Pulmonary:  Normal respiratory excursion without accessory muscle use  Abdomen:  Soft, nontender  Extremities:  No edema  Skin:  No rashes    Lab Results:  I have personally reviewed pertinent labs  Results from last 7 days   Lab Units 08/09/22  0925 08/08/22  0637 08/05/22  2155   POTASSIUM mmol/L 3 9 2 9* 5 1   CHLORIDE mmol/L 103 103 94*   CO2 mmol/L 28 25 29   BUN mg/dL 20 18 15   CREATININE mg/dL 1 03 0 83 0 85   EGFR ml/min/1 73sq m 80 97 96   CALCIUM mg/dL 9 1 8 3 9 1   AST U/L  --   --  43   ALT U/L  --   --  32   ALK PHOS U/L  --   --  85     Results from last 7 days   Lab Units 08/09/22  0925 08/08/22  0637 08/07/22  1331   WBC Thousand/uL 16 60* 13 87* 15 80*   HEMOGLOBIN g/dL 14 3 12 3 13 0   PLATELETS Thousands/uL 408* 358 315     Results from last 7 days   Lab Units 08/09/22  0938 08/06/22  1509 08/06/22  0137 08/05/22  2217 08/05/22  2215   BLOOD CULTURE  Received in Microbiology Lab  Culture in Progress  Received in Microbiology Lab  Culture in Progress  --   --  No Growth at 72 hrs  Staphylococcus aureus*   GRAM STAIN RESULT   --  No Polys or Bacteria seen 4+ Polys*  2+ Gram positive cocci in clusters*  --  Gram positive cocci in clusters*   BODY FLUID CULTURE, STERILE   --   --  3+ Growth of Staphylococcus aureus*  --   --        Imaging Studies:   I have personally reviewed pertinent imaging study reports and images in PACS  EKG, Pathology, and Other Studies:   I have personally reviewed pertinent reports

## 2022-08-09 NOTE — ASSESSMENT & PLAN NOTE
Hx of heroin injected one week ago into right wrist  Hx of opioid abuse (~6 bags/day)  Toxicology on Consult - Patient should go on Suboxone when he's willing (Cannot while on pain medicine)   COWS-4 (8/9)    -F/U with Toxicology with ID recommendations concerning Suboxone

## 2022-08-09 NOTE — PROGRESS NOTES
[address] [facility]  Progress Note - Cheryl Morley 1965, 62 y o  male MRN: [id number]  Unit/Bed#: CW2 213-01 Encounter: [id number]  Primary Care Provider: Mariana Weston MD   Date and time admitted to hospital: 8/6/2022 12:22 PM    * Right wrist pain  Assessment & Plan  Presented to [facility] ED, R wrist swelling and erythema x1 day, painful  VSS normal, no SIRS met besides WBC 18k  CRP >90, ESR 78, wrist aspiration gram stain 4+ polys and 2+ GPCs, HbA1c 8 3  Did receive 1 dose vancomycin and cefepime in ED  Procal negative  - transfer to [facility] for washout, S/P I&D/[place] (8/6)  - Ortho Following OP  - Continue Cefazolin 2mg Q8   - F/U ID recommendations  - F/U BCx - Tailor ABx to Sensitivity  - monitor vital signs  - pain control  - follow-up on labs from washout  Opioid use disorder  Assessment & Plan  Hx of heroin injected one week ago into right wrist  Hx of opioid abuse (~6 bags/day)  Toxicology on Consult - Patient should go on Suboxone when he's willing (Cannot while on pain medicine)  COWS-4 (8/9)    -F/U with Toxicology with ID recommendations concerning Suboxone    Other hyperlipidemia  Assessment & Plan  Stable  Continue atorvastatin 20 mg daily       Type 2 diabetes mellitus with complication, without long-term current use of insulin [facility])  Assessment & Plan  Lab Results   Component Value Date    HGBA1C 8 3 (A) 04/28/2022       Recent Labs     08/08/22  1134 08/08/22  1626 08/08/22  2104 08/09/22  0619   POCGLU 190* 207* 213* 172*       Blood Sugar Average: Last 72 hrs:  (P) 170 5     PTA meds: Lantus 15 units HS, Metformin 1000 mg BID    - Hold both for SSI       PPX: SCDs  Diet: Regular House  Code Status: Full   Dispo: Home    Plan D/W Dr Lynn Shanks and [facility] Team    Subjective:   Pt seen and assessed at bedside this AM, no ON events, no acute complaints  Patient states that nausea is improved and he has not thrown up recently, COWS-4   Denies pain, flushing, fever, chills, anxiety, or diaphoresis  Has no questions at this time  Objective:     Vitals: Blood pressure 156/82, pulse (!) 50, temperature 98 9 °F (37 2 °C), resp  rate 18, height 5' 7" (1 702 m), weight 81 6 kg (180 lb), SpO2 95 %  ,Body mass index is 28 19 kg/m²  Intake/Output Summary (Last 24 hours) at 8/9/2022 0841  Last data filed at 8/8/2022 1100  Gross per 24 hour   Intake 380 ml   Output --   Net 380 ml       Physical Exam:   Physical Exam  Constitutional:       Appearance: Normal appearance  HENT:      Head: Normocephalic and atraumatic  Right Ear: External ear normal       Left Ear: External ear normal       Nose: Nose normal    Eyes:      Conjunctiva/sclera: Conjunctivae normal    Cardiovascular:      Rate and Rhythm: Regular rhythm  Bradycardia present  Pulses: Normal pulses  Heart sounds: Normal heart sounds  Pulmonary:      Effort: Pulmonary effort is normal       Breath sounds: Normal breath sounds  Abdominal:      General: Bowel sounds are normal       Palpations: Abdomen is soft  Skin:     General: Skin is warm and dry  Neurological:      General: No focal deficit present  Mental Status: He is alert and oriented to person, place, and time  Invasive Devices  Report    Peripheral Intravenous Line  Duration           Peripheral IV 08/05/22 Left Hand 3 days                           Lab and other studies:  I have personally reviewed pertinent reports  Admission on 08/06/2022   Component Date Value    Anaerobic Culture 08/06/2022 No growth     Fungus (Mycology) Culture 08/06/2022 No growth to date     Tissue Culture 08/06/2022 Culture results to follow       Gram Stain Result 08/06/2022 No Polys or Bacteria seen     AFB Culture 08/06/2022 No growth to date     AFB Stain 08/06/2022 No acid fast bacilli seen     POC Glucose 08/06/2022 107     POC Glucose 08/07/2022 172 (A)    SARS-CoV-2 08/07/2022 Negative     INFLUENZA A PCR 08/07/2022 Negative     INFLUENZA B PCR 08/07/2022 Negative     RSV PCR 08/07/2022 Negative     POC Glucose 08/07/2022 184 (A)    Hepatitis B Surface Ag 08/07/2022 Non-reactive     Hepatitis C Ab 08/07/2022 High Reactive (A)    Rapid HIV 1 AND 2 08/07/2022 Non-Reactive     HIV-1 P24 Ag Screen 08/07/2022 Non-Reactive     WBC 08/07/2022 15 80 (A)    RBC 08/07/2022 4 41     Hemoglobin 08/07/2022 13 0     Hematocrit 08/07/2022 38 2     MCV 08/07/2022 87     MCH 08/07/2022 29 5     MCHC 08/07/2022 34 0     RDW 08/07/2022 12 7     MPV 08/07/2022 9 5     Platelets 43/57/8149 315     nRBC 08/07/2022 0     Neutrophils Relative 08/07/2022 87 (A)    Immat GRANS % 08/07/2022 1     Lymphocytes Relative 08/07/2022 7 (A)    Monocytes Relative 08/07/2022 5     Eosinophils Relative 08/07/2022 0     Basophils Relative 08/07/2022 0     Neutrophils Absolute 08/07/2022 13 77 (A)    Immature Grans Absolute 08/07/2022 0 11     Lymphocytes Absolute 08/07/2022 1 11     Monocytes Absolute 08/07/2022 0 79     Eosinophils Absolute 08/07/2022 0 00     Basophils Absolute 08/07/2022 0 02     POC Glucose 08/07/2022 131     POC Glucose 08/07/2022 148 (A)    Sodium 08/08/2022 138     Potassium 08/08/2022 2 9 (A)    Chloride 08/08/2022 103     CO2 08/08/2022 25     ANION GAP 08/08/2022 10     BUN 08/08/2022 18     Creatinine 08/08/2022 0 83     Glucose 08/08/2022 160 (A)    Calcium 08/08/2022 8 3     eGFR 08/08/2022 97     WBC 08/08/2022 13 87 (A)    RBC 08/08/2022 4 23     Hemoglobin 08/08/2022 12 3     Hematocrit 08/08/2022 36 5     MCV 08/08/2022 86     MCH 08/08/2022 29 1     MCHC 08/08/2022 33 7     RDW 08/08/2022 12 7     MPV 08/08/2022 9 4     Platelets 62/64/9642 358     nRBC 08/08/2022 0     Neutrophils Relative 08/08/2022 85 (A)    Immat GRANS % 08/08/2022 0     Lymphocytes Relative 08/08/2022 9 (A)    Monocytes Relative 08/08/2022 6     Eosinophils Relative 08/08/2022 0     Basophils Relative 08/08/2022 0  Neutrophils Absolute 08/08/2022 11 69 (A)    Immature Grans Absolute 08/08/2022 0 06     Lymphocytes Absolute 08/08/2022 1 29     Monocytes Absolute 08/08/2022 0 81     Eosinophils Absolute 08/08/2022 0 01     Basophils Absolute 08/08/2022 0 01     Hepatitis B Surface Ag 08/08/2022 Non-reactive     Hepatitis C Ab 08/08/2022 High Reactive (A)    Hep B C IgM 08/08/2022 Non-reactive     Hep B Core Total Ab 08/08/2022 Non-reactive     POC Glucose 08/08/2022 181 (A)    POC Glucose 08/08/2022 190 (A)    POC Glucose 08/08/2022 207 (A)    POC Glucose 08/08/2022 213 (A)    POC Glucose 08/09/2022 172 (A)       Recent Results (from the past 24 hour(s))   Fingerstick Glucose (POCT)    Collection Time: 08/08/22 11:34 AM   Result Value Ref Range    POC Glucose 190 (H) 65 - 140 mg/dl   Chronic Hepatitis Panel    Collection Time: 08/08/22 12:34 PM   Result Value Ref Range    Hepatitis B Surface Ag Non-reactive Non-reactive, NonReactive - Confirmed    Hepatitis C Ab High Reactive (A) Non-reactive    Hep B C IgM Non-reactive Non-reactive    Hep B Core Total Ab Non-reactive Non-reactive   Fingerstick Glucose (POCT)    Collection Time: 08/08/22  4:26 PM   Result Value Ref Range    POC Glucose 207 (H) 65 - 140 mg/dl   Fingerstick Glucose (POCT)    Collection Time: 08/08/22  9:04 PM   Result Value Ref Range    POC Glucose 213 (H) 65 - 140 mg/dl   Fingerstick Glucose (POCT)    Collection Time: 08/09/22  6:19 AM   Result Value Ref Range    POC Glucose 172 (H) 65 - 140 mg/dl     Blood Culture:   Lab Results   Component Value Date    BLOODCX No Growth at 72 hrs   08/05/2022   ,   Urinalysis:   Lab Results   Component Value Date    COLORU Yellow 08/31/2015    CLARITYU Clear 08/31/2015    SPECGRAV 1 020 08/31/2015    PHUR 5 5 08/31/2015    LEUKOCYTESUR Negative 08/31/2015    NITRITE Negative 08/31/2015    PROTEINUA Trace (A) 08/31/2015    GLUCOSEU 500 (1/2%) (A) 08/31/2015    KETONESU Negative 08/31/2015    BILIRUBINUR Negative 08/31/2015    BLOODU Negative 08/31/2015   ,   Urine Culture: No results found for: URINECX,   Wound Culure: No results found for: WOUNDCULT      Imaging:   No orders to display     VTE Mechanical Prophylaxis: sequential compression device    Current Facility-Administered Medications   Medication Dose Route Frequency    acetaminophen (TYLENOL) tablet 975 mg  975 mg Oral Q8H [address]    atorvastatin (LIPITOR) tablet 20 mg  20 mg Oral HS    ceFAZolin (ANCEF) IVPB (premix in dextrose) 2,000 mg 50 mL  2,000 mg Intravenous Q8H    cloNIDine (CATAPRES) tablet 0 1 mg  0 1 mg Oral Q8H [address]    gabapentin (NEURONTIN) capsule 100 mg  100 mg Oral TID    insulin lispro (HumaLOG) 100 units/mL subcutaneous injection 1-6 Units  1-6 Units Subcutaneous TID AC    lisinopril (ZESTRIL) tablet 2 5 mg  2 5 mg Oral Daily    ondansetron (ZOFRAN) injection 4 mg  4 mg Intravenous Q6H PRN    ondansetron (ZOFRAN) injection 4 mg  4 mg Intravenous TID       Dell Belcher MD  Family Medicine Resident PGY1

## 2022-08-09 NOTE — CASE MANAGEMENT
Case Management Discharge Planning Note    Patient name Ramón Barragan  Location 2 213/CW2 213-01 MRN [id number]  : 1965 Date 2022       Current Admission Date: 2022  Current Admission Diagnosis:Right wrist pain   Patient Active Problem List    Diagnosis Date Noted    Right wrist pain 2022    Opioid use disorder 2021    Class 1 obesity due to excess calories without serious comorbidity with body mass index (BMI) of 32 0 to 32 9 in adult 2020    Encounter for counseling for tobacco use disorder 2020    Encounter for screening colonoscopy 2020    Other hyperlipidemia 2020    Type 2 diabetes mellitus with complication, without long-term current use of insulin ([facility] ) 2019      LOS (days): 3  Geometric Mean LOS (GMLOS) (days):   Days to GMLOS:     OBJECTIVE:  Risk of Unplanned Readmission Score: 12 54         Current admission status: Inpatient   Preferred Pharmacy:   [facility] [address]  [address] [place] [id number]  Phone: [phone] Fax: [phone]    Primary Care Provider: Tom Moreno MD    Primary Insurance: [address] [place] [address]  Secondary Insurance:     DISCHARGE DETAILS:                    Additional Comments: Patient current IV drug user, will need 4 weeks IV antibiotics  Referral placed to Melissa Younger, Certified , who will meet with patient tomorrow to discuss treatment options  If patient not agreeable to inpatient D&A treatment, patient will need to remain in the hospital for course of IV antibiotics  CM to follow

## 2022-08-09 NOTE — QUICK NOTE
Patient reassessed at bedside  COWS - 4 for nausea, and mild tremors  Will continue to monitor closely

## 2022-08-10 LAB
ANION GAP SERPL CALCULATED.3IONS-SCNC: 5 MMOL/L (ref 4–13)
BACTERIA TISS AEROBE CULT: ABNORMAL
BASOPHILS # BLD AUTO: 0.03 THOUSANDS/ΜL (ref 0–0.1)
BASOPHILS NFR BLD AUTO: 0 % (ref 0–1)
BUN SERPL-MCNC: 20 MG/DL (ref 5–25)
CALCIUM SERPL-MCNC: 9.1 MG/DL (ref 8.3–10.1)
CHLORIDE SERPL-SCNC: 104 MMOL/L (ref 96–108)
CO2 SERPL-SCNC: 25 MMOL/L (ref 21–32)
CREAT SERPL-MCNC: 1 MG/DL (ref 0.6–1.3)
EOSINOPHIL # BLD AUTO: 0.11 THOUSAND/ΜL (ref 0–0.61)
EOSINOPHIL NFR BLD AUTO: 1 % (ref 0–6)
ERYTHROCYTE [DISTWIDTH] IN BLOOD BY AUTOMATED COUNT: 12.7 % (ref 11.6–15.1)
GFR SERPL CREATININE-BSD FRML MDRD: 83 ML/MIN/1.73SQ M
GLUCOSE SERPL-MCNC: 146 MG/DL (ref 65–140)
GLUCOSE SERPL-MCNC: 167 MG/DL (ref 65–140)
GLUCOSE SERPL-MCNC: 168 MG/DL (ref 65–140)
GLUCOSE SERPL-MCNC: 194 MG/DL (ref 65–140)
GRAM STN SPEC: ABNORMAL
HCT VFR BLD AUTO: 42.6 % (ref 36.5–49.3)
HGB BLD-MCNC: 14.4 G/DL (ref 12–17)
IMM GRANULOCYTES # BLD AUTO: 0.1 THOUSAND/UL (ref 0–0.2)
IMM GRANULOCYTES NFR BLD AUTO: 1 % (ref 0–2)
LYMPHOCYTES # BLD AUTO: 2.83 THOUSANDS/ΜL (ref 0.6–4.47)
LYMPHOCYTES NFR BLD AUTO: 19 % (ref 14–44)
MCH RBC QN AUTO: 29.5 PG (ref 26.8–34.3)
MCHC RBC AUTO-ENTMCNC: 33.8 G/DL (ref 31.4–37.4)
MCV RBC AUTO: 87 FL (ref 82–98)
MONOCYTES # BLD AUTO: 0.84 THOUSAND/ΜL (ref 0.17–1.22)
MONOCYTES NFR BLD AUTO: 6 % (ref 4–12)
NEUTROPHILS # BLD AUTO: 11.29 THOUSANDS/ΜL (ref 1.85–7.62)
NEUTS SEG NFR BLD AUTO: 73 % (ref 43–75)
NRBC BLD AUTO-RTO: 0 /100 WBCS
PLATELET # BLD AUTO: 406 THOUSANDS/UL (ref 149–390)
PMV BLD AUTO: 9.1 FL (ref 8.9–12.7)
POTASSIUM SERPL-SCNC: 3.8 MMOL/L (ref 3.5–5.3)
RBC # BLD AUTO: 4.88 MILLION/UL (ref 3.88–5.62)
SODIUM SERPL-SCNC: 134 MMOL/L (ref 135–147)
WBC # BLD AUTO: 15.2 THOUSAND/UL (ref 4.31–10.16)

## 2022-08-10 PROCEDURE — 99232 SBSQ HOSP IP/OBS MODERATE 35: CPT | Performed by: INTERNAL MEDICINE

## 2022-08-10 PROCEDURE — 99232 SBSQ HOSP IP/OBS MODERATE 35: CPT | Performed by: FAMILY MEDICINE

## 2022-08-10 PROCEDURE — 82948 REAGENT STRIP/BLOOD GLUCOSE: CPT

## 2022-08-10 PROCEDURE — 85025 COMPLETE CBC W/AUTO DIFF WBC: CPT

## 2022-08-10 PROCEDURE — 80048 BASIC METABOLIC PNL TOTAL CA: CPT

## 2022-08-10 PROCEDURE — 97116 GAIT TRAINING THERAPY: CPT

## 2022-08-10 RX ADMIN — ACETAMINOPHEN 975 MG: 325 TABLET ORAL at 15:10

## 2022-08-10 RX ADMIN — ACETAMINOPHEN 975 MG: 325 TABLET ORAL at 06:17

## 2022-08-10 RX ADMIN — ONDANSETRON 4 MG: 2 INJECTION INTRAMUSCULAR; INTRAVENOUS at 21:10

## 2022-08-10 RX ADMIN — ONDANSETRON 4 MG: 2 INJECTION INTRAMUSCULAR; INTRAVENOUS at 15:10

## 2022-08-10 RX ADMIN — CLONIDINE HYDROCHLORIDE 0.1 MG: 0.1 TABLET ORAL at 15:10

## 2022-08-10 RX ADMIN — GABAPENTIN 100 MG: 100 CAPSULE ORAL at 08:19

## 2022-08-10 RX ADMIN — CEFAZOLIN SODIUM 2000 MG: 2 SOLUTION INTRAVENOUS at 22:10

## 2022-08-10 RX ADMIN — CEFAZOLIN SODIUM 2000 MG: 2 SOLUTION INTRAVENOUS at 08:19

## 2022-08-10 RX ADMIN — GABAPENTIN 100 MG: 100 CAPSULE ORAL at 15:10

## 2022-08-10 RX ADMIN — GABAPENTIN 100 MG: 100 CAPSULE ORAL at 21:11

## 2022-08-10 RX ADMIN — ATORVASTATIN CALCIUM 20 MG: 20 TABLET, FILM COATED ORAL at 21:10

## 2022-08-10 RX ADMIN — CLONIDINE HYDROCHLORIDE 0.1 MG: 0.1 TABLET ORAL at 21:11

## 2022-08-10 RX ADMIN — LISINOPRIL 2.5 MG: 2.5 TABLET ORAL at 08:19

## 2022-08-10 RX ADMIN — CLONIDINE HYDROCHLORIDE 0.1 MG: 0.1 TABLET ORAL at 06:18

## 2022-08-10 RX ADMIN — CEFAZOLIN SODIUM 2000 MG: 2 SOLUTION INTRAVENOUS at 15:10

## 2022-08-10 RX ADMIN — INSULIN LISPRO 1 UNITS: 100 INJECTION, SOLUTION INTRAVENOUS; SUBCUTANEOUS at 16:50

## 2022-08-10 RX ADMIN — ONDANSETRON 4 MG: 2 INJECTION INTRAMUSCULAR; INTRAVENOUS at 08:19

## 2022-08-10 RX ADMIN — INSULIN LISPRO 1 UNITS: 100 INJECTION, SOLUTION INTRAVENOUS; SUBCUTANEOUS at 06:18

## 2022-08-10 RX ADMIN — ACETAMINOPHEN 975 MG: 325 TABLET ORAL at 21:10

## 2022-08-10 NOTE — PROGRESS NOTES
[facility]  Progress Note - [place] Danger 1965, 62 y o  male MRN: [id number]  Unit/Bed#: CW2 213-01 Encounter: [id number]  Primary Care Provider: Tierra Nesbitt MD   Date and time admitted to hospital: 8/6/2022 12:22 PM    * Right wrist pain  Assessment & Plan  Presented to [facility] ED, R wrist swelling and erythema x1 day, painful  VSS normal, no SIRS met besides WBC 18k  CRP >90, ESR 78, wrist aspiration gram stain 4+ polys and 2+ GPCs, HbA1c 8 3  Did receive 1 dose vancomycin and cefepime in ED  Procal negative 8/5 BCx positive for MSSA  - transfer to [facility] for washout, S/P I&D/Washout (8/6)  - Ortho Following OP   - Continue Cefazolin 2mg Q8  - F/U Repeat BCx (8/9) - Tailor ABx to Sensitivity  - monitor vital signs  - pain control    Opioid use disorder  Assessment & Plan  Hx of heroin injected one week ago into right wrist  Hx of opioid abuse (~6 bags/day)  Toxicology on Consult - Patient should go on Suboxone when he's willing (Cannot while on pain medicine)  COWS-4 (8/9)    -Patient due to discuss placement with CRS for rehab and long term IV Abx    Other hyperlipidemia  Assessment & Plan  Stable  Continue atorvastatin 20 mg daily        Type 2 diabetes mellitus with complication, without long-term current use of insulin [facility])  Assessment & Plan  Lab Results   Component Value Date    HGBA1C 8 3 (A) 04/28/2022       Recent Labs     08/09/22  1138 08/09/22  1542 08/09/22  2040 08/10/22  0558   POCGLU 184* 182* 197* 167*       Blood Sugar Average: Last 72 hrs:  (P) 724 [id number]     PTA meds: Lantus 15 units HS, Metformin 1000 mg BID    - Hold both for SSI        PPX: SCD  Diet: House  Code Status: Full   Dispo: Long term Rehab/[facility]    Plan D/W Dr Gauri Jesus and [facility] Team    Subjective:   Pt seen and assessed at bedside this morning, no ON events, no acute complaints   Patient endorses improvement of his withdrawal symptoms and denies any current pain, fever, or discomfort  Abx needs and placement options discussed with patient  No further complaints at this time  Objective:     Vitals: Blood pressure 146/83, pulse (!) 53, temperature 99 °F (37 2 °C), resp  rate 18, height 5' 7" (1 702 m), weight 81 6 kg (180 lb), SpO2 93 %  ,Body mass index is 28 19 kg/m²  Intake/Output Summary (Last 24 hours) at 8/10/2022 0742  Last data filed at 8/9/2022 1700  Gross per 24 hour   Intake 220 ml   Output --   Net 220 ml       Physical Exam:   Physical Exam  Constitutional:       Appearance: Normal appearance  HENT:      Head: Normocephalic and atraumatic  Right Ear: External ear normal       Left Ear: External ear normal       Nose: Nose normal    Eyes:      Conjunctiva/sclera: Conjunctivae normal    Cardiovascular:      Rate and Rhythm: Normal rate and regular rhythm  Pulses: Normal pulses  Heart sounds: Normal heart sounds  Pulmonary:      Effort: Pulmonary effort is normal       Breath sounds: Normal breath sounds  Abdominal:      General: Bowel sounds are normal       Palpations: Abdomen is soft  Skin:     General: Skin is warm and dry  Neurological:      General: No focal deficit present  Mental Status: He is alert and oriented to person, place, and time  Psychiatric:         Mood and Affect: Mood normal          Invasive Devices  Report    Peripheral Intravenous Line  Duration           Peripheral IV 08/09/22 Left Forearm <1 day                           Lab and other studies:  I have personally reviewed pertinent reports       Admission on 08/06/2022   Component Date Value    Anaerobic Culture 08/06/2022 No growth     Fungus (Mycology) Culture 08/06/2022 No growth to date     Tissue Culture 08/06/2022 1 colony Staphylococcus epidermidis (A)    Gram Stain Result 08/06/2022 No Polys or Bacteria seen     AFB Culture 08/06/2022 No growth to date     AFB Stain 08/06/2022 No acid fast bacilli seen     POC Glucose 08/06/2022 107     POC Glucose 08/07/2022 172 (A)    SARS-CoV-2 08/07/2022 Negative     INFLUENZA A PCR 08/07/2022 Negative     INFLUENZA B PCR 08/07/2022 Negative     RSV PCR 08/07/2022 Negative     POC Glucose 08/07/2022 184 (A)    Hepatitis B Surface Ag 08/07/2022 Non-reactive     Hepatitis C Ab 08/07/2022 High Reactive (A)    Rapid HIV 1 AND 2 08/07/2022 Non-Reactive     HIV-1 P24 Ag Screen 08/07/2022 Non-Reactive     WBC 08/07/2022 15 80 (A)    RBC 08/07/2022 4 41     Hemoglobin 08/07/2022 13 0     Hematocrit 08/07/2022 38 2     MCV 08/07/2022 87     MCH 08/07/2022 29 5     MCHC 08/07/2022 34 0     RDW 08/07/2022 12 7     MPV 08/07/2022 9 5     Platelets 44/75/9304 315     nRBC 08/07/2022 0     Neutrophils Relative 08/07/2022 87 (A)    Immat GRANS % 08/07/2022 1     Lymphocytes Relative 08/07/2022 7 (A)    Monocytes Relative 08/07/2022 5     Eosinophils Relative 08/07/2022 0     Basophils Relative 08/07/2022 0     Neutrophils Absolute 08/07/2022 13 77 (A)    Immature Grans Absolute 08/07/2022 0 11     Lymphocytes Absolute 08/07/2022 1 11     Monocytes Absolute 08/07/2022 0 79     Eosinophils Absolute 08/07/2022 0 00     Basophils Absolute 08/07/2022 0 02     POC Glucose 08/07/2022 131     POC Glucose 08/07/2022 148 (A)    Sodium 08/08/2022 138     Potassium 08/08/2022 2 9 (A)    Chloride 08/08/2022 103     CO2 08/08/2022 25     ANION GAP 08/08/2022 10     BUN 08/08/2022 18     Creatinine 08/08/2022 0 83     Glucose 08/08/2022 160 (A)    Calcium 08/08/2022 8 3     eGFR 08/08/2022 97     WBC 08/08/2022 13 87 (A)    RBC 08/08/2022 4 23     Hemoglobin 08/08/2022 12 3     Hematocrit 08/08/2022 36 5     MCV 08/08/2022 86     MCH 08/08/2022 29 1     MCHC 08/08/2022 33 7     RDW 08/08/2022 12 7     MPV 08/08/2022 9 4     Platelets 71/24/2959 358     nRBC 08/08/2022 0     Neutrophils Relative 08/08/2022 85 (A)    Immat GRANS % 08/08/2022 0     Lymphocytes Relative 08/08/2022 9 (A)    Monocytes Relative 08/08/2022 6     Eosinophils Relative 08/08/2022 0     Basophils Relative 08/08/2022 0     Neutrophils Absolute 08/08/2022 11 69 (A)    Immature Grans Absolute 08/08/2022 0 06     Lymphocytes Absolute 08/08/2022 1 29     Monocytes Absolute 08/08/2022 0 81     Eosinophils Absolute 08/08/2022 0 01     Basophils Absolute 08/08/2022 0 01     Hepatitis B Surface Ag 08/08/2022 Non-reactive     Hepatitis C Ab 08/08/2022 High Reactive (A)    Hep B C IgM 08/08/2022 Non-reactive     Hep B Core Total Ab 08/08/2022 Non-reactive     HIV-1/HIV-2 Ab 08/08/2022 Non-Reactive     POC Glucose 08/08/2022 181 (A)    POC Glucose 08/08/2022 190 (A)    A4C EF 08/09/2022 66     LVIDd 08/09/2022 5 50     LVIDS 08/09/2022 3 50     IVSd 08/09/2022 0 80     LVPWd 08/09/2022 0 60     FS 08/09/2022 36     MV E' Tissue Velocity Se* 08/09/2022 10     E wave deceleration time 08/09/2022 175     MV Peak E Branden 08/09/2022 83     MV Peak A Branden 08/09/2022 0 69     RVID d 08/09/2022 2 8     LA size 08/09/2022 3 2     LA length (A2C) 08/09/2022 4 30     RA 2D Volume 08/09/2022 22 0     RAA A4C 08/09/2022 10 7     MV stenosis pressure 1/2* 08/09/2022 51     MV valve area p 1/2 meth* 08/09/2022 4 31     Ao root 08/09/2022 3 20     Asc Ao 08/09/2022 3 5     Left ventricular stroke * 08/09/2022 100 00     IVS 08/09/2022 0 8     LEFT VENTRICLE SYSTOLIC * 50/60/7478 50     LV DIASTOLIC VOLUME (MOD* 00/03/3328 150     Left Atrium Area-systoli* 08/09/2022 12 5     Left Atrium Area-systoli* 08/09/2022 14 8     LVSV, 2D 08/09/2022 100     LV EF 08/09/2022 60     POC Glucose 08/08/2022 207 (A)    POC Glucose 08/08/2022 213 (A)    WBC 08/09/2022 16 60 (A)    RBC 08/09/2022 4 98     Hemoglobin 08/09/2022 14 3     Hematocrit 08/09/2022 42 7     MCV 08/09/2022 86     MCH 08/09/2022 28 7     MCHC 08/09/2022 33 5     RDW 08/09/2022 12 6     MPV 08/09/2022 8 9     Platelets 52/88/1930 408 (A)    nRBC 08/09/2022 0     Neutrophils Relative 08/09/2022 81 (A)    Immat GRANS % 08/09/2022 1     Lymphocytes Relative 08/09/2022 13 (A)    Monocytes Relative 08/09/2022 5     Eosinophils Relative 08/09/2022 0     Basophils Relative 08/09/2022 0     Neutrophils Absolute 08/09/2022 13 42 (A)    Immature Grans Absolute 08/09/2022 0 09     Lymphocytes Absolute 08/09/2022 2 19     Monocytes Absolute 08/09/2022 0 84     Eosinophils Absolute 08/09/2022 0 03     Basophils Absolute 08/09/2022 0 03     Sodium 08/09/2022 132 (A)    Potassium 08/09/2022 3 9     Chloride 08/09/2022 103     CO2 08/09/2022 28     ANION GAP 08/09/2022 1 (A)    BUN 08/09/2022 20     Creatinine 08/09/2022 1 03     Glucose 08/09/2022 180 (A)    Calcium 08/09/2022 9 1     eGFR 08/09/2022 80     Blood Culture 08/09/2022 Received in Microbiology Lab  Culture in Progress   Blood Culture 08/09/2022 Received in Microbiology Lab  Culture in Progress       POC Glucose 08/09/2022 172 (A)    POC Glucose 08/09/2022 184 (A)    POC Glucose 08/09/2022 182 (A)    POC Glucose 08/09/2022 197 (A)    WBC 08/10/2022 15 20 (A)    RBC 08/10/2022 4 88     Hemoglobin 08/10/2022 14 4     Hematocrit 08/10/2022 42 6     MCV 08/10/2022 87     MCH 08/10/2022 29 5     MCHC 08/10/2022 33 8     RDW 08/10/2022 12 7     MPV 08/10/2022 9 1     Platelets 97/66/6100 406 (A)    nRBC 08/10/2022 0     Neutrophils Relative 08/10/2022 73     Immat GRANS % 08/10/2022 1     Lymphocytes Relative 08/10/2022 19     Monocytes Relative 08/10/2022 6     Eosinophils Relative 08/10/2022 1     Basophils Relative 08/10/2022 0     Neutrophils Absolute 08/10/2022 11 29 (A)    Immature Grans Absolute 08/10/2022 0 10     Lymphocytes Absolute 08/10/2022 2 83     Monocytes Absolute 08/10/2022 0 84     Eosinophils Absolute 08/10/2022 0 11     Basophils Absolute 08/10/2022 0 03     Sodium 08/10/2022 134 (A)    Potassium 08/10/2022 3 8     Chloride 08/10/2022 104  CO2 08/10/2022 25     ANION GAP 08/10/2022 5     BUN 08/10/2022 20     Creatinine 08/10/2022 1 00     Glucose 08/10/2022 194 (A)    Calcium 08/10/2022 9 1     eGFR 08/10/2022 83     POC Glucose 08/10/2022 167 (A)       Recent Results (from the past 24 hour(s))   CBC and differential    Collection Time: 08/09/22  9:25 AM   Result Value Ref Range    WBC 16 60 (H) 4 31 - 10 16 Thousand/uL    RBC 4 98 3 88 - 5 62 Million/uL    Hemoglobin 14 3 12 0 - 17 0 g/dL    Hematocrit 42 7 36 5 - 49 3 %    MCV 86 82 - 98 fL    MCH 28 7 26 8 - 34 3 pg    MCHC 33 5 31 4 - 37 4 g/dL    RDW 12 6 11 6 - 15 1 %    MPV 8 9 8 9 - 12 7 fL    Platelets 454 (H) 890 - 390 Thousands/uL    nRBC 0 /100 WBCs    Neutrophils Relative 81 (H) 43 - 75 %    Immat GRANS % 1 0 - 2 %    Lymphocytes Relative 13 (L) 14 - 44 %    Monocytes Relative 5 4 - 12 %    Eosinophils Relative 0 0 - 6 %    Basophils Relative 0 0 - 1 %    Neutrophils Absolute 13 42 (H) 1 85 - 7 62 Thousands/µL    Immature Grans Absolute 0 09 0 00 - 0 20 Thousand/uL    Lymphocytes Absolute 2 19 0 60 - 4 47 Thousands/µL    Monocytes Absolute 0 84 0 17 - 1 22 Thousand/µL    Eosinophils Absolute 0 03 0 00 - 0 61 Thousand/µL    Basophils Absolute 0 03 0 00 - 0 10 Thousands/µL   Basic metabolic panel    Collection Time: 08/09/22  9:25 AM   Result Value Ref Range    Sodium 132 (L) 135 - 147 mmol/L    Potassium 3 9 3 5 - 5 3 mmol/L    Chloride 103 96 - 108 mmol/L    CO2 28 21 - 32 mmol/L    ANION GAP 1 (L) 4 - 13 mmol/L    BUN 20 5 - 25 mg/dL    Creatinine 1 03 0 60 - 1 30 mg/dL    Glucose 180 (H) 65 - 140 mg/dL    Calcium 9 1 8 3 - 10 1 mg/dL    eGFR 80 ml/min/1 73sq m   Blood culture    Collection Time: 08/09/22  9:38 AM    Specimen: Arm, Right; Blood   Result Value Ref Range    Blood Culture Received in Microbiology Lab  Culture in Progress      Blood culture    Collection Time: 08/09/22  9:38 AM    Specimen: Arm, Left; Blood   Result Value Ref Range    Blood Culture Received in Microbiology Lab  Culture in Progress      Echo complete w/ contrast if indicated    Collection Time: 08/09/22  9:41 AM   Result Value Ref Range    A4C EF 66 %    LVIDd 5 50 cm    LVIDS 3 50 cm    IVSd 0 80 cm    LVPWd 0 60 cm    FS 36 28 - 44    MV E' Tissue Velocity Septal 10 cm/s    E wave deceleration time 175 ms    MV Peak E Branden 83 cm/s    MV Peak A Branden 0 69 m/s    RVID d 2 8 cm    LA size 3 2 cm    LA length (A2C) 4 30 cm    RA 2D Volume 22 0 mL    RAA A4C 10 7 cm2    MV stenosis pressure 1/2 time 51 ms    MV valve area p 1/2 method 4 31     Ao root 3 20 cm    Asc Ao 3 5 cm    Left ventricular stroke volume (2D) 100 00 mL    IVS 0 8 cm    LEFT VENTRICLE SYSTOLIC VOLUME (MOD BIPLANE) 2D 50 mL    LV DIASTOLIC VOLUME (MOD BIPLANE) 2D 150 mL    Left Atrium Area-systolic Four Chamber 16 2 cm2    Left Atrium Area-systolic Apical Two Chamber 14 8 cm2    LVSV, 2D 100 mL    LV EF 60    Fingerstick Glucose (POCT)    Collection Time: 08/09/22 11:38 AM   Result Value Ref Range    POC Glucose 184 (H) 65 - 140 mg/dl   Fingerstick Glucose (POCT)    Collection Time: 08/09/22  3:42 PM   Result Value Ref Range    POC Glucose 182 (H) 65 - 140 mg/dl   Fingerstick Glucose (POCT)    Collection Time: 08/09/22  8:40 PM   Result Value Ref Range    POC Glucose 197 (H) 65 - 140 mg/dl   CBC and differential    Collection Time: 08/10/22  5:14 AM   Result Value Ref Range    WBC 15 20 (H) 4 31 - 10 16 Thousand/uL    RBC 4 88 3 88 - 5 62 Million/uL    Hemoglobin 14 4 12 0 - 17 0 g/dL    Hematocrit 42 6 36 5 - 49 3 %    MCV 87 82 - 98 fL    MCH 29 5 26 8 - 34 3 pg    MCHC 33 8 31 4 - 37 4 g/dL    RDW 12 7 11 6 - 15 1 %    MPV 9 1 8 9 - 12 7 fL    Platelets 374 (H) 674 - 390 Thousands/uL    nRBC 0 /100 WBCs    Neutrophils Relative 73 43 - 75 %    Immat GRANS % 1 0 - 2 %    Lymphocytes Relative 19 14 - 44 %    Monocytes Relative 6 4 - 12 %    Eosinophils Relative 1 0 - 6 %    Basophils Relative 0 0 - 1 %    Neutrophils Absolute 11 29 (H) 1 85 - 7 62 Thousands/µL    Immature Grans Absolute 0 10 0 00 - 0 20 Thousand/uL    Lymphocytes Absolute 2 83 0 60 - 4 47 Thousands/µL    Monocytes Absolute 0 84 0 17 - 1 22 Thousand/µL    Eosinophils Absolute 0 11 0 00 - 0 61 Thousand/µL    Basophils Absolute 0 03 0 00 - 0 10 Thousands/µL   Basic metabolic panel    Collection Time: 08/10/22  5:14 AM   Result Value Ref Range    Sodium 134 (L) 135 - 147 mmol/L    Potassium 3 8 3 5 - 5 3 mmol/L    Chloride 104 96 - 108 mmol/L    CO2 25 21 - 32 mmol/L    ANION GAP 5 4 - 13 mmol/L    BUN 20 5 - 25 mg/dL    Creatinine 1 00 0 60 - 1 30 mg/dL    Glucose 194 (H) 65 - 140 mg/dL    Calcium 9 1 8 3 - 10 1 mg/dL    eGFR 83 ml/min/1 73sq m   Fingerstick Glucose (POCT)    Collection Time: 08/10/22  5:58 AM   Result Value Ref Range    POC Glucose 167 (H) 65 - 140 mg/dl     Blood Culture:   Lab Results   Component Value Date    BLOODCX Received in Microbiology Lab  Culture in Progress  08/09/2022    BLOODCX Received in Microbiology Lab  Culture in Progress   08/09/2022   ,   Urinalysis:   Lab Results   Component Value Date    COLORU Yellow 08/31/2015    CLARITYU Clear 08/31/2015    SPECGRAV 1 020 08/31/2015    PHUR 5 5 08/31/2015    LEUKOCYTESUR Negative 08/31/2015    NITRITE Negative 08/31/2015    PROTEINUA Trace (A) 08/31/2015    GLUCOSEU 500 (1/2%) (A) 08/31/2015    KETONESU Negative 08/31/2015    BILIRUBINUR Negative 08/31/2015    BLOODU Negative 08/31/2015   ,   Urine Culture: No results found for: URINECX,   Wound Culure: No results found for: WOUNDCULT       Imaging:    No orders to display      VTE Mechanical Prophylaxis: sequential compression device    Current Facility-Administered Medications   Medication Dose Route Frequency    acetaminophen (TYLENOL) tablet 975 mg  975 mg Oral Q8H [address]    atorvastatin (LIPITOR) tablet 20 mg  20 mg Oral HS    ceFAZolin (ANCEF) IVPB (premix in dextrose) 2,000 mg 50 mL  2,000 mg Intravenous Q8H    cloNIDine (CATAPRES) tablet 0 1 mg  0 1 mg Oral Q8H [facility]    gabapentin (NEURONTIN) capsule 100 mg  100 mg Oral TID    insulin lispro (HumaLOG) 100 units/mL subcutaneous injection 1-6 Units  1-6 Units Subcutaneous TID AC    lisinopril (ZESTRIL) tablet 2 5 mg  2 5 mg Oral Daily    ondansetron (ZOFRAN) injection 4 mg  4 mg Intravenous Q6H PRN    ondansetron (ZOFRAN) injection 4 mg  4 mg Intravenous TID       Butch Rendon MD  Family Medicine Resident PGY1

## 2022-08-10 NOTE — PHYSICAL THERAPY NOTE
Physical Therapy Treatment Note    Patient's Name: Cody Palm  : 1965     08/10/22 1143   PT Last Visit   PT Visit Date 08/10/22   Note Type   Note Type Treatment   Pain Assessment   Pain Assessment Tool 0-10   Pain Score No Pain   Restrictions/Precautions   Weight Bearing Precautions Per Order Yes   RUE Weight Bearing Per Order NWB   Braces or Orthoses Splint  (RUE)   Other Precautions WBS   General   Chart Reviewed Yes   Response to Previous Treatment Patient with no complaints from previous session  Family/Caregiver Present No   Subjective   Subjective Pt agreeable to mobilize, reported no concerns w/ mobility at end of session, asking when he is able to go home  Bed Mobility   Supine to Sit 7  Independent   Sit to Supine Unable to assess   Additional Comments Pt greeted in supine  Transfers   Sit to Stand 5  Supervision   Additional items Verbal cues  (vc to maintain NWB RUE when standing up)   Stand to Sit 7  Independent   Additional Comments no AD   Ambulation/Elevation   Gait pattern   (occasional lateral displacement)   Gait Assistance 7  Independent  (S->I)   Assistive Device None   Distance 300'   Balance   Static Sitting Good   Dynamic Sitting Fair +   Static Standing Fair   Dynamic Standing Fair -   Ambulatory Fair -   Endurance Deficit   Endurance Deficit No   Activity Tolerance   Activity Tolerance Patient tolerated treatment well   Assessment   Prognosis Good   Assessment Pt seen for PT session w/ focus on t/f training + gait training  Pt w/ excellent progress this session w/ reduced lateral sway during ambulation  While pt did demonstrate 2 instances of lateral displacment there was no overt LOB  As pt is I w/ functional mobility there are no further skilled PT needs  Pt encouraged to ambulate 3x/day by hisself while in-house to prevent complications of bed rest  Pt verbalized understanding  Recommend d/c home w/ no PT needs when medically stable     Goals   Patient Goals go home PT Treatment Day 1   Plan   Treatment/Interventions Gait training;Functional transfer training  (performed this session, no further PT needs)   Progress (S)  Discontinue PT   Recommendation   PT Discharge Recommendation (S)  No rehabilitation needs   AM-PAC Basic Mobility Inpatient   Turning in Bed Without Bedrails 4   Lying on Back to Sitting on Edge of Flat Bed 4   Moving Bed to Chair 4   Standing Up From Chair 3   Walk in Room 4   Climb 3-5 Stairs 4   Basic Mobility Inpatient Raw Score 23   Basic Mobility Standardized Score 50 88   Highest Level Of Mobility   JH-HLM Goal 7: Walk 25 feet or more   JH-HLM Achieved 8: Walk 250 feet ot more   Education   Education Provided Mobility training   Patient Demonstrates acceptance/verbal understanding   End of Consult   Patient Position at End of Consult Seated edge of bed; All needs within reach     Rojelio Jimenez, PT, DPT

## 2022-08-10 NOTE — ASSESSMENT & PLAN NOTE
Hx of heroin injected one week ago into right wrist  Hx of opioid abuse (~6 bags/day)  Toxicology on Consult - Patient should go on Suboxone when he's willing (Cannot while on pain medicine)   COWS-4 (8/9)    -Patient due to discuss placement with CRS for rehab and long term IV Abx

## 2022-08-10 NOTE — CERTIFIED RECOVERY SPECIALIST
Certified  Note    Patient name: Janett Bonner  Location: [facility]-  [place]: [address] [place] [address]  Attending:  Lorri Holter, MD MRN [id number]  : 1965  Age: 62 y o  Sex: male Date 8/10/2022         Substance Use History:     Social History     Substance and Sexual Activity   Alcohol Use Not Currently        Social History     Substance and Sexual Activity   Drug Use Not Currently    Comment: heroin hx, clean x 1 year       Admission Information  Substances Used at This Admission[de-identified] Opiates  Readmission in Last 30 Days?: No  Encounter Type[de-identified] Patient Face-to-Face    Recovery Support Plan  Declined All Services?: No  Agreeable to Warm Handoff?: No  Is Patient Accepting [facility] Treatment Services?: No  Was Referral Made to [facility]?: No  Was Narcan Provided at Discharge?: No  Plan Discussed With Treatment Team[de-identified] Yes  Plan Discussed With[de-identified] , Provider    Referral to Recovery Supports:  Jaylyn Pan[de-identified] No  Community Based CRS[de-identified] No  Case Management[de-identified] No  Direct Access to ELIN Treatment?: No  Resource Guide Given?: Yes  Follow Up With Patient[de-identified] Yes (Ongoing until discharge)  Family / Other Support[de-identified] No  Referral for Community Physical Health[de-identified] No  Referral for Community Mental Health[de-identified] No'    CRS spoke to attending provider regarding [facility] program and patient involvement  CRS spoke to [facility] for [facility]IN Programs about patient's interest in program   Rosanna  98  emailed patient information for referral process to dragan Renner

## 2022-08-10 NOTE — CERTIFIED RECOVERY SPECIALIST
Certified  Note    Patient name: Liudmila Hou  Location: 2 213/2 213-  [place]: [address] [place] [address]  Attending:  Tania Hdez MD MRN [id number]  : 1965  Age: 62 y o  Sex: male Date 8/10/2022         Substance Use History:     Social History     Substance and Sexual Activity   Alcohol Use Not Currently        Social History     Substance and Sexual Activity   Drug Use Not Currently    Comment: heroin hx, clean x 1 year       Admission Information  Substances Used at This Admission[de-identified] Opiates  Readmission in Last 30 Days?: No  Encounter Type[de-identified] Patient Face-to-Face    Recovery Support Plan  Declined All Services?: No  Agreeable to Warm Handoff?: No  Is Patient Accepting ELIN Treatment Services?: No  Was Referral Made to [facility]?: No  Was Narcan Provided at Discharge?: No  Plan Discussed With Treatment Team[de-identified] Yes  Plan Discussed With[de-identified] , Provider    Referral to Recovery Supports:  Jaylyn Pan[de-identified] No  Community Based CRS[de-identified] No  Case Management[de-identified] No  Direct Access to ELIN Treatment?: No  Resource Guide Given?: Yes  Follow Up With Patient[de-identified] Yes (Ongoing until discharge)  Family / Other Support[de-identified] No  Referral for Community Physical Health[de-identified] No  Referral for Community Mental Health[de-identified] No'    CRS was consulted by CM and provider to meet with patient regarding treatment  CRS met with patient, provided introduction to recovery support, and purpose of meeting  CRS provided explanation as to resources available for him and asked him what he wanted for himself after discharge  Patient states he does not want to stay here until next week  He also asked when he would be receiving suboxone  CRS will follow up with patient and patient was agreeable        Elsy Galdamez

## 2022-08-10 NOTE — ASSESSMENT & PLAN NOTE
Presented to [facility] ED, R wrist swelling and erythema x1 day, painful  VSS normal, no SIRS met besides WBC 18k  CRP >90, ESR 78, wrist aspiration gram stain 4+ polys and 2+ GPCs, HbA1c 8 3  Did receive 1 dose vancomycin and cefepime in ED  Procal negative 8/5 BCx positive for MSSA  - transfer to [facility] for washout, S/P I&D/Washout (8/6)     - Ortho Following OP   - Continue Cefazolin 2mg Q8  - F/U Repeat BCx (8/9) - Tailor ABx to Sensitivity  - monitor vital signs  - pain control

## 2022-08-10 NOTE — PLAN OF CARE
Problem: PAIN - ADULT  Goal: Verbalizes/displays adequate comfort level or baseline comfort level  Description: Interventions:  - Encourage patient to monitor pain and request assistance  - Assess pain using appropriate pain scale  - Administer analgesics based on type and severity of pain and evaluate response  - Implement non-pharmacological measures as appropriate and evaluate response  - Consider cultural and social influences on pain and pain management  - Notify physician/advanced practitioner if interventions unsuccessful or patient reports new pain  Outcome: Progressing     Problem: INFECTION - ADULT  Goal: Absence or prevention of progression during hospitalization  Description: INTERVENTIONS:  - Assess and monitor for signs and symptoms of infection  - Monitor lab/diagnostic results  - Monitor all insertion sites, i e  indwelling lines, tubes, and drains  - Monitor endotracheal if appropriate and nasal secretions for changes in amount and color  - Mather appropriate cooling/warming therapies per order  - Administer medications as ordered  - Instruct and encourage patient and family to use good hand hygiene technique  - Identify and instruct in appropriate isolation precautions for identified infection/condition  Outcome: Progressing     Problem: SAFETY ADULT  Goal: Patient will remain free of falls  Description: INTERVENTIONS:  - Educate patient/family on patient safety including physical limitations  - Instruct patient to call for assistance with activity   - Consult OT/PT to assist with strengthening/mobility   - Keep Call bell within reach  - Keep bed low and locked with side rails adjusted as appropriate  - Keep care items and personal belongings within reach  - Initiate and maintain comfort rounds  - Make Fall Risk Sign visible to staff  - Apply yellow socks and bracelet for high fall risk patients  - Consider moving patient to room near nurses station  Outcome: Progressing

## 2022-08-10 NOTE — PLAN OF CARE
Problem: PHYSICAL THERAPY ADULT  Goal: Performs mobility at highest level of function for planned discharge setting  See evaluation for individualized goals  Description: Treatment/Interventions: Gait training, Functional transfer training (performed this session, no further PT needs)  Equipment Recommended: Cane (TBD)       See flowsheet documentation for full assessment, interventions and recommendations  Outcome: Adequate for Discharge  Note: Prognosis: Good  Problem List: Decreased strength, Decreased endurance, Impaired balance, Decreased mobility, Decreased safety awareness, Orthopedic restrictions, Pain  Assessment: Pt seen for PT session w/ focus on t/f training + gait training  Pt w/ excellent progress this session w/ reduced lateral sway during ambulation  While pt did demonstrate 2 instances of lateral displacment there was no overt LOB  As pt is I w/ functional mobility there are no further skilled PT needs  Pt encouraged to ambulate 3x/day by hisself while in-house to prevent complications of bed rest  Pt verbalized understanding  Recommend d/c home w/ no PT needs when medically stable  Barriers to Discharge: Decreased caregiver support     PT Discharge Recommendation: (S) No rehabilitation needs    See flowsheet documentation for full assessment

## 2022-08-10 NOTE — ASSESSMENT & PLAN NOTE
Lab Results   Component Value Date    HGBA1C 8 3 (A) 04/28/2022       Recent Labs     08/09/22  1138 08/09/22  1542 08/09/22  2040 08/10/22  0558   POCGLU 184* 182* 197* 167*       Blood Sugar Average: Last 72 hrs:  (P) 352 [id number]     PTA meds: Lantus 15 units HS, Metformin 1000 mg BID    - Hold both for SSI

## 2022-08-11 LAB
BACTERIA BLD CULT: NORMAL
BASOPHILS # BLD AUTO: 0.03 THOUSANDS/ΜL (ref 0–0.1)
BASOPHILS NFR BLD AUTO: 0 % (ref 0–1)
EOSINOPHIL # BLD AUTO: 0.22 THOUSAND/ΜL (ref 0–0.61)
EOSINOPHIL NFR BLD AUTO: 2 % (ref 0–6)
ERYTHROCYTE [DISTWIDTH] IN BLOOD BY AUTOMATED COUNT: 12.7 % (ref 11.6–15.1)
GLUCOSE SERPL-MCNC: 162 MG/DL (ref 65–140)
GLUCOSE SERPL-MCNC: 168 MG/DL (ref 65–140)
GLUCOSE SERPL-MCNC: 170 MG/DL (ref 65–140)
GLUCOSE SERPL-MCNC: 178 MG/DL (ref 65–140)
HCT VFR BLD AUTO: 43.4 % (ref 36.5–49.3)
HCV RNA SERPL NAA+PROBE-ACNC: NORMAL IU/ML
HGB BLD-MCNC: 14.6 G/DL (ref 12–17)
IMM GRANULOCYTES # BLD AUTO: 0.1 THOUSAND/UL (ref 0–0.2)
IMM GRANULOCYTES NFR BLD AUTO: 1 % (ref 0–2)
LYMPHOCYTES # BLD AUTO: 3.09 THOUSANDS/ΜL (ref 0.6–4.47)
LYMPHOCYTES NFR BLD AUTO: 21 % (ref 14–44)
MCH RBC QN AUTO: 29.3 PG (ref 26.8–34.3)
MCHC RBC AUTO-ENTMCNC: 33.6 G/DL (ref 31.4–37.4)
MCV RBC AUTO: 87 FL (ref 82–98)
MONOCYTES # BLD AUTO: 0.74 THOUSAND/ΜL (ref 0.17–1.22)
MONOCYTES NFR BLD AUTO: 5 % (ref 4–12)
NEUTROPHILS # BLD AUTO: 10.68 THOUSANDS/ΜL (ref 1.85–7.62)
NEUTS SEG NFR BLD AUTO: 71 % (ref 43–75)
NRBC BLD AUTO-RTO: 0 /100 WBCS
PLATELET # BLD AUTO: 423 THOUSANDS/UL (ref 149–390)
PMV BLD AUTO: 9 FL (ref 8.9–12.7)
RBC # BLD AUTO: 4.99 MILLION/UL (ref 3.88–5.62)
TEST INFORMATION: NORMAL
WBC # BLD AUTO: 14.86 THOUSAND/UL (ref 4.31–10.16)

## 2022-08-11 PROCEDURE — 85025 COMPLETE CBC W/AUTO DIFF WBC: CPT | Performed by: FAMILY MEDICINE

## 2022-08-11 PROCEDURE — 99232 SBSQ HOSP IP/OBS MODERATE 35: CPT | Performed by: INTERNAL MEDICINE

## 2022-08-11 PROCEDURE — 99232 SBSQ HOSP IP/OBS MODERATE 35: CPT | Performed by: FAMILY MEDICINE

## 2022-08-11 PROCEDURE — 82948 REAGENT STRIP/BLOOD GLUCOSE: CPT

## 2022-08-11 PROCEDURE — HZ99ZZZ PHARMACOTHERAPY FOR SUBSTANCE ABUSE TREATMENT, OTHER REPLACEMENT MEDICATION: ICD-10-PCS

## 2022-08-11 RX ORDER — BUPRENORPHINE AND NALOXONE 8; 2 MG/1; MG/1
8 FILM, SOLUBLE BUCCAL; SUBLINGUAL 2 TIMES DAILY
Status: DISCONTINUED | OUTPATIENT
Start: 2022-08-11 | End: 2022-08-17 | Stop reason: HOSPADM

## 2022-08-11 RX ADMIN — BUPRENORPHINE AND NALOXONE 8 MG: 8; 2 FILM BUCCAL; SUBLINGUAL at 12:12

## 2022-08-11 RX ADMIN — GABAPENTIN 100 MG: 100 CAPSULE ORAL at 16:09

## 2022-08-11 RX ADMIN — BUPRENORPHINE AND NALOXONE 8 MG: 8; 2 FILM BUCCAL; SUBLINGUAL at 21:00

## 2022-08-11 RX ADMIN — ATORVASTATIN CALCIUM 20 MG: 20 TABLET, FILM COATED ORAL at 21:00

## 2022-08-11 RX ADMIN — INSULIN LISPRO 1 UNITS: 100 INJECTION, SOLUTION INTRAVENOUS; SUBCUTANEOUS at 12:19

## 2022-08-11 RX ADMIN — ACETAMINOPHEN 975 MG: 325 TABLET ORAL at 16:08

## 2022-08-11 RX ADMIN — INSULIN LISPRO 1 UNITS: 100 INJECTION, SOLUTION INTRAVENOUS; SUBCUTANEOUS at 16:10

## 2022-08-11 RX ADMIN — CEFAZOLIN SODIUM 2000 MG: 2 SOLUTION INTRAVENOUS at 22:14

## 2022-08-11 RX ADMIN — LISINOPRIL 2.5 MG: 2.5 TABLET ORAL at 09:03

## 2022-08-11 RX ADMIN — ACETAMINOPHEN 975 MG: 325 TABLET ORAL at 21:00

## 2022-08-11 RX ADMIN — CLONIDINE HYDROCHLORIDE 0.1 MG: 0.1 TABLET ORAL at 06:00

## 2022-08-11 RX ADMIN — GABAPENTIN 100 MG: 100 CAPSULE ORAL at 09:03

## 2022-08-11 RX ADMIN — CLONIDINE HYDROCHLORIDE 0.1 MG: 0.1 TABLET ORAL at 16:09

## 2022-08-11 RX ADMIN — CEFAZOLIN SODIUM 2000 MG: 2 SOLUTION INTRAVENOUS at 06:00

## 2022-08-11 RX ADMIN — ACETAMINOPHEN 975 MG: 325 TABLET ORAL at 06:00

## 2022-08-11 RX ADMIN — GABAPENTIN 100 MG: 100 CAPSULE ORAL at 21:00

## 2022-08-11 RX ADMIN — CLONIDINE HYDROCHLORIDE 0.1 MG: 0.1 TABLET ORAL at 21:00

## 2022-08-11 RX ADMIN — CEFAZOLIN SODIUM 2000 MG: 2 SOLUTION INTRAVENOUS at 16:11

## 2022-08-11 NOTE — CASE MANAGEMENT
Case Management Progress Note    Patient name Lincoln Pan  Location 2 213/CW2 213- MRN [id number]  : 1965 Date 2022       LOS (days): 5  Geometric Mean LOS (GMLOS) (days):   Days to GMLOS:        OBJECTIVE:        Current admission status: Inpatient  Preferred Pharmacy:   [facility] [address] , [address]  [address] [place] [id number]  Phone: [phone] Fax: [phone]    Primary Care Provider: She Heck MD    Primary Insurance: [address] [place] [address]  Secondary Insurance:     PROGRESS NOTE:    CM continues to follow this pt; Per provider, pt expresses frustration at continued hospital stay and would like to leave and be somewhere where he can relax  He states that he does not wish to be difficult, but does plan to leave hospital if he has to stay over the weekend  Pt will continue to require IV abx (cefazolin) for 4 weeks through 2022  Pt is unable to return home with PICC line due to IV heroin hx and risk factor for subsequent infection  Per ID team, pt may be a good candidate for the [facility] program  Pt is followed by Quentin Gama, as well as [facility] Coordinator Merna Gibbs  Per Alana, pt continues to be evaluated, however CM was advised that "any transfer to [facility] would not occur until toward the end of next week " Alana states pt will be visited again by Rosio Mantilla tomorrow vs  Saturday to discuss next steps  Pt's insurance is in network with [facility] and the [facility], per [facility]  Update provided to [facility] regarding the above update re: same  CM will continue to follow

## 2022-08-11 NOTE — PROGRESS NOTES
[address] [facility]  Progress Note - Karon Nichole 1965, 62 y o  male MRN: [id number]  Unit/Bed#: CW2 213-01 Encounter: [id number]  Primary Care Provider: Kenny Rehman MD   Date and time admitted to hospital: 8/6/2022 12:22 PM    * Right wrist pain  Assessment & Plan  Presented to [facility] ED, R wrist swelling and erythema x1 day, painful  VSS normal, no SIRS met besides WBC 18k  CRP >90, ESR 78, wrist aspiration gram stain 4+ polys and 2+ GPCs, HbA1c 8 3  Did receive 1 dose vancomycin and cefepime in ED  Procal negative 8/5 BCx positive for MSSA   - S/P I&D/[place] (8/6)  - Ortho Following OP   - Continue Cefazolin 2mg Q8 (Day #5, Abx Day #6)  - F/U Repeat BCx (8/9) - Tailor ABx to Sensitivity  - monitor vital signs  - pain control    Opioid use disorder  Assessment & Plan  Hx of heroin injected one week ago into right wrist  Hx of opioid abuse (~6 bags/day)  Toxicology on Consult - Patient should go on Suboxone when he's willing (Cannot while on pain medicine)  COWS-4 (8/9)    -Patient due to discuss placement with CRS for rehab and long term IV Abx    Other hyperlipidemia  Assessment & Plan  Stable  Continue atorvastatin 20 mg daily         Type 2 diabetes mellitus with complication, without long-term current use of insulin [facility])  Assessment & Plan  Lab Results   Component Value Date    HGBA1C 8 3 (A) 04/28/2022       Recent Labs     08/10/22  0558 08/10/22  1130 08/10/22  1631 08/11/22  0559   POCGLU 167* 146* 168* 168*       Blood Sugar Average: Last 72 hrs:  (P) 181 25     PTA meds: Lantus 15 units HS, Metformin 1000 mg BID    - Hold both for SSI           PPX: SCDs  Diet: Regular House  Code Status: Full  Dispo: Home    Plan D/W Dr Richard Vicente and [facility] Team    Subjective:   Pt assessed at bedside this AM, no ON events, no Acute complaints  Patient expresses frustration at continued hospital stay, would like to leave and be somewhere where he can relax   States that he does not wish to be difficult, but does plan to leave hospital if he has to stay over the weekend  Still requiring IV ABx, discussed need for proper treatment with patient  No acute medical concerns at this time  Objective:     Vitals: Blood pressure 149/82, pulse (!) 51, temperature 97 8 °F (36 6 °C), resp  rate 20, height 5' 7" (1 702 m), weight 81 6 kg (180 lb), SpO2 96 %  ,Body mass index is 28 19 kg/m²  Intake/Output Summary (Last 24 hours) at 8/11/2022 0840  Last data filed at 8/10/2022 1700  Gross per 24 hour   Intake 220 ml   Output --   Net 220 ml       Physical Exam:   Physical Exam  Constitutional:       Appearance: Normal appearance  HENT:      Head: Normocephalic and atraumatic  Right Ear: External ear normal       Left Ear: External ear normal       Nose: Nose normal       Mouth/Throat:      Pharynx: Oropharynx is clear  Eyes:      Conjunctiva/sclera: Conjunctivae normal    Cardiovascular:      Rate and Rhythm: Normal rate and regular rhythm  Pulses: Normal pulses  Heart sounds: Normal heart sounds  Pulmonary:      Effort: Pulmonary effort is normal       Breath sounds: Normal breath sounds  Abdominal:      General: Bowel sounds are normal       Palpations: Abdomen is soft  Skin:     Capillary Refill: Capillary refill takes less than 2 seconds  Neurological:      General: No focal deficit present  Mental Status: He is alert and oriented to person, place, and time  Psychiatric:         Mood and Affect: Mood normal          Invasive Devices  Report    Peripheral Intravenous Line  Duration           Peripheral IV 08/09/22 Left Forearm 1 day                         Lab and other studies:  I have personally reviewed pertinent reports  No results displayed because visit has over 200 results            Recent Results (from the past 24 hour(s))   Fingerstick Glucose (POCT)    Collection Time: 08/10/22 11:30 AM   Result Value Ref Range    POC Glucose 146 (H) 65 - 140 mg/dl Fingerstick Glucose (POCT)    Collection Time: 08/10/22  4:31 PM   Result Value Ref Range    POC Glucose 168 (H) 65 - 140 mg/dl   CBC and differential    Collection Time: 08/11/22  5:57 AM   Result Value Ref Range    WBC 14 86 (H) 4 31 - 10 16 Thousand/uL    RBC 4 99 3 88 - 5 62 Million/uL    Hemoglobin 14 6 12 0 - 17 0 g/dL    Hematocrit 43 4 36 5 - 49 3 %    MCV 87 82 - 98 fL    MCH 29 3 26 8 - 34 3 pg    MCHC 33 6 31 4 - 37 4 g/dL    RDW 12 7 11 6 - 15 1 %    MPV 9 0 8 9 - 12 7 fL    Platelets 627 (H) 199 - 390 Thousands/uL    nRBC 0 /100 WBCs    Neutrophils Relative 71 43 - 75 %    Immat GRANS % 1 0 - 2 %    Lymphocytes Relative 21 14 - 44 %    Monocytes Relative 5 4 - 12 %    Eosinophils Relative 2 0 - 6 %    Basophils Relative 0 0 - 1 %    Neutrophils Absolute 10 68 (H) 1 85 - 7 62 Thousands/µL    Immature Grans Absolute 0 10 0 00 - 0 20 Thousand/uL    Lymphocytes Absolute 3 09 0 60 - 4 47 Thousands/µL    Monocytes Absolute 0 74 0 17 - 1 22 Thousand/µL    Eosinophils Absolute 0 22 0 00 - 0 61 Thousand/µL    Basophils Absolute 0 03 0 00 - 0 10 Thousands/µL   Fingerstick Glucose (POCT)    Collection Time: 08/11/22  5:59 AM   Result Value Ref Range    POC Glucose 168 (H) 65 - 140 mg/dl     Blood Culture:   Lab Results   Component Value Date    BLOODCX No Growth at 24 hrs  08/09/2022    BLOODCX No Growth at 24 hrs  08/09/2022   ,   Urinalysis:   Lab Results   Component Value Date    COLORU Yellow 08/31/2015    CLARITYU Clear 08/31/2015    SPECGRAV 1 020 08/31/2015    PHUR 5 5 08/31/2015    LEUKOCYTESUR Negative 08/31/2015    NITRITE Negative 08/31/2015    PROTEINUA Trace (A) 08/31/2015    GLUCOSEU 500 (1/2%) (A) 08/31/2015    KETONESU Negative 08/31/2015    BILIRUBINUR Negative 08/31/2015    BLOODU Negative 08/31/2015   ,   Urine Culture: No results found for: URINECX,   Wound Culure: No results found for: WOUNDCULT      Imaging:    No orders to display       VTE Mechanical Prophylaxis: sequential compression device    Current Facility-Administered Medications   Medication Dose Route Frequency    acetaminophen (TYLENOL) tablet 975 mg  975 mg Oral Q8H [address]    atorvastatin (LIPITOR) tablet 20 mg  20 mg Oral HS    ceFAZolin (ANCEF) IVPB (premix in dextrose) 2,000 mg 50 mL  2,000 mg Intravenous Q8H    cloNIDine (CATAPRES) tablet 0 1 mg  0 1 mg Oral Q8H [address]    gabapentin (NEURONTIN) capsule 100 mg  100 mg Oral TID    insulin lispro (HumaLOG) 100 units/mL subcutaneous injection 1-6 Units  1-6 Units Subcutaneous TID AC    lisinopril (ZESTRIL) tablet 2 5 mg  2 5 mg Oral Daily    ondansetron (ZOFRAN) injection 4 mg  4 mg Intravenous Q6H PRN    ondansetron (ZOFRAN) injection 4 mg  4 mg Intravenous TID       Veena Guillen MD  Family Medicine Resident PGY1

## 2022-08-11 NOTE — PROGRESS NOTES
Progress Note - Infectious Disease   Janett Bonner 62 y o  male MRN: [id number]  Unit/Bed#: CW2 213-01 Encounter: [id number]      Impression/Recommendations:  1   MSSA bacteremia   Due to #2   No other appreciable source   Clinically stable without formal sepsis criteria   Repeat blood cultures , TTE negative  Rec:  · Continue cefazolin for 4 weeks IV antibiotics through 22  · Check weekly CBC-diff, Cr while on IV antibiotics  · OK for PICC today  · D/C planning for [facility] program  · Outpatient follow-up with ID 2 weeks after D/C  · D/C PICC after last dose IV antibiotics     2   Right wrist septic arthritis   Likely due to IVDU   Status post I&D 22   Intraoperative findings notable for purulence in the joint space   OR cultures with 3+ MSSA   Doubt significance of single colony of coagulase-negative Staph  Rec:  · Continue cefazolin as above  · Serial exams     3   OUD   IV heroin   Risk factor for infection as above   Uses 6 bags/day   Withdrawal on admission, now resolved  HIV, HCV negative  Rec:  · Suboxone initiation per Toxicology  · Evaluation underway for [facility] program   · CRS follow-up ongoing  · Not a candidate for home IV antibiotics     4   Poorly controlled DM   Recent A1c 2022 8  3   Risk factor for infection as above      5   Positive HCV antibody  Likely represents prior exposure  No evidence for chronic infection as HCV RNA negative  The patient is stable from an ID standpoint  Antibiotics:  Cefazolin #5  Antibiotics #6    Subjective:  Patient seen on AM rounds  Sleeping soundly and difficult to arouse  Awake earlier on AM rounds by primary team     24 Hour Events:  No documented fevers, chills, sweats, nausea, vomiting, or diarrhea      Objective:  Vitals:  Temp:  [97 8 °F (36 6 °C)-98 7 °F (37 1 °C)] 97 8 °F (36 6 °C)  HR:  [51-64] 51  Resp:  [16-20] 20  BP: (138-152)/(82-85) 149/82  SpO2:  [95 %-98 %] 96 %  Temp (24hrs), Av 3 °F (36 8 °C), Min:97 8 °F (36 6 °C), Max:98 7 °F (37 1 °C)  Current: Temperature: 97 8 °F (36 6 °C)    Physical Exam:   General:  No acute distress  Psychiatric:  Sleeping soundly  Pulmonary:  Normal respiratory excursion without accessory muscle use  Abdomen:  Soft, nontender  Extremities:  No edema  Skin:  No rashes    Lab Results:  I have personally reviewed pertinent labs  Results from last 7 days   Lab Units 08/10/22  0514 08/09/22  0925 08/08/22  0637 08/05/22  2155   POTASSIUM mmol/L 3 8 3 9 2 9* 5 1   CHLORIDE mmol/L 104 103 103 94*   CO2 mmol/L 25 28 25 29   BUN mg/dL 20 20 18 15   CREATININE mg/dL 1 00 1 03 0 83 0 85   EGFR ml/min/1 73sq m 83 80 97 96   CALCIUM mg/dL 9 1 9 1 8 3 9 1   AST U/L  --   --   --  43   ALT U/L  --   --   --  32   ALK PHOS U/L  --   --   --  85     Results from last 7 days   Lab Units 08/11/22  0557 08/10/22  0514 08/09/22  0925   WBC Thousand/uL 14 86* 15 20* 16 60*   HEMOGLOBIN g/dL 14 6 14 4 14 3   PLATELETS Thousands/uL 423* 406* 408*     Results from last 7 days   Lab Units 08/09/22  0938 08/06/22  1509 08/06/22  0137 08/05/22  2217 08/05/22  2215   BLOOD CULTURE  No Growth at 48 hrs  No Growth at 48 hrs   --   --  No Growth After 5 Days  Staphylococcus aureus*   GRAM STAIN RESULT   --  No Polys or Bacteria seen 4+ Polys*  2+ Gram positive cocci in clusters*  --  Gram positive cocci in clusters*   BODY FLUID CULTURE, STERILE   --   --  3+ Growth of Staphylococcus aureus*  --   --        Imaging Studies:   I have personally reviewed pertinent imaging study reports and images in PACS  EKG, Pathology, and Other Studies:   I have personally reviewed pertinent reports

## 2022-08-11 NOTE — ASSESSMENT & PLAN NOTE
Lab Results   Component Value Date    HGBA1C 8 3 (A) 04/28/2022       Recent Labs     08/10/22  0558 08/10/22  1130 08/10/22  1631 08/11/22  0559   POCGLU 167* 146* 168* 168*       Blood Sugar Average: Last 72 hrs:  (P) 181 25     PTA meds: Lantus 15 units HS, Metformin 1000 mg BID    - Hold both for SSI

## 2022-08-11 NOTE — UTILIZATION REVIEW
Continued Stay Review    Date: 8/11/22                          Current Patient Class: inpatient  Current Level of Care: med surg    HPI:57 y o  male initially admitted on 8/6/22  Right wrist pain, Opioid use disorder, DM    Assessment/Plan:   Patient expresses frustration at continued hospital stay, would like to leave and be somewhere where he can relax  States that he does not wish to be difficult, but does plan to leave hospital if he has to stay over the weekend  Still requiring IV ABX, discussed need for proper treatment with patient  Pt has hx of heroin use  CM following for placement with rehab and long term IV ABX  Continue IV ABX, f/u on repeat blood cxs from 8/9, monitor VS, pain control, continue accuchecks w/ssi, continue home meds      Vital Signs:   Date/Time Temp Pulse Resp BP MAP (mmHg) SpO2 O2 Device   08/11/22 07:27:45 97 8 °F (36 6 °C) 51 Abnormal  20 149/82 104 96 % --   08/11/22 06:01:17 -- 60 -- 152/83 106 96 % --   08/10/22 21:11:10 -- 64 -- 138/85 103 98 % --   08/10/22 15:53:44 98 7 °F (37 1 °C) 61 16 138/85 103 95 % --   08/10/22 15:12:53 -- 55 -- 139/83 102 97 % --   08/10/22 08:20:32 98 3 °F (36 8 °C) 60 16 136/84 101 98 % None (Room air)   08/10/22 0819 -- -- -- 136/84 -- -- --   08/09/22 23:54:34 99 °F (37 2 °C) 53 Abnormal  18 146/83 104 93 % --   08/09/22 22:30:56 98 5 °F (36 9 °C) 54 Abnormal  32 Abnormal  146/81 103 94 % --   08/09/22 2209 -- -- -- -- -- 93 % None (Room air)   08/09/22 15:33:54 97 8 °F (36 6 °C) 71 -- 135/84 101 97 % --   08/09/22 1334 -- 59 -- 146/79 101 96 % --   08/09/22 1101 -- -- -- -- -- -- None (Room air)   08/09/22 0930 -- 55 -- 156/82 -- -- --   08/09/22 07:35:40 98 9 °F (37 2 °C) 50 Abnormal  18 156/82 107 95 % None (Room air)   08/09/22 05:21:28 98 6 °F (37 °C) 57 18 152/79 103 94 % None (Room air)     Pertinent Labs/Diagnostic Results:   Results from last 7 days   Lab Units 08/07/22  1029   SARS-COV-2  Negative     Results from last 7 days   Lab Units 08/11/22  0557 08/10/22  0514 08/09/22  0925 08/08/22  0637 08/07/22  1331   WBC Thousand/uL 14 86* 15 20* 16 60* 13 87* 15 80*   HEMOGLOBIN g/dL 14 6 14 4 14 3 12 3 13 0   HEMATOCRIT % 43 4 42 6 42 7 36 5 38 2   PLATELETS Thousands/uL 423* 406* 408* 358 315   NEUTROS ABS Thousands/µL 10 68* 11 29* 13 42* 11 69* 13 77*     Results from last 7 days   Lab Units 08/10/22  0514 08/09/22  0925 08/08/22  0637 08/05/22  2155   SODIUM mmol/L 134* 132* 138 128*   POTASSIUM mmol/L 3 8 3 9 2 9* 5 1   CHLORIDE mmol/L 104 103 103 94*   CO2 mmol/L 25 28 25 29   ANION GAP mmol/L 5 1* 10 5   BUN mg/dL 20 20 18 15   CREATININE mg/dL 1 00 1 03 0 83 0 85   EGFR ml/min/1 73sq m 83 80 97 96   CALCIUM mg/dL 9 1 9 1 8 3 9 1     Results from last 7 days   Lab Units 08/05/22  2155   AST U/L 43   ALT U/L 32   ALK PHOS U/L 85   TOTAL PROTEIN g/dL 8 9*   ALBUMIN g/dL 3 5   TOTAL BILIRUBIN mg/dL 0 56   BILIRUBIN DIRECT mg/dL 0 08     Results from last 7 days   Lab Units 08/11/22  0559 08/10/22  1631 08/10/22  1130 08/10/22  0558 08/09/22  2040 08/09/22  1542 08/09/22  1138 08/09/22  0619 08/08/22  2104 08/08/22  1626 08/08/22  1134 08/08/22  0832   POC GLUCOSE mg/dl 168* 168* 146* 167* 197* 182* 184* 172* 213* 207* 190* 181*     Results from last 7 days   Lab Units 08/10/22  0514 08/09/22  0925 08/08/22  0637 08/05/22  2155   GLUCOSE RANDOM mg/dL 194* 180* 160* 151*     Results from last 7 days   Lab Units 08/05/22  2253   PROTIME seconds 13 5   INR  1 03   PTT seconds 36     Results from last 7 days   Lab Units 08/05/22  2155   PROCALCITONIN ng/ml 0 10     Results from last 7 days   Lab Units 08/05/22  2215   LACTIC ACID mmol/L 0 6     Results from last 7 days   Lab Units 08/08/22  1234 08/07/22  1328   HEP B S AG  Non-reactive Non-reactive   HEP C AB  High Reactive* High Reactive*   HEP B C IGM  Non-reactive  --    HEP B C TOTAL AB  Non-reactive  --      Results from last 7 days   Lab Units 08/05/22  2155   SED RATE mm/hour 78*     Results from last 7 days   Lab Units 08/07/22  1029   INFLUENZA A PCR  Negative   INFLUENZA B PCR  Negative   RSV PCR  Negative     Results from last 7 days   Lab Units 08/09/22  0938 08/06/22  1509 08/06/22  0137 08/05/22  2217 08/05/22  2215   BLOOD CULTURE  No Growth at 24 hrs  No Growth at 24 hrs   --   --  No Growth After 5 Days  Staphylococcus aureus*   GRAM STAIN RESULT   --  No Polys or Bacteria seen 4+ Polys*  2+ Gram positive cocci in clusters*  --  Gram positive cocci in clusters*   BODY FLUID CULTURE, STERILE   --   --  3+ Growth of Staphylococcus aureus*  --   --      Medications:   Scheduled Medications:  acetaminophen, 975 mg, Oral, Q8H [place]  atorvastatin, 20 mg, Oral, HS  buprenorphine-naloxone, 8 mg, Sublingual, BID  cefazolin, 2,000 mg, Intravenous, Q8H  cloNIDine, 0 1 mg, Oral, Q8H [place]LE  gabapentin, 100 mg, Oral, TID  insulin lispro, 1-6 Units, Subcutaneous, TID AC  lisinopril, 2 5 mg, Oral, Daily  ondansetron, 4 mg, Intravenous, TID      Continuous IV Infusions: none     PRN Meds:  ondansetron, 4 mg, Intravenous, Q6H PRN        Discharge Plan: d    Network Utilization Review Department  ATTENTION: Please call with any questions or concerns to [phone] and carefully listen to the prompts so that you are directed to the right person  All voicemails are confidential   Sudie Crigler all requests for admission clinical reviews, approved or denied determinations and any other requests to dedicated fax number below belonging to the campus where the patient is receiving treatment   List of dedicated fax numbers for the Facilities:  [address] DENIALS (Administrative/Medical Necessity) [phone]   [address] (Maternity/NICU/Pediatrics) [phone]   [address] Tere Kocher [address] [place] [address] [address] [id number] [place] [address] [address] Her[address] [place], [place] [address] [phone]

## 2022-08-11 NOTE — QUICK NOTE
Sat down to discuss with patient developments and his options  Held lengthy discussion with patient about the kinds of options he has and what the visiting CRS can provide for him, as well as importance of IV antibiotics  Discussed today with Dr Leanne Neville about possibility of PO Antibiotics using Duricef 500 Q12 for 4 weeks if patient decides to leave AMA  Discussed this option with patient and stressed that using PO antibiotics may not fully clear the infection and that he may end up returning to the hospital sick If he does not complete IV antibiotics  He is amenable to waiting to talk to [facility]  tomorrow as scheduled, no additional concerns at this time

## 2022-08-12 LAB
ANION GAP SERPL CALCULATED.3IONS-SCNC: 4 MMOL/L (ref 4–13)
BUN SERPL-MCNC: 19 MG/DL (ref 5–25)
CALCIUM SERPL-MCNC: 8.9 MG/DL (ref 8.3–10.1)
CHLORIDE SERPL-SCNC: 105 MMOL/L (ref 96–108)
CO2 SERPL-SCNC: 23 MMOL/L (ref 21–32)
CREAT SERPL-MCNC: 0.85 MG/DL (ref 0.6–1.3)
ERYTHROCYTE [DISTWIDTH] IN BLOOD BY AUTOMATED COUNT: 13 % (ref 11.6–15.1)
GFR SERPL CREATININE-BSD FRML MDRD: 96 ML/MIN/1.73SQ M
GLUCOSE SERPL-MCNC: 123 MG/DL (ref 65–140)
GLUCOSE SERPL-MCNC: 130 MG/DL (ref 65–140)
GLUCOSE SERPL-MCNC: 132 MG/DL (ref 65–140)
GLUCOSE SERPL-MCNC: 232 MG/DL (ref 65–140)
HCT VFR BLD AUTO: 43.5 % (ref 36.5–49.3)
HGB BLD-MCNC: 14.8 G/DL (ref 12–17)
MCH RBC QN AUTO: 29.8 PG (ref 26.8–34.3)
MCHC RBC AUTO-ENTMCNC: 34 G/DL (ref 31.4–37.4)
MCV RBC AUTO: 88 FL (ref 82–98)
PLATELET # BLD AUTO: 386 THOUSANDS/UL (ref 149–390)
PMV BLD AUTO: 9.3 FL (ref 8.9–12.7)
POTASSIUM SERPL-SCNC: 3.9 MMOL/L (ref 3.5–5.3)
RBC # BLD AUTO: 4.97 MILLION/UL (ref 3.88–5.62)
SODIUM SERPL-SCNC: 132 MMOL/L (ref 135–147)
WBC # BLD AUTO: 13.99 THOUSAND/UL (ref 4.31–10.16)

## 2022-08-12 PROCEDURE — 99232 SBSQ HOSP IP/OBS MODERATE 35: CPT | Performed by: INTERNAL MEDICINE

## 2022-08-12 PROCEDURE — 85027 COMPLETE CBC AUTOMATED: CPT

## 2022-08-12 PROCEDURE — 80048 BASIC METABOLIC PNL TOTAL CA: CPT

## 2022-08-12 PROCEDURE — C1751 CATH, INF, PER/CENT/MIDLINE: HCPCS

## 2022-08-12 PROCEDURE — 02HV33Z INSERTION OF INFUSION DEVICE INTO SUPERIOR VENA CAVA, PERCUTANEOUS APPROACH: ICD-10-PCS

## 2022-08-12 PROCEDURE — 99232 SBSQ HOSP IP/OBS MODERATE 35: CPT | Performed by: FAMILY MEDICINE

## 2022-08-12 PROCEDURE — 97535 SELF CARE MNGMENT TRAINING: CPT

## 2022-08-12 PROCEDURE — 82948 REAGENT STRIP/BLOOD GLUCOSE: CPT

## 2022-08-12 PROCEDURE — 36569 INSJ PICC 5 YR+ W/O IMAGING: CPT

## 2022-08-12 RX ADMIN — CEFAZOLIN SODIUM 2000 MG: 2 SOLUTION INTRAVENOUS at 22:49

## 2022-08-12 RX ADMIN — ACETAMINOPHEN 975 MG: 325 TABLET ORAL at 06:39

## 2022-08-12 RX ADMIN — GABAPENTIN 100 MG: 100 CAPSULE ORAL at 09:30

## 2022-08-12 RX ADMIN — ACETAMINOPHEN 975 MG: 325 TABLET ORAL at 21:15

## 2022-08-12 RX ADMIN — CEFAZOLIN SODIUM 2000 MG: 2 SOLUTION INTRAVENOUS at 16:12

## 2022-08-12 RX ADMIN — CLONIDINE HYDROCHLORIDE 0.1 MG: 0.1 TABLET ORAL at 21:15

## 2022-08-12 RX ADMIN — CLONIDINE HYDROCHLORIDE 0.1 MG: 0.1 TABLET ORAL at 06:39

## 2022-08-12 RX ADMIN — ACETAMINOPHEN 975 MG: 325 TABLET ORAL at 16:13

## 2022-08-12 RX ADMIN — LISINOPRIL 2.5 MG: 2.5 TABLET ORAL at 09:31

## 2022-08-12 RX ADMIN — BUPRENORPHINE AND NALOXONE 8 MG: 8; 2 FILM BUCCAL; SUBLINGUAL at 09:30

## 2022-08-12 RX ADMIN — GABAPENTIN 100 MG: 100 CAPSULE ORAL at 16:12

## 2022-08-12 RX ADMIN — ATORVASTATIN CALCIUM 20 MG: 20 TABLET, FILM COATED ORAL at 21:15

## 2022-08-12 RX ADMIN — INSULIN LISPRO 3 UNITS: 100 INJECTION, SOLUTION INTRAVENOUS; SUBCUTANEOUS at 18:52

## 2022-08-12 RX ADMIN — CLONIDINE HYDROCHLORIDE 0.1 MG: 0.1 TABLET ORAL at 16:12

## 2022-08-12 RX ADMIN — BUPRENORPHINE AND NALOXONE 8 MG: 8; 2 FILM BUCCAL; SUBLINGUAL at 21:15

## 2022-08-12 RX ADMIN — GABAPENTIN 100 MG: 100 CAPSULE ORAL at 21:15

## 2022-08-12 RX ADMIN — CEFAZOLIN SODIUM 2000 MG: 2 SOLUTION INTRAVENOUS at 06:40

## 2022-08-12 NOTE — CERTIFIED RECOVERY SPECIALIST
Certified  Note    Patient name: Janett Bonner  Location: [facility] 213/[facility] 213-  [place]: [address] [place] [address]  Attending:  Lorri Holter, MD MRN [id number]  : 1965  Age: 62 y o  Sex: male Date 2022         Substance Use History:     Social History     Substance and Sexual Activity   Alcohol Use Not Currently        Social History     Substance and Sexual Activity   Drug Use Not Currently    Comment: heroin hx, clean x 1 year       Admission Information  Substances Used at This Admission[de-identified] Opiates  Readmission in Last 30 Days?: No  Encounter Type[de-identified] Patient Face-to-Face    Recovery Support Plan  Declined All Services?: No  Agreeable to Warm Handoff?: No  Is Patient Accepting ELIN Treatment Services?: No  Was Referral Made to [facility]?: No  Was Narcan Provided at Discharge?: No  Plan Discussed With Treatment Team[de-identified] Yes  Plan Discussed With[de-identified] , Provider    Referral to Recovery Supports:  Jaylyn Pan[de-identified] No  Community Based CRS[de-identified] No  Case Management[de-identified] No  Direct Access to ELIN Treatment?: No  Resource Guide Given?: Yes  Follow Up With Patient[de-identified] Yes (Ongoing until discharge)  Family / Other Support[de-identified] No  Referral for Community Physical Health[de-identified] No  Referral for Community Mental Health[de-identified] No'    CRS met with Network ELIN Coordinator via teams to discuss patient  CRS to meet with patient today  to discuss [facility] program, expectations, and sign releases as necessary  Contacted JEFERSON Rascon via TT, will meet with patient in conference room to create a more collaborative environment for discussion    Will continue to follow patient until discharge      Elsy Galdamez

## 2022-08-12 NOTE — ASSESSMENT & PLAN NOTE
Hx of heroin injected one week ago into right wrist  Hx of opioid abuse (~6 bags/day)  Toxicology on Consult - Patient should go on Suboxone when he's willing (Cannot while on pain medicine)   COWS-4 (8/9)    -Patient due to discuss placement with CRS for rehab and long term IV Abx  -Patient started on Suboxone (8/11) 8mg BID

## 2022-08-12 NOTE — ASSESSMENT & PLAN NOTE
Lab Results   Component Value Date    HGBA1C 8 3 (A) 04/28/2022       Recent Labs     08/11/22  1117 08/11/22  1558 08/11/22 2039 08/12/22  0607   POCGLU 170* 178* 162* 130       Blood Sugar Average: Last 72 hrs:  (P) 943 [id number]     PTA meds: Lantus 15 units HS, Metformin 1000 mg BID    - Hold both for SSI

## 2022-08-12 NOTE — CASE MANAGEMENT
Case Management Discharge Planning Note    Patient name Karon Nichole  Location [facility] 213/CW2 213-01 MRN [id number]  : 1965 Date 2022       Current Admission Date: 2022  Current Admission Diagnosis:Right wrist pain   Patient Active Problem List    Diagnosis Date Noted    Right wrist pain 2022    Opioid use disorder 2021    Class 1 obesity due to excess calories without serious comorbidity with body mass index (BMI) of 32 0 to 32 9 in adult 2020    Encounter for counseling for tobacco use disorder 2020    Encounter for screening colonoscopy 2020    Other hyperlipidemia 2020    Type 2 diabetes mellitus with complication, without long-term current use of insulin ([facility] ) 2019      LOS (days): 6  Geometric Mean LOS (GMLOS) (days):   Days to GMLOS:     OBJECTIVE:  Risk of Unplanned Readmission Score: 13 23         Current admission status: Inpatient   Preferred Pharmacy:   [address]  [address] [place] [id number]  Phone: [phone] Fax: [phone]    Primary Care Provider: Kenny Rehman MD    Primary Insurance: [address] [place] [address]  Secondary Insurance:     DISCHARGE DETAILS:    Other Referral/Resources/Interventions Provided:  Interventions: HHC, Home Infusion, Substance Abuse Treatment  Referral Comments: CM advised by treatment team that pt is in agreement with admission to  for participation in the [facility] program as he completes his IV abx administration  We are looking at a possible discharge/transfer to [address] [place] [address],[address] on Aug  17th, with a meeting held on Monday,  to discuss transfer  Scripts obtained from Dr Lisette Plata and uploaded into Aidin -- Referral sent to [address] [place] [address] for review of script  Awaiting responses

## 2022-08-12 NOTE — PLAN OF CARE
Problem: PAIN - ADULT  Goal: Verbalizes/displays adequate comfort level or baseline comfort level  Description: Interventions:  - Encourage patient to monitor pain and request assistance  - Assess pain using appropriate pain scale  - Administer analgesics based on type and severity of pain and evaluate response  - Implement non-pharmacological measures as appropriate and evaluate response  - Consider cultural and social influences on pain and pain management  - Notify physician/advanced practitioner if interventions unsuccessful or patient reports new pain  Outcome: Progressing     Problem: INFECTION - ADULT  Goal: Absence or prevention of progression during hospitalization  Description: INTERVENTIONS:  - Assess and monitor for signs and symptoms of infection  - Monitor lab/diagnostic results  - Monitor all insertion sites, i e  indwelling lines, tubes, and drains  - Monitor endotracheal if appropriate and nasal secretions for changes in amount and color  - Slater appropriate cooling/warming therapies per order  - Administer medications as ordered  - Instruct and encourage patient and family to use good hand hygiene technique  - Identify and instruct in appropriate isolation precautions for identified infection/condition  Outcome: Progressing  Goal: Absence of fever/infection during neutropenic period  Description: INTERVENTIONS:  - Monitor WBC    Outcome: Progressing     Problem: SAFETY ADULT  Goal: Patient will remain free of falls  Description: INTERVENTIONS:  - Educate patient/family on patient safety including physical limitations  - Instruct patient to call for assistance with activity   - Consult OT/PT to assist with strengthening/mobility   - Keep Call bell within reach  - Keep bed low and locked with side rails adjusted as appropriate  - Keep care items and personal belongings within reach  - Initiate and maintain comfort rounds  - Make Fall Risk Sign visible to staff  - Offer Toileting every 3 Hours, in advance of need  - Initiate/Maintain 2alarm  - Obtain necessary fall risk management equipment  - Apply yellow socks and bracelet for high fall risk patients  - Consider moving patient to room near nurses station  Outcome: Progressing  Goal: Maintain or return to baseline ADL function  Description: INTERVENTIONS:  -  Assess patient's ability to carry out ADLs; assess patient's baseline for ADL function and identify physical deficits which impact ability to perform ADLs (bathing, care of mouth/teeth, toileting, grooming, dressing, etc )  - Assess/evaluate cause of self-care deficits   - Assess range of motion  - Assess patient's mobility; develop plan if impaired  - Assess patient's need for assistive devices and provide as appropriate  - Encourage maximum independence but intervene and supervise when necessary  - Involve family in performance of ADLs  - Assess for home care needs following discharge   - Consider OT consult to assist with ADL evaluation and planning for discharge  - Provide patient education as appropriate  Outcome: Progressing  Goal: Maintains/Returns to pre admission functional level  Description: INTERVENTIONS:  - Perform BMAT or MOVE assessment daily    - Set and communicate daily mobility goal to care team and patient/family/caregiver  - Collaborate with rehabilitation services on mobility goals if consulted  - Perform Range of Motion 3 times a day  - Reposition patient every 2 hours    - Dangle patient 3 times a day  - Stand patient 3 times a day  - Ambulate patient 3 times a day  - Out of bed to chair 3 times a day   - Out of bed for meals 3 times a day  - Out of bed for toileting  - Record patient progress and toleration of activity level   Outcome: Progressing     Problem: DISCHARGE PLANNING  Goal: Discharge to home or other facility with appropriate resources  Description: INTERVENTIONS:  - Identify barriers to discharge w/patient and caregiver  - Arrange for needed discharge resources and transportation as appropriate  - Identify discharge learning needs (meds, wound care, etc )  - Arrange for interpretive services to assist at discharge as needed  - Refer to Case Management Department for coordinating discharge planning if the patient needs post-hospital services based on physician/advanced practitioner order or complex needs related to functional status, cognitive ability, or social support system  Outcome: Progressing     Problem: Knowledge Deficit  Goal: Patient/family/caregiver demonstrates understanding of disease process, treatment plan, medications, and discharge instructions  Description: Complete learning assessment and assess knowledge base  Interventions:  - Provide teaching at level of understanding  - Provide teaching via preferred learning methods  Outcome: Progressing     Problem: MOBILITY - ADULT  Goal: Maintain or return to baseline ADL function  Description: INTERVENTIONS:  -  Assess patient's ability to carry out ADLs; assess patient's baseline for ADL function and identify physical deficits which impact ability to perform ADLs (bathing, care of mouth/teeth, toileting, grooming, dressing, etc )  - Assess/evaluate cause of self-care deficits   - Assess range of motion  - Assess patient's mobility; develop plan if impaired  - Assess patient's need for assistive devices and provide as appropriate  - Encourage maximum independence but intervene and supervise when necessary  - Involve family in performance of ADLs  - Assess for home care needs following discharge   - Consider OT consult to assist with ADL evaluation and planning for discharge  - Provide patient education as appropriate  Outcome: Progressing  Goal: Maintains/Returns to pre admission functional level  Description: INTERVENTIONS:  - Perform BMAT or MOVE assessment daily    - Set and communicate daily mobility goal to care team and patient/family/caregiver     - Collaborate with rehabilitation services on mobility goals if consulted  - Perform Range of Motion 3 times a day  - Reposition patient every 2 hours    - Dangle patient 3 times a day  - Stand patient 3 times a day  - Ambulate patient 3 times a day  - Out of bed to chair 3 times a day   - Out of bed for meals 3 times a day  - Out of bed for toileting  - Record patient progress and toleration of activity level   Outcome: Progressing     Problem: Potential for Falls  Goal: Patient will remain free of falls  Description: INTERVENTIONS:  - Educate patient/family on patient safety including physical limitations  - Instruct patient to call for assistance with activity   - Consult OT/PT to assist with strengthening/mobility   - Keep Call bell within reach  - Keep bed low and locked with side rails adjusted as appropriate  - Keep care items and personal belongings within reach  - Initiate and maintain comfort rounds  - Make Fall Risk Sign visible to staff  - Offer Toileting every 2 Hours, in advance of need  - Initiate/Maintain alarm  - Obtain necessary fall risk management equipment  - Apply yellow socks and bracelet for high fall risk patients  - Consider moving patient to room near nurses station  Outcome: Progressing     Problem: Prexisting or High Potential for Compromised Skin Integrity  Goal: Skin integrity is maintained or improved  Description: INTERVENTIONS:  - Identify patients at risk for skin breakdown  - Assess and monitor skin integrity  - Assess and monitor nutrition and hydration status  - Monitor labs   - Assess for incontinence   - Turn and reposition patient  - Assist with mobility/ambulation  - Relieve pressure over bony prominences  - Avoid friction and shearing  - Provide appropriate hygiene as needed including keeping skin clean and dry  - Evaluate need for skin moisturizer/barrier cream  - Collaborate with interdisciplinary team   - Patient/family teaching  - Consider wound care consult   Outcome: Progressing

## 2022-08-12 NOTE — PROGRESS NOTES
[facility]  Progress Note - Sean Gordon 1965, 62 y o  male MRN: [id number]  Unit/Bed#: CW2 213-01 Encounter: [id number]  Primary Care Provider: Dora oLtt MD   Date and time admitted to hospital: 8/6/2022 12:22 PM    * Right wrist pain  Assessment & Plan  Presented to [facility] ED, R wrist swelling and erythema x1 day, painful  VSS normal, no SIRS met besides WBC 18k  CRP >90, ESR 78, wrist aspiration gram stain 4+ polys and 2+ GPCs, HbA1c 8 3  Did receive 1 dose vancomycin and cefepime in ED  Procal negative 8/5 BCx positive for MSSA   - S/P I&D/[place] (8/6)  - Ortho Following OP    - Continue Cefazolin 2mg Q8 (Day #6, Abx Day #7)   - F/U Repeat BCx (8/9) - Tailor ABx to Sensitivity   - monitor vital signs   - pain control     Opioid use disorder  Assessment & Plan  Hx of heroin injected one week ago into right wrist  Hx of opioid abuse (~6 bags/day)  Toxicology on Consult - Patient should go on Suboxone when he's willing (Cannot while on pain medicine)  COWS-4 (8/9)    -Patient due to discuss placement with CRS for rehab and long term IV Abx  -Patient started on Suboxone (8/11) 8mg BID    Other hyperlipidemia  Assessment & Plan  Stable  Continue atorvastatin 20 mg daily          Type 2 diabetes mellitus with complication, without long-term current use of insulin [facility])  Assessment & Plan  Lab Results   Component Value Date    HGBA1C 8 3 (A) 04/28/2022       Recent Labs     08/11/22  1117 08/11/22  1558 08/11/22  2039 08/12/22  0607   POCGLU 170* 178* 162* 130       Blood Sugar Average: Last 72 hrs:  (P) 799 [id number]     PTA meds: Lantus 15 units HS, Metformin 1000 mg BID    - Hold both for SSI            PPX: SCDs  Diet: Regular House  Code Status: Full  Dispo: per CRS    Plan D/W Dr Jordan Fisher and [facility] Team    Subjective:   Pt seen and assessed at bedside this AM, no ON events   Patient remains frustrated and continues to endorse that he feels like nothing is being done for his care  He is upset that he has not been able to shower and that his hand has not been unwrapped  IV antibiotic regimen discussed with patient  Discussed with patient that he will speak with the CRS today about his options  Patient denies any pain in the wrist or discomfort  Denies any chest pain, SOB, fever, chills, N/V/D, states he otherwise feels well  No other complaints at this time  Objective:     Vitals: Blood pressure 136/78, pulse 59, temperature 98 2 °F (36 8 °C), temperature source Oral, resp  rate 18, height 5' 7" (1 702 m), weight 81 6 kg (180 lb), SpO2 96 %  ,Body mass index is 28 19 kg/m²  Intake/Output Summary (Last 24 hours) at 8/12/2022 0853  Last data filed at 8/11/2022 1100  Gross per 24 hour   Intake 480 ml   Output 500 ml   Net -20 ml       Physical Exam:   Physical Exam  Constitutional:       General: He is not in acute distress  Appearance: Normal appearance  He is not ill-appearing  HENT:      Head: Normocephalic and atraumatic  Right Ear: External ear normal       Left Ear: External ear normal       Nose: Nose normal       Mouth/Throat:      Pharynx: Oropharynx is clear  Eyes:      Conjunctiva/sclera: Conjunctivae normal    Cardiovascular:      Rate and Rhythm: Normal rate and regular rhythm  Pulses: Normal pulses  Heart sounds: Normal heart sounds  Pulmonary:      Effort: Pulmonary effort is normal       Breath sounds: Normal breath sounds  Abdominal:      General: Bowel sounds are normal       Palpations: Abdomen is soft  Skin:     Capillary Refill: Capillary refill takes less than 2 seconds  Neurological:      General: No focal deficit present  Mental Status: He is alert and oriented to person, place, and time     Psychiatric:         Mood and Affect: Mood normal          Invasive Devices  Report    Peripheral Intravenous Line  Duration           Peripheral IV 08/09/22 Left Forearm 2 days                           Lab and other studies:  I have personally reviewed pertinent reports  No results displayed because visit has over 200 results  Recent Results (from the past 24 hour(s))   Fingerstick Glucose (POCT)    Collection Time: 08/11/22 11:17 AM   Result Value Ref Range    POC Glucose 170 (H) 65 - 140 mg/dl   Fingerstick Glucose (POCT)    Collection Time: 08/11/22  3:58 PM   Result Value Ref Range    POC Glucose 178 (H) 65 - 140 mg/dl   Fingerstick Glucose (POCT)    Collection Time: 08/11/22  8:39 PM   Result Value Ref Range    POC Glucose 162 (H) 65 - 140 mg/dl   Basic metabolic panel    Collection Time: 08/12/22  5:06 AM   Result Value Ref Range    Sodium 132 (L) 135 - 147 mmol/L    Potassium 3 9 3 5 - 5 3 mmol/L    Chloride 105 96 - 108 mmol/L    CO2 23 21 - 32 mmol/L    ANION GAP 4 4 - 13 mmol/L    BUN 19 5 - 25 mg/dL    Creatinine 0 85 0 60 - 1 30 mg/dL    Glucose 123 65 - 140 mg/dL    Calcium 8 9 8 3 - 10 1 mg/dL    eGFR 96 ml/min/1 73sq m   CBC    Collection Time: 08/12/22  5:06 AM   Result Value Ref Range    WBC 13 99 (H) 4 31 - 10 16 Thousand/uL    RBC 4 97 3 88 - 5 62 Million/uL    Hemoglobin 14 8 12 0 - 17 0 g/dL    Hematocrit 43 5 36 5 - 49 3 %    MCV 88 82 - 98 fL    MCH 29 8 26 8 - 34 3 pg    MCHC 34 0 31 4 - 37 4 g/dL    RDW 13 0 11 6 - 15 1 %    Platelets 692 717 - 303 Thousands/uL    MPV 9 3 8 9 - 12 7 fL   Fingerstick Glucose (POCT)    Collection Time: 08/12/22  6:07 AM   Result Value Ref Range    POC Glucose 130 65 - 140 mg/dl     Blood Culture:   Lab Results   Component Value Date    BLOODCX No Growth at 48 hrs  08/09/2022    BLOODCX No Growth at 48 hrs   08/09/2022   ,   Urinalysis:   Lab Results   Component Value Date    COLORU Yellow 08/31/2015    CLARITYU Clear 08/31/2015    SPECGRAV 1 020 08/31/2015    PHUR 5 5 08/31/2015    LEUKOCYTESUR Negative 08/31/2015    NITRITE Negative 08/31/2015    PROTEINUA Trace (A) 08/31/2015    GLUCOSEU 500 (1/2%) (A) 08/31/2015    KETONESU Negative 08/31/2015 BILIRUBINUR Negative 08/31/2015    BLOODU Negative 08/31/2015   ,   Urine Culture: No results found for: URINECX,   Wound Culure: No results found for: WOUNDCULT      Imaging:   No orders to display     VTE Mechanical Prophylaxis: sequential compression device    Current Facility-Administered Medications   Medication Dose Route Frequency    acetaminophen (TYLENOL) tablet 975 mg  975 mg Oral Q8H [address]    atorvastatin (LIPITOR) tablet 20 mg  20 mg Oral HS    buprenorphine-naloxone (Suboxone) film 8 mg  8 mg Sublingual BID    ceFAZolin (ANCEF) IVPB (premix in dextrose) 2,000 mg 50 mL  2,000 mg Intravenous Q8H    cloNIDine (CATAPRES) tablet 0 1 mg  0 1 mg Oral Q8H [address]    gabapentin (NEURONTIN) capsule 100 mg  100 mg Oral TID    insulin lispro (HumaLOG) 100 units/mL subcutaneous injection 1-6 Units  1-6 Units Subcutaneous TID AC    lisinopril (ZESTRIL) tablet 2 5 mg  2 5 mg Oral Daily    ondansetron (ZOFRAN) injection 4 mg  4 mg Intravenous Q6H PRN    ondansetron (ZOFRAN) injection 4 mg  4 mg Intravenous TID       Ilana Pichardo MD  Family Medicine Resident PGY1

## 2022-08-12 NOTE — PLAN OF CARE
Problem: OCCUPATIONAL THERAPY ADULT  Goal: Performs self-care activities at highest level of function for planned discharge setting  See evaluation for individualized goals  Description: Treatment Interventions: ADL retraining, Endurance training  Equipment Recommended: Bedside commode, Shower/Tub chair with back ($) (if continues with standing balance deficits)       See flowsheet documentation for full assessment, interventions and recommendations  Outcome: Completed  Note: Limitation: Decreased ADL status, Decreased UE ROM, Decreased UE strength, Decreased endurance, Decreased self-care trans, Decreased high-level ADLs, Decreased fine motor control  Prognosis: Fair  Assessment: Patient participated in Skilled OT session this date with interventions consisting of ADL re training with the use of correct body mechnaics and maintaining weight bearing restrictions  Patient agreeable to OT treatment session, upon arrival patient was found in the bathroom  In comparison to previous session, patient with improvements in ADLs/transfers*   Patient requiring ocassional safety reminders  Patient has attained all treatment goals and is now demonstrating ability to complete UB/LB ADLs at independent,  Patient was educated on WBS , patient verbalized understanding and demonstrated recommended plan correctly  Patient appears to be functioning at baseline  No further acute OT needs identified at this time to warrant continuation of services  D/C OT services  From OT standpoint, recommendation at time of d/c would be Home Independent  The patient's raw score on the AM-PAC Daily Activity inpatient short form is 19, standardized score is 40 22, greater than 39 4  Patients at this level are likely to benefit from discharge to home  Please refer to the recommendation of the Occupational Therapist for safe discharge planning       OT Discharge Recommendation: No rehabilitation needs

## 2022-08-12 NOTE — OCCUPATIONAL THERAPY NOTE
Occupational Therapy Progress Note     Patient Name: Justina Hernandez  Today's Date: 8/12/2022  Problem List  Principal Problem:    Right wrist pain  Active Problems:    Type 2 diabetes mellitus with complication, without long-term current use of insulin ([facility] [address] )    Other hyperlipidemia    Opioid use disorder        08/12/22 1000   OT Last Visit   OT Visit Date 08/12/22   Note Type   Note Type Treatment   Restrictions/Precautions   Weight Bearing Precautions Per Order Yes   RUE Weight Bearing Per Order NWB   ADL   UB Bathing Assistance 7  Independent   LB Bathing Assistance 7  Independent   UB Dressing Assistance 7  Independent   LB Dressing Assistance 7  Independent   Transfers   Sit to Stand 7  Independent   Stand to Sit 7  Independent   Functional Mobility   Functional Mobility 7  Independent   Cognition   Overall Cognitive Status WFL   Arousal/Participation Cooperative   Attention Attends with cues to redirect   Orientation Level Oriented X4   Memory Within functional limits   Following Commands Follows multistep commands with increased time or repetition   Assessment   Assessment Patient participated in Skilled OT session this date with interventions consisting of ADL re training with the use of correct body mechnaics and maintaining weight bearing restrictions  Patient agreeable to OT treatment session, upon arrival patient was found in the bathroom  In comparison to previous session, patient with improvements in ADLs/transfers*   Patient requiring ocassional safety reminders  Patient has attained all treatment goals and is now demonstrating ability to complete UB/LB ADLs at independent,  Patient was educated on WBS , patient verbalized understanding and demonstrated recommended plan correctly  Patient appears to be functioning at baseline  No further acute OT needs identified at this time to warrant continuation of services  D/C OT services   From OT standpoint, recommendation at time of d/c would be Home Independent  The patient's raw score on the AM-PAC Daily Activity inpatient short form is 19, standardized score is 40 22, greater than 39 4  Patients at this level are likely to benefit from discharge to home  Please refer to the recommendation of the Occupational Therapist for safe discharge planning  Plan   Treatment Interventions ADL retraining; Endurance training   Goal Expiration Date 08/22/22   OT Treatment Day 1   OT Frequency 3-5x/wk   Recommendation   OT Discharge Recommendation No rehabilitation needs   AM-PAC Daily Activity Inpatient   Lower Body Dressing 4   Bathing 4   Toileting 4   Upper Body Dressing 4   Grooming 4   Eating 4   Daily Activity Raw Score 24   Daily Activity Standardized Score (Calc for Raw Score >=11) 57 54       Stella Vang MS, OTR/L

## 2022-08-12 NOTE — ASSESSMENT & PLAN NOTE
Presented to [facility] ED, R wrist swelling and erythema x1 day, painful  VSS normal, no SIRS met besides WBC 18k  CRP >90, ESR 78, wrist aspiration gram stain 4+ polys and 2+ GPCs, HbA1c 8 3  Did receive 1 dose vancomycin and cefepime in ED  Procal negative 8/5 BCx positive for MSSA   - S/P I&D/[facility] (8/6)      - Ortho Following OP    - Continue Cefazolin 2mg Q8 (Day #6, Abx Day #7)   - F/U Repeat BCx (8/9) - Tailor ABx to Sensitivity   - monitor vital signs   - pain control

## 2022-08-12 NOTE — PROCEDURES
Insert PICC line    Date/Time: 8/12/2022 2:28 PM  Performed by: Leonidas Braswell RN  Authorized by: Ross Dahl MD     Patient location:  Bedside  Other Assisting Provider: Yes (comment)    Consent:     Consent obtained:  Written (physician obtained consent)    Consent given by:  Patient    Procedural risks discussed: MD instructed  Universal protocol:     Procedure explained and questions answered to patient or proxy's satisfaction: yes      Relevant documents present and verified: yes      Test results available and properly labeled: yes      Radiology Images displayed and confirmed  If images not available, report reviewed: yes      Required blood products, implants, devices, and special equipment available: yes      Site/side marked: yes      Immediately prior to procedure, a time out was called: yes      Patient identity confirmed:  Verbally with patient and arm band  Pre-procedure details:     Hand hygiene: Hand hygiene performed prior to insertion      Sterile barrier technique: All elements of maximal sterile technique followed      Skin preparation:  ChloraPrep    Skin preparation agent: Skin preparation agent completely dried prior to procedure    Indications:     PICC line indications: long term antibiotics    Sedation:     Sedation type: Other (comment)  Anesthesia (see MAR for exact dosages):      Anesthesia method:  Local infiltration    Local anesthetic:  Lidocaine 1% w/o epi (2 ml)  Procedure details:     Location:  Basilic    Vessel type: vein      Laterality:  Left    Approach: percutaneous technique used      Patient position:  Flat    Procedural supplies:  Double lumen    Catheter size:  5 Fr    Landmarks identified: yes      Ultrasound guidance: yes      Ultrasound image availability:  Not saved    Sterile ultrasound techniques: Sterile gel and sterile probe covers were used      Number of attempts:  1    Successful placement: yes      Vessel of catheter tip end:  Sherlock 3CG confirmed (OK to use, reported to RN)    Total catheter length (cm):  43    Catheter out on skin (cm):  1    Max flow rate:  999 ml / hr    Arm circumference:  29  Post-procedure details:     Post-procedure:  Dressing applied and securement device placed    Assessment:  Blood return through all ports and free fluid flow    Post-procedure complications: none      Patient tolerance of procedure:   Tolerated well, no immediate complications    Observer: Yes      Observer name:  Rosio Rivas, [facility]

## 2022-08-12 NOTE — PROGRESS NOTES
Progress Note - Infectious Disease   Maria G Knet 62 y o  male MRN: [id number]  Unit/Bed#: CW2 213-01 Encounter: [id number]      Impression/Recommendations:  1   MSSA bacteremia   Due to #2   No other appreciable source   Clinically stable without formal sepsis criteria   Repeat blood cultures 8/9, TTE negative  Rec:  · Continue cefazolin for 4 weeks IV antibiotics through 9/2/22  · Check weekly CBC-diff, Cr while on IV antibiotics  · OK for PICC at any time  · D/C planning for [facility] program  · Outpatient follow-up with ID 2 weeks after D/C  · D/C PICC after last dose IV antibiotics  · If patient decides to leave AMA, could transition to doxycycline 100 mg PO Q12 to continue through 9/2/22 although explained to patient this would run risk relapse of infection, bacteremia, and recurrent/persistent wrist infection could lead to potential permanent dysfunction of right hand (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)     2   Right wrist septic arthritis   Likely due to IVDU   Status post I&D 8/6/22   Intraoperative findings notable for purulence in the joint space   OR cultures with 3+ MSSA   Doubt significance of single colony of coagulase-negative Staph  Rec:  · Continue cefazolin as above  · Serial exams     3   OUD   IV heroin   Risk factor for infection as above   Uses 6 bags/day   Withdrawal on admission, now resolved  HIV, HCV negative  Rec:  · Suboxone per Toxicology  · Evaluation underway for STAR program   · CRS follow-up ongoing  · Not a candidate for home IV antibiotics     4   Poorly controlled DM   Recent A1c 4/2022 8  3   Risk factor for infection as above      5   Positive HCV antibody  Likely represents prior exposure  No evidence for chronic infection as HCV RNA negative        The patient is stable from an ID standpoint  Dr David Wood will reassess the patient on Monday 8/15    Please call in the interim with new questions      Antibiotics:  Cefazolin #6  Antibiotics #7    Subjective:  Patient seen on AM rounds  Continues to express frustration at "just sitting here in the hospital"  States he cannot stay here until next week  Tells me he is "in recovery", living in a recovery house with a recent relapse  Denies fevers, chills, sweats, nausea, vomiting, or diarrhea  Got a shower this AM which made him feel better  24 Hour Events:  No documented fevers, chills, sweats, nausea, vomiting, or diarrhea  Objective:  Vitals:  Temp:  [98 °F (36 7 °C)-98 2 °F (36 8 °C)] 98 2 °F (36 8 °C)  HR:  [55-72] 66  Resp:  [18] 18  BP: (121-146)/(64-82) 124/76  SpO2:  [95 %-100 %] 100 %  Temp (24hrs), Av 1 °F (36 7 °C), Min:98 °F (36 7 °C), Max:98 2 °F (36 8 °C)  Current: Temperature: 98 2 °F (36 8 °C)    Physical Exam:   General:  No acute distress  Psychiatric:  Awake and alert  Pulmonary:  Normal respiratory excursion without accessory muscle use  Abdomen:  Soft, nontender  Extremities:  Right wrist with sutures intact, no warmth or erythema  Skin:  No rashes    Lab Results:  I have personally reviewed pertinent labs  Results from last 7 days   Lab Units 22  0506 08/10/22  0514 22  0925 22  0637 22  2155   POTASSIUM mmol/L 3 9 3 8 3 9   < > 5 1   CHLORIDE mmol/L 105 104 103   < > 94*   CO2 mmol/L 23 25 28   < > 29   BUN mg/dL 19 20 20   < > 15   CREATININE mg/dL 0 85 1 00 1 03   < > 0 85   EGFR ml/min/1 73sq m 96 83 80   < > 96   CALCIUM mg/dL 8 9 9 1 9 1   < > 9 1   AST U/L  --   --   --   --  43   ALT U/L  --   --   --   --  32   ALK PHOS U/L  --   --   --   --  85    < > = values in this interval not displayed       Results from last 7 days   Lab Units 22  0506 22  0557 08/10/22  0514   WBC Thousand/uL 13 99* 14 86* 15 20*   HEMOGLOBIN g/dL 14 8 14 6 14 4   PLATELETS Thousands/uL 386 423* 406*     Results from last 7 days   Lab Units 22  0938 22  1509 22  0137 22  2217 22  2215   BLOOD CULTURE  No Growth at 72 hrs   No Growth at 72 hrs   --   --  No Growth After 5 Days  Staphylococcus aureus*   GRAM STAIN RESULT   --  No Polys or Bacteria seen 4+ Polys*  2+ Gram positive cocci in clusters*  --  Gram positive cocci in clusters*   BODY FLUID CULTURE, STERILE   --   --  3+ Growth of Staphylococcus aureus*  --   --        Imaging Studies:   I have personally reviewed pertinent imaging study reports and images in PACS  EKG, Pathology, and Other Studies:   I have personally reviewed pertinent reports

## 2022-08-12 NOTE — CERTIFIED RECOVERY SPECIALIST
Certified  Note    Patient name: Jaci Chavez  [facility]  [place]: [address] [place] [address]  Attending:  Nasrin Lynne MD MRN [id number]  : 1965  Age: 62 y o  Sex: male Date 2022         Substance Use History:     Social History     Substance and Sexual Activity   Alcohol Use Not Currently        Social History     Substance and Sexual Activity   Drug Use Not Currently    Comment: heroin hx, clean x 1 year       Admission Information  Substances Used at This Admission[de-identified] Opiates  Readmission in Last 30 Days?: No  Encounter Type[de-identified] Patient Face-to-Face    Recovery Support Plan  Declined All Services?: No  Agreeable to Warm Handoff?: No  Is Patient Accepting [facility] Treatment Services?: No  Was Referral Made to [facility]?: No  Was Narcan Provided at Discharge?: No  Plan Discussed With Treatment Team[de-identified] Yes  Plan Discussed With[de-identified] , Provider    Referral to Recovery Supports:  Jaylyn Pan[de-identified] No  Community Based CRS[de-identified] No  Case Management[de-identified] No  Direct Access to [facility]IN Treatment?: No  Resource Guide Given?: Yes  Follow Up With Patient[de-identified] Yes (Ongoing until discharge)  Family / Other Support[de-identified] No  Referral for Community Physical Health[de-identified] No  Referral for Community Mental Health[de-identified] No'    CRS met with patient to discuss treatment  CRS provided patient with a thorough explanation of the process of the [facility] program and answered all of his questions  CRS provided explanation of [employer] and items that he will be able to bring to the facility  Patient will need to have items dropped off at the facility after admission since his belongings are at home  CRS provided in depth conversation of patient acknowledgement and release for [facility]  Patient was in agreement with all aspects of the program and signed all documents  Patient was given a copy of all documents that he signed for his records    Patient was grateful for the time spent to explain the process and understands all of the referrals that are needed for transition  CRS provided patient with contact information if he has any other question or concerns  CRS updated patient of a Sunday meeting to continue discussion as we ready for discharge  Patient in agreement    Updates sent to providers, CM, and coordinator  Emailed necessary documents to [facility]        [facility]

## 2022-08-13 LAB
BASOPHILS # BLD AUTO: 0.04 THOUSANDS/ΜL (ref 0–0.1)
BASOPHILS NFR BLD AUTO: 0 % (ref 0–1)
EOSINOPHIL # BLD AUTO: 0.41 THOUSAND/ΜL (ref 0–0.61)
EOSINOPHIL NFR BLD AUTO: 3 % (ref 0–6)
ERYTHROCYTE [DISTWIDTH] IN BLOOD BY AUTOMATED COUNT: 12.9 % (ref 11.6–15.1)
GLUCOSE SERPL-MCNC: 110 MG/DL (ref 65–140)
GLUCOSE SERPL-MCNC: 146 MG/DL (ref 65–140)
GLUCOSE SERPL-MCNC: 189 MG/DL (ref 65–140)
GLUCOSE SERPL-MCNC: 218 MG/DL (ref 65–140)
HCT VFR BLD AUTO: 40.6 % (ref 36.5–49.3)
HGB BLD-MCNC: 13.6 G/DL (ref 12–17)
IMM GRANULOCYTES # BLD AUTO: 0.15 THOUSAND/UL (ref 0–0.2)
IMM GRANULOCYTES NFR BLD AUTO: 1 % (ref 0–2)
LYMPHOCYTES # BLD AUTO: 3.22 THOUSANDS/ΜL (ref 0.6–4.47)
LYMPHOCYTES NFR BLD AUTO: 24 % (ref 14–44)
MCH RBC QN AUTO: 29.1 PG (ref 26.8–34.3)
MCHC RBC AUTO-ENTMCNC: 33.5 G/DL (ref 31.4–37.4)
MCV RBC AUTO: 87 FL (ref 82–98)
MONOCYTES # BLD AUTO: 0.75 THOUSAND/ΜL (ref 0.17–1.22)
MONOCYTES NFR BLD AUTO: 6 % (ref 4–12)
NEUTROPHILS # BLD AUTO: 8.81 THOUSANDS/ΜL (ref 1.85–7.62)
NEUTS SEG NFR BLD AUTO: 66 % (ref 43–75)
NRBC BLD AUTO-RTO: 0 /100 WBCS
PLATELET # BLD AUTO: 383 THOUSANDS/UL (ref 149–390)
PMV BLD AUTO: 8.8 FL (ref 8.9–12.7)
RBC # BLD AUTO: 4.67 MILLION/UL (ref 3.88–5.62)
WBC # BLD AUTO: 13.38 THOUSAND/UL (ref 4.31–10.16)

## 2022-08-13 PROCEDURE — 85025 COMPLETE CBC W/AUTO DIFF WBC: CPT | Performed by: FAMILY MEDICINE

## 2022-08-13 PROCEDURE — 99232 SBSQ HOSP IP/OBS MODERATE 35: CPT | Performed by: FAMILY MEDICINE

## 2022-08-13 PROCEDURE — 82948 REAGENT STRIP/BLOOD GLUCOSE: CPT

## 2022-08-13 RX ADMIN — ATORVASTATIN CALCIUM 20 MG: 20 TABLET, FILM COATED ORAL at 21:15

## 2022-08-13 RX ADMIN — CEFAZOLIN SODIUM 2000 MG: 2 SOLUTION INTRAVENOUS at 23:24

## 2022-08-13 RX ADMIN — GABAPENTIN 100 MG: 100 CAPSULE ORAL at 14:39

## 2022-08-13 RX ADMIN — CEFAZOLIN SODIUM 2000 MG: 2 SOLUTION INTRAVENOUS at 06:27

## 2022-08-13 RX ADMIN — GABAPENTIN 100 MG: 100 CAPSULE ORAL at 21:15

## 2022-08-13 RX ADMIN — CEFAZOLIN SODIUM 2000 MG: 2 SOLUTION INTRAVENOUS at 14:42

## 2022-08-13 RX ADMIN — BUPRENORPHINE AND NALOXONE 8 MG: 8; 2 FILM BUCCAL; SUBLINGUAL at 21:15

## 2022-08-13 RX ADMIN — GABAPENTIN 100 MG: 100 CAPSULE ORAL at 09:42

## 2022-08-13 RX ADMIN — CLONIDINE HYDROCHLORIDE 0.1 MG: 0.1 TABLET ORAL at 14:39

## 2022-08-13 RX ADMIN — ACETAMINOPHEN 975 MG: 325 TABLET ORAL at 06:26

## 2022-08-13 RX ADMIN — ACETAMINOPHEN 975 MG: 325 TABLET ORAL at 21:15

## 2022-08-13 RX ADMIN — CLONIDINE HYDROCHLORIDE 0.1 MG: 0.1 TABLET ORAL at 21:15

## 2022-08-13 RX ADMIN — ACETAMINOPHEN 975 MG: 325 TABLET ORAL at 14:39

## 2022-08-13 RX ADMIN — BUPRENORPHINE AND NALOXONE 8 MG: 8; 2 FILM BUCCAL; SUBLINGUAL at 09:41

## 2022-08-13 RX ADMIN — INSULIN LISPRO 2 UNITS: 100 INJECTION, SOLUTION INTRAVENOUS; SUBCUTANEOUS at 17:22

## 2022-08-13 NOTE — ASSESSMENT & PLAN NOTE
Lab Results   Component Value Date    HGBA1C 8 3 (A) 04/28/2022       Recent Labs     08/12/22  0607 08/12/22  1131 08/12/22  1844 08/13/22  0535   POCGLU 130 132 232* 110       Blood Sugar Average: Last 72 hrs:  (P) 160 [id number]     PTA meds: Lantus 15 units HS, Metformin 1000 mg BID    - Hold both for SSI

## 2022-08-13 NOTE — ASSESSMENT & PLAN NOTE
Presented to [facility] ED, R wrist swelling and erythema x1 day, painful  VSS normal, no SIRS met besides WBC 18k  CRP >90, ESR 78, wrist aspiration gram stain 4+ polys and 2+ GPCs, HbA1c 8 3  Did receive 1 dose vancomycin and cefepime in ED  Procal negative 8/5 BCx positive for MSSA   - S/P I&D/[facility] (8/6)      - Ortho Following OP    - ID consulted: Continue Cefazolin 2mg Q8 (Day #7, Abx Day #8)   - monitor vital signs   - pain control

## 2022-08-13 NOTE — ASSESSMENT & PLAN NOTE
Hx of heroin injected one week ago into right wrist  Hx of opioid abuse (~6 bags/day)  Toxicology on Consult - Patient should go on Suboxone when he's willing (Cannot while on pain medicine)   COWS-4 (8/9)    -Patient discussed placement with CRS for rehab and long term IV Abx; possible placement 08/17 pending meeting and approval   -Patient started on Suboxone (8/11) 8mg BID

## 2022-08-13 NOTE — PROGRESS NOTES
[facility]  Progress Note - Tray Diaz 1965, 62 y o  male MRN: [id number]  Unit/Bed#: CW2 213-01 Encounter: [id number]  Primary Care Provider: Huey Calderon MD   Date and time admitted to hospital: 8/6/2022 12:22 PM    Opioid use disorder  Assessment & Plan  Hx of heroin injected one week ago into right wrist  Hx of opioid abuse (~6 bags/day)  Toxicology on Consult - Patient should go on Suboxone when he's willing (Cannot while on pain medicine)  COWS-4 (8/9)    -Patient discussed placement with CRS for rehab and long term IV Abx; possible placement 08/17 pending meeting and approval   -Patient started on Suboxone (8/11) 8mg BID       Other hyperlipidemia  Assessment & Plan  Stable  Continue atorvastatin 20 mg daily           Type 2 diabetes mellitus with complication, without long-term current use of insulin [facility])  Assessment & Plan  Lab Results   Component Value Date    HGBA1C 8 3 (A) 04/28/2022       Recent Labs     08/12/22  0607 08/12/22  1131 08/12/22  1844 08/13/22  0535   POCGLU 130 132 232* 110       Blood Sugar Average: Last 72 hrs:  (P) 160 [id number]     PTA meds: Lantus 15 units HS, Metformin 1000 mg BID    - Hold both for SSI          * Right wrist pain  Assessment & Plan  Presented to [facility] ED, R wrist swelling and erythema x1 day, painful  VSS normal, no SIRS met besides WBC 18k  CRP >90, ESR 78, wrist aspiration gram stain 4+ polys and 2+ GPCs, HbA1c 8 3  Did receive 1 dose vancomycin and cefepime in ED  Procal negative 8/5 BCx positive for MSSA   - S/P I&D/[facility] (8/6)  - Ortho Following OP    - ID consulted: Continue Cefazolin 2mg Q8 (Day #7, Abx Day #8)   - monitor vital signs   - pain control         PPX: SCDs  Diet: Regular House  Code Status: Full  Dispo: per CRS     Plan D/W Dr Rose Reyes and [facility] Team    Subjective:   No acute events overnight   Patient denies any chest pain, SOB, fever, chills, N/V and reports that he feels well save for diarrhea that he states he has had since last friday  Patient is tolerating regular diet  No other complaints at this time  Objective:     Vitals: Blood pressure 106/60, pulse 64, temperature 98 1 °F (36 7 °C), resp  rate 20, height 5' 7" (1 702 m), weight 81 6 kg (180 lb), SpO2 97 %  ,Body mass index is 28 19 kg/m²  Intake/Output Summary (Last 24 hours) at 8/13/2022 0758  Last data filed at 8/12/2022 1752  Gross per 24 hour   Intake 720 ml   Output --   Net 720 ml       Physical Exam:   Physical Exam  Constitutional:       General: He is awake  Appearance: Normal appearance  HENT:      Head: Normocephalic  Jaw: There is normal jaw occlusion  Eyes:      General: Lids are normal    Cardiovascular:      Rate and Rhythm: Normal rate and regular rhythm  Heart sounds: Normal heart sounds, S1 normal and S2 normal    Pulmonary:      Effort: Pulmonary effort is normal  No tachypnea or bradypnea  Breath sounds: Normal breath sounds and air entry  Abdominal:      General: Bowel sounds are normal       Palpations: Abdomen is soft  Tenderness: There is no abdominal tenderness  Skin:     General: Skin is warm  Neurological:      Mental Status: He is alert and oriented to person, place, and time  Psychiatric:         Attention and Perception: Attention normal          Behavior: Behavior is cooperative  Invasive Devices  Report    Peripherally Inserted Central Catheter Line  Duration           PICC Line 58/64/70 Left Basilic <1 day                           Lab and other studies:  I have personally reviewed pertinent reports  No results displayed because visit has over 200 results            Recent Results (from the past 24 hour(s))   Fingerstick Glucose (POCT)    Collection Time: 08/12/22 11:31 AM   Result Value Ref Range    POC Glucose 132 65 - 140 mg/dl   Fingerstick Glucose (POCT)    Collection Time: 08/12/22  6:44 PM   Result Value Ref Range    POC Glucose 232 (H) 65 - 140 mg/dl   Fingerstick Glucose (POCT)    Collection Time: 08/13/22  5:35 AM   Result Value Ref Range    POC Glucose 110 65 - 140 mg/dl   CBC and differential    Collection Time: 08/13/22  6:26 AM   Result Value Ref Range    WBC 13 38 (H) 4 31 - 10 16 Thousand/uL    RBC 4 67 3 88 - 5 62 Million/uL    Hemoglobin 13 6 12 0 - 17 0 g/dL    Hematocrit 40 6 36 5 - 49 3 %    MCV 87 82 - 98 fL    MCH 29 1 26 8 - 34 3 pg    MCHC 33 5 31 4 - 37 4 g/dL    RDW 12 9 11 6 - 15 1 %    MPV 8 8 (L) 8 9 - 12 7 fL    Platelets 909 692 - 592 Thousands/uL    nRBC 0 /100 WBCs    Neutrophils Relative 66 43 - 75 %    Immat GRANS % 1 0 - 2 %    Lymphocytes Relative 24 14 - 44 %    Monocytes Relative 6 4 - 12 %    Eosinophils Relative 3 0 - 6 %    Basophils Relative 0 0 - 1 %    Neutrophils Absolute 8 81 (H) 1 85 - 7 62 Thousands/µL    Immature Grans Absolute 0 15 0 00 - 0 20 Thousand/uL    Lymphocytes Absolute 3 22 0 60 - 4 47 Thousands/µL    Monocytes Absolute 0 75 0 17 - 1 22 Thousand/µL    Eosinophils Absolute 0 41 0 00 - 0 61 Thousand/µL    Basophils Absolute 0 04 0 00 - 0 10 Thousands/µL     Blood Culture:   Lab Results   Component Value Date    BLOODCX No Growth at 72 hrs  08/09/2022    BLOODCX No Growth at 72 hrs  08/09/2022   ,   Urinalysis:   Lab Results   Component Value Date    COLORU Yellow 08/31/2015    CLARITYU Clear 08/31/2015    SPECGRAV 1 020 08/31/2015    PHUR 5 5 08/31/2015    LEUKOCYTESUR Negative 08/31/2015    NITRITE Negative 08/31/2015    PROTEINUA Trace (A) 08/31/2015    GLUCOSEU 500 (1/2%) (A) 08/31/2015    KETONESU Negative 08/31/2015    BILIRUBINUR Negative 08/31/2015    BLOODU Negative 08/31/2015   ,   Urine Culture: No results found for: URINECX,   Wound Culure: No results found for: WOUNDCULT      Imaging:  Chest X-Ray  No valid procedures specified         Current Facility-Administered Medications   Medication Dose Route Frequency    acetaminophen (TYLENOL) tablet 975 mg  975 mg Oral Q8H [facility]    atorvastatin (LIPITOR) tablet 20 mg  20 mg Oral HS    buprenorphine-naloxone (Suboxone) film 8 mg  8 mg Sublingual BID    ceFAZolin (ANCEF) IVPB (premix in dextrose) 2,000 mg 50 mL  2,000 mg Intravenous Q8H    cloNIDine (CATAPRES) tablet 0 1 mg  0 1 mg Oral Q8H [facility]    gabapentin (NEURONTIN) capsule 100 mg  100 mg Oral TID    insulin lispro (HumaLOG) 100 units/mL subcutaneous injection 1-6 Units  1-6 Units Subcutaneous TID AC    lisinopril (ZESTRIL) tablet 2 5 mg  2 5 mg Oral Daily    ondansetron (ZOFRAN) injection 4 mg  4 mg Intravenous Q6H PRN    ondansetron (ZOFRAN) injection 4 mg  4 mg Intravenous TID       Alesia Hill MD  Family Medicine Resident PGY1

## 2022-08-14 LAB
BACTERIA BLD CULT: NORMAL
BACTERIA BLD CULT: NORMAL
GLUCOSE SERPL-MCNC: 129 MG/DL (ref 65–140)
GLUCOSE SERPL-MCNC: 218 MG/DL (ref 65–140)
GLUCOSE SERPL-MCNC: 223 MG/DL (ref 65–140)
GLUCOSE SERPL-MCNC: 225 MG/DL (ref 65–140)

## 2022-08-14 PROCEDURE — 99232 SBSQ HOSP IP/OBS MODERATE 35: CPT | Performed by: FAMILY MEDICINE

## 2022-08-14 PROCEDURE — 82948 REAGENT STRIP/BLOOD GLUCOSE: CPT

## 2022-08-14 RX ADMIN — CLONIDINE HYDROCHLORIDE 0.1 MG: 0.1 TABLET ORAL at 21:09

## 2022-08-14 RX ADMIN — CEFAZOLIN SODIUM 2000 MG: 2 SOLUTION INTRAVENOUS at 06:12

## 2022-08-14 RX ADMIN — INSULIN LISPRO 2 UNITS: 100 INJECTION, SOLUTION INTRAVENOUS; SUBCUTANEOUS at 12:16

## 2022-08-14 RX ADMIN — INSULIN LISPRO 2 UNITS: 100 INJECTION, SOLUTION INTRAVENOUS; SUBCUTANEOUS at 16:51

## 2022-08-14 RX ADMIN — CLONIDINE HYDROCHLORIDE 0.1 MG: 0.1 TABLET ORAL at 13:05

## 2022-08-14 RX ADMIN — ACETAMINOPHEN 975 MG: 325 TABLET ORAL at 13:05

## 2022-08-14 RX ADMIN — CEFAZOLIN SODIUM 2000 MG: 2 SOLUTION INTRAVENOUS at 15:50

## 2022-08-14 RX ADMIN — CLONIDINE HYDROCHLORIDE 0.1 MG: 0.1 TABLET ORAL at 06:12

## 2022-08-14 RX ADMIN — BUPRENORPHINE AND NALOXONE 8 MG: 8; 2 FILM BUCCAL; SUBLINGUAL at 08:46

## 2022-08-14 RX ADMIN — GABAPENTIN 100 MG: 100 CAPSULE ORAL at 08:46

## 2022-08-14 RX ADMIN — ACETAMINOPHEN 975 MG: 325 TABLET ORAL at 06:12

## 2022-08-14 RX ADMIN — LISINOPRIL 2.5 MG: 2.5 TABLET ORAL at 08:46

## 2022-08-14 RX ADMIN — GABAPENTIN 100 MG: 100 CAPSULE ORAL at 21:09

## 2022-08-14 RX ADMIN — ATORVASTATIN CALCIUM 20 MG: 20 TABLET, FILM COATED ORAL at 21:09

## 2022-08-14 RX ADMIN — ACETAMINOPHEN 975 MG: 325 TABLET ORAL at 21:09

## 2022-08-14 RX ADMIN — BUPRENORPHINE AND NALOXONE 8 MG: 8; 2 FILM BUCCAL; SUBLINGUAL at 21:09

## 2022-08-14 RX ADMIN — CEFAZOLIN SODIUM 2000 MG: 2 SOLUTION INTRAVENOUS at 23:16

## 2022-08-14 RX ADMIN — GABAPENTIN 100 MG: 100 CAPSULE ORAL at 16:51

## 2022-08-14 NOTE — PLAN OF CARE
Problem: PAIN - ADULT  Goal: Verbalizes/displays adequate comfort level or baseline comfort level  Description: Interventions:  - Encourage patient to monitor pain and request assistance  - Assess pain using appropriate pain scale  - Administer analgesics based on type and severity of pain and evaluate response  - Implement non-pharmacological measures as appropriate and evaluate response  - Consider cultural and social influences on pain and pain management  - Notify physician/advanced practitioner if interventions unsuccessful or patient reports new pain  Outcome: Progressing     Problem: INFECTION - ADULT  Goal: Absence or prevention of progression during hospitalization  Description: INTERVENTIONS:  - Assess and monitor for signs and symptoms of infection  - Monitor lab/diagnostic results  - Monitor all insertion sites, i e  indwelling lines, tubes, and drains  - Monitor endotracheal if appropriate and nasal secretions for changes in amount and color  - [place] appropriate cooling/warming therapies per order  - Administer medications as ordered  - Instruct and encourage patient and family to use good hand hygiene technique  - Identify and instruct in appropriate isolation precautions for identified infection/condition  Outcome: Progressing  Goal: Absence of fever/infection during neutropenic period  Description: INTERVENTIONS:  - Monitor WBC    Outcome: Progressing     Problem: SAFETY ADULT  Goal: Patient will remain free of falls  Description: INTERVENTIONS:  - Educate patient/family on patient safety including physical limitations  - Instruct patient to call for assistance with activity   - Consult OT/PT to assist with strengthening/mobility   - Keep Call bell within reach  - Keep bed low and locked with side rails adjusted as appropriate  - Keep care items and personal belongings within reach  - Initiate and maintain comfort rounds  - Make Fall Risk Sign visible to staff  - Apply yellow socks and bracelet for high fall risk patients  - Consider moving patient to room near nurses station  Outcome: Progressing

## 2022-08-14 NOTE — CERTIFIED RECOVERY SPECIALIST
Certified  Note    Patient name: Kennedi Kumar  Location: [facility]  [place]: [address] [place] [address]  Attending:  Greg Valles MD MRN [id number]  : 1965  Age: 62 y o  Sex: male Date 2022         Substance Use History:     Social History     Substance and Sexual Activity   Alcohol Use Not Currently        Social History     Substance and Sexual Activity   Drug Use Not Currently    Comment: heroin hx, clean x 1 year       Admission Information  Substances Used at This Admission[de-identified] Opiates  Readmission in Last 30 Days?: No  Encounter Type[de-identified] Patient Face-to-Face    Recovery Support Plan  Declined All Services?: No  Medication Assisted Treatment[de-identified] Yes  Medication Therapy Type[de-identified] Induction  Medication Type[de-identified] Suboxone/Buprenorphine  Agreeable to Warm Handoff?: Yes ([facility] program)  Is Patient Accepting [facility]IN Treatment Services?: Yes  Was Referral Made to [facility]?: No  Was Narcan Provided at Discharge?: No  Plan Discussed With Treatment Team[de-identified] Yes  Plan Discussed With[de-identified] Provider    Referral to Recovery Supports:  Jaylyn Pan[de-identified] No  Community Based CRS[de-identified] No  Case Management[de-identified] No  Direct Access to ELIN Treatment?: Yes  Resource Guide Given?: Yes  Follow Up With Patient[de-identified]  (Ongoing until discharge)  Family / Other Support[de-identified] No  Referral for Community Physical Health[de-identified] No  Referral for Community Mental Health[de-identified] No'    CRS met with patient to update him on the team meeting that will be happening in the AM on Monday  CRS will update patient to keep him motivated, since he is feeling discouraged by a negative phone call he had yesterday  CRS and patient engaged in conversation of recovery house that he lives in (and is able to go back to)  CRS engaged patient in discussion of recovery supports and network that he has developed and maintained    Patient identified many people that he feels are a support for ongoing recovery when he discharges from treatment        Yara Meyers

## 2022-08-14 NOTE — ASSESSMENT & PLAN NOTE
Presented to [facility] ED, R wrist swelling and erythema x1 day, painful  VSS normal, no SIRS met besides WBC 18k  CRP >90, ESR 78, wrist aspiration gram stain 4+ polys and 2+ GPCs, HbA1c 8 3  Did receive 1 dose vancomycin and cefepime in ED  Procal negative 8/5 BCx positive for MSSA   - S/P I&D/[facility] (8/6)      - Ortho Following OP    - ID consulted: Continue Cefazolin 2mg Q8 (Day #8, Abx Day #9)   - monitor vital signs

## 2022-08-14 NOTE — PROGRESS NOTES
[address] [facility]  Progress Note - Sean Gordon 1965, 62 y o  male MRN: [id number]  Unit/Bed#: CW2 213-01 Encounter: [id number]  Primary Care Provider: Dora Lott MD   Date and time admitted to hospital: 8/6/2022 12:22 PM    * Right wrist pain  Assessment & Plan  Presented to [facility] ED, R wrist swelling and erythema x1 day, painful  VSS normal, no SIRS met besides WBC 18k  CRP >90, ESR 78, wrist aspiration gram stain 4+ polys and 2+ GPCs, HbA1c 8 3  Did receive 1 dose vancomycin and cefepime in ED  Procal negative 8/5 BCx positive for MSSA   - S/P I&D/[facility] (8/6)  - Ortho Following OP    - ID consulted: Continue Cefazolin 2mg Q8 (Day #8, Abx Day #9)   - monitor vital signs        Opioid use disorder  Assessment & Plan  Hx of heroin injected one week ago into right wrist  Hx of opioid abuse (~6 bags/day)  Toxicology on Consult - Patient should go on Suboxone when he's willing (Cannot while on pain medicine)  COWS-4 (8/9)    -[facility] ([facility] following meeting (8/15)  -Patient started on Suboxone (8/11) 8mg BID        Other hyperlipidemia  Assessment & Plan  Stable  Continue atorvastatin 20 mg daily            Type 2 diabetes mellitus with complication, without long-term current use of insulin [facility])  Assessment & Plan  Lab Results   Component Value Date    HGBA1C 8 3 (A) 04/28/2022       Recent Labs     08/13/22  1052 08/13/22  1607 08/13/22  2030 08/14/22  0624   POCGLU 146* 218* 189* 129       Blood Sugar Average: Last 72 hrs:  (P) 592 [id number]     PTA meds: Lantus 15 units HS, Metformin 1000 mg BID    - Hold both for SSI            PPX:   Diet:  Code Status:   Dispo:     Plan D/W Dr Jordan Fisher and [facility] Team    Subjective:   Pt seen and assessed at bedside this AM, no ON events, no acute complaints  Patient says he is doing well, denies any pain/fever/flushing or SOB   He has not felt as anxious now that he has the room to himself and is ready to go for the [employer] program  Pending Meeting with [employer] team tomorrow  No other concerns at this time  Objective:     Vitals: Blood pressure 119/75, pulse 64, temperature 98 1 °F (36 7 °C), resp  rate 18, height 5' 7" (1 702 m), weight 81 6 kg (180 lb), SpO2 97 %  ,Body mass index is 28 19 kg/m²  No intake or output data in the 24 hours ending 08/14/22 0839    Physical Exam:   Physical Exam  Constitutional:       Appearance: Normal appearance  HENT:      Head: Normocephalic and atraumatic  Right Ear: External ear normal       Left Ear: External ear normal       Nose: Nose normal       Mouth/Throat:      Mouth: Mucous membranes are moist       Pharynx: Oropharynx is clear  Eyes:      Conjunctiva/sclera: Conjunctivae normal    Cardiovascular:      Rate and Rhythm: Normal rate and regular rhythm  Pulses: Normal pulses  Heart sounds: Normal heart sounds  Pulmonary:      Effort: Pulmonary effort is normal       Breath sounds: Normal breath sounds  Abdominal:      General: Bowel sounds are normal       Palpations: Abdomen is soft  Musculoskeletal:      Comments: Wrist Splint in place     Skin:     General: Skin is warm and dry  Capillary Refill: Capillary refill takes less than 2 seconds  Neurological:      General: No focal deficit present  Mental Status: He is alert and oriented to person, place, and time  Psychiatric:         Mood and Affect: Mood normal          Invasive Devices  Report    Peripherally Inserted Central Catheter Line  Duration           PICC Line 89/47/38 Left Basilic 1 day                           Lab and other studies:  I have personally reviewed pertinent reports  No results displayed because visit has over 200 results            Recent Results (from the past 24 hour(s))   Fingerstick Glucose (POCT)    Collection Time: 08/13/22 10:52 AM   Result Value Ref Range    POC Glucose 146 (H) 65 - 140 mg/dl   Fingerstick Glucose (POCT)    Collection Time: 08/13/22  4:07 PM   Result Value Ref Range    POC Glucose 218 (H) 65 - 140 mg/dl   Fingerstick Glucose (POCT)    Collection Time: 08/13/22  8:30 PM   Result Value Ref Range    POC Glucose 189 (H) 65 - 140 mg/dl   Fingerstick Glucose (POCT)    Collection Time: 08/14/22  6:24 AM   Result Value Ref Range    POC Glucose 129 65 - 140 mg/dl     Blood Culture:   Lab Results   Component Value Date    BLOODCX No Growth After 4 Days  08/09/2022    BLOODCX No Growth After 4 Days   08/09/2022   ,   Urinalysis:   Lab Results   Component Value Date    COLORU Yellow 08/31/2015    CLARITYU Clear 08/31/2015    SPECGRAV 1 020 08/31/2015    PHUR 5 5 08/31/2015    LEUKOCYTESUR Negative 08/31/2015    NITRITE Negative 08/31/2015    PROTEINUA Trace (A) 08/31/2015    GLUCOSEU 500 (1/2%) (A) 08/31/2015    KETONESU Negative 08/31/2015    BILIRUBINUR Negative 08/31/2015    BLOODU Negative 08/31/2015   ,   Urine Culture: No results found for: URINECX,   Wound Culure: No results found for: WOUNDCULT      Imaging:   No orders to display     VTE Mechanical Prophylaxis: sequential compression device    Current Facility-Administered Medications   Medication Dose Route Frequency    acetaminophen (TYLENOL) tablet 975 mg  975 mg Oral Q8H [address]    atorvastatin (LIPITOR) tablet 20 mg  20 mg Oral HS    buprenorphine-naloxone (Suboxone) film 8 mg  8 mg Sublingual BID    ceFAZolin (ANCEF) IVPB (premix in dextrose) 2,000 mg 50 mL  2,000 mg Intravenous Q8H    cloNIDine (CATAPRES) tablet 0 1 mg  0 1 mg Oral Q8H [address]    gabapentin (NEURONTIN) capsule 100 mg  100 mg Oral TID    insulin lispro (HumaLOG) 100 units/mL subcutaneous injection 1-6 Units  1-6 Units Subcutaneous TID AC    lisinopril (ZESTRIL) tablet 2 5 mg  2 5 mg Oral Daily    ondansetron (ZOFRAN) injection 4 mg  4 mg Intravenous Q6H PRN    ondansetron (ZOFRAN) injection 4 mg  4 mg Intravenous TID       Jered Meredith MD  Family Medicine Resident PGY1

## 2022-08-14 NOTE — ASSESSMENT & PLAN NOTE
Hx of heroin injected one week ago into right wrist  Hx of opioid abuse (~6 bags/day)  Toxicology on Consult - Patient should go on Suboxone when he's willing (Cannot while on pain medicine)   COWS-4 (8/9)    -Patient Pending placement at [place] program (8/17) following meeting (8/15)  -Patient started on Suboxone (8/11) 8mg BID

## 2022-08-14 NOTE — ASSESSMENT & PLAN NOTE
Lab Results   Component Value Date    HGBA1C 8 3 (A) 04/28/2022       Recent Labs     08/13/22  1052 08/13/22  1607 08/13/22  2030 08/14/22  0624   POCGLU 146* 218* 189* 129       Blood Sugar Average: Last 72 hrs:  (P) 282 [id number]     PTA meds: Lantus 15 units HS, Metformin 1000 mg BID    - Hold both for SSI

## 2022-08-15 ENCOUNTER — TRANSCRIBE ORDERS (OUTPATIENT)
Dept: HOME HEALTH SERVICES | Facility: HOME HEALTHCARE | Age: 57
End: 2022-08-15

## 2022-08-15 ENCOUNTER — HOME HEALTH ADMISSION (OUTPATIENT)
Dept: HOME HEALTH SERVICES | Facility: HOME HEALTHCARE | Age: 57
End: 2022-08-15
Payer: COMMERCIAL

## 2022-08-15 DIAGNOSIS — R78.81 BACTEREMIA: Primary | ICD-10-CM

## 2022-08-15 LAB
BASOPHILS # BLD AUTO: 0.05 THOUSANDS/ΜL (ref 0–0.1)
BASOPHILS NFR BLD AUTO: 0 % (ref 0–1)
EOSINOPHIL # BLD AUTO: 0.45 THOUSAND/ΜL (ref 0–0.61)
EOSINOPHIL NFR BLD AUTO: 4 % (ref 0–6)
ERYTHROCYTE [DISTWIDTH] IN BLOOD BY AUTOMATED COUNT: 12.7 % (ref 11.6–15.1)
FUNGUS SPEC CULT: NORMAL
GLUCOSE SERPL-MCNC: 135 MG/DL (ref 65–140)
GLUCOSE SERPL-MCNC: 188 MG/DL (ref 65–140)
GLUCOSE SERPL-MCNC: 228 MG/DL (ref 65–140)
GLUCOSE SERPL-MCNC: 286 MG/DL (ref 65–140)
HCT VFR BLD AUTO: 38.5 % (ref 36.5–49.3)
HGB BLD-MCNC: 12.5 G/DL (ref 12–17)
IMM GRANULOCYTES # BLD AUTO: 0.15 THOUSAND/UL (ref 0–0.2)
IMM GRANULOCYTES NFR BLD AUTO: 1 % (ref 0–2)
LYMPHOCYTES # BLD AUTO: 3.55 THOUSANDS/ΜL (ref 0.6–4.47)
LYMPHOCYTES NFR BLD AUTO: 29 % (ref 14–44)
MCH RBC QN AUTO: 28.8 PG (ref 26.8–34.3)
MCHC RBC AUTO-ENTMCNC: 32.5 G/DL (ref 31.4–37.4)
MCV RBC AUTO: 89 FL (ref 82–98)
MONOCYTES # BLD AUTO: 0.8 THOUSAND/ΜL (ref 0.17–1.22)
MONOCYTES NFR BLD AUTO: 7 % (ref 4–12)
NEUTROPHILS # BLD AUTO: 7.32 THOUSANDS/ΜL (ref 1.85–7.62)
NEUTS SEG NFR BLD AUTO: 59 % (ref 43–75)
NRBC BLD AUTO-RTO: 0 /100 WBCS
PLATELET # BLD AUTO: 380 THOUSANDS/UL (ref 149–390)
PMV BLD AUTO: 8.9 FL (ref 8.9–12.7)
RBC # BLD AUTO: 4.34 MILLION/UL (ref 3.88–5.62)
WBC # BLD AUTO: 12.32 THOUSAND/UL (ref 4.31–10.16)

## 2022-08-15 PROCEDURE — 99232 SBSQ HOSP IP/OBS MODERATE 35: CPT | Performed by: FAMILY MEDICINE

## 2022-08-15 PROCEDURE — 85025 COMPLETE CBC W/AUTO DIFF WBC: CPT

## 2022-08-15 PROCEDURE — 82948 REAGENT STRIP/BLOOD GLUCOSE: CPT

## 2022-08-15 PROCEDURE — 99233 SBSQ HOSP IP/OBS HIGH 50: CPT | Performed by: INTERNAL MEDICINE

## 2022-08-15 RX ADMIN — BUPRENORPHINE AND NALOXONE 8 MG: 8; 2 FILM BUCCAL; SUBLINGUAL at 09:00

## 2022-08-15 RX ADMIN — GABAPENTIN 100 MG: 100 CAPSULE ORAL at 09:00

## 2022-08-15 RX ADMIN — BUPRENORPHINE AND NALOXONE 8 MG: 8; 2 FILM BUCCAL; SUBLINGUAL at 21:20

## 2022-08-15 RX ADMIN — GABAPENTIN 100 MG: 100 CAPSULE ORAL at 21:20

## 2022-08-15 RX ADMIN — ACETAMINOPHEN 975 MG: 325 TABLET ORAL at 21:20

## 2022-08-15 RX ADMIN — INSULIN LISPRO 1 UNITS: 100 INJECTION, SOLUTION INTRAVENOUS; SUBCUTANEOUS at 17:27

## 2022-08-15 RX ADMIN — ATORVASTATIN CALCIUM 20 MG: 20 TABLET, FILM COATED ORAL at 21:20

## 2022-08-15 RX ADMIN — CLONIDINE HYDROCHLORIDE 0.1 MG: 0.1 TABLET ORAL at 15:32

## 2022-08-15 RX ADMIN — INSULIN LISPRO 4 UNITS: 100 INJECTION, SOLUTION INTRAVENOUS; SUBCUTANEOUS at 11:20

## 2022-08-15 RX ADMIN — ACETAMINOPHEN 975 MG: 325 TABLET ORAL at 15:32

## 2022-08-15 RX ADMIN — CLONIDINE HYDROCHLORIDE 0.1 MG: 0.1 TABLET ORAL at 06:18

## 2022-08-15 RX ADMIN — GABAPENTIN 100 MG: 100 CAPSULE ORAL at 15:32

## 2022-08-15 RX ADMIN — CEFAZOLIN SODIUM 2000 MG: 2 SOLUTION INTRAVENOUS at 15:31

## 2022-08-15 RX ADMIN — ACETAMINOPHEN 975 MG: 325 TABLET ORAL at 06:18

## 2022-08-15 RX ADMIN — CEFAZOLIN SODIUM 2000 MG: 2 SOLUTION INTRAVENOUS at 22:30

## 2022-08-15 RX ADMIN — CEFAZOLIN SODIUM 2000 MG: 2 SOLUTION INTRAVENOUS at 06:18

## 2022-08-15 RX ADMIN — CLONIDINE HYDROCHLORIDE 0.1 MG: 0.1 TABLET ORAL at 21:20

## 2022-08-15 NOTE — ASSESSMENT & PLAN NOTE
Lab Results   Component Value Date    HGBA1C 8 3 (A) 04/28/2022       Recent Labs     08/14/22  1053 08/14/22  1606 08/14/22  2103 08/15/22  0559   POCGLU 223* 225* 218* 135       Blood Sugar Average: Last 72 hrs:  (P) 251 [id number]     PTA meds: Lantus 15 units HS, Metformin 1000 mg BID    - Hold both for SSI

## 2022-08-15 NOTE — PLAN OF CARE
Problem: PAIN - ADULT  Goal: Verbalizes/displays adequate comfort level or baseline comfort level  Description: Interventions:  - Encourage patient to monitor pain and request assistance  - Assess pain using appropriate pain scale  - Administer analgesics based on type and severity of pain and evaluate response  - Implement non-pharmacological measures as appropriate and evaluate response  - Consider cultural and social influences on pain and pain management  - Notify physician/advanced practitioner if interventions unsuccessful or patient reports new pain  Outcome: Progressing     Problem: INFECTION - ADULT  Goal: Absence or prevention of progression during hospitalization  Description: INTERVENTIONS:  - Assess and monitor for signs and symptoms of infection  - Monitor lab/diagnostic results  - Monitor all insertion sites, i e  indwelling lines, tubes, and drains  - Monitor endotracheal if appropriate and nasal secretions for changes in amount and color  - [place] appropriate cooling/warming therapies per order  - Administer medications as ordered  - Instruct and encourage patient and family to use good hand hygiene technique  - Identify and instruct in appropriate isolation precautions for identified infection/condition  Outcome: Progressing     Problem: INFECTION - ADULT  Goal: Absence of fever/infection during neutropenic period  Description: INTERVENTIONS:  - Monitor WBC    Outcome: Progressing

## 2022-08-15 NOTE — CASE MANAGEMENT
Case Management Progress Note    Patient name Lincoln Pan  Location 2 213/CW2 213-01 MRN [id number]  : 1965 Date 8/15/2022       LOS (days): 9  Geometric Mean LOS (GMLOS) (days):   Days to GMLOS:        OBJECTIVE:        Current admission status: Inpatient  Preferred Pharmacy:   [address]  [address] [place] [id number]  Phone: [phone] Fax: [phone]    Primary Care Provider: She Heck MD    Primary Insurance: Maru Ravel  Secondary Insurance:     PROGRESS NOTE:    Teams meeting held today with pt's treatment team to discuss transition to [facility] for treatment on 2022 at [address] [facility] was present and confirmed having all necessary documentation -- They will deliver supplies on the morning of the , prior to his transfer  [facility] also present and states they will provide education on the first dose at 1pm      Pt will need pre-cert for transfer to [facility]  CM to complete  Pt must also be notified that he will be responsible for pushing his own IV medications  CM will continue to follow

## 2022-08-15 NOTE — ASSESSMENT & PLAN NOTE
Presented to [facility] ED, R wrist swelling and erythema x1 day, painful  VSS normal, no SIRS met besides WBC 18k  CRP >90, ESR 78, wrist aspiration gram stain 4+ polys and 2+ GPCs, HbA1c 8 3  Did receive 1 dose vancomycin and cefepime in ED  Procal negative 8/5 BCx positive for MSSA   - S/P I&D/[facility] (8/6)      - Ortho Following OP    - ID consulted: Continue Cefazolin 2mg Q8 (Day #9, Abx Day #10)   - monitor vital signs

## 2022-08-15 NOTE — PROGRESS NOTES
Progress Note - Infectious Disease   Blanca Hammans 62 y o  male MRN: [id number]  Unit/Bed#: CW2 213-01 Encounter: [id number]      Impression/Plan:  1   MSSA bacteremia   Due to #2   No other appreciable source   Clinically stable without formal sepsis criteria   Repeat blood cultures , TTE negative  Rec:  · Continue cefazolin for 4 weeks IV antibiotics through 22  · Check weekly CBC-diff, Cr while on IV antibiotics  · D/C planning for [facility]  · Outpatient follow-up with ID 2 weeks after D/C  · D/C PICC after last dose IV antibiotics     2   Right wrist septic arthritis   Likely due to IVDU   Status post I&D 22   Intraoperative findings notable for purulence in the joint space   OR cultures with 3+ MSSA   Doubt significance of single colony of coagulase-negative Staph  Rec:  · Continue cefazolin as above  · Serial exams     3   OUD   IV heroin   Risk factor for infection as above   Uses 6 bags/day   Withdrawal on admission, now resolved   HIV, HCV negative  Rec:  · Suboxone per Toxicology  · Possible transition to rehab via [facility] on Wednesday  · CRS follow-up ongoing  · Not a candidate for home IV antibiotics     4   Poorly controlled DM   Recent A1c 2022 8  3   Risk factor for infection as above      5   Positive HCV antibody   Likely represents prior exposure   No evidence for chronic infection as HCV RNA negative       I spent 35 minutes on the unit floor of which 20 minutes was in counseling/coordination of care as outlined in my note including extensive review of the medical records and team meeting  Antibiotics:  Cefazolin 9   antibiotics 10    Subjective:  Patient has no fever, chills, sweats; no nausea, vomiting, diarrhea; no cough, shortness of breath; no increased pain  No new symptoms      Objective:  Vitals:  Temp:  [97 2 °F (36 2 °C)-98 °F (36 7 °C)] 98 °F (36 7 °C)  Resp:  [17] 17  BP: (109-121)/(61-76) 111/68  SpO2:  [97 %] 97 %  Temp (24hrs), Av 6 °F (36 4 °C), Min:97 2 °F (36 2 °C), Max:98 °F (36 7 °C)  Current: Temperature: 98 °F (36 7 °C)    Physical Exam:   General Appearance:  Alert, interactive, nontoxic, no acute distress  Throat: Oropharynx moist without lesions  Lungs:   Clear to auscultation bilaterally; no wheezes, rhonchi or rales; respirations unlabored   Heart:  RRR; no murmur, rub or gallop   Abdomen:   Soft, non-tender, non-distended, positive bowel sounds  Extremities: No clubbing, cyanosis  Right hand heavily dressed with a dry dressing in place  Skin: No new rashes or lesions  No draining wounds noted  Labs, Imaging, & Other studies:   All pertinent labs and imaging studies were personally reviewed  Results from last 7 days   Lab Units 08/15/22  0506 08/13/22  0626 08/12/22  0506   WBC Thousand/uL 12 32* 13 38* 13 99*   HEMOGLOBIN g/dL 12 5 13 6 14 8   PLATELETS Thousands/uL 380 383 386     Results from last 7 days   Lab Units 08/12/22  0506 08/10/22  0514 08/09/22  0925   SODIUM mmol/L 132* 134* 132*   POTASSIUM mmol/L 3 9 3 8 3 9   CHLORIDE mmol/L 105 104 103   CO2 mmol/L 23 25 28   BUN mg/dL 19 20 20   CREATININE mg/dL 0 85 1 00 1 03   EGFR ml/min/1 73sq m 96 83 80   CALCIUM mg/dL 8 9 9 1 9 1     Results from last 7 days   Lab Units 08/09/22  0938   BLOOD CULTURE  No Growth After 5 Days  No Growth After 5 Days

## 2022-08-15 NOTE — ASSESSMENT & PLAN NOTE
Hx of heroin injected one week ago into right wrist  Hx of opioid abuse (~6 bags/day)  Toxicology on Consult - Patient should go on Suboxone when he's willing (Cannot while on pain medicine)   COWS-4 (8/9)    -Patient Due for Pickup (8/17) 10:00am to go to the [facility]  -Patient started on Suboxone (8/11) 8mg BID

## 2022-08-15 NOTE — CERTIFIED RECOVERY SPECIALIST
Certified  Note    Patient name: Fernandez Meza  Location: [facility] 213/[facility] 213-  [place]: [address] [place] [address]  Attending:  Dwayne Bhatt MD MRN [id number]  : 1965  Age: 62 y o  Sex: male Date 8/15/2022         Substance Use History:     Social History     Substance and Sexual Activity   Alcohol Use Not Currently        Social History     Substance and Sexual Activity   Drug Use Not Currently    Comment: heroin hx, clean x 1 year       Admission Information  Substances Used at This Admission[de-identified] Opiates  Readmission in Last 30 Days?: No  Encounter Type[de-identified] Patient Face-to-Face    Recovery Support Plan  Declined All Services?: No  Medication Assisted Treatment[de-identified] Yes  Medication Therapy Type[de-identified] Induction  Medication Type[de-identified] Suboxone/Buprenorphine  Agreeable to Warm Handoff?: Yes ([facility])  Is Patient Accepting [facility] Treatment Services?: Yes  Was Referral Made to [facility]?: No  Was Narcan Provided at Discharge?: No  Plan Discussed With Treatment Team[de-identified] Yes  Plan Discussed With[de-identified] Provider    Referral to Recovery Supports:  Jaylyn Pan[de-identified] No  Community Based CRS[de-identified] No  Case Management[de-identified] No  Direct Access to [facility] Treatment?: Yes  Resource Guide Given?: Yes  Follow Up With Patient[de-identified]  (Ongoing until discharge)  Family / Other Support[de-identified] No  Referral for Community Physical Health[de-identified] No  Referral for [address] [place] [address] Ave[de-identified] No'    Team meeting was held to discuss transition to [facility] for treatment on  at [address] [place] [address] to follow up with patient this afternoon to discuss meeting and details needed for discharge  UPDATE 3:25pm    CRS met with patient to discuss specifics of team meeting  Patient and CRS went over policies and procedures again for [facility] as well as belongings he can bring so he can have roommate provide prior to hospital discharge  Patient agreeable to outreach if needed from [facility]  [facility]    Patient grateful for work done on his behalf  CRS will continue to follow until discharge and continue contact with clinical team at [facility]      Alem Phillips

## 2022-08-15 NOTE — DISCHARGE SUMMARY
DISCHARGE NOTE - Family Medicine Residency, Erika Centeno [place] 1965, 62 y o  male  MRN: [id number]    Unit/Bed#: UV5 213-01 Encounter: [id number]  Primary Care Provider: Dashawn Ni MD      Admission Date: 8/6/2022 1222  Discharge Date: 8/17/2022  Length of Stay: 11 days  Diagnosis: Wrist joint infection [facility]) [M00 9]    Plans finalization pending attending physician attestation  HPI (per admission H&P note on 8/6)     HPI:   62 y o  male with past medical history significant for IVDU/DM2 presenting to [facility] ED with 2 days of R wrist pain and swelling  Hx of injecting heroin 1 week ago  I&D + aspiration performed in ED  Imaging shows bony erosions  Pt transferred to [facility] on 8/6/22 for consult w hand surgery and debridement in OR  POD#0  Per ortho eval initially at [facility], "Pain is sharp in character, Located wrist, acute in onset, constant in duration, severe in intensity, Exacerbating factors wrist movement, Remitting factors immobilization, nonradiating, no numbness or tingling, no open wounds noted"      ED: s/p 1 dose Vanc/Cef, 1 dose in OR w/ Ancef, pain control, xray of right wrist    No new Assessment & Plan notes have been filed under this hospital service since the last note was generated  Service: Family Medicine      Patient Active Problem List   Diagnosis    Type 2 diabetes mellitus with complication, without long-term current use of insulin ([facility] [address] )    Other hyperlipidemia    Encounter for screening colonoscopy    Encounter for counseling for tobacco use disorder    Class 1 obesity due to excess calories without serious comorbidity with body mass index (BMI) of 32 0 to 32 9 in adult    Opioid use disorder    Right wrist pain       HOSPITAL COURSE:     Hospital Course:   Patient was taken for debridement and washout of the right wrist by orthopedics on 8/6  He was given Cefepime and Vancomycin in the [facility] emergency department and transitioned to ancef during his operation   He was continued Ancef for his hospital stay as well as him home medications, with his Diabetic medications held for Sliding scale insulin  He received scheduled tylenol and PRN Toradol and Oxycodone for pain  He was gradually weaned off pain medication and monitored for withdrawal symptoms with a plan to induce Suboxone if needed  The patient was monitored for several days while receiving IV Antibiotics but did not meet criteria for induction, so he was started on a regular Suboxone dose of 8mg BID on 8/11  His initial blood cultures showed growth of Methicillin Sensitive Staph Aureus and decision was made to continue patient on IV Abx for 4 weeks  CRS was consulted and patient was accepted for [employer] program for continued treatment  CONDITION AT DISCHARGE:   On day of discharge patient is seen and evaluated at bedside  Patient is stable and without concern  Patient denies any pain or SOB  Patient able to tolerate PO food without N/V/D and had bowel movement and baseline urine output  Patient able to ambulate independently without assistance  Patient is aware of current health status and understand plan of treatment and outpatient follow-up  The patient understood and agreed with the plan  All pertinent lab results, imaging studies, procedures, and/or any incidental findings have been disclosed to the patient  All pertinent questions are answered to patient's satisfaction  On day of discharge, the patient was hemodynamically stable and appropriate for discharge home  Condition at Discharge: good     COMPLICATIONS:     Complications: NONE     PROCEDURES:     Procedures Performed:   No orders of the defined types were placed in this encounter      SIGNIFICANT FINDINGS / ABNORMAL RESULTS:     Significant Findings/Abnormal Results with this admission:  · Wrist Aspiration gram stain 4+ Polys and 2+ GPCs (8/6), 2x BCx positive for MSSA (8/5)    VITALS ON DISCHARGE DATE:     Vitals:    08/16/22 5395 08/16/22 1549 08/16/22 2338 08/17/22 0656   BP: 138/81 132/80 133/71 125/76   BP Location: Left arm      Pulse:       Resp: 18      Temp: 98 °F (36 7 °C) (!) 97 4 °F (36 3 °C) (!) 97 3 °F (36 3 °C)    TempSrc: Oral      SpO2:       Weight:       Height:         Temp:  [97 3 °F (36 3 °C)-97 4 °F (36 3 °C)] 97 3 °F (36 3 °C)  BP: (125-133)/(71-80) 125/76  Weight (last 2 days)     None          Intake/Output Summary (Last 24 hours) at 8/17/2022 4992  Last data filed at 8/17/2022 0368  Gross per 24 hour   Intake 1280 ml   Output --   Net 1280 ml     Invasive Devices  Report    Peripherally Inserted Central Catheter Line  Duration           PICC Line 50/97/49 Left Basilic 4 days                PHYSICAL EXAM ON DAY OF DISCHARGE:     Physical Exam  Constitutional:       General: He is not in acute distress  Appearance: Normal appearance  HENT:      Head: Normocephalic and atraumatic  Right Ear: External ear normal       Left Ear: External ear normal       Nose: Nose normal       Mouth/Throat:      Pharynx: Oropharynx is clear  Eyes:      Conjunctiva/sclera: Conjunctivae normal    Cardiovascular:      Rate and Rhythm: Normal rate  Pulses: Normal pulses  Heart sounds: Normal heart sounds  Pulmonary:      Effort: Pulmonary effort is normal       Breath sounds: Normal breath sounds  Abdominal:      General: Bowel sounds are normal       Palpations: Abdomen is soft  Musculoskeletal:      Comments: Right Wrist splint and dressing in place, C/D/I   Skin:     General: Skin is warm and dry  Capillary Refill: Capillary refill takes less than 2 seconds  Neurological:      General: No focal deficit present  Mental Status: He is alert and oriented to person, place, and time  Psychiatric:         Mood and Affect: Mood normal          DISCHARGE MEDICATIONS:     Discharge Medications:  See after visit summary for reconciled discharge medications provided to patient and family    Medication changes made with this admission:  · Continue: IV Cefazolin 2g in 50mL Q8  · Resume: Home Medications      FOLLOW-UP APPOINTMENTS / INSTRUCTION :     Important Physician Related Follow Up:   · F/U with Orthopedics concerning right wrist   · F/U with PCP    Appointment confirmed:  Future Appointments   Date Time Provider Fidel Nance   8/17/2022  1:00 PM Michelle Starkey RN VN  [facility]   8/29/2022  3:00 PM Tito Austin 46 Hospitalist       Discharge instructions/Information to patient and family:   See after visit summary (AVS) for information provided to patient and family  Provisions for Follow-Up Care:  See after visit summary for information related to follow-up care and any pertinent home health orders  DISPOSITION:     Disposition: [facility]    Discharge Statement   I spent 30 minutes discharging the patient  This time was spent on the day of discharge  I had direct contact with the patient on the day of discharge  Additional documentation is required if more than 30 minutes were spent on discharge       Planned Readmission: No      Cristopher Menchaca MD  PGY-1, Family Medicine  08/17/22  9:29 AM

## 2022-08-15 NOTE — PLAN OF CARE
Problem: PAIN - ADULT  Goal: Verbalizes/displays adequate comfort level or baseline comfort level  Description: Interventions:  - Encourage patient to monitor pain and request assistance  - Assess pain using appropriate pain scale  - Administer analgesics based on type and severity of pain and evaluate response  - Implement non-pharmacological measures as appropriate and evaluate response  - Consider cultural and social influences on pain and pain management  - Notify physician/advanced practitioner if interventions unsuccessful or patient reports new pain  Outcome: Progressing     Problem: INFECTION - ADULT  Goal: Absence or prevention of progression during hospitalization  Description: INTERVENTIONS:  - Assess and monitor for signs and symptoms of infection  - Monitor lab/diagnostic results  - Monitor all insertion sites, i e  indwelling lines, tubes, and drains  - Monitor endotracheal if appropriate and nasal secretions for changes in amount and color  - [place] appropriate cooling/warming therapies per order  - Administer medications as ordered  - Instruct and encourage patient and family to use good hand hygiene technique  - Identify and instruct in appropriate isolation precautions for identified infection/condition  Outcome: Progressing  Goal: Absence of fever/infection during neutropenic period  Description: INTERVENTIONS:  - Monitor WBC    Outcome: Progressing

## 2022-08-15 NOTE — PROGRESS NOTES
[facility]  Progress Note - [place] 1965, 62 y o  male MRN: [id number]  Unit/Bed#: CW2 213-01 Encounter: [id number]  Primary Care Provider: Dashawn Ni MD   Date and time admitted to hospital: 8/6/2022 12:22 PM    * Right wrist pain  Assessment & Plan  Presented to [facility] ED, R wrist swelling and erythema x1 day, painful  VSS normal, no SIRS met besides WBC 18k  CRP >90, ESR 78, wrist aspiration gram stain 4+ polys and 2+ GPCs, HbA1c 8 3  Did receive 1 dose vancomycin and cefepime in ED  Procal negative 8/5 BCx positive for MSSA   - S/P I&D/[facility] (8/6)  - Ortho Following OP    - ID consulted: Continue Cefazolin 2mg Q8 (Day #9, Abx Day #10)   - monitor vital signs    Opioid use disorder  Assessment & Plan  Hx of heroin injected one week ago into right wrist  Hx of opioid abuse (~6 bags/day)  Toxicology on Consult - Patient should go on Suboxone when he's willing (Cannot while on pain medicine)  COWS-4 (8/9)    -Patient Due for Pickup (8/17) 10:00am to go to the [facility]  -Patient started on Suboxone (8/11) 8mg BID        Other hyperlipidemia  Assessment & Plan  Stable  Continue atorvastatin 20 mg daily             Type 2 diabetes mellitus with complication, without long-term current use of insulin [facility])  Assessment & Plan  Lab Results   Component Value Date    HGBA1C 8 3 (A) 04/28/2022       Recent Labs     08/14/22  1053 08/14/22  1606 08/14/22  2103 08/15/22  0559   POCGLU 223* 225* 218* 135       Blood Sugar Average: Last 72 hrs:  (P) 935 [id number]     PTA meds: Lantus 15 units HS, Metformin 1000 mg BID    - Hold both for SSI             PPX: Ambulate, SCDs  Diet: Regular House  Code Status: Full  Dispo: Kristen Munroe D/W Dr Cheo Ridley and [facility] Team    Subjective:   Pt seen and assessed at bedside this AM, no ON events, no acute complaints   Meeting held for [facility] placement today, patient is due for pickup at 10:00am on Wednesday, 8/17  Patient is agreeable, has no immediate concerns  Denies fever, chills, nausea, vomiting, or diarrhea  Objective:     Vitals: Blood pressure 111/68, pulse 64, temperature 98 °F (36 7 °C), resp  rate 17, height 5' 7" (1 702 m), weight 81 6 kg (180 lb), SpO2 97 %  ,Body mass index is 28 19 kg/m²  Intake/Output Summary (Last 24 hours) at 8/15/2022 0914  Last data filed at 8/14/2022 1100  Gross per 24 hour   Intake 580 ml   Output --   Net 580 ml       Physical Exam:   Physical Exam  Constitutional:       General: He is not in acute distress  Appearance: Normal appearance  HENT:      Head: Normocephalic and atraumatic  Right Ear: External ear normal       Left Ear: External ear normal       Nose: Nose normal       Mouth/Throat:      Pharynx: Oropharynx is clear  Eyes:      Conjunctiva/sclera: Conjunctivae normal    Cardiovascular:      Rate and Rhythm: Normal rate and regular rhythm  Pulses: Normal pulses  Heart sounds: Normal heart sounds  Pulmonary:      Breath sounds: Normal breath sounds  Abdominal:      General: Bowel sounds are normal       Palpations: Abdomen is soft  Skin:     General: Skin is warm and dry  Capillary Refill: Capillary refill takes less than 2 seconds  Neurological:      General: No focal deficit present  Mental Status: He is alert and oriented to person, place, and time  Psychiatric:         Mood and Affect: Mood normal          Invasive Devices  Report    Peripherally Inserted Central Catheter Line  Duration           PICC Line 98/80/78 Left Basilic 2 days                         Lab and other studies:  I have personally reviewed pertinent reports  No results displayed because visit has over 200 results            Recent Results (from the past 24 hour(s))   Fingerstick Glucose (POCT)    Collection Time: 08/14/22 10:53 AM   Result Value Ref Range    POC Glucose 223 (H) 65 - 140 mg/dl   Fingerstick Glucose (POCT)    Collection Time: 08/14/22  4:06 PM   Result Value Ref Range    POC Glucose 225 (H) 65 - 140 mg/dl   Fingerstick Glucose (POCT)    Collection Time: 08/14/22  9:03 PM   Result Value Ref Range    POC Glucose 218 (H) 65 - 140 mg/dl   CBC and differential    Collection Time: 08/15/22  5:06 AM   Result Value Ref Range    WBC 12 32 (H) 4 31 - 10 16 Thousand/uL    RBC 4 34 3 88 - 5 62 Million/uL    Hemoglobin 12 5 12 0 - 17 0 g/dL    Hematocrit 38 5 36 5 - 49 3 %    MCV 89 82 - 98 fL    MCH 28 8 26 8 - 34 3 pg    MCHC 32 5 31 4 - 37 4 g/dL    RDW 12 7 11 6 - 15 1 %    MPV 8 9 8 9 - 12 7 fL    Platelets 648 986 - 510 Thousands/uL    nRBC 0 /100 WBCs    Neutrophils Relative 59 43 - 75 %    Immat GRANS % 1 0 - 2 %    Lymphocytes Relative 29 14 - 44 %    Monocytes Relative 7 4 - 12 %    Eosinophils Relative 4 0 - 6 %    Basophils Relative 0 0 - 1 %    Neutrophils Absolute 7 32 1 85 - 7 62 Thousands/µL    Immature Grans Absolute 0 15 0 00 - 0 20 Thousand/uL    Lymphocytes Absolute 3 55 0 60 - 4 47 Thousands/µL    Monocytes Absolute 0 80 0 17 - 1 22 Thousand/µL    Eosinophils Absolute 0 45 0 00 - 0 61 Thousand/µL    Basophils Absolute 0 05 0 00 - 0 10 Thousands/µL   Fingerstick Glucose (POCT)    Collection Time: 08/15/22  5:59 AM   Result Value Ref Range    POC Glucose 135 65 - 140 mg/dl     Blood Culture:   Lab Results   Component Value Date    BLOODCX No Growth After 5 Days  08/09/2022    BLOODCX No Growth After 5 Days   08/09/2022   ,   Urinalysis:   Lab Results   Component Value Date    COLORU Yellow 08/31/2015    CLARITYU Clear 08/31/2015    SPECGRAV 1 020 08/31/2015    PHUR 5 5 08/31/2015    LEUKOCYTESUR Negative 08/31/2015    NITRITE Negative 08/31/2015    PROTEINUA Trace (A) 08/31/2015    GLUCOSEU 500 (1/2%) (A) 08/31/2015    KETONESU Negative 08/31/2015    BILIRUBINUR Negative 08/31/2015    BLOODU Negative 08/31/2015   ,   Urine Culture: No results found for: URINECX,   Wound Culure: No results found for: WOUNDCULT      Imaging: No orders to display     VTE Mechanical Prophylaxis: sequential compression device    Current Facility-Administered Medications   Medication Dose Route Frequency    acetaminophen (TYLENOL) tablet 975 mg  975 mg Oral Q8H [address]    atorvastatin (LIPITOR) tablet 20 mg  20 mg Oral HS    buprenorphine-naloxone (Suboxone) film 8 mg  8 mg Sublingual BID    ceFAZolin (ANCEF) IVPB (premix in dextrose) 2,000 mg 50 mL  2,000 mg Intravenous Q8H    cloNIDine (CATAPRES) tablet 0 1 mg  0 1 mg Oral Q8H [address]    gabapentin (NEURONTIN) capsule 100 mg  100 mg Oral TID    insulin lispro (HumaLOG) 100 units/mL subcutaneous injection 1-6 Units  1-6 Units Subcutaneous TID AC    lisinopril (ZESTRIL) tablet 2 5 mg  2 5 mg Oral Daily    ondansetron (ZOFRAN) injection 4 mg  4 mg Intravenous Q6H PRN    ondansetron (ZOFRAN) injection 4 mg  4 mg Intravenous TID       Alissa Kovacs MD  Family Medicine Resident PGY1

## 2022-08-16 LAB
GLUCOSE SERPL-MCNC: 134 MG/DL (ref 65–140)
GLUCOSE SERPL-MCNC: 166 MG/DL (ref 65–140)
GLUCOSE SERPL-MCNC: 221 MG/DL (ref 65–140)

## 2022-08-16 PROCEDURE — 82948 REAGENT STRIP/BLOOD GLUCOSE: CPT

## 2022-08-16 PROCEDURE — 99233 SBSQ HOSP IP/OBS HIGH 50: CPT | Performed by: INTERNAL MEDICINE

## 2022-08-16 PROCEDURE — 99232 SBSQ HOSP IP/OBS MODERATE 35: CPT | Performed by: FAMILY MEDICINE

## 2022-08-16 RX ADMIN — BUPRENORPHINE AND NALOXONE 8 MG: 8; 2 FILM BUCCAL; SUBLINGUAL at 08:52

## 2022-08-16 RX ADMIN — ACETAMINOPHEN 975 MG: 325 TABLET ORAL at 15:31

## 2022-08-16 RX ADMIN — GABAPENTIN 100 MG: 100 CAPSULE ORAL at 21:15

## 2022-08-16 RX ADMIN — CEFAZOLIN SODIUM 2000 MG: 2 SOLUTION INTRAVENOUS at 21:15

## 2022-08-16 RX ADMIN — LISINOPRIL 2.5 MG: 2.5 TABLET ORAL at 08:52

## 2022-08-16 RX ADMIN — CLONIDINE HYDROCHLORIDE 0.1 MG: 0.1 TABLET ORAL at 21:16

## 2022-08-16 RX ADMIN — ATORVASTATIN CALCIUM 20 MG: 20 TABLET, FILM COATED ORAL at 21:15

## 2022-08-16 RX ADMIN — BUPRENORPHINE AND NALOXONE 8 MG: 8; 2 FILM BUCCAL; SUBLINGUAL at 21:14

## 2022-08-16 RX ADMIN — CEFAZOLIN SODIUM 2000 MG: 2 SOLUTION INTRAVENOUS at 06:03

## 2022-08-16 RX ADMIN — ACETAMINOPHEN 975 MG: 325 TABLET ORAL at 21:14

## 2022-08-16 RX ADMIN — CLONIDINE HYDROCHLORIDE 0.1 MG: 0.1 TABLET ORAL at 06:03

## 2022-08-16 RX ADMIN — CLONIDINE HYDROCHLORIDE 0.1 MG: 0.1 TABLET ORAL at 15:31

## 2022-08-16 RX ADMIN — ACETAMINOPHEN 975 MG: 325 TABLET ORAL at 06:03

## 2022-08-16 RX ADMIN — INSULIN LISPRO 2 UNITS: 100 INJECTION, SOLUTION INTRAVENOUS; SUBCUTANEOUS at 17:21

## 2022-08-16 RX ADMIN — CEFAZOLIN SODIUM 2000 MG: 2 SOLUTION INTRAVENOUS at 15:33

## 2022-08-16 RX ADMIN — GABAPENTIN 100 MG: 100 CAPSULE ORAL at 08:52

## 2022-08-16 RX ADMIN — GABAPENTIN 100 MG: 100 CAPSULE ORAL at 15:31

## 2022-08-16 NOTE — ASSESSMENT & PLAN NOTE
Hx of heroin injected one week ago into right wrist  Hx of opioid abuse (~6 bags/day)  Toxicology on Consult - Patient should go on Suboxone when he's willing (Cannot while on pain medicine)   COWS-4 (8/9)    -Patient Due for Pickup (8/17) 10:00am to go to the [facility]  -Patient started on Suboxone (8/11) 8mg BID

## 2022-08-16 NOTE — PROGRESS NOTES
Progress Note - Infectious Disease   Kennedi Kumar 62 y o  male MRN: [id number]  Unit/Bed#: CW2 213-01 Encounter: [id number]      Impression/Plan:  1   MSSA bacteremia   Due to #2   No other appreciable source   Clinically stable without formal sepsis criteria   Repeat blood cultures 8/9, TTE negative  Rec:  · Continue cefazolin for 4 weeks IV antibiotics through 9/2/22  · Check weekly CBC-diff, Cr while on IV antibiotics  · [facility] planning for [facility]  · Outpatient follow-up with ID 2 weeks after D/C  · D/C PICC after last dose IV antibiotics     2   Right wrist septic arthritis   Likely due to IVDU   Status post I&D 8/6/22   Intraoperative findings notable for purulence in the joint space   OR cultures with 3+ MSSA   Doubt significance of single colony of coagulase-negative Staph  Rec:  · Continue cefazolin as above  · Serial exams     3   OUD   IV heroin   Risk factor for infection as above   Uses 6 bags/day   Withdrawal on admission, now resolved   HIV, HCV negative  Rec:  · Suboxone per Toxicology  · Possible transition to rehab via [facility] tomorrow  · CRS follow-up ongoing  · Not a candidate for home IV antibiotics     4   Poorly controlled DM   Recent A1c 4/2022 8  3   Risk factor for infection as above      5   Positive HCV antibody   Likely represents prior exposure   No evidence for chronic infection as HCV RNA negative  Discussed the above management plan with the primary service, case management, and substance use disorder coordinator    I spent 35 minutes on the unit floor of which 20 minutes was in counseling/coordination of care as outlined in my note including continuing arrangements for outpatient intravenous antibiotics, laboratory follow-up, and office follow-up    Antibiotics:  Cefazolin 10  Antibiotics 11    Subjective:  Patient has no fever, chills, sweats; no nausea, vomiting, diarrhea; no cough, shortness of breath; No new symptoms  No increased pain    He is anxious for discharge    Objective:  Vitals:  Temp:  [98 °F (36 7 °C)-98 2 °F (36 8 °C)] 98 °F (36 7 °C)  Resp:  [18] 18  BP: (109-145)/(68-84) 138/81  Temp (24hrs), Av 1 °F (36 7 °C), Min:98 °F (36 7 °C), Max:98 2 °F (36 8 °C)  Current: Temperature: 98 °F (36 7 °C)    Physical Exam:   General Appearance:  Alert, interactive, nontoxic, no acute distress  Throat: Oropharynx moist without lesions  Lungs:   Clear to auscultation bilaterally; no wheezes, rhonchi or rales; respirations unlabored   Heart:  RRR; no murmur, rub or gallop   Abdomen:   Soft, non-tender, non-distended, positive bowel sounds  Extremities: No clubbing, cyanosis  Right hand dressed with a dry dressing in place and a splint in place  Left upper extremity PICC line in place without erythema or drainage   Skin: No new rashes or lesions  No draining wounds noted  Labs, Imaging, & Other studies:   All pertinent labs and imaging studies were personally reviewed  Results from last 7 days   Lab Units 08/15/22  0506 22  0626 22  0506   WBC Thousand/uL 12 32* 13 38* 13 99*   HEMOGLOBIN g/dL 12 5 13 6 14 8   PLATELETS Thousands/uL 380 383 386     Results from last 7 days   Lab Units 22  0506 08/10/22  0514 22  0925   SODIUM mmol/L 132* 134* 132*   POTASSIUM mmol/L 3 9 3 8 3 9   CHLORIDE mmol/L 105 104 103   CO2 mmol/L 23 25 28   BUN mg/dL 19 20 20   CREATININE mg/dL 0 85 1 00 1 03   EGFR ml/min/1 73sq m 96 83 80   CALCIUM mg/dL 8 9 9 1 9 1     Results from last 7 days   Lab Units 22  0938   BLOOD CULTURE  No Growth After 5 Days  No Growth After 5 Days

## 2022-08-16 NOTE — PLAN OF CARE
Problem: PAIN - ADULT  Goal: Verbalizes/displays adequate comfort level or baseline comfort level  Description: Interventions:  - Encourage patient to monitor pain and request assistance  - Assess pain using appropriate pain scale  - Administer analgesics based on type and severity of pain and evaluate response  - Implement non-pharmacological measures as appropriate and evaluate response  - Consider cultural and social influences on pain and pain management  - Notify physician/advanced practitioner if interventions unsuccessful or patient reports new pain  Outcome: Progressing     Problem: INFECTION - ADULT  Goal: Absence or prevention of progression during hospitalization  Description: INTERVENTIONS:  - Assess and monitor for signs and symptoms of infection  - Monitor lab/diagnostic results  - Monitor all insertion sites, i e  indwelling lines, tubes, and drains  - Monitor endotracheal if appropriate and nasal secretions for changes in amount and color  - [place] appropriate cooling/warming therapies per order  - Administer medications as ordered  - Instruct and encourage patient and family to use good hand hygiene technique  - Identify and instruct in appropriate isolation precautions for identified infection/condition  Outcome: Progressing     Problem: INFECTION - ADULT  Goal: Absence of fever/infection during neutropenic period  Description: INTERVENTIONS:  - Monitor WBC    Outcome: Progressing       Problem: DISCHARGE PLANNING  Goal: Discharge to home or other facility with appropriate resources  Description: INTERVENTIONS:  - Identify barriers to discharge w/patient and caregiver  - Arrange for needed discharge resources and transportation as appropriate  - Identify discharge learning needs (meds, wound care, etc )  - Arrange for interpretive services to assist at discharge as needed  - Refer to Case Management Department for coordinating discharge planning if the patient needs post-hospital services based on physician/advanced practitioner order or complex needs related to functional status, cognitive ability, or social support system  Outcome: Progressing

## 2022-08-16 NOTE — CASE MANAGEMENT
Case Management Discharge Planning Note    Patient name Shalonda Cap  Location CW2 213/CW2 213-01 MRN [id number]  : 1965 Date 2022       Current Admission Date: 2022  Current Admission Diagnosis:Right wrist pain   Patient Active Problem List    Diagnosis Date Noted    Right wrist pain 2022    Opioid use disorder 2021    Class 1 obesity due to excess calories without serious comorbidity with body mass index (BMI) of 32 0 to 32 9 in adult 2020    Encounter for counseling for tobacco use disorder 2020    Encounter for screening colonoscopy 2020    Other hyperlipidemia 2020    Type 2 diabetes mellitus with complication, without long-term current use of insulin ([facility] ) 2019      LOS (days): 10  Geometric Mean LOS (GMLOS) (days):   Days to GMLOS:     OBJECTIVE:  Risk of Unplanned Readmission Score: 13 64         Current admission status: Inpatient   Preferred Pharmacy:   [facility] [place] - [facility] [address]  [address] [place] [address]  Phone: [phone] Fax: [phone]    Primary Care Provider: Princess Aguero MD    Primary Insurance: [address] [place] [address]  Secondary Insurance:     DISCHARGE DETAILS:    Pt remains onboard for D&SA treatment at [facility]; He will be leaving tomorrow morning at 10am -- Transportation will be provided by the facility  CM spoke with pt at bedside to discuss same and he is in agreement  Pt also verbalized understanding that he will need to push his own medications when a nurse is not present for assistance -- Pt is agreeable and denies questions or concerns regarding same  Pt will be given a removable brace to assist in pushing his medications at discharge (pt's R hand currently has a splint), per ortho  Pt also informed and in agreement      [facility] remains onboard to deliver the supplies before 10am tomorrow -- No other medications needed, per Dr Tre Mendoza of [facility]; [facility] is aware to be present for his 1pm dose at [facility]  Pre-cert was obtained via [address] [place] [address] Avenue by this CM  Pt is authorized for 16 days, which can be extended as needed  CM was provided the auth by Dominique Sanders  at [facility] ([phone])  [facility] can [employer] upon his arrival to obtain the formal auth number for their records  No further CM needs  CM will continue to follow      Treatment Team Recommendation: Substance Abuse Treatment  Discharge Destination Plan[de-identified] Substance Abuse Treatment

## 2022-08-16 NOTE — ASSESSMENT & PLAN NOTE
Presented to [facility] ED, R wrist swelling and erythema x1 day, painful  VSS normal, no SIRS met besides WBC 18k  CRP >90, ESR 78, wrist aspiration gram stain 4+ polys and 2+ GPCs, HbA1c 8 3  Did receive 1 dose vancomycin and cefepime in ED  Procal negative 8/5 BCx positive for MSSA   - S/P I&D/[facility] (8/6)      - Ortho Following OP    - ID consulted: Continue Cefazolin 2mg Q8 (Day #10, Abx Day #11)   - monitor vital signs

## 2022-08-16 NOTE — ASSESSMENT & PLAN NOTE
Lab Results   Component Value Date    HGBA1C 8 3 (A) 04/28/2022       Recent Labs     08/15/22  1122 08/15/22  1639 08/15/22  2111 08/16/22  0607   POCGLU 286* 188* 228* 166*       Blood Sugar Average: Last 72 hrs:  (P) 799 [id number]     PTA meds: Lantus 15 units HS, Metformin 1000 mg BID    - Hold both for SSI

## 2022-08-16 NOTE — DISCHARGE INSTRUCTIONS
CBC with diff and creatinine weekly while on cefazolin    Remove PICC line after last dose of cefazolin 9/2/2022    Discharge Instructions - Orthopedics  Justina Hernandez 62 y o  male MRN: [id number]  Unit/Bed#: CW2 213-01    Weight Bearing Status:                                           Non-weight bearing right upper extremity in removable wrist brace  Pain:  Continue analgesics as directed    Dressing Instructions:   Please keep clean, dry and intact until follow up     Appt Instructions: If you do not have your appointment, please call the clinic at [phone] t  Otherwise followup as scheduled     Contact the office sooner if you experience any increased numbness/tingling in the extremities

## 2022-08-16 NOTE — PROGRESS NOTES
[address] [facility]  Progress Note - Luna Virk 1965, 62 y o  male MRN: [id number]  Unit/Bed#: CW2 213-01 Encounter: [id number]  Primary Care Provider: Osman Nguyen MD   Date and time admitted to hospital: 8/6/2022 12:22 PM    * Right wrist pain  Assessment & Plan  Presented to [facility] ED, R wrist swelling and erythema x1 day, painful  VSS normal, no SIRS met besides WBC 18k  CRP >90, ESR 78, wrist aspiration gram stain 4+ polys and 2+ GPCs, HbA1c 8 3  Did receive 1 dose vancomycin and cefepime in ED  Procal negative 8/5 BCx positive for MSSA   - S/P I&D/[place] (8/6)  - Ortho Following OP    - ID consulted: Continue Cefazolin 2mg Q8 (Day #10, Abx Day #11)   - monitor vital signs    Opioid use disorder  Assessment & Plan  Hx of heroin injected one week ago into right wrist  Hx of opioid abuse (~6 bags/day)  Toxicology on Consult - Patient should go on Suboxone when he's willing (Cannot while on pain medicine)  COWS-4 (8/9)    -Patient Due for Pickup (8/17) 10:00am to go to the [facility]  -Patient started on Suboxone (8/11) 8mg BID         Other hyperlipidemia  Assessment & Plan  Stable  Continue atorvastatin 20 mg daily              Type 2 diabetes mellitus with complication, without long-term current use of insulin [facility])  Assessment & Plan  Lab Results   Component Value Date    HGBA1C 8 3 (A) 04/28/2022       Recent Labs     08/15/22  1122 08/15/22  1639 08/15/22  2111 08/16/22  0607   POCGLU 286* 188* 228* 166*       Blood Sugar Average: Last 72 hrs:  (P) 029 [id number]     PTA meds: Lantus 15 units HS, Metformin 1000 mg BID    - Hold both for SSI              PPX: Ambulation  Diet: Regular House  Code Status: Full  Dispo: [facility]  Plan D/W Dr Earmon Cogan and [facility] Team    Subjective:   Pt seen and assessed at bedside this AM, no acute complaints, No ON events  Patient due for pickup with [employer] tomorrow morning, patient is ready to go   No immediate concerns at this time  Denies headache, fever, chills, SOB, chest pain, or difficulty with bowel movements or urination  Objective:     Vitals: Blood pressure 138/81, pulse 64, temperature 98 °F (36 7 °C), resp  rate 18, height 5' 7" (1 702 m), weight 81 6 kg (180 lb), SpO2 95 %  ,Body mass index is 28 19 kg/m²  Intake/Output Summary (Last 24 hours) at 8/16/2022 0935  Last data filed at 8/15/2022 1900  Gross per 24 hour   Intake 480 ml   Output --   Net 480 ml       Physical Exam:   Physical Exam  Constitutional:       General: He is not in acute distress  HENT:      Head: Normocephalic and atraumatic  Right Ear: External ear normal       Left Ear: External ear normal       Nose: Nose normal       Mouth/Throat:      Pharynx: Oropharynx is clear  Eyes:      Conjunctiva/sclera: Conjunctivae normal    Cardiovascular:      Rate and Rhythm: Normal rate and regular rhythm  Pulses: Normal pulses  Heart sounds: Normal heart sounds  Pulmonary:      Effort: Pulmonary effort is normal       Breath sounds: Normal breath sounds  Abdominal:      General: Bowel sounds are normal       Palpations: Abdomen is soft  Musculoskeletal:      Comments: Right wrist splint in place, C/D/I   Neurological:      General: No focal deficit present  Mental Status: He is alert and oriented to person, place, and time  Psychiatric:         Mood and Affect: Mood normal        Invasive Devices  Report    Peripherally Inserted Central Catheter Line  Duration           PICC Line 02/32/40 Left Basilic 3 days                         Lab and other studies:  I have personally reviewed pertinent reports  No results displayed because visit has over 200 results            Recent Results (from the past 24 hour(s))   Fingerstick Glucose (POCT)    Collection Time: 08/15/22 11:22 AM   Result Value Ref Range    POC Glucose 286 (H) 65 - 140 mg/dl   Fingerstick Glucose (POCT)    Collection Time: 08/15/22  4:39 PM Result Value Ref Range    POC Glucose 188 (H) 65 - 140 mg/dl   Fingerstick Glucose (POCT)    Collection Time: 08/15/22  9:11 PM   Result Value Ref Range    POC Glucose 228 (H) 65 - 140 mg/dl   Fingerstick Glucose (POCT)    Collection Time: 08/16/22  6:07 AM   Result Value Ref Range    POC Glucose 166 (H) 65 - 140 mg/dl     Blood Culture:   Lab Results   Component Value Date    BLOODCX No Growth After 5 Days  08/09/2022    BLOODCX No Growth After 5 Days   08/09/2022   ,   Urinalysis:   Lab Results   Component Value Date    COLORU Yellow 08/31/2015    CLARITYU Clear 08/31/2015    SPECGRAV 1 020 08/31/2015    PHUR 5 5 08/31/2015    LEUKOCYTESUR Negative 08/31/2015    NITRITE Negative 08/31/2015    PROTEINUA Trace (A) 08/31/2015    GLUCOSEU 500 (1/2%) (A) 08/31/2015    KETONESU Negative 08/31/2015    BILIRUBINUR Negative 08/31/2015    BLOODU Negative 08/31/2015   ,   Urine Culture: No results found for: URINECX,   Wound Culure: No results found for: WOUNDCULT      Imaging:    No orders to display     VTE Mechanical Prophylaxis: Ambulate PT    Current Facility-Administered Medications   Medication Dose Route Frequency    acetaminophen (TYLENOL) tablet 975 mg  975 mg Oral Q8H [address]    atorvastatin (LIPITOR) tablet 20 mg  20 mg Oral HS    buprenorphine-naloxone (Suboxone) film 8 mg  8 mg Sublingual BID    ceFAZolin (ANCEF) IVPB (premix in dextrose) 2,000 mg 50 mL  2,000 mg Intravenous Q8H    cloNIDine (CATAPRES) tablet 0 1 mg  0 1 mg Oral Q8H [address]    gabapentin (NEURONTIN) capsule 100 mg  100 mg Oral TID    insulin lispro (HumaLOG) 100 units/mL subcutaneous injection 1-6 Units  1-6 Units Subcutaneous TID AC    lisinopril (ZESTRIL) tablet 2 5 mg  2 5 mg Oral Daily    ondansetron (ZOFRAN) injection 4 mg  4 mg Intravenous Q6H PRN    ondansetron (ZOFRAN) injection 4 mg  4 mg Intravenous TID       Alissa Kovacs MD  Family Medicine Resident PGY1

## 2022-08-16 NOTE — UTILIZATION REVIEW
Continued Stay Review    Date: 8/16/22                          Current Patient Class: inpatient  Current Level of Care: med surg    HPI:57 y o  male initially admitted on 8/6/22  Right wrist pain     Assessment/Plan:  No new concerns or complaints  Right wrist splint in place, CDI  PICC line remains  S/p ID and washout on 8/16, ortho following OP, ID consulted and recommends continue IV ABX until 8/17, monitor VS   Suboxone started on 8/11  Continue home meds, hold SSI      Vital Signs:   Date/Time Temp Resp BP MAP (mmHg) SpO2 O2 Device   08/16/22 07:33:10 98 °F (36 7 °C) -- 138/81 100 -- --   08/16/22 06:03:08 -- -- 145/84 104 -- --   08/15/22 23:23:10 -- 18 120/68 85 -- --   08/15/22 21:21:05 -- -- 130/76 94 -- --   08/15/22 15:36:30 98 2 °F (36 8 °C) 18 125/75 92 -- --   08/15/22 1532 -- -- 125/75 -- -- --   08/15/22 09:02:42 -- -- 111/68 82 -- --   08/15/22 0859 -- -- 109/68 -- -- --   08/15/22 07:51:47 98 °F (36 7 °C) 16 114/61 79 95 % None (Room air)   08/14/22 23:16:12 -- -- 121/74 90 -- --   08/14/22 2100 -- -- -- -- 97 % None (Room air)   08/14/22 15:30:21 97 2 °F (36 2 °C) Abnormal  17 117/76 90 -- --   08/14/22 0830 -- -- -- -- -- None (Room air)   08/14/22 07:14:27 98 1 °F (36 7 °C) 18 119/75 90 -- --     Pertinent Labs/Diagnostic Results:       Results from last 7 days   Lab Units 08/15/22  0506 08/13/22  0626 08/12/22  0506 08/11/22  0557 08/10/22  0514   WBC Thousand/uL 12 32* 13 38* 13 99* 14 86* 15 20*   HEMOGLOBIN g/dL 12 5 13 6 14 8 14 6 14 4   HEMATOCRIT % 38 5 40 6 43 5 43 4 42 6   PLATELETS Thousands/uL 380 383 386 423* 406*   NEUTROS ABS Thousands/µL 7 32 8 81*  --  10 68* 11 29*     Results from last 7 days   Lab Units 08/12/22  0506 08/10/22  0514   SODIUM mmol/L 132* 134*   POTASSIUM mmol/L 3 9 3 8   CHLORIDE mmol/L 105 104   CO2 mmol/L 23 25   ANION GAP mmol/L 4 5   BUN mg/dL 19 20   CREATININE mg/dL 0 85 1 00   EGFR ml/min/1 73sq m 96 83   CALCIUM mg/dL 8 9 9 1     Results from last 7 days   Lab Units 08/16/22  0607 08/15/22  2111 08/15/22  1639 08/15/22  1122 08/15/22  0559 08/14/22  2103 08/14/22  1606 08/14/22  1053 08/14/22  0624 08/13/22  2030 08/13/22  1607 08/13/22  1052   POC GLUCOSE mg/dl 166* 228* 188* 286* 135 218* 225* 223* 129 189* 218* 146*     Results from last 7 days   Lab Units 08/12/22  0506 08/10/22  0514   GLUCOSE RANDOM mg/dL 123 194*         Medications:   Scheduled Medications:  acetaminophen, 975 mg, Oral, Q8H GUILLE  atorvastatin, 20 mg, Oral, HS  buprenorphine-naloxone, 8 mg, Sublingual, BID  cefazolin, 2,000 mg, Intravenous, Q8H  cloNIDine, 0 1 mg, Oral, Q8H GUILLE  gabapentin, 100 mg, Oral, TID  insulin lispro, 1-6 Units, Subcutaneous, TID AC  lisinopril, 2 5 mg, Oral, Daily  ondansetron, 4 mg, Intravenous, TID      Continuous IV Infusions: none     PRN Meds:  ondansetron, 4 mg, Intravenous, Q6H PRN        Discharge Plan: Patient Due for Pickup (8/17) 10:00am to go to the [facility] Utilization Review Department  ATTENTION: Please call with any questions or concerns to [phone] and carefully listen to the prompts so that you are directed to the right person  All voicemails are confidential   Lexi Grand all requests for admission clinical reviews, approved or denied determinations and any other requests to dedicated fax number below belonging to the campus where the patient is receiving treatment   List of dedicated fax numbers for the Facilities:  [address] DENIALS (Administrative/Medical Necessity) [phone]   [address] (Maternity/NICU/Pediatrics) [phone]   [address] [place] [address] [address] [facility] Antwon Herrera [address] [phone]

## 2022-08-17 ENCOUNTER — HOME CARE VISIT (OUTPATIENT)
Dept: HOME HEALTH SERVICES | Facility: HOME HEALTHCARE | Age: 57
End: 2022-08-17
Payer: COMMERCIAL

## 2022-08-17 ENCOUNTER — TELEPHONE (OUTPATIENT)
Dept: FAMILY MEDICINE CLINIC | Facility: CLINIC | Age: 57
End: 2022-08-17

## 2022-08-17 VITALS
OXYGEN SATURATION: 97 % | DIASTOLIC BLOOD PRESSURE: 78 MMHG | SYSTOLIC BLOOD PRESSURE: 122 MMHG | HEART RATE: 82 BPM | TEMPERATURE: 98.3 F

## 2022-08-17 VITALS
OXYGEN SATURATION: 95 % | SYSTOLIC BLOOD PRESSURE: 125 MMHG | HEART RATE: 64 BPM | TEMPERATURE: 97.3 F | WEIGHT: 180 LBS | HEIGHT: 67 IN | DIASTOLIC BLOOD PRESSURE: 76 MMHG | RESPIRATION RATE: 18 BRPM | BODY MASS INDEX: 28.25 KG/M2

## 2022-08-17 LAB
ANION GAP SERPL CALCULATED.3IONS-SCNC: 3 MMOL/L (ref 4–13)
BASOPHILS # BLD AUTO: 0.07 THOUSANDS/ΜL (ref 0–0.1)
BASOPHILS NFR BLD AUTO: 1 % (ref 0–1)
BUN SERPL-MCNC: 13 MG/DL (ref 5–25)
CALCIUM SERPL-MCNC: 9.7 MG/DL (ref 8.3–10.1)
CHLORIDE SERPL-SCNC: 101 MMOL/L (ref 96–108)
CO2 SERPL-SCNC: 30 MMOL/L (ref 21–32)
CREAT SERPL-MCNC: 0.75 MG/DL (ref 0.6–1.3)
EOSINOPHIL # BLD AUTO: 0.45 THOUSAND/ΜL (ref 0–0.61)
EOSINOPHIL NFR BLD AUTO: 3 % (ref 0–6)
ERYTHROCYTE [DISTWIDTH] IN BLOOD BY AUTOMATED COUNT: 13 % (ref 11.6–15.1)
GFR SERPL CREATININE-BSD FRML MDRD: 101 ML/MIN/1.73SQ M
GLUCOSE SERPL-MCNC: 161 MG/DL (ref 65–140)
GLUCOSE SERPL-MCNC: 172 MG/DL (ref 65–140)
HCT VFR BLD AUTO: 39.8 % (ref 36.5–49.3)
HGB BLD-MCNC: 13.2 G/DL (ref 12–17)
IMM GRANULOCYTES # BLD AUTO: 0.31 THOUSAND/UL (ref 0–0.2)
IMM GRANULOCYTES NFR BLD AUTO: 2 % (ref 0–2)
LYMPHOCYTES # BLD AUTO: 3.16 THOUSANDS/ΜL (ref 0.6–4.47)
LYMPHOCYTES NFR BLD AUTO: 24 % (ref 14–44)
MCH RBC QN AUTO: 28.8 PG (ref 26.8–34.3)
MCHC RBC AUTO-ENTMCNC: 33.2 G/DL (ref 31.4–37.4)
MCV RBC AUTO: 87 FL (ref 82–98)
MONOCYTES # BLD AUTO: 0.84 THOUSAND/ΜL (ref 0.17–1.22)
MONOCYTES NFR BLD AUTO: 6 % (ref 4–12)
NEUTROPHILS # BLD AUTO: 8.4 THOUSANDS/ΜL (ref 1.85–7.62)
NEUTS SEG NFR BLD AUTO: 64 % (ref 43–75)
NRBC BLD AUTO-RTO: 0 /100 WBCS
PLATELET # BLD AUTO: 388 THOUSANDS/UL (ref 149–390)
PMV BLD AUTO: 8.9 FL (ref 8.9–12.7)
POTASSIUM SERPL-SCNC: 4.7 MMOL/L (ref 3.5–5.3)
RBC # BLD AUTO: 4.58 MILLION/UL (ref 3.88–5.62)
SODIUM SERPL-SCNC: 134 MMOL/L (ref 135–147)
WBC # BLD AUTO: 13.23 THOUSAND/UL (ref 4.31–10.16)

## 2022-08-17 PROCEDURE — 400013 VN SOC

## 2022-08-17 PROCEDURE — 82948 REAGENT STRIP/BLOOD GLUCOSE: CPT

## 2022-08-17 PROCEDURE — G0299 HHS/HOSPICE OF RN EA 15 MIN: HCPCS

## 2022-08-17 PROCEDURE — 80048 BASIC METABOLIC PNL TOTAL CA: CPT | Performed by: FAMILY MEDICINE

## 2022-08-17 PROCEDURE — 85025 COMPLETE CBC W/AUTO DIFF WBC: CPT | Performed by: FAMILY MEDICINE

## 2022-08-17 PROCEDURE — 99238 HOSP IP/OBS DSCHRG MGMT 30/<: CPT | Performed by: FAMILY MEDICINE

## 2022-08-17 RX ORDER — CEFAZOLIN SODIUM 2 G/50ML
2000 SOLUTION INTRAVENOUS EVERY 8 HOURS
Qty: 2550 ML | Refills: 0 | Status: SHIPPED | OUTPATIENT
Start: 2022-08-17 | End: 2022-09-03

## 2022-08-17 RX ADMIN — BUPRENORPHINE AND NALOXONE 8 MG: 8; 2 FILM BUCCAL; SUBLINGUAL at 08:42

## 2022-08-17 RX ADMIN — ACETAMINOPHEN 975 MG: 325 TABLET ORAL at 06:56

## 2022-08-17 RX ADMIN — CEFAZOLIN SODIUM 2000 MG: 2 SOLUTION INTRAVENOUS at 06:55

## 2022-08-17 RX ADMIN — LISINOPRIL 2.5 MG: 2.5 TABLET ORAL at 08:42

## 2022-08-17 RX ADMIN — CLONIDINE HYDROCHLORIDE 0.1 MG: 0.1 TABLET ORAL at 06:56

## 2022-08-17 RX ADMIN — INSULIN LISPRO 1 UNITS: 100 INJECTION, SOLUTION INTRAVENOUS; SUBCUTANEOUS at 06:57

## 2022-08-17 RX ADMIN — GABAPENTIN 100 MG: 100 CAPSULE ORAL at 08:42

## 2022-08-17 NOTE — NURSING NOTE
AVS reviewed with PT  [facility] and attempted to give report  Medication delivered from [facility]  Retrieved medication PT brought from home from pharmacy  All medications given to transporter from [facility]   All other belongings sent with PT

## 2022-08-17 NOTE — CERTIFIED RECOVERY SPECIALIST
Certified  Note    Patient name: Kennedi Kumar  [facility]   [place]: [address] [place] [address]  Attending:  Greg Valles MD MRN [id number]  : 1965  Age: 62 y o  Sex: male Date 2022         Substance Use History:     Social History     Substance and Sexual Activity   Alcohol Use Not Currently        Social History     Substance and Sexual Activity   Drug Use Not Currently    Comment: heroin hx, clean x 1 year       Admission Information  Substances Used at This Admission[de-identified] Opiates  Readmission in Last 30 Days?: No  Encounter Type[de-identified] Patient Face-to-Face    Recovery Support Plan  Declined All Services?: No  Medication Assisted Treatment[de-identified] Yes  Medication Therapy Type[de-identified] Induction  Medication Type[de-identified] Suboxone/Buprenorphine  Agreeable to Warm Handoff?: Yes (STAR program)  Is Patient Accepting [facility]IN Treatment Services?: Yes  Was Referral Made to [facility]?: No  Was Narcan Provided at Discharge?: No  Plan Discussed With Treatment Team[de-identified] Yes  Plan Discussed With[de-identified] Provider    Referral to Recovery Supports:  Jaylyn Pan[de-identified] No  Community Based CRS[de-identified] No  Case Management[de-identified] No  Direct Access to [facility]IN Treatment?: Yes  Resource Guide Given?: Yes  Follow Up With Patient[de-identified]  (Ongoing until discharge)  Family / Other Support[de-identified] No  Referral for Community Physical Health[de-identified] No  Referral for Community Mental Health[de-identified] No'    CRS check in with patient prior to discharge  Patient reports having the majority of the clothes that he needs and will contact his roommate for additional items  Patient expressed gratitude and appreciation for all of the hard work done to get him to this point of early discharge from hospital and understanding his needs  CRS provided patient with additional cards to provide to his clinical team at  for outreach as needed    CRS provided and explained resource for [facility] office where his Suboxone can be transitioned to prior to discharge from treatment  CRS will continue following patient while in [facility]

## 2022-08-17 NOTE — RESTORATIVE TECHNICIAN NOTE
Restorative Technician Note      Patient Name: Liudmila Hou     Restorative Tech Visit Date: 8/17/2022  Note Type: Bracing, Initial consult  Patient Position Upon Consult: Seated edge of bed  Brace Applied: Wrist Reyes Martha (right)  Additional Brace Ordered: No  Patient Position When Brace Applied: Seated  Patient Position at End of Consult: Seated edge of bed  Nurse Communication: Nurse aware of consult, application of brace        Reached out to the ortho team who came to remove pt's ACE wrap and splint  Brace then donned to pt's right wrist  Pt states he had a brace in the past and is familiar with itl     Please call Mobility Coordinator at ext  [phone] or on [place] text " SLB-PT-Restorative Tech" role in regards to bracing instruction and/or adjustment      [employer],

## 2022-08-17 NOTE — TELEPHONE ENCOUNTER
dave called about medication that was ordered by Dr Charlene Walker in the hospital     The cefazolin (ANCEF) cannot be ordered by a retail pharmacy  It needs to be ordered by a specialty pharmacy and the pharmacy is out of [place]      The phone number that dave gave me for the pharmacy in [place] to contact them is    [phone]

## 2022-08-18 ENCOUNTER — HOME CARE VISIT (OUTPATIENT)
Dept: HOME HEALTH SERVICES | Facility: HOME HEALTHCARE | Age: 57
End: 2022-08-18
Payer: COMMERCIAL

## 2022-08-18 DIAGNOSIS — R78.81 MSSA BACTEREMIA: Primary | ICD-10-CM

## 2022-08-18 DIAGNOSIS — B95.61 MSSA BACTEREMIA: Primary | ICD-10-CM

## 2022-08-18 NOTE — UTILIZATION REVIEW
Notification of Discharge   This is a Notification of Discharge from our facility [address]  Please be advised that this patient has been discharge from our facility  Below you will find the admission and discharge date and time including the patients disposition  UTILIZATION REVIEW CONTACT:  Elizabeth Wright  Utilization   Network Utilization Review Department  Phone: [phone] x carefully listen to the prompts  All voicemails are confidential   Email: [email or web address]  [email or web address]     PHYSICIAN ADVISORY SERVICES:  FOR EMMZ-DC-XQOD REVIEW - MEDICAL NECESSITY DENIAL  Phone: [phone]  Fax: [phone]  Email: [email or web address]     PRESENTATION DATE: 8/6/2022 12:22 PM  OBERVATION ADMISSION DATE:   INPATIENT ADMISSION DATE: 8/6/22  5:20 PM   DISCHARGE DATE: 8/17/2022 10:00 AM  DISPOSITION: Discharge/Transfer to not defined Healthcare Facility Discharge/Transfer to not defined Healthcare Facility      IMPORTANT INFORMATION:  Send all requests for admission clinical reviews, approved or denied determinations and any other requests to dedicated fax number below belonging to the campus where the patient is receiving treatment   List of dedicated fax numbers:  [address] [place] [address] DENIALS (Administrative/Medical Necessity) [phone]   [address] [facility] (Maternity/NICU/Pediatrics) [phone]   [facility] [phone]   [address] [place] [address] [phone]   [address] [place] [address] [phone]   2000 [facility] [address] [place] [address],[address] [phone]   [facility]  [phone]   [address] [place] [address], [facility]  2401 [facility] 1000 [address] [facility] [phone]

## 2022-08-20 ENCOUNTER — HOME CARE VISIT (OUTPATIENT)
Dept: HOME HEALTH SERVICES | Facility: HOME HEALTHCARE | Age: 57
End: 2022-08-20
Payer: COMMERCIAL

## 2022-08-21 ENCOUNTER — TELEPHONE (OUTPATIENT)
Dept: OBGYN CLINIC | Facility: CLINIC | Age: 57
End: 2022-08-21

## 2022-08-21 NOTE — TELEPHONE ENCOUNTER
----- Message from Nina Flood sent at 8/17/2022  9:41 AM EDT -----    ----- Message -----  From: Daniel Galeas MD  Sent: 8/17/2022   9:25 AM EDT  To: [facility] Clinical    Please schedule appt with Dr Ana Villagomez in 1 wk F/U wrist septic arthritis

## 2022-08-22 ENCOUNTER — HOME CARE VISIT (OUTPATIENT)
Dept: HOME HEALTH SERVICES | Facility: HOME HEALTHCARE | Age: 57
End: 2022-08-22
Payer: COMMERCIAL

## 2022-08-22 ENCOUNTER — APPOINTMENT (OUTPATIENT)
Dept: LAB | Facility: HOSPITAL | Age: 57
End: 2022-08-22
Attending: INTERNAL MEDICINE
Payer: COMMERCIAL

## 2022-08-22 VITALS
HEART RATE: 83 BPM | SYSTOLIC BLOOD PRESSURE: 126 MMHG | DIASTOLIC BLOOD PRESSURE: 74 MMHG | TEMPERATURE: 98.2 F | OXYGEN SATURATION: 98 %

## 2022-08-22 DIAGNOSIS — R78.81 BACTEREMIA: ICD-10-CM

## 2022-08-22 LAB
BASOPHILS # BLD AUTO: 0.06 THOUSANDS/ΜL (ref 0–0.1)
BASOPHILS NFR BLD AUTO: 0 % (ref 0–1)
CREAT SERPL-MCNC: 1.01 MG/DL (ref 0.6–1.3)
EOSINOPHIL # BLD AUTO: 0.3 THOUSAND/ΜL (ref 0–0.61)
EOSINOPHIL NFR BLD AUTO: 2 % (ref 0–6)
ERYTHROCYTE [DISTWIDTH] IN BLOOD BY AUTOMATED COUNT: 13 % (ref 11.6–15.1)
GFR SERPL CREATININE-BSD FRML MDRD: 82 ML/MIN/1.73SQ M
HCT VFR BLD AUTO: 39.6 % (ref 36.5–49.3)
HGB BLD-MCNC: 13 G/DL (ref 12–17)
IMM GRANULOCYTES # BLD AUTO: 0.16 THOUSAND/UL (ref 0–0.2)
IMM GRANULOCYTES NFR BLD AUTO: 1 % (ref 0–2)
LYMPHOCYTES # BLD AUTO: 2.83 THOUSANDS/ΜL (ref 0.6–4.47)
LYMPHOCYTES NFR BLD AUTO: 21 % (ref 14–44)
MCH RBC QN AUTO: 29.5 PG (ref 26.8–34.3)
MCHC RBC AUTO-ENTMCNC: 32.8 G/DL (ref 31.4–37.4)
MCV RBC AUTO: 90 FL (ref 82–98)
MONOCYTES # BLD AUTO: 0.72 THOUSAND/ΜL (ref 0.17–1.22)
MONOCYTES NFR BLD AUTO: 5 % (ref 4–12)
NEUTROPHILS # BLD AUTO: 9.51 THOUSANDS/ΜL (ref 1.85–7.62)
NEUTS SEG NFR BLD AUTO: 71 % (ref 43–75)
NRBC BLD AUTO-RTO: 0 /100 WBCS
PLATELET # BLD AUTO: 369 THOUSANDS/UL (ref 149–390)
PMV BLD AUTO: 9.6 FL (ref 8.9–12.7)
RBC # BLD AUTO: 4.4 MILLION/UL (ref 3.88–5.62)
WBC # BLD AUTO: 13.58 THOUSAND/UL (ref 4.31–10.16)

## 2022-08-22 PROCEDURE — 85025 COMPLETE CBC W/AUTO DIFF WBC: CPT

## 2022-08-22 PROCEDURE — G0299 HHS/HOSPICE OF RN EA 15 MIN: HCPCS

## 2022-08-22 PROCEDURE — 36415 COLL VENOUS BLD VENIPUNCTURE: CPT

## 2022-08-22 PROCEDURE — 82565 ASSAY OF CREATININE: CPT

## 2022-08-22 RX ADMIN — CEFAZOLIN SODIUM 2000 MG: 2 SOLUTION INTRAVENOUS at 09:12

## 2022-08-23 LAB
MYCOBACTERIUM SPEC CULT: NORMAL
RHODAMINE-AURAMINE STN SPEC: NORMAL

## 2022-08-26 PROCEDURE — G0180 MD CERTIFICATION HHA PATIENT: HCPCS | Performed by: INTERNAL MEDICINE

## 2022-08-27 PROBLEM — B95.61 MSSA BACTEREMIA: Status: ACTIVE | Noted: 2022-08-27

## 2022-08-27 PROBLEM — M00.031: Status: ACTIVE | Noted: 2022-08-27

## 2022-08-27 PROBLEM — R78.81 MSSA BACTEREMIA: Status: ACTIVE | Noted: 2022-08-27

## 2022-08-27 NOTE — PROGRESS NOTES
Outpatient Progress Note - [facility]   Phyllis Chen 62 y o  male MRN: [id number]  Encounter: [id number]    Impression/Plan:  1  MSSA bacteremia  Secondary to R wrist septic arthritis in the setting of IVDU  Patient did have cleared repeat cultures while inpatient  His TTE was negative for valvular vegetations  Patient has a LUE PICC in place and has been undergoing a 6-week course of IV cefazolin  He has been tolerating his antibiotic without difficultly  WBC count from AM lab has trended down to 11 59  Creatinine remained stable on labs from 8/22/2022  I recommend continuing patient on his IV cefazolin for now    -continue IV Cefazolin through 9/2/2022  -continue weekly CBCD and creatinine while on IV antibiotic  -home RN to remove PICC after final dose of IV antibiotic on 9/2/2022  -patient will need to complete surveillance blood cultures 2 weeks after finishing IV antibiotic treatment, should be drawn after 9/16/2022  -return to the outpatient infectious disease office as needed for follow up    2  R wrist septic arthritis  Secondary to IVDU  Patient underwent surgical I&D on 8/6/2022 with purulence noted in joint space  Cultures grew MSSA and a single colony of coag negative staph which is likely insignificant   -antibiotic as above  -continue follow up with orthopedic surgery    3  Type 2 diabetes mellitus with long term insulin use  Unclear if patient has previously been adherent to insulin use  His last HbA1c was 8 3% on 4/28/2022  Elevated blood glucose is risk factor for infection  Recommend tight glycemic control  4  OUD  IV heroin  Patient is on Suboxone and has transitioned to the [facility] program  Recent HIV screen was negative  Hepatitis testing with evidence of past Hep C infection but patient's likely cleared on his own as viral load was negative   -supportive care    5  LUE PICC  Home RN to continue routine exams and care of PICC   PICC to be discontinued by home RN after final dose of IV antibiotic on 9/2/2022   -home RN to continue routine exams and care of PICC  -PICC to be removed by home RN after final dose of IV antibiotic on 9/2/2022    Above plan was discussed in detail with patient in the exam room  Antibiotics:  IV Cefazolin    Subjective:  Patient presents for follow up for MSSA bacteremia/R septic wrist arthritis treatment  Patient has a PICC in place and has been undergoing a 6-week course of IV Cefazolin  He has been tolerating his antibiotic without difficulty  He reports no issues with his PICC  Patient has noticed significant improvement in his R wrist ROM and  strength  Other than peeling skin on the R hand he's had no new changes  He has no fever, chills, sweats, shakes; no nausea, vomiting, abdominal pain, diarrhea, or dysuria; no cough, shortness of breath, or chest pain  No new symptoms  Objective:  Vitals:  Temp 98  HR 81  RR 18  /64    Physical Exam:   General Appearance:  Alert, interactive, nontoxic, no acute distress  He appears comfortable sitting in the exam chair  Throat: Oropharynx moist without lesions  Lungs:   Clear to auscultation bilaterally; no wheezes, rhonchi or rales; respirations unlabored on room air  Heart:  Tachycardic; no murmur, rub or gallop  Abdomen:   Soft, non-tender, non-distended, positive bowel sounds  Extremities: No clubbing or cyanosis, no lower extremity edema  R wrist brace removed for exam, dry steristrips in place over surgical site, no spreading erythema, no active drainage/bleeding/weeping  R hand/wrist with minimal trace swelling and no overlying erythema  R hand/fingers with peeling skin, new skin is heavy epithelium  Skin: No new rashes noted on exposed skin       Labs, Imaging, & Other studies:   All pertinent labs and imaging studies were personally reviewed  Results from last 7 days   Lab Units 08/22/22  0900   WBC Thousand/uL 13 58*   HEMOGLOBIN g/dL 13 0   PLATELETS Thousands/uL 369     Results from last 7 days   Lab Units 08/22/22  0900   CREATININE mg/dL 1 01   EGFR ml/min/1 73sq m 82

## 2022-08-27 NOTE — PATIENT INSTRUCTIONS
Continue IV cefazolin through 9/2/2022  Continue weekly CBCD and creatinine while on IV antibiotic  Home RN to remove patient's PICC after final dose of IV antibiotic on 9/2/2022  Patient to complete surveillance blood cultures 2 weeks after finishing IV antibiotic treatment, should be drawn after 9/16/2022, paper lab script sent back with patient to [facility]  Return to the outpatient infectious disease office as needed for follow up

## 2022-08-29 ENCOUNTER — HOME CARE VISIT (OUTPATIENT)
Dept: HOME HEALTH SERVICES | Facility: HOME HEALTHCARE | Age: 57
End: 2022-08-29
Payer: COMMERCIAL

## 2022-08-29 ENCOUNTER — OFFICE VISIT (OUTPATIENT)
Dept: INFECTIOUS DISEASES | Facility: CLINIC | Age: 57
End: 2022-08-29
Payer: COMMERCIAL

## 2022-08-29 ENCOUNTER — APPOINTMENT (OUTPATIENT)
Dept: LAB | Facility: HOSPITAL | Age: 57
End: 2022-08-29
Attending: INTERNAL MEDICINE
Payer: COMMERCIAL

## 2022-08-29 VITALS
SYSTOLIC BLOOD PRESSURE: 126 MMHG | RESPIRATION RATE: 18 BRPM | BODY MASS INDEX: 28.25 KG/M2 | WEIGHT: 180 LBS | TEMPERATURE: 98 F | OXYGEN SATURATION: 95 % | HEIGHT: 67 IN | DIASTOLIC BLOOD PRESSURE: 64 MMHG | HEART RATE: 81 BPM

## 2022-08-29 VITALS
DIASTOLIC BLOOD PRESSURE: 70 MMHG | TEMPERATURE: 98.3 F | OXYGEN SATURATION: 97 % | SYSTOLIC BLOOD PRESSURE: 126 MMHG | HEART RATE: 78 BPM

## 2022-08-29 DIAGNOSIS — F11.90 OPIOID USE DISORDER: ICD-10-CM

## 2022-08-29 DIAGNOSIS — R78.81 MSSA BACTEREMIA: Primary | ICD-10-CM

## 2022-08-29 DIAGNOSIS — E11.8 TYPE 2 DIABETES MELLITUS WITH COMPLICATION, WITHOUT LONG-TERM CURRENT USE OF INSULIN (HCC): ICD-10-CM

## 2022-08-29 DIAGNOSIS — E11.9 TYPE 2 DIABETES MELLITUS (HCC): ICD-10-CM

## 2022-08-29 DIAGNOSIS — Z45.2 PICC (PERIPHERALLY INSERTED CENTRAL CATHETER) IN PLACE: ICD-10-CM

## 2022-08-29 DIAGNOSIS — B95.61 MSSA BACTEREMIA: Primary | ICD-10-CM

## 2022-08-29 DIAGNOSIS — R78.81 BACTEREMIA: ICD-10-CM

## 2022-08-29 DIAGNOSIS — M00.031 STAPHYLOCOCCAL ARTHRITIS OF RIGHT WRIST (HCC): ICD-10-CM

## 2022-08-29 LAB
BASOPHILS # BLD AUTO: 0.04 THOUSANDS/ΜL (ref 0–0.1)
BASOPHILS NFR BLD AUTO: 0 % (ref 0–1)
CREAT SERPL-MCNC: 0.93 MG/DL (ref 0.6–1.3)
EOSINOPHIL # BLD AUTO: 0.43 THOUSAND/ΜL (ref 0–0.61)
EOSINOPHIL NFR BLD AUTO: 4 % (ref 0–6)
ERYTHROCYTE [DISTWIDTH] IN BLOOD BY AUTOMATED COUNT: 13.1 % (ref 11.6–15.1)
FUNGUS SPEC CULT: NORMAL
GFR SERPL CREATININE-BSD FRML MDRD: 90 ML/MIN/1.73SQ M
HCT VFR BLD AUTO: 40.2 % (ref 36.5–49.3)
HGB BLD-MCNC: 13 G/DL (ref 12–17)
IMM GRANULOCYTES # BLD AUTO: 0.09 THOUSAND/UL (ref 0–0.2)
IMM GRANULOCYTES NFR BLD AUTO: 1 % (ref 0–2)
LYMPHOCYTES # BLD AUTO: 3.13 THOUSANDS/ΜL (ref 0.6–4.47)
LYMPHOCYTES NFR BLD AUTO: 27 % (ref 14–44)
MCH RBC QN AUTO: 28.6 PG (ref 26.8–34.3)
MCHC RBC AUTO-ENTMCNC: 32.3 G/DL (ref 31.4–37.4)
MCV RBC AUTO: 89 FL (ref 82–98)
MONOCYTES # BLD AUTO: 0.7 THOUSAND/ΜL (ref 0.17–1.22)
MONOCYTES NFR BLD AUTO: 6 % (ref 4–12)
NEUTROPHILS # BLD AUTO: 7.2 THOUSANDS/ΜL (ref 1.85–7.62)
NEUTS SEG NFR BLD AUTO: 62 % (ref 43–75)
NRBC BLD AUTO-RTO: 0 /100 WBCS
PLATELET # BLD AUTO: 300 THOUSANDS/UL (ref 149–390)
PMV BLD AUTO: 9.8 FL (ref 8.9–12.7)
RBC # BLD AUTO: 4.54 MILLION/UL (ref 3.88–5.62)
WBC # BLD AUTO: 11.59 THOUSAND/UL (ref 4.31–10.16)

## 2022-08-29 PROCEDURE — 85025 COMPLETE CBC W/AUTO DIFF WBC: CPT

## 2022-08-29 PROCEDURE — 36415 COLL VENOUS BLD VENIPUNCTURE: CPT

## 2022-08-29 PROCEDURE — 82565 ASSAY OF CREATININE: CPT

## 2022-08-29 PROCEDURE — G0299 HHS/HOSPICE OF RN EA 15 MIN: HCPCS

## 2022-08-29 PROCEDURE — 99213 OFFICE O/P EST LOW 20 MIN: CPT | Performed by: NURSE PRACTITIONER

## 2022-08-29 RX ADMIN — CEFAZOLIN SODIUM 2000 MG: 2 SOLUTION INTRAVENOUS at 08:50

## 2022-08-29 RX ADMIN — SODIUM CHLORIDE 10 ML: 9 INJECTION INTRAVENOUS at 08:50

## 2022-08-30 LAB
MYCOBACTERIUM SPEC CULT: NORMAL
RHODAMINE-AURAMINE STN SPEC: NORMAL

## 2022-08-31 ENCOUNTER — TELEPHONE (OUTPATIENT)
Dept: INFECTIOUS DISEASES | Facility: CLINIC | Age: 57
End: 2022-08-31

## 2022-08-31 NOTE — TELEPHONE ENCOUNTER
Hunter from [facility] calls today to report pt's one lumen is blocked  The other is doing well and patient's last dose IV abx is done as of Friday 9/2  Last set of labs were drawn 8/29  Due to insurance, patient would have to go to infusion center or emergency room for cathflo if indicated, but with short course remaining and no additional blood work, usually would not do cathflo  Patient also at [facility]  Will route to Dr Princess Moscoso to find out if she would feel we need to get him somewhere to unblock

## 2022-09-02 ENCOUNTER — HOME CARE VISIT (OUTPATIENT)
Dept: HOME HEALTH SERVICES | Facility: HOME HEALTHCARE | Age: 57
End: 2022-09-02
Payer: COMMERCIAL

## 2022-09-02 VITALS
OXYGEN SATURATION: 97 % | TEMPERATURE: 98.2 F | DIASTOLIC BLOOD PRESSURE: 72 MMHG | SYSTOLIC BLOOD PRESSURE: 128 MMHG | HEART RATE: 74 BPM

## 2022-09-02 PROCEDURE — G0299 HHS/HOSPICE OF RN EA 15 MIN: HCPCS

## 2022-09-02 RX ADMIN — CEFAZOLIN SODIUM 2000 MG: 2 SOLUTION INTRAVENOUS at 08:36

## 2022-09-05 LAB — FUNGUS SPEC CULT: NORMAL

## 2022-09-06 LAB
MYCOBACTERIUM SPEC CULT: NORMAL
RHODAMINE-AURAMINE STN SPEC: NORMAL

## 2022-09-12 ENCOUNTER — VBI (OUTPATIENT)
Dept: ADMINISTRATIVE | Facility: OTHER | Age: 57
End: 2022-09-12

## 2022-09-13 LAB
MYCOBACTERIUM SPEC CULT: NORMAL
RHODAMINE-AURAMINE STN SPEC: NORMAL

## 2022-09-16 ENCOUNTER — OFFICE VISIT (OUTPATIENT)
Dept: OTHER | Age: 57
End: 2022-09-16
Payer: COMMERCIAL

## 2022-09-16 VITALS
BODY MASS INDEX: 28.28 KG/M2 | HEART RATE: 76 BPM | WEIGHT: 180.2 LBS | DIASTOLIC BLOOD PRESSURE: 73 MMHG | HEIGHT: 67 IN | SYSTOLIC BLOOD PRESSURE: 121 MMHG

## 2022-09-16 DIAGNOSIS — B18.2 HEPATITIS C VIRUS CARRIER STATE (HCC): ICD-10-CM

## 2022-09-16 DIAGNOSIS — F11.90 OPIOID USE DISORDER: Primary | ICD-10-CM

## 2022-09-16 PROCEDURE — 99204 OFFICE O/P NEW MOD 45 MIN: CPT | Performed by: NURSE PRACTITIONER

## 2022-09-16 PROCEDURE — 99214 OFFICE O/P EST MOD 30 MIN: CPT | Performed by: NURSE PRACTITIONER

## 2022-09-16 RX ORDER — NALOXONE HYDROCHLORIDE 4 MG/.1ML
SPRAY NASAL
Qty: 1 EACH | Refills: 1 | Status: SHIPPED | OUTPATIENT
Start: 2022-09-16

## 2022-09-16 RX ORDER — BUPRENORPHINE AND NALOXONE 8; 2 MG/1; MG/1
8 FILM, SOLUBLE BUCCAL; SUBLINGUAL 2 TIMES DAILY
Qty: 60 FILM | Refills: 0 | Status: SHIPPED | OUTPATIENT
Start: 2022-09-16 | End: 2022-10-17 | Stop reason: SDUPTHER

## 2022-09-16 NOTE — PROGRESS NOTES
SHARE Program     History and Physical  Janett Bonner 62 y o  male MRN: [id number]   @ Encounter: [id number]        1  Opioid use disorder        -     Presents from [facility], has been stable on maintenance treatment 8mg BID  Engages with recovery supports           -     PDMP reviewed, as expected         -     RTC 1 month     -     buprenorphine-naloxone (Suboxone) 8-2 mg; Place 1 Film (8 mg total) under the tongue 2 (two) times a day  -     naloxone (NARCAN) 4 mg/0 1 mL nasal spray; Administer 1 spray into a nostril  If no response after 2-3 minutes, give another dose in the other nostril using a new spray  2  Hepatitis C virus carrier state ([facility] [address] )        -     Diagnosed in 2015 cleared on own, VL in 8/2015 non detectable      In addition to the above recommendations, the patient will also be referred to the [facility] Certified Addiction Counselors for additional evaluation and psychotherapy  We will also engage our Certified  for patient support  HPI: Janett Bonner is a 62y o  year old male who presents as a referral from [facility] to engage with the share program   He endorses a history of opioid use disorder starting when he was 25years old  He has had success with buprenorphine-naloxone in the past   He initially started on Suboxone and due to experiencing pruritus was switched to Zubsolv  Pruritus continued however he felt zubsolv did not provide same efficacy as Suboxone and he returned to use twice during this period  Shortly after he was diagnosed with diabetes and started on treatment and his pruritus resolved  He has been seeing Dr Patricia Allen for several years and was recently transitioned to generic buprenorphine tabs but would rather be on films  He attends meetings 3 times a week and currently resides in recovery house  He identifies these as support systems during his recovery    He was diagnosed with hepatitis-C in 2015 but likely cleared on his own as viral load was negative in 08/2022  Opioid History   Age of first use: 17-17yo  Opioids currently used: heroin and fentanyl    Route of use: insufflation and intravenous IV beginning in late 45s  Date/Time of Last Opioid Use: 3 months ago    Current Signs/Symptoms of Opioid Withdrawal: no current symptoms     Review of PDMP:     PDMP Review       Value Time User    PDMP Reviewed  Yes 9/16/2022  2:31 PM ISMA Philippe             Social History     Tobacco Use   Smoking Status Current Every Day Smoker    Packs/day: 0 50    Types: Cigarettes   Smokeless Tobacco Never Used   Tobacco Comment    about 12 cigarettes/daily        Review of Systems   Constitutional: Negative for diaphoresis and fever  Respiratory: Negative for cough and shortness of breath  Cardiovascular: Negative for chest pain and palpitations  Gastrointestinal: Negative for abdominal pain  Neurological: Negative for headaches  Psychiatric/Behavioral: The patient is not nervous/anxious       ROS     Historical Information      Past Medical History:   Diagnosis Date    Diabetes mellitus ([facility] [address] [facility] )     Substance abuse ([facility] )         Past Surgical History:   Procedure Laterality Date    NO PAST SURGERIES      WOUND DEBRIDEMENT Right 8/6/2022    Procedure: DEBRIDEMENT RIGHT UPPER EXTREMITY ([address]) SEPTIC WRIST;  Surgeon: Kate Richards MD;  Location: [facility];  Service: Orthopedics        Family History   Problem Relation Age of Onset    Diabetes Mother     Alcohol abuse Father     Diabetes Sister     Asthma Brother         Social History      Marital Status: /Civil Union    Occupation: water [employer]     Patient Pre-hospital Living Situation: recovery house      Communicable diseases:    Tuberculosis (TB):   Have you traveled extensively (more than 4 weeks) outside the [address]  in the last five years to high tuberculosis incidence areas ([place], [place], [place], [place])?: No  Have you resided in any of these facilities in the past year: jails, prisons, shelters, nursing homes, and other long-term care facilities such as rehabilitation centers? If residents of any of these facilities were tested within the past three (3) benjamin: No  Have you had any close contact with someone diagnosed with tuberculosis?: No  Have you been homeless within the past year?: No  Have you ever injected drugs?: Yes  If yes, describe[de-identified] IVDU hx  Do you or anyone in your household, currently have the following symptoms such as a sustained cough for two or more weeks, coughing up blood, fever/chills, loss of appetite, unexplained weight loss, fatigue, night sweats? : No  Do you currently have or anticipate having any condition that would decrease your immune system?: No    Viral Hepatitis:  Have you been tested for Hepatitis B or C?: Yes  Diagnosed with Hepatitis B or C?: Yes  If yes, when?: 2015  If you have not been diagnosed with Hepatitis B or C, do you meet any of these criteria: received a clotting factor produced before 1987, received hemodialysis, been diagnosed with HIV, or received a blood transfusion or organ transplant?: No    HIV:  Have you been diagnosed with HIV?: No  Do you have high risk factors for HIV including injecting drugs, multiple sexual partners, engaging in unprotected sexual activity, infected or recently treated for viral hepatitis, or infected or recently treated for a sexually transmitted disease(STD)?: No    No Known Allergies     Prior to Admission medications    Medication Sig Start Date End Date Taking?  Authorizing Provider   ACCU-CHEK FASTCLIX LANCETS [id number]  6/18/19  Yes Historical Provider, MD   aspirin (ECOTRIN LOW STRENGTH) 81 mg EC tablet Take 1 tablet (81 mg total) by mouth daily 5/8/20  Yes Jorge Elise MD   Blood Glucose Monitoring Suppl (ACCU-CHEK GUIDE) w/Device KIT  6/18/19  Yes Historical Provider, MD   glucose blood (Accu-Chek Guide) test strip Use 1 each 3 (three) times a day Test as directed 1/28/21  Yes Calin Stein MD   insulin glargine (Basaglar KwikPen) 100 units/mL injection pen Inject 15 Units under the skin daily at bedtime 4/28/22  Yes Calin Stein MD   Insulin Pen Needle (BD Pen Needle Cely 2nd Gen) 32G X 4 MM MISC Inject under the skin daily 4/28/22  Yes Calin Stein MD   lisinopril (ZESTRIL) 2 5 mg tablet TAKE 1 TABLET(2 5 MG) BY MOUTH DAILY 6/21/22  Yes Calin Stein MD   metFORMIN (GLUCOPHAGE) 1000 MG tablet TAKE 1 TABLET(1000 MG) BY MOUTH TWICE DAILY WITH MEALS 5/16/22  Yes Calin Stein MD   sodium chloride, PF, 0 9 % 10 mL by Intracatheter route every 8 (eight) hours  PICC Line flush  10ml NSS flush before IV infusion and after IV infusion  10ml flush to opposite port once a day  Indications: FLUSHING 8/17/22  Yes Shelby Turner MD   atorvastatin (LIPITOR) 20 mg tablet TAKE 1 TABLET(20 MG) BY MOUTH DAILY AT BEDTIME  Patient not taking: No sig reported 8/11/22   Malissa Haines MD   ZUBSOLV 5 7-1 4 MG SUBL SUBLINGUAL TWO TABLETS SUBLINGUALLY DAILY  Patient not taking: No sig reported 7/31/19   Historical Provider, MD Cristin Lennon 8 6-2 1 MG SUBL SUBLINGUAL 1 AND 1 /2 TABLETS SUBLINGUALLY DAILY  Patient not taking: No sig reported 7/3/19   Historical Provider, MD       buprenorphine-naloxone     Objective      Vitals    Vitals:    09/16/22 1422   BP: 121/73   Pulse: 76       Physical Exam  Vitals reviewed  HENT:      Head: Normocephalic and atraumatic  Eyes:      Extraocular Movements: Extraocular movements intact  Conjunctiva/sclera: Conjunctivae normal    Cardiovascular:      Rate and Rhythm: Normal rate  Pulmonary:      Effort: Pulmonary effort is normal  No respiratory distress  Musculoskeletal:      Cervical back: Normal range of motion  Skin:     General: Skin is warm and dry  Neurological:      Mental Status: He is alert and oriented to person, place, and time  Psychiatric:         Mood and Affect: Mood normal          Behavior: Behavior is cooperative  COWS Score:          Data:     Lab Results:  Results from last 6 Months   Lab Units 08/29/22  0845   WBC Thousand/uL 11 59*   HEMOGLOBIN g/dL 13 0   HEMATOCRIT % 40 2   PLATELETS Thousands/uL 300          Results from last 6 Months   Lab Units 08/29/22  0845 08/22/22  0900 08/17/22  0657 08/08/22  0637 08/05/22  2155   POTASSIUM mmol/L  --   --  4 7   < > 5 1   CHLORIDE mmol/L  --   --  101   < > 94*   CO2 mmol/L  --   --  30   < > 29   BUN mg/dL  --   --  13   < > 15   CREATININE mg/dL 0 93   < > 0 75   < > 0 85   CALCIUM mg/dL  --   --  9 7   < > 9 1   ALBUMIN g/dL  --   --   --   --  3 5   ALK PHOS U/L  --   --   --   --  85   ALT U/L  --   --   --   --  32   AST U/L  --   --   --   --  43    < > = values in this interval not displayed  Hepatitis panel:  Results from last 6 Months   Lab Units 08/09/22  0925 08/08/22  1234   HEP B S AG   --  Non-reactive   HEP C AB   --  High Reactive*   HEP B C IGM   --  Non-reactive   HCV PCR QUANTITATIVE IU/mL HCV Not Detected  --          HIV results:   Results from last 6 Months   Lab Units 08/07/22  1328   RAPID HIV 1X2  Non-Reactive   HIV-1 P24 AG  Non-Reactive         TB:        Imaging Studies: I have personally reviewed pertinent reports  EKG, Pathology, and Other Studies: I have personally reviewed pertinent reports  Counseling / Coordination of Care     Total time spent today 45 minutes  Greater than 50% of total time was spent with the patient and / or family counseling and / or coordination of care  ** Please Note: This note may have been constructed using a voice recognition system   **     ISMA Mart

## 2022-09-19 ENCOUNTER — OFFICE VISIT (OUTPATIENT)
Dept: FAMILY MEDICINE CLINIC | Facility: CLINIC | Age: 57
End: 2022-09-19

## 2022-09-19 VITALS
SYSTOLIC BLOOD PRESSURE: 118 MMHG | WEIGHT: 180 LBS | BODY MASS INDEX: 28.25 KG/M2 | OXYGEN SATURATION: 98 % | TEMPERATURE: 96.6 F | HEIGHT: 67 IN | DIASTOLIC BLOOD PRESSURE: 76 MMHG | RESPIRATION RATE: 16 BRPM | HEART RATE: 83 BPM

## 2022-09-19 DIAGNOSIS — Z12.11 SCREEN FOR COLON CANCER: ICD-10-CM

## 2022-09-19 DIAGNOSIS — Z23 ENCOUNTER FOR IMMUNIZATION: ICD-10-CM

## 2022-09-19 DIAGNOSIS — Z79.4 TYPE 2 DIABETES MELLITUS WITHOUT COMPLICATION, WITH LONG-TERM CURRENT USE OF INSULIN (HCC): Primary | ICD-10-CM

## 2022-09-19 DIAGNOSIS — E78.49 OTHER HYPERLIPIDEMIA: ICD-10-CM

## 2022-09-19 DIAGNOSIS — I10 PRIMARY HYPERTENSION: ICD-10-CM

## 2022-09-19 DIAGNOSIS — Z72.0 TOBACCO ABUSE: ICD-10-CM

## 2022-09-19 DIAGNOSIS — E11.9 TYPE 2 DIABETES MELLITUS WITHOUT COMPLICATION, WITH LONG-TERM CURRENT USE OF INSULIN (HCC): Primary | ICD-10-CM

## 2022-09-19 LAB — SL AMB POCT HEMOGLOBIN AIC: 7.1 (ref ?–6.5)

## 2022-09-19 PROCEDURE — 90682 RIV4 VACC RECOMBINANT DNA IM: CPT | Performed by: FAMILY MEDICINE

## 2022-09-19 PROCEDURE — 83036 HEMOGLOBIN GLYCOSYLATED A1C: CPT | Performed by: FAMILY MEDICINE

## 2022-09-19 PROCEDURE — 3051F HG A1C>EQUAL 7.0%<8.0%: CPT | Performed by: NURSE PRACTITIONER

## 2022-09-19 PROCEDURE — 99406 BEHAV CHNG SMOKING 3-10 MIN: CPT | Performed by: FAMILY MEDICINE

## 2022-09-19 PROCEDURE — 99214 OFFICE O/P EST MOD 30 MIN: CPT | Performed by: FAMILY MEDICINE

## 2022-09-19 PROCEDURE — 90471 IMMUNIZATION ADMIN: CPT | Performed by: FAMILY MEDICINE

## 2022-09-19 NOTE — ASSESSMENT & PLAN NOTE
Lab Results   Component Value Date    HGBA1C 8 3 (A) 04/28/2022   -HBA1C Improved from 8 3 to 7 1  -Recent Fasting sugars range   -Continue Lantus 14 units  -Continue low carbohydrate diet  -Continue [id number] BID  -Continue statin  -Recommend annual podiatry and ophthalmology

## 2022-09-19 NOTE — ASSESSMENT & PLAN NOTE
-Currently Smokes 12 cigarette per day  -Tobacco Cessation Counseling: Tobacco cessation counseling and education was provided  The patient is sincerely urged to quit consumption of tobacco  He is not ready to quit tobacco  The numerous health risks of tobacco consumption were discussed  If he decides to quit, there are a number of helpful adjunctive aids, and he can see me to discuss nicotine replacement therapy, chantix, or bupropion anytime in the future    Spent 5 minutes on counseling

## 2022-09-19 NOTE — PROGRESS NOTES
Name: Kennedi Kumar      : 1965      MRN: [id number]  Encounter Provider: Carlos Saleem MD  Encounter Date: 2022   Encounter department: [address] [place] [address]     1  Type 2 diabetes mellitus without complication, with long-term current use of insulin ([facility] )  Assessment & Plan:    Lab Results   Component Value Date    HGBA1C 8 3 (A) 2022   -HBA1C Improved from 8 3 to 7 1  -Recent Fasting sugars range   -Continue Lantus 14 units  -Continue low carbohydrate diet  -Continue [id number] BID  -Continue statin  -Recommend annual podiatry and ophthalmology     Orders:  -     POCT hemoglobin A1c  -     Ambulatory Referral to Podiatry; Future    2  Screen for colon cancer  -     Ambulatory Referral to Gastroenterology; Future    3  Other hyperlipidemia  Assessment & Plan: Will check  Lipid panel  Continue statin    Orders:  -     Lipid panel; Future    4  Tobacco abuse  Assessment & Plan:  -Currently Smokes 12 cigarette per day  -Tobacco Cessation Counseling: Tobacco cessation counseling and education was provided  The patient is sincerely urged to quit consumption of tobacco  He is not ready to quit tobacco  The numerous health risks of tobacco consumption were discussed  If he decides to quit, there are a number of helpful adjunctive aids, and he can see me to discuss nicotine replacement therapy, chantix, or bupropion anytime in the future  Spent 5 minutes on counseling      5  Encounter for immunization  -     influenza vaccine, quadrivalent, recombinant, PF, 0 5 mL, for patients 18 yr+ (FLUBLOK)    6  Primary hypertension  Assessment & Plan:  -Blood pressure  At goal of less than 140/90  -Continue lisinopril        Depression Screening and Follow-up Plan: Patient was screened for depression during today's encounter  They screened negative with a PHQ-2 score of 0          Subjective       71-year-old male with a history of type 2 diabetes, hypertension and tobacco abuse who is here today for follow-up of  Chronic medical conditions  He states that his blood sugars are ranging 80 to 100 fasting  He states that he has been eating a very low carb diet  He has no other concerns today  Review of Systems   Constitutional: Negative for appetite change, chills, fatigue and fever  HENT: Negative for congestion  Eyes: Negative for visual disturbance  Respiratory: Negative for cough, shortness of breath and wheezing  Cardiovascular: Negative for chest pain, palpitations and leg swelling  Gastrointestinal: Negative for abdominal pain, diarrhea, nausea and vomiting  Musculoskeletal: Negative for arthralgias  Neurological: Negative for dizziness, speech difficulty, weakness, light-headedness and headaches  Hematological: Negative for adenopathy  Psychiatric/Behavioral: Negative for confusion         Current Outpatient Medications on File Prior to Visit   Medication Sig    ACCU-CHEK FASTCLIX LANCETS MISC     aspirin (ECOTRIN LOW STRENGTH) 81 mg EC tablet Take 1 tablet (81 mg total) by mouth daily    atorvastatin (LIPITOR) 20 mg tablet TAKE 1 TABLET(20 MG) BY MOUTH DAILY AT BEDTIME (Patient not taking: No sig reported)    Blood Glucose Monitoring Suppl (ACCU-CHEK GUIDE) w/Device KIT     buprenorphine-naloxone (Suboxone) 8-2 mg Place 1 Film (8 mg total) under the tongue 2 (two) times a day    glucose blood (Accu-Chek Guide) test strip Use 1 each 3 (three) times a day Test as directed    insulin glargine (Basaglar KwikPen) 100 units/mL injection pen Inject 15 Units under the skin daily at bedtime    Insulin Pen Needle (BD Pen Needle Cely 2nd Gen) 32G X 4 MM MISC Inject under the skin daily    lisinopril (ZESTRIL) 2 5 mg tablet TAKE 1 TABLET(2 5 MG) BY MOUTH DAILY    metFORMIN (GLUCOPHAGE) 1000 MG tablet TAKE 1 TABLET(1000 MG) BY MOUTH TWICE DAILY WITH MEALS    naloxone (NARCAN) 4 mg/0 1 mL nasal spray Administer 1 spray into a nostril  If no response after 2-3 minutes, give another dose in the other nostril using a new spray   sodium chloride, PF, 0 9 % 10 mL by Intracatheter route every 8 (eight) hours  PICC Line flush  10ml NSS flush before IV infusion and after IV infusion  10ml flush to opposite port once a day  Indications: FLUSHING       Objective     /76 (BP Location: Left arm, Patient Position: Sitting, Cuff Size: Standard)   Pulse 83   Temp (!) 96 6 °F (35 9 °C) (Temporal)   Resp 16   Ht 5' 7" (1 702 m)   Wt 81 6 kg (180 lb)   SpO2 98%   BMI 28 19 kg/m²     Physical Exam  Constitutional:       General: He is not in acute distress  Appearance: Normal appearance  He is well-developed  He is not ill-appearing, toxic-appearing or diaphoretic  HENT:      Head: Normocephalic and atraumatic  Right Ear: External ear normal       Left Ear: External ear normal       Nose: Nose normal       Mouth/Throat:      Pharynx: No oropharyngeal exudate  Eyes:      General: No scleral icterus  Right eye: No discharge  Left eye: No discharge  Conjunctiva/sclera: Conjunctivae normal       Pupils: Pupils are equal, round, and reactive to light  Cardiovascular:      Rate and Rhythm: Normal rate and regular rhythm  Pulses: Pulses are weak  Dorsalis pedis pulses are 2+ on the right side and 2+ on the left side  Posterior tibial pulses are 2+ on the right side and 2+ on the left side  Heart sounds: Normal heart sounds  No murmur heard  No friction rub  No gallop  Pulmonary:      Effort: Pulmonary effort is normal  No respiratory distress  Breath sounds: Normal breath sounds  No stridor  No wheezing or rhonchi  Abdominal:      General: Bowel sounds are normal  There is no distension  Palpations: Abdomen is soft  There is no mass  Tenderness: There is no abdominal tenderness  There is no guarding or rebound  Hernia: No hernia is present  Musculoskeletal:         General: No deformity  Normal range of motion  Cervical back: Normal range of motion and neck supple  Feet:      Right foot:      Skin integrity: Callus present  No ulcer, skin breakdown, erythema, warmth or dry skin  Toenail Condition: Right toenails are abnormally thick  Left foot:      Skin integrity: Callus present  No ulcer, skin breakdown, erythema, warmth or dry skin  Toenail Condition: Left toenails are abnormally thick  Skin:     General: Skin is warm  Capillary Refill: Capillary refill takes less than 2 seconds  Neurological:      General: No focal deficit present  Mental Status: He is alert and oriented to person, place, and time  Sensory: No sensory deficit  Motor: No weakness  Patient's shoes and socks removed  Right Foot/Ankle   Right Foot Inspection  Skin Exam: skin normal, skin intact, callus and callus  No dry skin, no warmth, no erythema, no maceration, no abnormal color, no pre-ulcer and no ulcer  Toe Exam: ROM and strength within normal limits  No swelling, no tenderness, erythema and  no right toe deformity    Sensory   Proprioception: intact  Monofilament testing: intact    Vascular  Capillary refills: < 3 seconds  The right DP pulse is 2+  The right PT pulse is 2+  Left Foot/Ankle  Left Foot Inspection  Skin Exam: skin normal, skin intact and callus  No dry skin, no warmth, no erythema, no maceration, normal color, no pre-ulcer and no ulcer  Toe Exam: ROM and strength within normal limits  No swelling, no tenderness, no erythema and no left toe deformity  Sensory   Proprioception: intact  Monofilament testing: intact    Vascular  Capillary refills: < 3 seconds  The left DP pulse is 2+  The left PT pulse is 2+       Assign Risk Category  Deformity present  Loss of protective sensation  Weak pulses  Risk: 2    Sariah Zee MD

## 2022-09-20 LAB
MYCOBACTERIUM SPEC CULT: NORMAL
RHODAMINE-AURAMINE STN SPEC: NORMAL

## 2022-09-29 ENCOUNTER — TELEPHONE (OUTPATIENT)
Dept: FAMILY MEDICINE CLINIC | Facility: CLINIC | Age: 57
End: 2022-09-29

## 2022-09-29 NOTE — TELEPHONE ENCOUNTER
Pt is requesting medication refill for     glucose blood (Accu-Chek Guide) test strip     Please send to pharmacy on file

## 2022-09-30 DIAGNOSIS — E11.8 TYPE 2 DIABETES MELLITUS WITH COMPLICATION, WITHOUT LONG-TERM CURRENT USE OF INSULIN (HCC): ICD-10-CM

## 2022-09-30 RX ORDER — BLOOD SUGAR DIAGNOSTIC
1 STRIP MISCELLANEOUS 3 TIMES DAILY
Qty: 100 EACH | Refills: 5 | Status: SHIPPED | OUTPATIENT
Start: 2022-09-30

## 2022-10-17 ENCOUNTER — OFFICE VISIT (OUTPATIENT)
Dept: OTHER | Age: 57
End: 2022-10-17
Payer: COMMERCIAL

## 2022-10-17 VITALS
BODY MASS INDEX: 29.98 KG/M2 | HEART RATE: 80 BPM | WEIGHT: 191 LBS | SYSTOLIC BLOOD PRESSURE: 118 MMHG | HEIGHT: 67 IN | DIASTOLIC BLOOD PRESSURE: 75 MMHG

## 2022-10-17 DIAGNOSIS — F11.21 OPIOID USE DISORDER, SEVERE, IN SUSTAINED REMISSION, DEPENDENCE (HCC): Primary | ICD-10-CM

## 2022-10-17 PROCEDURE — 99212 OFFICE O/P EST SF 10 MIN: CPT | Performed by: EMERGENCY MEDICINE

## 2022-10-17 RX ORDER — BUPRENORPHINE AND NALOXONE 8; 2 MG/1; MG/1
8 FILM, SOLUBLE BUCCAL; SUBLINGUAL 2 TIMES DAILY
Qty: 60 FILM | Refills: 0 | Status: SHIPPED | OUTPATIENT
Start: 2022-10-17 | End: 2022-11-16

## 2022-10-17 NOTE — PROGRESS NOTES
SHARE Program    Progress Note  Chris Beckham 62 y o  male MRN: [id number]  @ Encounter: [id number]      9  Opioid use disorder, severe, in sustained remission, dependence ([facility] [address] )  Assessment & Plan:  · Doing well  Denies cravings or use  · Will continue current treatment plan  · UDS ordered for routine diversion monitoring purposes  Orders:  -     [facility] All Prescribed Meds and Special Instructions  -     Amphetamines, Methamphetamines  -     Butalbital  -     Phenobarbital  -     Secobarbital  -     Temazepam  -     Alprazolam  -     Clonazepam  -     Diazepam  -     Lorazepam  -     Oxazepam  -     Gabapentin  -     Pregabalin  -     Cocaine  -     Heroin  -     Buprenorphine  -     Levorphanol  -     Meperidine  -     Naltrexone  -     Fentanyl  -     Methadone  -     Oxycodone  -     Oxymorphone  -     Tapentadol  -     THC  -     Tramadol  -     Codeine, Hydrocodone, Hydropmorphone, Morphine  -     Bath Salts  -     Ethyl Glucuronide/Ethyl Sulfate  -     Kratom  -     Spice  -     Methylphenidate  -     Phentermine  -     Validity Oxidant  -     Validity Creatinine  -     Validity pH  -     Validity Specific  -     buprenorphine-naloxone (Suboxone) 8-2 mg; Place 1 Film (8 mg total) under the tongue 2 (two) times a day        Support Services  Case Management and Certified  are following     Patient was referred to the [facility] Certified Addiction Counselors: Yes  The patient is actively engaged with the [facility] Certified Addiction Counselor’s psychotherapy plan: Yes    buprenorphine-naloxone      PDMP reviewed:     PDMP Review       Value Time User    PDMP Reviewed  Yes 10/17/2022  9:06 AM Kandace Montiel, DO          SUBJECTIVE  No complaints    Drug cravings: no  Motivation to continue treatment: high      Current Outpatient Medications:   •  ACCU-CHEK FASTCLIX LANCETS MISC, , Disp: , Rfl:   •  aspirin (ECOTRIN LOW STRENGTH) 81 mg EC tablet, Take 1 tablet (81 mg total) by mouth daily, Disp: 90 tablet, Rfl: 3  •  atorvastatin (LIPITOR) 20 mg tablet, TAKE 1 TABLET(20 MG) BY MOUTH DAILY AT BEDTIME, Disp: 30 tablet, Rfl: 1  •  Blood Glucose Monitoring Suppl (ACCU-CHEK GUIDE) w/Device KIT, , Disp: , Rfl:   •  buprenorphine-naloxone (Suboxone) 8-2 mg, Place 1 Film (8 mg total) under the tongue 2 (two) times a day, Disp: 60 Film, Rfl: 0  •  glucose blood (Accu-Chek Guide) test strip, Use 1 each 3 (three) times a day Test as directed, Disp: 100 each, Rfl: 5  •  insulin glargine (Basaglar KwikPen) 100 units/mL injection pen, Inject 15 Units under the skin daily at bedtime, Disp: 15 mL, Rfl: 3  •  Insulin Pen Needle (BD Pen Needle Cely 2nd Gen) 32G X 4 MM MISC, Inject under the skin daily, Disp: 100 each, Rfl: 3  •  lisinopril (ZESTRIL) 2 5 mg tablet, TAKE 1 TABLET(2 5 MG) BY MOUTH DAILY, Disp: 30 tablet, Rfl: 3  •  metFORMIN (GLUCOPHAGE) 1000 MG tablet, TAKE 1 TABLET(1000 MG) BY MOUTH TWICE DAILY WITH MEALS, Disp: 60 tablet, Rfl: 3  •  naloxone (NARCAN) 4 mg/0 1 mL nasal spray, Administer 1 spray into a nostril  If no response after 2-3 minutes, give another dose in the other nostril using a new spray  (Patient not taking: Reported on 10/17/2022), Disp: 1 each, Rfl: 1  •  sodium chloride, PF, 0 9 %, 10 mL by Intracatheter route every 8 (eight) hours  PICC Line flush  10ml NSS flush before IV infusion and after IV infusion  10ml flush to opposite port once a day  Indications: FLUSHING (Patient not taking: Reported on 10/17/2022), Disp: , Rfl:     Objective     Vitals:    10/17/22 1440   BP: 118/75   Pulse: 80       Physical Exam  Constitutional:       General: He is not in acute distress  Appearance: Normal appearance  Cardiovascular:      Rate and Rhythm: Normal rate  Pulmonary:      Effort: Pulmonary effort is normal    Neurological:      Mental Status: He is alert and oriented to person, place, and time     Psychiatric:         Mood and Affect: Mood normal          Behavior: Behavior normal               Lab Results:   Results from last 6 Months   Lab Units 08/29/22  0845   WBC Thousand/uL 11 59*   HEMOGLOBIN g/dL 13 0   HEMATOCRIT % 40 2   PLATELETS Thousands/uL 300      Results from last 6 Months   Lab Units 08/29/22  0845 08/22/22  0900 08/17/22  0657 08/08/22  0637 08/05/22  2155   POTASSIUM mmol/L  --   --  4 7   < > 5 1   CHLORIDE mmol/L  --   --  101   < > 94*   CO2 mmol/L  --   --  30   < > 29   BUN mg/dL  --   --  13   < > 15   CREATININE mg/dL 0 93   < > 0 75   < > 0 85   CALCIUM mg/dL  --   --  9 7   < > 9 1   ALBUMIN g/dL  --   --   --   --  3 5   ALK PHOS U/L  --   --   --   --  85   ALT U/L  --   --   --   --  32   AST U/L  --   --   --   --  43    < > = values in this interval not displayed  Results from last 6 Months   Lab Units 08/08/22  1234   HEP B S AG  Non-reactive   HEP C AB  High Reactive*   HEP B C IGM  Non-reactive     Results from last 6 Months   Lab Units 08/07/22  1328   RAPID HIV 1X2  Non-Reactive   HIV-1 P24 AG  Non-Reactive                 Counseling / Coordination of Care  Total time spent today 15 minutes  Greater than 50% of total time was spent with the patient and / or family counseling and / or coordination of care       Harris Alfonso,

## 2022-10-20 NOTE — ASSESSMENT & PLAN NOTE
· Doing well  Denies cravings or use  · Will continue current treatment plan  · UDS ordered for routine diversion monitoring purposes

## 2022-10-29 LAB
6MAM UR QL CFM: NEGATIVE NG/ML
7AMINOCLONAZEPAM UR QL CFM: NEGATIVE NG/ML
A-OH ALPRAZ UR QL CFM: NEGATIVE NG/ML
ACCEPTABLE CREAT UR QL: NORMAL MG/DL
ACCEPTIBLE SP GR UR QL: NORMAL
AMPHET UR QL CFM: NEGATIVE NG/ML
BUPRENORPHINE UR QL CFM: NORMAL NG/ML
BUTALBITAL UR QL CFM: NEGATIVE NG/ML
BZE UR QL CFM: NEGATIVE NG/ML
CODEINE UR QL CFM: NEGATIVE NG/ML
EDDP UR QL CFM: NEGATIVE NG/ML
ETHYL GLUCURONIDE UR QL CFM: NEGATIVE NG/ML
ETHYL SULFATE UR QL SCN: NEGATIVE NG/ML
EUTYLONE UR QL: NEGATIVE NG/ML
FENTANYL UR QL CFM: NEGATIVE NG/ML
GLIADIN IGG SER IA-ACNC: NEGATIVE NG/ML
HYDROCODONE UR QL CFM: NEGATIVE NG/ML
HYDROMORPHONE UR QL CFM: NEGATIVE NG/ML
LORAZEPAM UR QL CFM: NEGATIVE NG/ML
ME-PHENIDATE UR QL CFM: NEGATIVE NG/ML
MEPERIDINE UR QL CFM: NEGATIVE NG/ML
METHADONE UR QL CFM: NEGATIVE NG/ML
METHAMPHET UR QL CFM: NEGATIVE NG/ML
MORPHINE UR QL CFM: NEGATIVE NG/ML
NALTREXONE UR QL CFM: NEGATIVE NG/ML
NITRITE UR QL: NORMAL UG/ML
NORBUPRENORPHINE UR QL CFM: NORMAL NG/ML
NORDIAZEPAM UR QL CFM: NEGATIVE NG/ML
NORFENTANYL UR QL CFM: NEGATIVE NG/ML
NORHYDROCODONE UR QL CFM: NEGATIVE NG/ML
NORMEPERIDINE UR QL CFM: NEGATIVE NG/ML
NOROXYCODONE UR QL CFM: NEGATIVE NG/ML
OXAZEPAM UR QL CFM: NEGATIVE NG/ML
OXYCODONE UR QL CFM: NEGATIVE NG/ML
OXYMORPHONE UR QL CFM: NEGATIVE NG/ML
OXYMORPHONE UR QL CFM: NEGATIVE NG/ML
PARA-FLUOROFENTANYL QUANTIFICATION: NORMAL NG/ML
PHENOBARB UR QL CFM: NEGATIVE NG/ML
RESULT ALL_PRESCRIBED MEDS AND SPECIAL INSTRUCTIONS: NORMAL
SECOBARBITAL UR QL CFM: NEGATIVE NG/ML
SL AMB 4-ANPP QUANTIFICATION: NORMAL NG/ML
SL AMB 5F-ADB-M7 METABOLITE QUANTIFICATION: NEGATIVE NG/ML
SL AMB 7-OH-MITRAGYNINE (KRATOM ALKALOID) QUANTIFICATION: NEGATIVE NG/ML
SL AMB AB-FUBINACA-M3 METABOLITE QUANTIFICATION: NEGATIVE NG/ML
SL AMB ACETYL FENTANYL QUANTIFICATION: NORMAL NG/ML
SL AMB ACETYL NORFENTANYL QUANTIFICATION: NORMAL NG/ML
SL AMB ACRYL FENTANYL QUANTIFICATION: NORMAL NG/ML
SL AMB CARFENTANIL QUANTIFICATION: NORMAL NG/ML
SL AMB CTHC (MARIJUANA METABOLITE) QUANTIFICATION: NEGATIVE NG/ML
SL AMB DEXTRORPHAN (DEXTROMETHORPHAN METABOLITE) QUANT: NEGATIVE NG/ML
SL AMB GABAPENTIN QUANTIFICATION: NEGATIVE
SL AMB JWH018 METABOLITE QUANTIFICATION: NEGATIVE NG/ML
SL AMB JWH073 METABOLITE QUANTIFICATION: NEGATIVE NG/ML
SL AMB MDMB-FUBINACA-M1 METABOLITE QUANTIFICATION: NEGATIVE NG/ML
SL AMB METHYLONE QUANTIFICATION: NEGATIVE NG/ML
SL AMB N-DESMETHYL-TRAMADOL QUANTIFICATION: NEGATIVE NG/ML
SL AMB PHENTERMINE QUANTIFICATION: NEGATIVE NG/ML
SL AMB PREGABALIN QUANTIFICATION: NEGATIVE
SL AMB RCS4 METABOLITE QUANTIFICATION: NEGATIVE NG/ML
SL AMB RITALINIC ACID QUANTIFICATION: NEGATIVE NG/ML
SMOOTH MUSCLE AB TITR SER IF: NEGATIVE NG/ML
SPECIMEN DRAWN SERPL: NEGATIVE NG/ML
SPECIMEN PH ACCEPTABLE UR: NORMAL
TAPENTADOL UR QL CFM: NEGATIVE NG/ML
TEMAZEPAM UR QL CFM: NEGATIVE NG/ML
TEMAZEPAM UR QL CFM: NEGATIVE NG/ML
TRAMADOL UR QL CFM: NEGATIVE NG/ML
URATE/CREAT 24H UR: NEGATIVE NG/ML

## 2022-11-08 ENCOUNTER — HOSPITAL ENCOUNTER (EMERGENCY)
Facility: HOSPITAL | Age: 57
Discharge: HOME | End: 2022-11-08
Attending: EMERGENCY MEDICINE

## 2022-11-08 ENCOUNTER — APPOINTMENT (EMERGENCY)
Dept: RADIOLOGY | Facility: HOSPITAL | Age: 57
End: 2022-11-08

## 2022-11-08 VITALS
HEART RATE: 89 BPM | BODY MASS INDEX: 29 KG/M2 | TEMPERATURE: 98.8 F | WEIGHT: 185.19 LBS | OXYGEN SATURATION: 99 % | SYSTOLIC BLOOD PRESSURE: 149 MMHG | RESPIRATION RATE: 18 BRPM | DIASTOLIC BLOOD PRESSURE: 75 MMHG

## 2022-11-08 DIAGNOSIS — R03.0 ELEVATED BLOOD PRESSURE READING: ICD-10-CM

## 2022-11-08 DIAGNOSIS — M25.511 ACUTE PAIN OF RIGHT SHOULDER: Primary | ICD-10-CM

## 2022-11-08 RX ORDER — ACETAMINOPHEN 500 MG
500 TABLET ORAL EVERY 6 HOURS PRN
Qty: 24 TABLET | Refills: 0 | Status: SHIPPED | OUTPATIENT
Start: 2022-11-08 | End: 2022-11-15

## 2022-11-08 RX ORDER — ACETAMINOPHEN 325 MG/1
650 TABLET ORAL ONCE
Status: COMPLETED | OUTPATIENT
Start: 2022-11-08 | End: 2022-11-08

## 2022-11-08 RX ORDER — KETOROLAC TROMETHAMINE 30 MG/ML
15 INJECTION, SOLUTION INTRAMUSCULAR; INTRAVENOUS ONCE
Status: COMPLETED | OUTPATIENT
Start: 2022-11-08 | End: 2022-11-08

## 2022-11-08 RX ORDER — IBUPROFEN 400 MG/1
400 TABLET ORAL EVERY 6 HOURS PRN
Qty: 24 TABLET | Refills: 0 | Status: SHIPPED | OUTPATIENT
Start: 2022-11-08 | End: 2022-11-15

## 2022-11-08 RX ADMIN — ACETAMINOPHEN 650 MG: 325 TABLET ORAL at 19:17

## 2022-11-08 RX ADMIN — KETOROLAC TROMETHAMINE 15 MG: 30 INJECTION, SOLUTION INTRAMUSCULAR; INTRAVENOUS at 19:17

## 2022-11-08 NOTE — ED PROVIDER NOTES
History  Chief Complaint   Patient presents with   • Arm Pain     Pt reports lifting a lot of heavy things at work and began having right upper arm pain on Friday  Tylenol last taken at 1200     Patient is a 78-year-old male, with a history significant for well-controlled diabetes as well as injection opioid drug use disorder in remission, who presents to the ED today due to a five day history of atraumatic, gradual onset, sore in character, severe intensity, constant, progressively worsening, nonradiating right shoulder pain  There is no associated fever, diaphoresis, chills, weakness, numbness  Notably, patient is employed as a labor and frequently lifts heavy objects  Movement exacerbates his discomfort  Patient took Tylenol x2 to remit his symptoms  Patient is without other concerns at this time     - No language barrier    - History obtained from patient  - There are no limitations to the history obtained  - Previous charting was reviewed          Prior to Admission Medications   Prescriptions Last Dose Informant Patient Reported? Taking?    ACCU-CHEK FASTCLIX LANCETS MISC   Yes No   Blood Glucose Monitoring Suppl (ACCU-CHEK GUIDE) w/Device KIT   Yes No   Insulin Pen Needle (BD Pen Needle Cely 2nd Gen) 32G X 4 MM MISC   No No   Sig: Inject under the skin daily   aspirin (ECOTRIN LOW STRENGTH) 81 mg EC tablet   No No   Sig: Take 1 tablet (81 mg total) by mouth daily   atorvastatin (LIPITOR) 20 mg tablet  Other (Specify) No No   Sig: TAKE 1 TABLET(20 MG) BY MOUTH DAILY AT BEDTIME   buprenorphine-naloxone (Suboxone) 8-2 mg   No No   Sig: Place 1 Film (8 mg total) under the tongue 2 (two) times a day   glucose blood (Accu-Chek Guide) test strip   No No   Sig: Use 1 each 3 (three) times a day Test as directed   insulin glargine (Basaglar KwikPen) 100 units/mL injection pen   No No   Sig: Inject 15 Units under the skin daily at bedtime   lisinopril (ZESTRIL) 2 5 mg tablet   No No   Sig: TAKE 1 TABLET(2 5 MG) BY MOUTH DAILY   metFORMIN (GLUCOPHAGE) 1000 MG tablet   No No   Sig: TAKE 1 TABLET(1000 MG) BY MOUTH TWICE DAILY WITH MEALS   naloxone (NARCAN) 4 mg/0 1 mL nasal spray   No No   Sig: Administer 1 spray into a nostril  If no response after 2-3 minutes, give another dose in the other nostril using a new spray  Patient not taking: Reported on 10/17/2022   sodium chloride, PF, 0 9 %   Yes No   Sig: 10 mL by Intracatheter route every 8 (eight) hours  PICC Line flush  10ml NSS flush before IV infusion and after IV infusion  10ml flush to opposite port once a day  Indications: FLUSHING   Patient not taking: Reported on 10/17/2022      Facility-Administered Medications: None       Past Medical History:   Diagnosis Date   • Diabetes mellitus ([facility] [address] )    • Substance abuse ([facility] )        Past Surgical History:   Procedure Laterality Date   • NO PAST SURGERIES     • WOUND DEBRIDEMENT Right 8/6/2022    Procedure: DEBRIDEMENT RIGHT UPPER EXTREMITY ([address]) SEPTIC WRIST;  Surgeon: Lashon Reyes MD;  Location: [facility];  Service: Orthopedics       Family History   Problem Relation Age of Onset   • Diabetes Mother    • Alcohol abuse Father    • Diabetes Sister    • Asthma Brother      I have reviewed and agree with the history as documented  E-Cigarette/Vaping   • E-Cigarette Use Never User      E-Cigarette/Vaping Substances   • Nicotine No    • THC No    • CBD No    • Flavoring No    • Other No    • Unknown No      Social History     Tobacco Use   • Smoking status: Current Every Day Smoker     Packs/day: 0 50     Types: Cigarettes   • Smokeless tobacco: Never Used   • Tobacco comment: about 12 cigarettes/daily   Vaping Use   • Vaping Use: Never used   Substance Use Topics   • Alcohol use: Not Currently   • Drug use: Not Currently     Comment: heroin hx, clean x 1 year        Review of Systems   Constitutional: Negative for chills, diaphoresis and fever  HENT: Negative for trouble swallowing      Eyes: Negative for visual disturbance  Respiratory: Negative for shortness of breath  Cardiovascular: Negative for chest pain  Gastrointestinal: Negative for abdominal pain  Endocrine: Negative for polyuria  Genitourinary: Negative for dysuria  Musculoskeletal: Positive for arthralgias  Negative for gait problem  Skin: Negative for rash  Allergic/Immunologic: Negative for environmental allergies  Neurological: Negative for weakness and numbness  Hematological: Negative for adenopathy  Psychiatric/Behavioral: Negative for confusion  All other systems reviewed and are negative  Physical Exam  ED Triage Vitals [11/08/22 1730]   Temperature Pulse Respirations Blood Pressure SpO2   98 8 °F (37 1 °C) 89 18 149/75 99 %      Temp Source Heart Rate Source Patient Position - Orthostatic VS BP Location FiO2 (%)   Oral Monitor Sitting Left arm --      Pain Score       --             Orthostatic Vital Signs  Vitals:    11/08/22 1730   BP: 149/75   Pulse: 89   Patient Position - Orthostatic VS: Sitting       Physical Exam  Vitals and nursing note reviewed  Constitutional:       General: He is not in acute distress  Appearance: He is not ill-appearing, toxic-appearing or diaphoretic  HENT:      Head: Normocephalic and atraumatic  Right Ear: External ear normal       Left Ear: External ear normal       Nose: Nose normal  No rhinorrhea  Mouth/Throat:      Mouth: Mucous membranes are moist       Pharynx: Oropharynx is clear  No oropharyngeal exudate or posterior oropharyngeal erythema  Eyes:      General: No scleral icterus  Right eye: No discharge  Left eye: No discharge  Conjunctiva/sclera: Conjunctivae normal       Pupils: Pupils are equal, round, and reactive to light     Neck:      Comments: Patient is spontaneously rotating their neck to the left and right during the history and physical exam interaction without difficulty or apparent discomfort    Cardiovascular:      Rate and Rhythm: Normal rate and regular rhythm  Pulses: Normal pulses  Heart sounds: Normal heart sounds  No murmur heard  No friction rub  No gallop  Comments: 2+ Radial bilaterally  Pulmonary:      Effort: Pulmonary effort is normal  No respiratory distress  Breath sounds: Normal breath sounds  No stridor  No wheezing, rhonchi or rales  Abdominal:      General: Abdomen is flat  There is no distension  Palpations: Abdomen is soft  Tenderness: There is no abdominal tenderness  There is no right CVA tenderness, left CVA tenderness, guarding or rebound  Musculoskeletal:         General: Tenderness present  No deformity  Cervical back: Neck supple  No tenderness  No muscular tenderness  Comments: Tenderness palpation of the right biceps insertion point    Provocative testing of the right rotator cuff limited secondary to pain    Limited active and passive range of motion secondary to pain    No effusion, no warmth, no erythema   Lymphadenopathy:      Cervical: No cervical adenopathy  Skin:     General: Skin is warm and dry  Capillary Refill: Capillary refill takes less than 2 seconds  Neurological:      Mental Status: He is alert  Comments: Patient is speaking clearly in complete sentences  Patient is answering appropriately and able follow commands  Patient is moving all four extremities spontaneously  No facial droop  Intact median, radial, ulnar, axillary sensory function bilaterally      Intact median, radial, ulnar motor function bilateral   Psychiatric:         Mood and Affect: Mood normal          Behavior: Behavior normal          ED Medications  Medications   ketorolac (TORADOL) injection 15 mg (15 mg Intramuscular Given 11/8/22 1917)   acetaminophen (TYLENOL) tablet 650 mg (650 mg Oral Given 11/8/22 1917)       Diagnostic Studies  Results Reviewed     None                 XR shoulder 2+ views RIGHT   ED Interpretation by Yamileth Juarez MD (11/08 2006)   No acute osseous abnormality             Procedures  Procedures      ED Course  ED Course as of 11/08/22 2123   Tue Nov 08, 2022 2122 Patient reports improvement in symptoms on reevaluation  Patient able to range his shoulder on reevaluation  Patient has neither questions nor concerns, except for analgesia options, after receiving discharge instructions and return precautions                                        MDM  Number of Diagnoses or Management Options  Acute pain of right shoulder  Elevated blood pressure reading  Diagnosis management comments: Patient is a 55-year-old male, with a history significant for well-controlled diabetes as well as injection opioid drug use disorder in remission, who presents to the ED today due to a five day history of atraumatic, gradual onset, sore in character, severe intensity, constant, progressively worsening, nonradiating right shoulder pain  There is no associated fever, diaphoresis, chills, weakness, numbness  Notably, patient is employed as a labor and frequently lifts heavy objects  Patient is currently afebrile and hemodynamically stable  His physical exam is notable for limited active and passive range of motion of the right shoulder with neither erythema nor effusions nor warmth  He also has tenderness palpation of the right biceps insertion point  This presentation is concerning for:  Biceps tendinitis  I also considered rotator cuff pathology  No reason to suspect septic arthritis at this time based upon history and physical exam   Will investigate with x-ray of the right shoulder  Will manage with Toradol, Tylenol, referral to Orthopedics, further based on workup        Disposition  Final diagnoses:   Acute pain of right shoulder   Elevated blood pressure reading     Time reflects when diagnosis was documented in both MDM as applicable and the Disposition within this note     Time User Action Codes Description Comment    11/8/2022  7:11 PM Tobias Pagan Add [Y99 870] Acute pain of right shoulder     11/8/2022  7:11 PM Amanda Muñiz Add [R03 0] Elevated blood pressure reading       ED Disposition     ED Disposition   Discharge    Condition   Stable    Date/Time   Tue Nov 8, 2022  8:06 PM    Darlene Mendez Cover discharge to home/self care                 Follow-up Information     Follow up With Specialties Details Why Contact Info Additional Information    Brayan Barlow MD Family Medicine Schedule an appointment as soon as possible for a visit   [facility]   [address] [facility] [address]  [address]  [phone]  [facility] Orthopedic Surgery Schedule an appointment as soon as possible for a visit   [address] [address]  [facility], [address] [place] [address], [address] [place] [address], [id number] [phone]          Discharge Medication List as of 11/8/2022  8:06 PM      CONTINUE these medications which have NOT CHANGED    Details   ACCU-CHEK FASTCLIX LANCETS MISC Starting Tue 6/18/2019, Historical Med      aspirin (ECOTRIN LOW STRENGTH) 81 mg EC tablet Take 1 tablet (81 mg total) by mouth daily, Starting Fri 5/8/2020, Normal      atorvastatin (LIPITOR) 20 mg tablet TAKE 1 TABLET(20 MG) BY MOUTH DAILY AT BEDTIME, Normal      Blood Glucose Monitoring Suppl (ACCU-CHEK GUIDE) w/Device KIT Starting Tue 6/18/2019, Historical Med      buprenorphine-naloxone (Suboxone) 8-2 mg Place 1 Film (8 mg total) under the tongue 2 (two) times a day, Starting Mon 10/17/2022, Until Wed 11/16/2022, Normal      glucose blood (Accu-Chek Guide) test strip Use 1 each 3 (three) times a day Test as directed, Starting Fri 9/30/2022, Normal      insulin glargine (Basaglar KwikPen) 100 units/mL injection pen Inject 15 Units under the skin daily at bedtime, Starting Thu 4/28/2022, Normal      Insulin Pen Needle (BD Pen Needle Cely 2nd Gen) 32G X 4 MM MISC Inject under the skin daily, Starting u 4/28/2022, Normal      lisinopril (ZESTRIL) 2 5 mg tablet TAKE 1 TABLET(2 5 MG) BY MOUTH DAILY, Normal      metFORMIN (GLUCOPHAGE) 1000 MG tablet TAKE 1 TABLET(1000 MG) BY MOUTH TWICE DAILY WITH MEALS, Normal      naloxone (NARCAN) 4 mg/0 1 mL nasal spray Administer 1 spray into a nostril  If no response after 2-3 minutes, give another dose in the other nostril using a new spray , Normal      sodium chloride, PF, 0 9 % 10 mL by Intracatheter route every 8 (eight) hours  PICC Line flush  10ml NSS flush before IV infusion and after IV infusion  10ml flush to opposite port once a day  Indications: FLUSHING, Starting Wed 8/17/2022, Historical Med               PDMP Review       Value Time User    PDMP Reviewed  Yes 10/17/2022  9:06 AM Troy Teran DO           ED Provider  Attending physically available and evaluated Jordana Chaidez  I managed the patient along with the ED Attending      Electronically Signed by         João Barrera MD  11/08/22 8678

## 2022-11-08 NOTE — Clinical Note
Roger Fuchs was seen and treated in our emergency department on 11/8/2022     ? No work until cleared by Family Doctor/Orthopedics    ? Diagnosis: ?    Liu Filter  ? Adelaida Chan He may return on this date: ?    ? If you have any questions or concerns, please don't hesitate to call        Arelis Vizcarra MD    ______________________________           _______________          _______________  Hospital Representative                              Date                                Time

## 2022-11-09 NOTE — DISCHARGE INSTRUCTIONS
You were evaluated in the emergency department for: right shoulder pain  You can access your current and pending results through [facility]  A radiologist will take a second look at your X-Rays, if you had any, and you will be contacted with any new findings  You should follow-up with your primary care provider, as soon as possible, for re-evaluation  If you do not have a primary care provider, I have referred you to [address] [place] [address]  You will be contacted about scheduling an appointment  Their phone number is also included on this paperwork  You have also been referred to orthopaedic surgery and physical therapy and you should follow up with them as well  You may take 650mg of tylenol every four to six hours, not exceeding 3,000mg daily, for the management of your discomfort  You may also take ibuprofen, 400mg every six to eight hours  You should also perform range of motion exercises for the joint as well  Your workup revealed no emergent features at this time; however, many disease processes are dynamic:    Please, return to the emergency department if you experience new or worsening symptoms, fever, chest pain, shortness of breath, difficulty breathing, dizziness, abdominal pain, persistent nausea/vomiting, syncope or passing out, blood in your urine or stool, coughing up blood, leg swelling/pain, urinary retention, bowel or bladder incontinence, numbness between your legs  Additionally, your blood pressure was measured to be high  This is something that you should discuss with your primary care provider and have re-checked within one week

## 2022-11-09 NOTE — ED ATTENDING ATTESTATION
11/8/2022  ISherrie MD, saw and evaluated the patient  I have discussed the patient with the resident/non-physician practitioner and agree with the resident's/non-physician practitioner's findings, Plan of Care, and MDM as documented in the resident's/non-physician practitioner's note, except where noted  All available labs and Radiology studies were reviewed  I was present for key portions of any procedure(s) performed by the resident/non-physician practitioner and I was immediately available to provide assistance  At this point I agree with the current assessment done in the Emergency Department  I have conducted an independent evaluation of this patient a history and physical is as follows:      63 y/o M Patient complains of right upper arm pain that started on Friday after lifting lots of heavy things at work  The pain is constant, moderate intensity, worse with using his right arm  No paresthesias, no other joint pains or swelling  Ten systems reviewed otherwise negative  Exam no distress, lungs normal, cardiac normal abnormal, right shoulder exam; no joint swelling, redness or effusion is noted  Patient has painful full range of motion  Tender palpation over the biceps tendon head  Normal right elbow exam   Neurovascular intact right upper extremity      ED Course         Critical Care Time  Procedures

## 2022-11-10 ENCOUNTER — HOSPITAL ENCOUNTER (EMERGENCY)
Facility: HOSPITAL | Age: 57
Discharge: HOME | End: 2022-11-10
Attending: EMERGENCY MEDICINE

## 2022-11-10 VITALS
SYSTOLIC BLOOD PRESSURE: 124 MMHG | OXYGEN SATURATION: 99 % | HEART RATE: 87 BPM | WEIGHT: 195.1 LBS | TEMPERATURE: 98.2 F | BODY MASS INDEX: 30.56 KG/M2 | RESPIRATION RATE: 20 BRPM | DIASTOLIC BLOOD PRESSURE: 92 MMHG

## 2022-11-10 DIAGNOSIS — M25.511 ACUTE PAIN OF RIGHT SHOULDER: Primary | ICD-10-CM

## 2022-11-10 RX ORDER — NAPROXEN 500 MG/1
500 TABLET ORAL 2 TIMES DAILY WITH MEALS
Qty: 14 TABLET | Refills: 0 | Status: SHIPPED | OUTPATIENT
Start: 2022-11-10 | End: 2022-11-17

## 2022-11-10 NOTE — Clinical Note
Phyllis Chen was seen and treated in our emergency department on 11/10/2022  No restrictions        No lifitng of object 40lbs or over for 1 week  Diagnosis:     Herrera Rape  may return to work on return date  He may return on this date: 11/11/2022         If you have any questions or concerns, please don't hesitate to call        Sherryll Klinefelter, MD    ______________________________           _______________          _______________  Hospital Representative                              Date                                Time

## 2022-11-10 NOTE — ED PROVIDER NOTES
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 Taking?    ACCU-CHEK FASTCLIX LANCETS MISC   Yes No   Blood Glucose Monitoring Suppl (ACCU-CHEK GUIDE) w/Device KIT   Yes No   Insulin Pen Needle (BD Pen Needle Cely 2nd Gen) 32G X 4 MM MISC   No No   Sig: Inject under the skin daily   acetaminophen (TYLENOL) 500 mg tablet   No No   Sig: Take 1 tablet (500 mg total) by mouth every 6 (six) hours as needed for mild pain for up to 7 days   aspirin (ECOTRIN LOW STRENGTH) 81 mg EC tablet   No No   Sig: Take 1 tablet (81 mg total) by mouth daily   atorvastatin (LIPITOR) 20 mg tablet  Other (Specify) No No   Sig: TAKE 1 TABLET(20 MG) BY MOUTH DAILY AT BEDTIME   buprenorphine-naloxone (Suboxone) 8-2 mg   No No   Sig: Place 1 Film (8 mg total) under the tongue 2 (two) times a day   glucose blood (Accu-Chek Guide) test strip   No No   Sig: Use 1 each 3 (three) times a day Test as directed   ibuprofen (MOTRIN) 400 mg tablet   No No   Sig: Take 1 tablet (400 mg total) by mouth every 6 (six) hours as needed for mild pain for up to 7 days   insulin glargine (Basaglar KwikPen) 100 units/mL injection pen   No No   Sig: Inject 15 Units under the skin daily at bedtime   lisinopril (ZESTRIL) 2 5 mg tablet   No No   Sig: TAKE 1 TABLET(2 5 MG) BY MOUTH DAILY   metFORMIN (GLUCOPHAGE) 1000 MG tablet   No No   Sig: TAKE 1 TABLET(1000 MG) BY MOUTH TWICE DAILY WITH MEALS   naloxone (NARCAN) 4 mg/0 1 mL nasal spray   No No   Sig: Administer 1 spray into a nostril  If no response after 2-3 minutes, give another dose in the other nostril using a new spray  Patient not taking: Reported on 10/17/2022   sodium chloride, PF, 0 9 %   Yes No   Sig: 10 mL by Intracatheter route every 8 (eight) hours  PICC Line flush  10ml NSS flush before IV infusion and after IV infusion  10ml flush to opposite port once a day  Indications: FLUSHING   Patient not taking: Reported on 10/17/2022      Facility-Administered Medications: None       Past Medical History:   Diagnosis Date   • Diabetes mellitus ([facility] )    • Substance abuse ([facility] )        Past Surgical History:   Procedure Laterality Date   • NO PAST SURGERIES     • WOUND DEBRIDEMENT Right 8/6/2022    Procedure: DEBRIDEMENT RIGHT UPPER EXTREMITY ([address]) SEPTIC WRIST;  Surgeon: Juancarlos Evans MD;  Location: [facility];  Service: Orthopedics       Family History   Problem Relation Age of Onset   • Diabetes Mother    • Alcohol abuse Father    • Diabetes Sister    • Asthma Brother      I have reviewed and agree with the history as documented      E-Cigarette/Vaping   • E-Cigarette Use Never User      E-Cigarette/Vaping Substances   • Nicotine No    • THC No    • CBD No    • Flavoring No    • Other No    • Unknown No      Social History     Tobacco Use   • Smoking status: Current Every Day Smoker     Packs/day: 0 50     Types: Cigarettes   • Smokeless tobacco: Never Used   • Tobacco comment: about 12 cigarettes/daily   Vaping Use   • Vaping Use: Never used   Substance Use Topics   • Alcohol use: Not Currently   • Drug use: Not Currently     Comment: heroin hx, clean x 1 year       Review of Systems   Constitutional: Negative for chills and fever  HENT: Negative for congestion, rhinorrhea and sore throat  Eyes: Negative for redness and visual disturbance  Respiratory: Negative for cough and shortness of breath  Cardiovascular: Negative for chest pain and palpitations  Gastrointestinal: Negative for constipation, diarrhea, nausea and vomiting  Genitourinary: Negative for dysuria and hematuria  Musculoskeletal: Positive for arthralgias (R shoulder)  Negative for myalgias and neck pain  Skin: Negative for rash and wound  Allergic/Immunologic: Negative for immunocompromised state  Neurological: Negative for seizures and syncope  Psychiatric/Behavioral: Negative for confusion and suicidal ideas  Physical Exam  Physical Exam  Vitals and nursing note reviewed  Constitutional:       General: He is not in acute distress  Appearance: Normal appearance  He is well-developed  HENT:      Head: Normocephalic and atraumatic  No raccoon eyes  Right Ear: External ear normal       Left Ear: External ear normal       Nose: Nose normal  No congestion  Mouth/Throat:      Lips: Pink  Mouth: Mucous membranes are moist    Eyes:      General: Lids are normal  No scleral icterus  Extraocular Movements: Extraocular movements intact  Cardiovascular:      Rate and Rhythm: Normal rate and regular rhythm  Heart sounds: No murmur heard  No friction rub  Pulmonary:      Effort: Pulmonary effort is normal  No respiratory distress  Breath sounds: No wheezing or rhonchi  Abdominal:      General: Abdomen is flat  Palpations: Abdomen is soft  Tenderness: There is no abdominal tenderness  There is no guarding or rebound  Musculoskeletal:      Right shoulder: No swelling, tenderness, bony tenderness or crepitus   Decreased range of motion (s/t pain with abduction past 45 degrees  )  Normal strength  Normal pulse  Left shoulder: Normal range of motion  Cervical back: Normal range of motion  No torticollis  Comments: Radial pulse 2+, symmetric strength, intact sensation   Skin:     General: Skin is warm and dry  Coloration: Skin is not jaundiced  Findings: No rash  Neurological:      Mental Status: He is alert and oriented to person, place, and time  Mental status is at baseline  Psychiatric:         Behavior: Behavior normal  Behavior is cooperative  Vital Signs  ED Triage Vitals [11/10/22 1151]   Temperature Pulse Respirations Blood Pressure SpO2   98 2 °F (36 8 °C) 87 20 124/92 99 %      Temp Source Heart Rate Source Patient Position - Orthostatic VS BP Location FiO2 (%)   Oral Monitor Sitting Left arm --      Pain Score       --           Vitals:    11/10/22 1151   BP: 124/92   Pulse: 87   Patient Position - Orthostatic VS: Sitting         Visual Acuity      ED Medications  Medications - No data to display    Diagnostic Studies  Results Reviewed     None                 No orders to display              Procedures  Procedures         ED Course                                             MDM  Patient is a 68-year-old male with past medical history of substance abuse and diabetes presenting with right-sided shoulder pain  Patient on arrival is ambulatory to room is in no acute distress, vital signs stable, afebrile  On exam lungs clear auscultation, heart without murmurs rubs or gallops abdomen soft nontender  Exam consistent with msk injury  Will treat with NSAIDs and conservative management  Will follow with PCP, return precautions discussed    Disposition  Final diagnoses:   Acute pain of right shoulder     Time reflects when diagnosis was documented in both MDM as applicable and the Disposition within this note     Time User Action Codes Description Comment    11/10/2022 12:04 PM Kolby [address] [place] [address] Democravise Acute pain of right shoulder       ED Disposition     ED Disposition   Discharge    Condition   Stable    Date/Time   Thu Nov 10, 2022 12:04 PM    Comment   Dhruv Garcia discharge to home/self care                 Follow-up Information     Follow up With Specialties Details [address]   [address]  [place]  [address]  [id number]  [phone]            Discharge Medication List as of 11/10/2022 12:06 PM      START taking these medications    Details   naproxen (Naprosyn) 500 mg tablet Take 1 tablet (500 mg total) by mouth 2 (two) times a day with meals for 7 days, Starting Thu 11/10/2022, Until Thu 11/17/2022, Normal         CONTINUE these medications which have NOT CHANGED    Details   ACCU-CHEK FASTCLIX LANCETS MISC Starting Tue 6/18/2019, Historical Med      acetaminophen (TYLENOL) 500 mg tablet Take 1 tablet (500 mg total) by mouth every 6 (six) hours as needed for mild pain for up to 7 days, Starting Tue 11/8/2022, Until Tue 11/15/2022 at 2359, Normal      aspirin (ECOTRIN LOW STRENGTH) 81 mg EC tablet Take 1 tablet (81 mg total) by mouth daily, Starting Fri 5/8/2020, Normal      atorvastatin (LIPITOR) 20 mg tablet TAKE 1 TABLET(20 MG) BY MOUTH DAILY AT BEDTIME, Normal      Blood Glucose Monitoring Suppl (ACCU-CHEK GUIDE) w/Device KIT Starting Tue 6/18/2019, Historical Med      buprenorphine-naloxone (Suboxone) 8-2 mg Place 1 Film (8 mg total) under the tongue 2 (two) times a day, Starting Mon 10/17/2022, Until Wed 11/16/2022, Normal      glucose blood (Accu-Chek Guide) test strip Use 1 each 3 (three) times a day Test as directed, Starting Fri 9/30/2022, Normal      ibuprofen (MOTRIN) 400 mg tablet Take 1 tablet (400 mg total) by mouth every 6 (six) hours as needed for mild pain for up to 7 days, Starting Tue 11/8/2022, Until Tue 11/15/2022 at 2359, Normal      insulin glargine (Basaglar KwikPen) 100 units/mL injection pen Inject 15 Units under the skin daily at bedtime, Starting Thu 4/28/2022, Normal      Insulin Pen Needle (BD Pen Needle Cely 2nd Gen) 32G X 4 MM MISC Inject under the skin daily, Starting Thu 4/28/2022, Normal      lisinopril (ZESTRIL) 2 5 mg tablet TAKE 1 TABLET(2 5 MG) BY MOUTH DAILY, Normal      metFORMIN (GLUCOPHAGE) 1000 MG tablet TAKE 1 TABLET(1000 MG) BY MOUTH TWICE DAILY WITH MEALS, Normal      naloxone (NARCAN) 4 mg/0 1 mL nasal spray Administer 1 spray into a nostril  If no response after 2-3 minutes, give another dose in the other nostril using a new spray , Normal      sodium chloride, PF, 0 9 % 10 mL by Intracatheter route every 8 (eight) hours  PICC Line flush  10ml NSS flush before IV infusion and after IV infusion  10ml flush to opposite port once a day  Indications: FLUSHING, Starting Wed 8/17/2022, Historical Med             No discharge procedures on file      PDMP Review       Value Time User    PDMP Reviewed  Yes 10/17/2022  9:06 AM Antony Go DO          ED Provider  Electronically Signed by           Shari Taylor MD  11/10/22 8406

## 2022-11-14 ENCOUNTER — OFFICE VISIT (OUTPATIENT)
Dept: OTHER | Age: 57
End: 2022-11-14

## 2022-11-14 VITALS
DIASTOLIC BLOOD PRESSURE: 79 MMHG | SYSTOLIC BLOOD PRESSURE: 130 MMHG | WEIGHT: 194.4 LBS | BODY MASS INDEX: 30.45 KG/M2 | HEART RATE: 80 BPM

## 2022-11-14 DIAGNOSIS — F11.21 OPIOID USE DISORDER, SEVERE, IN SUSTAINED REMISSION, DEPENDENCE (HCC): ICD-10-CM

## 2022-11-14 RX ORDER — BUPRENORPHINE AND NALOXONE 8; 2 MG/1; MG/1
8 FILM, SOLUBLE BUCCAL; SUBLINGUAL 2 TIMES DAILY
Qty: 60 FILM | Refills: 0 | Status: SHIPPED | OUTPATIENT
Start: 2022-11-14 | End: 2022-12-14

## 2022-11-30 ENCOUNTER — VBI (OUTPATIENT)
Dept: ADMINISTRATIVE | Facility: OTHER | Age: 57
End: 2022-11-30

## 2022-12-12 DIAGNOSIS — F11.21 OPIOID USE DISORDER, SEVERE, IN SUSTAINED REMISSION, DEPENDENCE (HCC): ICD-10-CM

## 2022-12-12 RX ORDER — BUPRENORPHINE AND NALOXONE 8; 2 MG/1; MG/1
8 FILM, SOLUBLE BUCCAL; SUBLINGUAL 2 TIMES DAILY
Qty: 60 FILM | Refills: 0 | Status: SHIPPED | OUTPATIENT
Start: 2022-12-12 | End: 2023-01-11

## 2022-12-12 NOTE — TELEPHONE ENCOUNTER
Patient called requesting a refill  He will be out of his medication by 12/14, however is not scheduled for a follow up until 12/29 with Dr Radha Barclay  Please advise

## 2022-12-29 DIAGNOSIS — F11.21 OPIOID USE DISORDER, SEVERE, IN SUSTAINED REMISSION, DEPENDENCE (HCC): ICD-10-CM

## 2022-12-29 RX ORDER — BUPRENORPHINE AND NALOXONE 8; 2 MG/1; MG/1
8 FILM, SOLUBLE BUCCAL; SUBLINGUAL 2 TIMES DAILY
Qty: 60 FILM | Refills: 0 | Status: CANCELLED | OUTPATIENT
Start: 2022-12-29 | End: 2023-01-28

## 2022-12-29 NOTE — TELEPHONE ENCOUNTER
Patient presented to the office at 4:25 for his 3:30 appointment  He is rescheduled for the next soonest appointment, 02/02/2023  He was last seen 11/14/2022  Informed the patient I would route a message to the provider to see what we could do regarding his medication refill  Please refill if appropriate

## 2022-12-30 ENCOUNTER — TELEPHONE (OUTPATIENT)
Dept: PSYCHIATRY | Facility: CLINIC | Age: 57
End: 2022-12-30

## 2022-12-30 ENCOUNTER — DOCUMENTATION (OUTPATIENT)
Dept: PSYCHIATRY | Facility: CLINIC | Age: 57
End: 2022-12-30

## 2022-12-30 DIAGNOSIS — F11.90 OPIOID USE DISORDER: ICD-10-CM

## 2022-12-30 DIAGNOSIS — F11.90 OPIOID USE DISORDER: Primary | ICD-10-CM

## 2022-12-30 RX ORDER — BUPRENORPHINE AND NALOXONE 8; 2 MG/1; MG/1
8 FILM, SOLUBLE BUCCAL; SUBLINGUAL 2 TIMES DAILY
Qty: 60 FILM | Refills: 0 | Status: SHIPPED | OUTPATIENT
Start: 2022-12-30 | End: 2022-12-30 | Stop reason: SDUPTHER

## 2022-12-30 RX ORDER — BUPRENORPHINE AND NALOXONE 8; 2 MG/1; MG/1
8 FILM, SOLUBLE BUCCAL; SUBLINGUAL 2 TIMES DAILY
Qty: 60 FILM | Refills: 0 | Status: SHIPPED | OUTPATIENT
Start: 2022-12-30 | End: 2023-01-29

## 2022-12-30 NOTE — TELEPHONE ENCOUNTER
Need to find patient sooner time and later in the day will clal patient back next week   -   Called patient and left voicemail for RC   No appts available sooner for bc at that late time

## 2022-12-30 NOTE — PROGRESS NOTES
Checking to see if RX was filled so I can call and let patient know will follow up with provider this morning

## 2023-01-03 ENCOUNTER — OFFICE VISIT (OUTPATIENT)
Dept: FAMILY MEDICINE CLINIC | Facility: CLINIC | Age: 58
End: 2023-01-03

## 2023-01-03 VITALS
WEIGHT: 199 LBS | HEIGHT: 67 IN | RESPIRATION RATE: 16 BRPM | SYSTOLIC BLOOD PRESSURE: 130 MMHG | HEART RATE: 92 BPM | BODY MASS INDEX: 31.23 KG/M2 | OXYGEN SATURATION: 97 % | TEMPERATURE: 97.1 F | DIASTOLIC BLOOD PRESSURE: 80 MMHG

## 2023-01-03 DIAGNOSIS — F11.21 OPIOID USE DISORDER, SEVERE, IN SUSTAINED REMISSION, DEPENDENCE (HCC): ICD-10-CM

## 2023-01-03 DIAGNOSIS — Z79.4 TYPE 2 DIABETES MELLITUS WITHOUT COMPLICATION, WITH LONG-TERM CURRENT USE OF INSULIN (HCC): Primary | ICD-10-CM

## 2023-01-03 DIAGNOSIS — Z72.0 TOBACCO ABUSE: ICD-10-CM

## 2023-01-03 DIAGNOSIS — E11.9 TYPE 2 DIABETES MELLITUS WITHOUT COMPLICATION, WITH LONG-TERM CURRENT USE OF INSULIN (HCC): Primary | ICD-10-CM

## 2023-01-03 DIAGNOSIS — Z12.11 SCREENING FOR COLON CANCER: ICD-10-CM

## 2023-01-03 DIAGNOSIS — Z23 ENCOUNTER FOR IMMUNIZATION: ICD-10-CM

## 2023-01-03 DIAGNOSIS — I10 PRIMARY HYPERTENSION: ICD-10-CM

## 2023-01-03 PROBLEM — E11.8 TYPE 2 DIABETES MELLITUS WITH COMPLICATION, WITHOUT LONG-TERM CURRENT USE OF INSULIN (HCC): Status: RESOLVED | Noted: 2019-06-18 | Resolved: 2023-01-03

## 2023-01-03 LAB — SL AMB POCT HEMOGLOBIN AIC: 8.1 (ref ?–6.5)

## 2023-01-03 RX ORDER — LISINOPRIL 2.5 MG/1
2.5 TABLET ORAL DAILY
Qty: 30 TABLET | Refills: 3 | Status: SHIPPED | OUTPATIENT
Start: 2023-01-03

## 2023-01-03 RX ORDER — ATORVASTATIN CALCIUM 20 MG/1
20 TABLET, FILM COATED ORAL
Qty: 90 TABLET | Refills: 1 | Status: SHIPPED | OUTPATIENT
Start: 2023-01-03

## 2023-01-03 RX ORDER — INSULIN GLARGINE 100 [IU]/ML
10 INJECTION, SOLUTION SUBCUTANEOUS
Qty: 15 ML | Refills: 3 | Status: SHIPPED | OUTPATIENT
Start: 2023-01-03

## 2023-01-03 NOTE — ASSESSMENT & PLAN NOTE
-Smokes 10 cigs per day  -Tobacco Cessation Counseling: Tobacco cessation counseling and education was provided  The patient is sincerely urged to quit consumption of tobacco  He is not ready to quit tobacco  The numerous health risks of tobacco consumption were discussed   If he decides to quit, there are a number of helpful adjunctive aids, and he can see me to discuss nicotine replacement therapy, chantix, or bupropion anytime in the future   -Spent 5 minutes on counseling

## 2023-01-03 NOTE — PROGRESS NOTES
Name: Stacey Caraballo      : 1965      MRN: [id number]  Encounter Provider: Zach Baldwin MD  Encounter Date: 1/3/2023   Encounter department: [address] [place] [address]     1  Type 2 diabetes mellitus without complication, with long-term current use of insulin ([facility])  Assessment & Plan:    Lab Results   Component Value Date    HGBA1C 8 1 (A) 2023   -Suboptimal control  -Fasting Blood sugar range    -Continue lantus 10 units and metformin 1000 mg BID  -Will add Jardiance for better glycemic control  -Hemoglobin A1c goal of less than 7  -Continue statin  -Recommend yearly ophthalmology and podiatry evaluation    Orders:  -     POCT hemoglobin A1c  -     lisinopril (ZESTRIL) 2 5 mg tablet; Take 1 tablet (2 5 mg total) by mouth daily  -     atorvastatin (LIPITOR) 20 mg tablet; Take 1 tablet (20 mg total) by mouth daily at bedtime  -     metFORMIN (GLUCOPHAGE) 1000 MG tablet; Take 1 tablet (1,000 mg total) by mouth 2 (two) times a day with meals  -     Empagliflozin (Jardiance) 10 MG TABS tablet; Take 1 tablet (10 mg total) by mouth in the morning  -     Ambulatory referral to Diabetic Education; Future; Expected date: 2023  -     Insulin Glargine Solostar (Basaglar KwikPen) 100 UNIT/ML SOPN; Inject 0 1 mL (10 Units total) under the skin daily at bedtime    2  Tobacco abuse  Assessment & Plan:  -Smokes 10 cigs per day  -Tobacco Cessation Counseling: Tobacco cessation counseling and education was provided  The patient is sincerely urged to quit consumption of tobacco  He is not ready to quit tobacco  The numerous health risks of tobacco consumption were discussed  If he decides to quit, there are a number of helpful adjunctive aids, and he can see me to discuss nicotine replacement therapy, chantix, or bupropion anytime in the future   -Spent 5 minutes on counseling      3   Primary hypertension  Assessment & Plan:  -Blood pressure  At goal of less than 140/90  -Continue lisinopril      4  Opioid use disorder, severe, in sustained remission, dependence ([facility] )  Assessment & Plan: On suboxone  Currently clean for 1 year      5  Encounter for immunization  -     Pneumococcal Conjugate Vaccine 20-valent (Pcv20)    6  Screening for colon cancer  -     Ambulatory Referral to Gastroenterology; Future         Subjective      60-year-old male with a history of type 2 diabetes, hypertension and tobacco abuse who presents today for follow-up on his medical condition  He needs some medication refills  He admits that he has not been very consistent with a low glycemic diet  He has not had a chance to get blood work done because he has been busy with work  He reports that his blood sugar fasting has been ranging between   Review of Systems   Constitutional: Negative for appetite change, chills, fatigue and fever  HENT: Negative for congestion and ear pain  Respiratory: Negative for cough, shortness of breath and wheezing  Cardiovascular: Negative for chest pain, palpitations and leg swelling  Gastrointestinal: Negative for abdominal distention, abdominal pain, blood in stool, constipation, diarrhea, nausea and vomiting  Endocrine: Negative for polydipsia, polyphagia and polyuria  Genitourinary: Negative for dysuria, frequency, hematuria and urgency  Musculoskeletal: Negative for arthralgias, back pain and myalgias  Neurological: Negative for dizziness, tremors, weakness, light-headedness, numbness and headaches         Current Outpatient Medications on File Prior to Visit   Medication Sig   • ACCU-CHEK FASTCLIX LANCETS MISC    • aspirin (ECOTRIN LOW STRENGTH) 81 mg EC tablet Take 1 tablet (81 mg total) by mouth daily   • Blood Glucose Monitoring Suppl (ACCU-CHEK GUIDE) w/Device KIT    • buprenorphine-naloxone (Suboxone) 8-2 mg Place 1 Film (8 mg total) under the tongue 2 (two) times a day   • buprenorphine-naloxone (Suboxone) 8-2 mg Place 1 Film (8 mg total) under the tongue 2 (two) times a day   • glucose blood (Accu-Chek Guide) test strip Use 1 each 3 (three) times a day Test as directed   • ibuprofen (MOTRIN) 400 mg tablet Take 1 tablet (400 mg total) by mouth every 6 (six) hours as needed for mild pain for up to 7 days   • Insulin Pen Needle (BD Pen Needle Cely 2nd Gen) 32G X 4 MM MISC Inject under the skin daily   • naloxone (NARCAN) 4 mg/0 1 mL nasal spray Administer 1 spray into a nostril  If no response after 2-3 minutes, give another dose in the other nostril using a new spray  • naproxen (Naprosyn) 500 mg tablet Take 1 tablet (500 mg total) by mouth 2 (two) times a day with meals for 7 days   • sodium chloride, PF, 0 9 % 10 mL by Intracatheter route every 8 (eight) hours PICC Line flush  10ml NSS flush before IV infusion and after IV infusion  10ml flush to opposite port once a day  • [DISCONTINUED] atorvastatin (LIPITOR) 20 mg tablet TAKE 1 TABLET(20 MG) BY MOUTH DAILY AT BEDTIME   • [DISCONTINUED] insulin glargine (Basaglar KwikPen) 100 units/mL injection pen Inject 15 Units under the skin daily at bedtime   • [DISCONTINUED] lisinopril (ZESTRIL) 2 5 mg tablet TAKE 1 TABLET(2 5 MG) BY MOUTH DAILY   • [DISCONTINUED] metFORMIN (GLUCOPHAGE) 1000 MG tablet TAKE 1 TABLET(1000 MG) BY MOUTH TWICE DAILY WITH MEALS       Objective     /80 (BP Location: Left arm, Patient Position: Sitting, Cuff Size: Large)   Pulse 92   Temp (!) 97 1 °F (36 2 °C) (Temporal)   Resp 16   Ht 5' 7" (1 702 m)   Wt 90 3 kg (199 lb)   SpO2 97%   BMI 31 17 kg/m²     Physical Exam  Constitutional:       General: He is not in acute distress  Appearance: Normal appearance  He is well-developed  He is not ill-appearing, toxic-appearing or diaphoretic  HENT:      Head: Normocephalic and atraumatic  Right Ear: External ear normal       Left Ear: External ear normal       Nose: Nose normal       Mouth/Throat:      Pharynx: No oropharyngeal exudate  Eyes:      General: No scleral icterus  Right eye: No discharge  Left eye: No discharge  Conjunctiva/sclera: Conjunctivae normal       Pupils: Pupils are equal, round, and reactive to light  Cardiovascular:      Rate and Rhythm: Normal rate and regular rhythm  Heart sounds: Normal heart sounds  No murmur heard  No friction rub  No gallop  Pulmonary:      Effort: Pulmonary effort is normal  No respiratory distress  Breath sounds: Normal breath sounds  No stridor  No wheezing or rhonchi  Abdominal:      General: Bowel sounds are normal  There is no distension  Palpations: Abdomen is soft  There is no mass  Tenderness: There is no abdominal tenderness  There is no guarding or rebound  Hernia: No hernia is present  Musculoskeletal:         General: No deformity  Normal range of motion  Cervical back: Normal range of motion and neck supple  Skin:     General: Skin is warm  Capillary Refill: Capillary refill takes less than 2 seconds  Neurological:      Mental Status: He is alert and oriented to person, place, and time  Cranial Nerves: No cranial nerve deficit         Denece Merlin, MD

## 2023-01-03 NOTE — ASSESSMENT & PLAN NOTE
Lab Results   Component Value Date    HGBA1C 8 1 (A) 01/03/2023   -Suboptimal control  -Fasting Blood sugar range    -Continue lantus 10 units and metformin 1000 mg BID  -Will add Jardiance for better glycemic control  -Hemoglobin A1c goal of less than 7  -Continue statin  -Recommend yearly ophthalmology and podiatry evaluation

## 2023-01-17 ENCOUNTER — VBI (OUTPATIENT)
Dept: ADMINISTRATIVE | Facility: OTHER | Age: 58
End: 2023-01-17

## 2023-01-20 ENCOUNTER — TELEPHONE (OUTPATIENT)
Dept: DIABETES SERVICES | Facility: OTHER | Age: 58
End: 2023-01-20

## 2023-01-20 NOTE — TELEPHONE ENCOUNTER
Left 1st message for pt regarding diabetes education program
aerobic capacity/endurance/gait, locomotion, and balance/muscle strength

## 2023-01-23 ENCOUNTER — HOSPITAL ENCOUNTER (INPATIENT)
Facility: HOSPITAL | Age: 58
LOS: 3 days | Discharge: HOME | End: 2023-01-26
Attending: EMERGENCY MEDICINE | Admitting: EMERGENCY MEDICINE

## 2023-01-23 DIAGNOSIS — F11.93 WITHDRAWAL FROM OPIOIDS (HCC): ICD-10-CM

## 2023-01-23 DIAGNOSIS — F11.90 OPIOID USE DISORDER: ICD-10-CM

## 2023-01-23 DIAGNOSIS — F11.10 OPIOID ABUSE (HCC): Primary | ICD-10-CM

## 2023-01-23 PROBLEM — D72.829 LEUKOCYTOSIS: Status: ACTIVE | Noted: 2023-01-23

## 2023-01-23 PROBLEM — F11.20 OPIOID USE DISORDER, SEVERE, DEPENDENCE (HCC): Status: ACTIVE | Noted: 2021-03-16

## 2023-01-23 LAB
ALBUMIN SERPL BCP-MCNC: 4.1 G/DL (ref 3.5–5)
ALP SERPL-CCNC: 88 U/L (ref 43–122)
ALT SERPL W P-5'-P-CCNC: 47 U/L
AMPHETAMINES SERPL QL SCN: NEGATIVE
ANION GAP SERPL CALCULATED.3IONS-SCNC: 4 MMOL/L (ref 5–14)
AST SERPL W P-5'-P-CCNC: 38 U/L (ref 17–59)
BACTERIA UR QL AUTO: NORMAL /HPF
BARBITURATES UR QL: NEGATIVE
BASOPHILS # BLD AUTO: 0.02 THOUSANDS/ÂΜL (ref 0–0.1)
BASOPHILS NFR BLD AUTO: 0 % (ref 0–1)
BENZODIAZ UR QL: NEGATIVE
BILIRUB SERPL-MCNC: 0.62 MG/DL (ref 0.2–1)
BILIRUB UR QL STRIP: NEGATIVE
BUN SERPL-MCNC: 15 MG/DL (ref 5–25)
CALCIUM SERPL-MCNC: 9.1 MG/DL (ref 8.4–10.2)
CHLORIDE SERPL-SCNC: 101 MMOL/L (ref 96–108)
CLARITY UR: CLEAR
CO2 SERPL-SCNC: 30 MMOL/L (ref 21–32)
COCAINE UR QL: NEGATIVE
COLOR UR: ABNORMAL
CREAT SERPL-MCNC: 0.85 MG/DL (ref 0.7–1.5)
EOSINOPHIL # BLD AUTO: 0.16 THOUSAND/ÂΜL (ref 0–0.61)
EOSINOPHIL NFR BLD AUTO: 1 % (ref 0–6)
ERYTHROCYTE [DISTWIDTH] IN BLOOD BY AUTOMATED COUNT: 13.4 % (ref 11.6–15.1)
ETHANOL SERPL-MCNC: <10 MG/DL (ref 0–10)
FLUAV RNA RESP QL NAA+PROBE: NEGATIVE
FLUBV RNA RESP QL NAA+PROBE: NEGATIVE
GFR SERPL CREATININE-BSD FRML MDRD: 96 ML/MIN/1.73SQ M
GLUCOSE SERPL-MCNC: 104 MG/DL (ref 65–140)
GLUCOSE SERPL-MCNC: 164 MG/DL (ref 65–140)
GLUCOSE SERPL-MCNC: 173 MG/DL (ref 70–99)
GLUCOSE UR STRIP-MCNC: ABNORMAL MG/DL
HCT VFR BLD AUTO: 39.1 % (ref 36.5–49.3)
HGB BLD-MCNC: 12.6 G/DL (ref 12–17)
HGB UR QL STRIP.AUTO: 10
IMM GRANULOCYTES # BLD AUTO: 0.04 THOUSAND/UL (ref 0–0.2)
IMM GRANULOCYTES NFR BLD AUTO: 0 % (ref 0–2)
KETONES UR STRIP-MCNC: NEGATIVE MG/DL
LEUKOCYTE ESTERASE UR QL STRIP: NEGATIVE
LYMPHOCYTES # BLD AUTO: 2.19 THOUSANDS/ÂΜL (ref 0.6–4.47)
LYMPHOCYTES NFR BLD AUTO: 17 % (ref 14–44)
MAGNESIUM SERPL-MCNC: 1.9 MG/DL (ref 1.6–2.3)
MCH RBC QN AUTO: 28.6 PG (ref 26.8–34.3)
MCHC RBC AUTO-ENTMCNC: 32.2 G/DL (ref 31.4–37.4)
MCV RBC AUTO: 89 FL (ref 82–98)
METHADONE UR QL: NEGATIVE
MONOCYTES # BLD AUTO: 0.66 THOUSAND/ÂΜL (ref 0.17–1.22)
MONOCYTES NFR BLD AUTO: 5 % (ref 4–12)
NEUTROPHILS # BLD AUTO: 9.54 THOUSANDS/ÂΜL (ref 1.85–7.62)
NEUTS SEG NFR BLD AUTO: 77 % (ref 43–75)
NITRITE UR QL STRIP: NEGATIVE
NON-SQ EPI CELLS URNS QL MICRO: NORMAL /HPF
NRBC BLD AUTO-RTO: 0 /100 WBCS
OPIATES UR QL SCN: POSITIVE
OXYCODONE+OXYMORPHONE UR QL SCN: NEGATIVE
PCP UR QL: NEGATIVE
PH UR STRIP.AUTO: 6 [PH]
PLATELET # BLD AUTO: 302 THOUSANDS/UL (ref 149–390)
PMV BLD AUTO: 8.8 FL (ref 8.9–12.7)
POTASSIUM SERPL-SCNC: 4.4 MMOL/L (ref 3.5–5.3)
PROT SERPL-MCNC: 7.6 G/DL (ref 6.4–8.4)
PROT UR STRIP-MCNC: ABNORMAL MG/DL
RBC # BLD AUTO: 4.41 MILLION/UL (ref 3.88–5.62)
RBC #/AREA URNS AUTO: NORMAL /HPF
RSV RNA RESP QL NAA+PROBE: NEGATIVE
SARS-COV-2 RNA RESP QL NAA+PROBE: NEGATIVE
SODIUM SERPL-SCNC: 135 MMOL/L (ref 135–147)
SP GR UR STRIP.AUTO: 1.01 (ref 1–1.04)
THC UR QL: NEGATIVE
UROBILINOGEN UA: NEGATIVE MG/DL
WBC # BLD AUTO: 12.61 THOUSAND/UL (ref 4.31–10.16)
WBC #/AREA URNS AUTO: NORMAL /HPF

## 2023-01-23 PROCEDURE — HZ2ZZZZ DETOXIFICATION SERVICES FOR SUBSTANCE ABUSE TREATMENT: ICD-10-PCS | Performed by: EMERGENCY MEDICINE

## 2023-01-23 RX ORDER — ENOXAPARIN SODIUM 100 MG/ML
40 INJECTION SUBCUTANEOUS DAILY
Status: DISCONTINUED | OUTPATIENT
Start: 2023-01-24 | End: 2023-01-26 | Stop reason: HOSPADM

## 2023-01-23 RX ORDER — ACETAMINOPHEN 325 MG/1
650 TABLET ORAL EVERY 6 HOURS PRN
Status: DISCONTINUED | OUTPATIENT
Start: 2023-01-23 | End: 2023-01-26 | Stop reason: HOSPADM

## 2023-01-23 RX ORDER — ATORVASTATIN CALCIUM 20 MG/1
20 TABLET, FILM COATED ORAL
Status: DISCONTINUED | OUTPATIENT
Start: 2023-01-23 | End: 2023-01-26 | Stop reason: HOSPADM

## 2023-01-23 RX ORDER — LOPERAMIDE HYDROCHLORIDE 2 MG/1
2 CAPSULE ORAL EVERY 4 HOURS PRN
Status: DISCONTINUED | OUTPATIENT
Start: 2023-01-23 | End: 2023-01-26 | Stop reason: HOSPADM

## 2023-01-23 RX ORDER — LISINOPRIL 5 MG/1
2.5 TABLET ORAL DAILY
Status: DISCONTINUED | OUTPATIENT
Start: 2023-01-24 | End: 2023-01-26 | Stop reason: HOSPADM

## 2023-01-23 RX ORDER — ONDANSETRON 2 MG/ML
4 INJECTION INTRAMUSCULAR; INTRAVENOUS EVERY 6 HOURS PRN
Status: DISCONTINUED | OUTPATIENT
Start: 2023-01-23 | End: 2023-01-26 | Stop reason: HOSPADM

## 2023-01-23 RX ORDER — GABAPENTIN 300 MG/1
300 CAPSULE ORAL EVERY 8 HOURS PRN
Status: DISCONTINUED | OUTPATIENT
Start: 2023-01-23 | End: 2023-01-26 | Stop reason: HOSPADM

## 2023-01-23 RX ORDER — ASPIRIN 81 MG/1
81 TABLET ORAL DAILY
Status: DISCONTINUED | OUTPATIENT
Start: 2023-01-24 | End: 2023-01-26 | Stop reason: HOSPADM

## 2023-01-23 RX ORDER — TRAZODONE HYDROCHLORIDE 50 MG/1
50 TABLET ORAL
Status: DISCONTINUED | OUTPATIENT
Start: 2023-01-23 | End: 2023-01-26 | Stop reason: HOSPADM

## 2023-01-23 RX ORDER — NICOTINE 21 MG/24HR
1 PATCH, TRANSDERMAL 24 HOURS TRANSDERMAL DAILY
Status: DISCONTINUED | OUTPATIENT
Start: 2023-01-23 | End: 2023-01-26 | Stop reason: HOSPADM

## 2023-01-23 RX ORDER — BUPRENORPHINE 20 UG/H
20 PATCH TRANSDERMAL
Status: DISCONTINUED | OUTPATIENT
Start: 2023-01-23 | End: 2023-01-26 | Stop reason: HOSPADM

## 2023-01-23 RX ORDER — CLONIDINE HYDROCHLORIDE 0.1 MG/1
0.1 TABLET ORAL EVERY 6 HOURS PRN
Status: DISCONTINUED | OUTPATIENT
Start: 2023-01-23 | End: 2023-01-26 | Stop reason: HOSPADM

## 2023-01-23 RX ORDER — INSULIN GLARGINE 100 [IU]/ML
10 INJECTION, SOLUTION SUBCUTANEOUS
Status: DISCONTINUED | OUTPATIENT
Start: 2023-01-23 | End: 2023-01-26 | Stop reason: HOSPADM

## 2023-01-23 RX ORDER — IBUPROFEN 600 MG/1
600 TABLET ORAL EVERY 6 HOURS PRN
Status: DISCONTINUED | OUTPATIENT
Start: 2023-01-23 | End: 2023-01-26 | Stop reason: HOSPADM

## 2023-01-23 RX ORDER — CLONAZEPAM 1 MG/1
2 TABLET ORAL EVERY 6 HOURS PRN
Status: DISCONTINUED | OUTPATIENT
Start: 2023-01-23 | End: 2023-01-26 | Stop reason: HOSPADM

## 2023-01-23 RX ORDER — SODIUM CHLORIDE 9 MG/ML
100 INJECTION, SOLUTION INTRAVENOUS CONTINUOUS
Status: DISCONTINUED | OUTPATIENT
Start: 2023-01-23 | End: 2023-01-24

## 2023-01-23 RX ADMIN — GABAPENTIN 300 MG: 300 CAPSULE ORAL at 21:58

## 2023-01-23 RX ADMIN — BUPRENORPHINE 20 MCG: 20 PATCH, EXTENDED RELEASE TRANSDERMAL at 16:52

## 2023-01-23 RX ADMIN — ATORVASTATIN CALCIUM 20 MG: 20 TABLET, FILM COATED ORAL at 21:41

## 2023-01-23 RX ADMIN — CLONAZEPAM 2 MG: 1 TABLET ORAL at 23:52

## 2023-01-23 RX ADMIN — NICOTINE 1 PATCH: 21 PATCH, EXTENDED RELEASE TRANSDERMAL at 16:53

## 2023-01-23 RX ADMIN — CLONAZEPAM 2 MG: 1 TABLET ORAL at 16:50

## 2023-01-23 RX ADMIN — SODIUM CHLORIDE 100 ML/HR: 0.9 INJECTION, SOLUTION INTRAVENOUS at 16:50

## 2023-01-23 RX ADMIN — TRAZODONE HYDROCHLORIDE 50 MG: 50 TABLET ORAL at 21:58

## 2023-01-23 RX ADMIN — INSULIN GLARGINE 10 UNITS: 100 INJECTION, SOLUTION SUBCUTANEOUS at 21:54

## 2023-01-23 RX ADMIN — METFORMIN HYDROCHLORIDE 1000 MG: 500 TABLET, FILM COATED ORAL at 16:50

## 2023-01-23 RX ADMIN — SODIUM CHLORIDE 1000 ML: 0.9 INJECTION, SOLUTION INTRAVENOUS at 13:48

## 2023-01-23 NOTE — ASSESSMENT & PLAN NOTE
· Patient stated last use of opioids was Monday, January 23, 2023  · Butrans patch in place  · Test dose of Subutex 0 5 mg yesterday  Patient reported feeling tired, no sign of withdrawal   · Started micro induction dose of Subutex 2 mg every 2 hours for 4 doses  We will see how he tolerates

## 2023-01-23 NOTE — CERTIFIED RECOVERY SPECIALIST
Certified  Note    Patient name: Kimmy Bullard  Location: ED 06/ED [address]: [facility]  Attending:  Saji Sandoval MD MRN [id number]  : 1965  Age: 62 y o  Sex: male Date 2023         Substance Use History:     Social History     Substance and Sexual Activity   Alcohol Use Not Currently        Social History     Substance and Sexual Activity   Drug Use Yes   • Types: Fentanyl       Admission Information  Substances Used at This Admission[de-identified] Opiates  Readmission in Last 30 Days?: No  Encounter Type[de-identified] Patient Face-to-Face    Recovery Support Plan  Declined All Services?: No  Medication Assisted Treatment[de-identified] Yes  Medication Type[de-identified] Suboxone/Buprenorphine  Agreeable to Warm Handoff?: No  Is Patient Accepting [facility] Treatment Services?: Yes (5T Detox)  Was Referral Made to [facility]?: No  Was Narcan Provided at Discharge?: Yes  Plan Discussed With Treatment Team[de-identified] Yes  Plan Discussed With[de-identified] Provider    Referral to Recovery Supports:  Jaylyn Pan[de-identified] No  Community Based CRS[de-identified] No  Case Management[de-identified] No  Direct Access to [facility] Treatment?: Yes (5T Detox)  Resource Guide Given?: No  Follow Up With Patient[de-identified] Yes (Ongoing until discharge)  Family / Other Support[de-identified] No  Referral for Community Physical Health[de-identified] No  Referral for Community Mental Health[de-identified] No'    CRS met with patient to check in before transfer to [facility] unit  CRS provided contact information to follow up  Patient appeared lethargic, but responded when spoken to          Fuentes Richardson

## 2023-01-23 NOTE — H&P
HISTORY & PHYSICAL EXAM  DEPARTMENT OF MEDICAL TOXICOLOGY  LEVEL 4 MEDICAL DETOX UNIT  Germaine Nesbitt 62 y o  male MRN: [id number]  Unit/Bed#: ROSEMARIE Encounter: [id number]      Reason for Admission/Principal Problem: Opioid withdrawal, opioid use disorder    Admitting Provider: Adrian Saavedra MD  Attending Provider: Adrian Saavedra MD   1/23/2023  1:19 PM      * Opioid withdrawal ([facility] )  Assessment & Plan  · Not yet ready for Suboxone induction given last use this morning  · Will treat symptomatically  · Apply Butrans patch  · Plan for Suboxone microinduction 24-48 hours after last opioid use    Opioid use disorder, severe, dependence ([place] [address] )  Assessment & Plan  · Had several months of sobriety but relapsed to using 8-10 bags of fentanyl daily intravenously  · Treatment of withdrawal as above  · Known hepatitis C carrier, recommend continued GI follow-up  · Will check rapid HIV with morning labs  · Case management consult    Leukocytosis  Assessment & Plan  · Has consistently had leukocytosis on lab checks going back to 2019  · No current signs/ symptoms of infection, afebrile  · Continue to monitor    Hepatitis C virus carrier state ([place] [address] )  Assessment & Plan  · Recommend continued outpatient GI follow-up    Type 2 diabetes mellitus without complication, with long-term current use of insulin ([place] [address] )  Assessment & Plan  · Continue home medications  · Continue to monitor blood glucose    Primary hypertension  Assessment & Plan  · Continue lisinopril    Other hyperlipidemia  Assessment & Plan  · Continue atorvastatin    Tobacco abuse  Assessment & Plan  · Offer nicotine replacement therapy  · Encourage cessation        VTE Prophylaxis: Enoxaparin (Lovenox)  / sequential compression device   Code Status: Full Code      Anticipated Length of Stay:  Patient will be admitted on an Inpatient basis with an anticipated length of stay of at least 2 midnights     Justification for Hospital Stay: Opioid withdrawal, opioid use disorder    For any questions or concerns, please Tiger Text the advanced practitioner in the role of [facility]-DETOX-[facility] On Call      This patient qualifies for Level IV medically managed intensive inpatient services under the criteria set by the American Society of Addiction Medicine, including dimensions 1-3  The patient is in withdrawal (or is intoxicated with high risk of withdrawal), with severe and unstable medical and/or psychiatric (dual diagnosis) problems, requiring requires 24-hour medical and nursing care and the full resources of a [address] patient to medical detox unit and continue supportive care and stabilization of acute opioid withdrawal per medical toxicology/detox medication assisted treatment pathway  Complicated Opioid Withdrawal (ie associated with long acting agents, such as methadone or illicit fentanyl analogues):  • >72 hours: Routine COWS/induction as per uncomplicated opoid withdrawal (below)  • <72 hours:  • Adjunctive medications (see below), including clonazepam 1-2mg Q6 hrs PRN anxiety (or diazepam 5 mg if cannot take PO)  • >48 hours, test dose buprenorphine 0 5-1mg  • If responds well, continue with 2mg Q2 hrs (total 8mg) holding for worsening withdrawal, then start maintenance dosing   • If responds poorly, follow next step below   • <48 hours and/or COWS < 6 or failed test dose, add Butrans transdermal patch 20-40mcg/hr, monitor for signs/symptoms of withdrawal   • If within first 24-48 hrs, can administer buprenorphine 0 5-1mg Q6 hrs x 4, followed by 2mg Q2 hrs x 4 the next day  • If surpassing 48 hrs, can administer buprenorphine 2mg Q2hrs if tolerated without transdermal patch or lower sublingual dose  • When complete, remove transdermal patch and start maintenance dosing   • For moderate-severe withdrawal (COWS >/= 8, may still consider routine induction with buprenorphine 8 mg if appropriate     • For worsened or precipitated withdrawal, consider adjunctive benzodiazepines and other comfort meds  Dexmedetomidine may be considered for severe precipitated withdrawal          Uncomplicated Opioid Withdrawal:  Monitor opioid severity via Clinical Opioid Withdrawal Scale (COWS) Q4 hours and administer buprenorphine/naloxone 8mg/2mg when COWS >8, or when greater than 24 hours have elapsed from most recent opioid use (excluding long-acting opioids, such as methadone)  Continue to monitor opioid severity Q30-60 minutes after first dose and administer additional buprenorphine 2-4mg every 30-60 minutes until COWS < 8 for two consecutive hours  Adjunctive medications administered as needed:  Clonidine 0 1 mg PO Q6 hours PRN anxiety or palpitations    Gabapentin 300mg PO Q8 hours PRN anxiety    Ibuprofen 600 mg PO Q6 hours PRN pain    Acetaminophen 1000mg PO Q8 hours PRN pain    Ondansetron 4 mg PO Q6 hours PRN N/V    Nicotine patch 7, 14, 21 mg  PRN nicotine withdrawal   Trazodone 50 mg PO QHS PRN sleep    Loperamide 4 mg PO PRN diarrhea up to 16 mg/day       The risks, benefits and mechanism of buprenorphine/naloxone were discussed and patient agreed to treatment  Case management consultation will take place to assist with coordination of subsequent treatment after discharge  Administer daily multivitamin  Evaluate and treat for coexisting substance use, such as nicotine  Discuss risk factors for infectious disease, such as history of intravenous drug abuse, and offer hepatitis and HIV screening if indicated  HPI: Germaine Nesbitt is a 62y o  year old male with history of opioid use disorder who presents requesting detox  Patient had several months of sobriety and has been taking Suboxone, however 1 month ago he discontinued taking Suboxone and relapsed to fentanyl use  He endorses using 8-10 bags of fentanyl daily over the last month  Last use was at approximately 9:00 this morning    He would like to get started back on Suboxone but has prior history of severe precipitated withdrawal and does not feel comfortable doing home induction  At this time he has some mild withdrawal symptoms including anxiety and a little bit of nausea  He has no other complaints at this time  Opioids currently used: fentanyl  Route of use: intravenous  Date/Time of Last Opioid Use: 01/23/23  09:00 AM  Current Signs/Symptoms of Opioid Withdrawal: Yes    COWS score:   Clinical Opiate Withdrawal Scale  Pulse: (!) 54      Methadone & Buprenorphine History  History of prior treatment for opioid dependence? yes  Currently on Methadone Maintenance? no  History of prior treatment with Suboxone? yes  Currently taking Suboxone? no  History of using Suboxone without having a prescription? no  History of IVDA? yes  Co-existing substance use? no    Review of PDMP: yes    Social History     Substance and Sexual Activity   Alcohol Use Not Currently     Social History     Substance and Sexual Activity   Drug Use Yes   • Types: Fentanyl     Social History     Tobacco Use   Smoking Status Every Day   • Packs/day: 0 50   • Types: Cigarettes   Smokeless Tobacco Never   Tobacco Comments    about 12 cigarettes/daily       Review of Systems   Constitutional: Negative for chills and fever  HENT: Positive for congestion  Eyes: Negative  Respiratory: Negative for cough and shortness of breath  Cardiovascular: Negative for chest pain and leg swelling  Gastrointestinal: Positive for nausea  Negative for abdominal pain and vomiting  Genitourinary: Negative  Musculoskeletal: Negative  Skin: Negative  Neurological: Negative for dizziness and headaches  Hematological: Negative          Historical Information   Past Medical History:   Diagnosis Date   • Diabetes mellitus ([facility] )    • Substance abuse [facility])      Past Surgical History:   Procedure Laterality Date   • NO PAST SURGERIES     • WOUND DEBRIDEMENT Right 8/6/2022 Procedure: DEBRIDEMENT RIGHT UPPER EXTREMITY ([address]) SEPTIC WRIST;  Surgeon: Adonis Telles MD;  Location: [facility];  Service: Orthopedics     Family History   Problem Relation Age of Onset   • Diabetes Mother    • Alcohol abuse Father    • Diabetes Sister    • Asthma Brother      Social History   Marital Status: /Civil Union   Patient Pre-hospital Living Situation: Lives independently  Patient Pre-hospital Level of Mobility: Independent  Patient Pre-hospital Diet Restrictions: None    No Known Allergies    Prior to Admission medications    Medication Sig Start Date End Date Taking?  Authorizing Provider   ACCU-CHEK FASTCLIX LANCETS [facility]  6/18/19   Historical Provider, MD   aspirin (ECOTRIN LOW STRENGTH) 81 mg EC tablet Take 1 tablet (81 mg total) by mouth daily 5/8/20   Hoang Gayle MD   atorvastatin (LIPITOR) 20 mg tablet Take 1 tablet (20 mg total) by mouth daily at bedtime 1/3/23   Marlon Burrows MD   Blood Glucose Monitoring Suppl (ACCU-CHEK GUIDE) w/Device KIT  6/18/19   Historical Provider, MD   buprenorphine-naloxone (Suboxone) 8-2 mg Place 1 Film (8 mg total) under the tongue 2 (two) times a day 12/30/22 1/29/23  ISMA Chapa   Empagliflozin (Jardiance) 10 MG TABS tablet Take 1 tablet (10 mg total) by mouth in the morning 1/3/23 2/2/23  Marlon Burrows MD   glucose blood (Accu-Chek Guide) test strip Use 1 each 3 (three) times a day Test as directed 9/30/22   Marlon Burrows MD   ibuprofen (MOTRIN) 400 mg tablet Take 1 tablet (400 mg total) by mouth every 6 (six) hours as needed for mild pain for up to 7 days 11/8/22 11/15/22  Lilibeth Virk MD   Insulin Glargine Solostar (Basaglar KwikPen) 100 UNIT/ML SOPN Inject 0 1 mL (10 Units total) under the skin daily at bedtime 1/3/23   Marlon Burrows MD   Insulin Pen Needle (BD Pen Needle Cely 2nd Gen) 32G X 4 MM MISC Inject under the skin daily 4/28/22   Wyatt Caraballo MD   lisinopril (ZESTRIL) 2 5 mg tablet Take 1 tablet (2 5 mg total) by mouth daily 1/3/23   Lennox El, MD   metFORMIN (GLUCOPHAGE) 1000 MG tablet Take 1 tablet (1,000 mg total) by mouth 2 (two) times a day with meals 1/3/23   Lennox El, MD   naloxone [facility]) 4 mg/0 1 mL nasal spray Administer 1 spray into a nostril  If no response after 2-3 minutes, give another dose in the other nostril using a new spray  9/16/22   ISMA Chapa   naproxen (Naprosyn) 500 mg tablet Take 1 tablet (500 mg total) by mouth 2 (two) times a day with meals for 7 days 11/10/22 11/17/22  Rj Jarrett MD   sodium chloride, PF, 0 9 % 10 mL by Intracatheter route every 8 (eight) hours PICC Line flush  10ml NSS flush before IV infusion and after IV infusion  10ml flush to opposite port once a day   8/17/22   Ayaka Blue MD       Current Facility-Administered Medications   Medication Dose Route Frequency   • acetaminophen (TYLENOL) tablet 650 mg  650 mg Oral Q6H PRN   • clonazePAM (KlonoPIN) tablet 2 mg  2 mg Oral Q6H PRN   • cloNIDine (CATAPRES) tablet 0 1 mg  0 1 mg Oral Q6H PRN   • [START ON 1/24/2023] enoxaparin (LOVENOX) subcutaneous injection 40 mg  40 mg Subcutaneous Daily   • gabapentin (NEURONTIN) capsule 300 mg  300 mg Oral Q8H PRN   • ibuprofen (MOTRIN) tablet 600 mg  600 mg Oral Q6H PRN   • loperamide (IMODIUM) capsule 2 mg  2 mg Oral Q4H PRN   • nicotine (NICODERM CQ) 21 mg/24 hr TD 24 hr patch 1 patch  1 patch Transdermal Daily   • ondansetron (ZOFRAN) injection 4 mg  4 mg Intravenous Q6H PRN   • sodium chloride 0 9 % infusion  100 mL/hr Intravenous Continuous   • transdermal buprenorphine (BUTRANS) 20 mcg/hr TD patch 20 mcg  20 mcg Transdermal Q7 Days   • traZODone (DESYREL) tablet 50 mg  50 mg Oral HS PRN       Continuous Infusions:sodium chloride, 100 mL/hr             Objective       Intake/Output Summary (Last 24 hours) at 1/23/2023 1611  Last data filed at 1/23/2023 1526  Gross per 24 hour Intake 1000 ml   Output --   Net 1000 ml       Invasive Devices:   Peripheral IV 01/23/23 Right Antecubital (Active)   Site Assessment WDL 01/23/23 1346   Dressing Type Transparent 01/23/23 1346   Line Status Blood return noted; Flushed 01/23/23 1346   Dressing Status Clean;Dry; Intact 01/23/23 1346       Vitals   Vitals:    01/23/23 1316   BP: 148/89   TempSrc: Oral   Pulse: (!) 54   Resp: 16   Patient Position - Orthostatic VS: Sitting   Temp: 98 8 °F (37 1 °C)       Physical Exam  Vitals reviewed  Constitutional:       General: He is not in acute distress  HENT:      Head: Normocephalic  Mouth/Throat:      Mouth: Mucous membranes are moist       Pharynx: Oropharynx is clear  Eyes:      Conjunctiva/sclera: Conjunctivae normal       Pupils: Pupils are equal, round, and reactive to light  Cardiovascular:      Rate and Rhythm: Normal rate and regular rhythm  Heart sounds: No murmur heard  No friction rub  No gallop  Pulmonary:      Effort: Pulmonary effort is normal  No respiratory distress  Breath sounds: Normal breath sounds  Abdominal:      General: There is no distension  Palpations: Abdomen is soft  Tenderness: There is no abdominal tenderness  Musculoskeletal:      Cervical back: Neck supple  Right lower leg: No edema  Left lower leg: No edema  Skin:     General: Skin is warm and dry  Neurological:      General: No focal deficit present  Mental Status: He is alert and oriented to person, place, and time  Psychiatric:      Comments: Appears mildly anxious         DATA    Lab Results: I have personally reviewed pertinent reports          CBC ETOH     Lab Results   Component Value Date    WBC 12 61 (H) 01/23/2023    RBC 4 41 01/23/2023    HGB 12 6 01/23/2023    HCT 39 1 01/23/2023    MCV 89 01/23/2023    MCH 28 6 01/23/2023    MCHC 32 2 01/23/2023    RDW 13 4 01/23/2023     01/23/2023    MPV 8 8 (L) 01/23/2023      Lab Results   Component Value Date    LACTICACID 0 6 08/05/2022      CMP UA         Component Value Date/Time    K 4 4 01/23/2023 1346     01/23/2023 1346    CO2 30 01/23/2023 1346    BUN 15 01/23/2023 1346    CREATININE 0 85 01/23/2023 1346         Component Value Date/Time    CALCIUM 9 1 01/23/2023 1346    ALKPHOS 88 01/23/2023 1346    AST 38 01/23/2023 1346    ALT 47 01/23/2023 1346      Lab Results   Component Value Date    CLARITYU Clear 01/23/2023    CLARITYU Clear 08/31/2015    COLORU Teresa (A) 01/23/2023    COLORU Yellow 08/31/2015    SPECGRAV 1 015 01/23/2023    SPECGRAV 1 020 08/31/2015    PHUR 6 0 01/23/2023    PHUR 5 5 08/31/2015    GLUCOSEU 250 (1/4%) (A) 01/23/2023    GLUCOSEU 500 (1/2%) (A) 08/31/2015    KETONESU Negative 01/23/2023    KETONESU Negative 08/31/2015    BLOODU 10 0 (A) 01/23/2023    BLOODU Negative 08/31/2015    PROTEIN UA 15 (Trace) (A) 01/23/2023    NITRITE Negative 01/23/2023    NITRITE Negative 08/31/2015    BILIRUBINUR Negative 01/23/2023    BILIRUBINUR Negative 08/31/2015    UROBILINOGEN Negative 01/23/2023    UROBILINOGEN 0 2 08/31/2015    LEUKOCYTESUR Negative 01/23/2023    LEUKOCYTESUR Negative 08/31/2015    WBCUA 0-1 01/23/2023    WBCUA 0-1 08/31/2015    RBCUA 0-1 01/23/2023    RBCUA None seen 08/31/2015    BACTERIA None Seen 01/23/2023    BACTERIA None seen 08/31/2015    EPIS Occasional 01/23/2023    EPIS Occasional 08/31/2015        Liver Function Test: ASA     Lab Results   Component Value Date    TBILI 0 62 01/23/2023    BILIDIR 0 08 08/05/2022    ALKPHOS 88 01/23/2023    AST 38 01/23/2023    ALT 47 01/23/2023    TP 7 6 01/23/2023    ALB 4 1 01/23/2023      No results found for: SALICYLATE   Troponin APAP     No results found for: TROPONINI   No results found for: ACTMNPHEN   VBG HCG     No results found for: PHVEN, SVK3DDY, PO2VEN, AEK8LYK, BEVEN, L4UJQEDNH, Z3SGYZP   No results found for: HCGQUANT   ABG Urine Drug Screen     No results found for: PHART, QUV8NQW, PO2ART, WUR1NGJ, BEART, Y2NKGBDNJ, O2HGB, SOURC, PITO, VTAC, ACRATE, INSPIREDAIR, PEEP   Lab Results   Component Value Date    AMPMETHUR Negative 01/23/2023    BARBTUR Negative 01/23/2023    BDZUR Negative 01/23/2023    COCAINEUR Negative 01/23/2023    METHADONEUR Negative 01/23/2023    OPIATEUR Positive (A) 01/23/2023    PCPUR Negative 01/23/2023    THCUR Negative 01/23/2023    OXYCODONEUR Negative 01/23/2023      Lactate INR     Lab Results   Component Value Date    LACTICACID 0 6 08/05/2022      Lab Results   Component Value Date    INR 1 03 08/05/2022      PTT Protime     Lab Results   Component Value Date/Time    PTT 36 08/05/2022 10:53 PM      Lab Results   Component Value Date/Time    PROTIME 13 5 08/05/2022 10:53 PM      Hepatitis HIV     Lab Results   Component Value Date    HEPBSAG Non-reactive 08/08/2022    HEPCAB High Reactive (A) 08/08/2022      Lab Results   Component Value Date    [id number] Non-Reactive 08/07/2022    [id number] Non-Reactive 08/07/2022            Counseling / Coordination of Care  Total floor / unit time spent today 45 minutes  Greater than 50% of total time was spent with the patient and / or family counseling and / or coordination of care  ** Please Note: This note has been constructed using a voice recognition system   **

## 2023-01-23 NOTE — CERTIFIED RECOVERY SPECIALIST
Certified  Note    Patient name: Lindsey Olivo  Location: ED 06/ED [address]: [facility]  Attending:  Heide Hassan DO MRN [id number]  : 1965  Age: 62 y o  Sex: male Date 2023         Substance Use History:     Social History     Substance and Sexual Activity   Alcohol Use Not Currently        Social History     Substance and Sexual Activity   Drug Use Yes   • Types: Fentanyl       Admission Information  Substances Used at This Admission[de-identified] Opiates  Readmission in Last 30 Days?: No  Encounter Type[de-identified] Patient Face-to-Face    Recovery Support Plan  Declined All Services?: No  Medication Assisted Treatment[de-identified] Yes  Medication Type[de-identified] Suboxone/Buprenorphine  Agreeable to Warm Handoff?: No  Is Patient Accepting [facility] Treatment Services?: Yes (5T Detox)  Was Referral Made to [facility]?: No  Was Narcan Provided at Discharge?: Yes  Plan Discussed With Treatment Team[de-identified] Yes  Plan Discussed With[de-identified] Provider    Referral to Recovery Supports:  Jaylyn Pan[de-identified] No  Community Based CRS[de-identified] No  Case Management[de-identified] No  Direct Access to [facility] Treatment?: Yes (5T Detox)  Resource Guide Given?: No  Follow Up With Patient[de-identified] Yes (Ongoing until discharge)  Family / Other Support[de-identified] No  Referral for Community Physical Health[de-identified] No  Referral for Community Mental Health[de-identified] No'    CRS received consult to meet with patient  Patient and CRS have worked together in the past   Patient shared that he messed up, but he was able to verbalize understanding that he needed help  CRS provided understanding and encouragement of patient's decision to seek help  Patient not interested in inpatient rehab but willing to engage in [facility] services at [address] [place] [address]  Patient still has CRS contact information for support  CRS will continue to follow        Essence Banuelos

## 2023-01-23 NOTE — ASSESSMENT & PLAN NOTE
· Had several months of sobriety but relapsed to using 8-10 bags of fentanyl daily intravenously  · Treatment of withdrawal as above  · Known hepatitis C carrier, recommend continued primary care follow-up  · Rapid HIV 1/2 antigen negative    · Case management consulted

## 2023-01-23 NOTE — ASSESSMENT & PLAN NOTE
· Per ID notes in August 2022, positive HCV antibodies likely represent prior exposure and not active infection given RNA negative  · Continue to follow with primary care

## 2023-01-23 NOTE — ASSESSMENT & PLAN NOTE
· Has consistently had leukocytosis on lab checks going back to 2019  · No current signs/ symptoms of infection, afebrile  · Continue to monitor

## 2023-01-23 NOTE — ED PROVIDER NOTES
History  Chief Complaint   Patient presents with   • Detox Evaluation     Pt requesting detox from fentanyl  "I used to be on suboxone but I quit taking that a while ago (1 month ago)  " Reports using 8-10 bags IV/day  Last use was this morning  Patient is a 66-year-old male, history of diabetes, hypertension, opioid abuse  Present in the emergency room seeking help with restarting Suboxone  States he injects 8-10 bags IV of fentanyl daily  Last use was approximately 9 AM this morning  No current withdrawal symptoms  However, he notes that he has had severe symptoms from precipitated withdrawal in the past   He is seeking admission to the medical detox unit so he can get restarted on Suboxone  Prior to Admission Medications   Prescriptions Last Dose Informant Patient Reported? Taking?    ACCU-CHEK FASTCLIX LANCETS MISC   Yes No   Blood Glucose Monitoring Suppl (ACCU-CHEK GUIDE) w/Device KIT   Yes No   Insulin Glargine Solostar (Basaglar KwikPen) 100 UNIT/ML SOPN   No No   Sig: Inject 0 1 mL (10 Units total) under the skin daily at bedtime   Insulin Pen Needle (BD Pen Needle Cely 2nd Gen) 32G X 4 MM MISC   No No   Sig: Inject under the skin daily   aspirin (ECOTRIN LOW STRENGTH) 81 mg EC tablet   No No   Sig: Take 1 tablet (81 mg total) by mouth daily   atorvastatin (LIPITOR) 20 mg tablet   No No   Sig: Take 1 tablet (20 mg total) by mouth daily at bedtime   buprenorphine-naloxone (Suboxone) 8-2 mg   No No   Sig: Place 1 Film (8 mg total) under the tongue 2 (two) times a day   glucose blood (Accu-Chek Guide) test strip   No No   Sig: Use 1 each 3 (three) times a day Test as directed   ibuprofen (MOTRIN) 400 mg tablet   No No   Sig: Take 1 tablet (400 mg total) by mouth every 6 (six) hours as needed for mild pain for up to 7 days   lisinopril (ZESTRIL) 2 5 mg tablet   No No   Sig: Take 1 tablet (2 5 mg total) by mouth daily   metFORMIN (GLUCOPHAGE) 1000 MG tablet   No No   Sig: Take 1 tablet (1,000 mg total) by mouth 2 (two) times a day with meals   naloxone (NARCAN) 4 mg/0 1 mL nasal spray   No No   Sig: Administer 1 spray into a nostril  If no response after 2-3 minutes, give another dose in the other nostril using a new spray  naproxen (Naprosyn) 500 mg tablet   No No   Sig: Take 1 tablet (500 mg total) by mouth 2 (two) times a day with meals for 7 days   sodium chloride, PF, 0 9 %   Yes No   Sig: 10 mL by Intracatheter route every 8 (eight) hours PICC Line flush  10ml NSS flush before IV infusion and after IV infusion  10ml flush to opposite port once a day  Facility-Administered Medications: None       Past Medical History:   Diagnosis Date   • Diabetes mellitus ([facility] [address] )    • Substance abuse ([facility] )        Past Surgical History:   Procedure Laterality Date   • NO PAST SURGERIES     • WOUND DEBRIDEMENT Right 8/6/2022    Procedure: DEBRIDEMENT RIGHT UPPER EXTREMITY ([address]) SEPTIC WRIST;  Surgeon: Sally Tripp MD;  Location: [facility];  Service: Orthopedics       Family History   Problem Relation Age of Onset   • Diabetes Mother    • Alcohol abuse Father    • Diabetes Sister    • Asthma Brother      I have reviewed and agree with the history as documented  E-Cigarette/Vaping   • E-Cigarette Use Never User      E-Cigarette/Vaping Substances   • Nicotine No    • THC No    • CBD No    • Flavoring No    • Other No    • Unknown No      Social History     Tobacco Use   • Smoking status: Every Day     Packs/day: 0 50     Types: Cigarettes   • Smokeless tobacco: Never   • Tobacco comments:     about 12 cigarettes/daily   Vaping Use   • Vaping Use: Never used   Substance Use Topics   • Alcohol use: Not Currently   • Drug use: Yes     Types: Fentanyl       Review of Systems   Constitutional: Negative  Negative for chills and fever  HENT: Negative  Negative for rhinorrhea, sore throat, trouble swallowing and voice change  Eyes: Negative  Negative for pain and visual disturbance     Respiratory: Negative  Negative for cough, shortness of breath and wheezing  Cardiovascular: Negative  Negative for chest pain and palpitations  Gastrointestinal: Negative for abdominal pain, diarrhea, nausea and vomiting  Genitourinary: Negative  Negative for dysuria and frequency  Musculoskeletal: Negative  Negative for neck pain and neck stiffness  Skin: Negative  Negative for rash  Neurological: Negative  Negative for dizziness, speech difficulty, weakness, light-headedness and numbness  Physical Exam  Physical Exam  Vitals and nursing note reviewed  Constitutional:       General: He is not in acute distress  Appearance: He is well-developed  HENT:      Head: Normocephalic and atraumatic  Eyes:      Conjunctiva/sclera: Conjunctivae normal       Pupils: Pupils are equal, round, and reactive to light  Neck:      Trachea: No tracheal deviation  Cardiovascular:      Rate and Rhythm: Normal rate and regular rhythm  Pulmonary:      Effort: Pulmonary effort is normal  No respiratory distress  Breath sounds: Normal breath sounds  No wheezing or rales  Abdominal:      General: Bowel sounds are normal  There is no distension  Palpations: Abdomen is soft  Tenderness: There is no abdominal tenderness  There is no guarding or rebound  Musculoskeletal:         General: No tenderness or deformity  Normal range of motion  Cervical back: Normal range of motion and neck supple  Skin:     General: Skin is warm and dry  Capillary Refill: Capillary refill takes less than 2 seconds  Findings: No rash  Neurological:      Mental Status: He is alert and oriented to person, place, and time     Psychiatric:         Behavior: Behavior normal          Vital Signs  ED Triage Vitals   Temperature Pulse Respirations Blood Pressure SpO2   01/23/23 1316 01/23/23 1316 01/23/23 1316 01/23/23 1316 01/23/23 1316   98 8 °F (37 1 °C) (!) 54 16 148/89 99 %      Temp Source Heart Rate Source Patient Position - Orthostatic VS BP Location FiO2 (%)   01/23/23 1316 01/23/23 1316 01/23/23 1316 01/23/23 1316 --   Oral Monitor Sitting Left arm       Pain Score       01/23/23 1625       No Pain           Vitals:    01/23/23 1316 01/23/23 1627 01/23/23 1636   BP: 148/89  132/77   Pulse: (!) 54 (!) 47 (!) 47   Patient Position - Orthostatic VS: Sitting           Visual Acuity      ED Medications  Medications   sodium chloride 0 9 % infusion (100 mL/hr Intravenous New Bag 1/23/23 1650)   acetaminophen (TYLENOL) tablet 650 mg (has no administration in time range)   ibuprofen (MOTRIN) tablet 600 mg (has no administration in time range)   ondansetron (ZOFRAN) injection 4 mg (has no administration in time range)   nicotine (NICODERM CQ) 21 mg/24 hr TD 24 hr patch 1 patch (1 patch Transdermal Medication Applied 1/23/23 1653)   traZODone (DESYREL) tablet 50 mg (has no administration in time range)   gabapentin (NEURONTIN) capsule 300 mg (has no administration in time range)   cloNIDine (CATAPRES) tablet 0 1 mg (has no administration in time range)   loperamide (IMODIUM) capsule 2 mg (has no administration in time range)   clonazePAM (KlonoPIN) tablet 2 mg (2 mg Oral Given 1/23/23 1650)   enoxaparin (LOVENOX) subcutaneous injection 40 mg (has no administration in time range)   aspirin (ECOTRIN LOW STRENGTH) EC tablet 81 mg (has no administration in time range)   atorvastatin (LIPITOR) tablet 20 mg (has no administration in time range)   insulin glargine (LANTUS) subcutaneous injection 10 Units 0 1 mL (has no administration in time range)   lisinopril (ZESTRIL) tablet 2 5 mg (has no administration in time range)   metFORMIN (GLUCOPHAGE) tablet 1,000 mg (1,000 mg Oral Given 1/23/23 1650)   transdermal buprenorphine (BUTRANS) 20 mcg/hr TD patch 20 mcg (20 mcg Transdermal Medication Applied 1/23/23 1652)   sodium chloride 0 9 % bolus 1,000 mL (0 mL Intravenous Stopped 1/23/23 1526)       Diagnostic Studies  Results Reviewed     Procedure Component Value Units Date/Time    FLU/RSV/COVID - if FLU/RSV clinically relevant [[id number]]  (Normal) Collected: 01/23/23 1346    Lab Status: Final result Specimen: Nares from Nose Updated: 01/23/23 1447     SARS-CoV-2 Negative     INFLUENZA A PCR Negative     INFLUENZA B PCR Negative     RSV PCR Negative    Narrative:      FOR PEDIATRIC PATIENTS - copy/paste COVID Guidelines URL to browser: [email or web address]  [employer]    SARS-CoV-2 assay is a Nucleic Acid Amplification assay intended for the  qualitative detection of nucleic acid from SARS-CoV-2 in nasopharyngeal  swabs  Results are for the presumptive identification of SARS-CoV-2 RNA  Positive results are indicative of infection with SARS-CoV-2, the virus  causing COVID-19, but do not rule out bacterial infection or co-infection  with other viruses  Laboratories within the [place] and its  territories are required to report all positive results to the appropriate  public health authorities  Negative results do not preclude SARS-CoV-2  infection and should not be used as the sole basis for treatment or other  patient management decisions  Negative results must be combined with  clinical observations, patient history, and epidemiological information  This test has not been FDA cleared or approved  This test has been authorized by FDA under an Emergency Use Authorization  (EUA)  This test is only authorized for the duration of time the  declaration that circumstances exist justifying the authorization of the  emergency use of an in vitro diagnostic tests for detection of SARS-CoV-2  virus and/or diagnosis of COVID-19 infection under section 564(b)(1) of  the Act, 21 U  S C  213QCQ-1(A)(9), unless the authorization is terminated  or revoked sooner  The test has been validated but independent review by FDA  and CLIA is pending  Test performed using [employer] GeneXpert:  This RT-PCR assay targets N2,  a region unique to SARS-CoV-2  A conserved region in the E-gene was chosen  for pan-Sarbecovirus detection which includes SARS-CoV-2  According to CMS-2020-01-R, this platform meets the definition of high-throughput technology  Rapid drug screen, urine [[id number]]  (Abnormal) Collected: 01/23/23 1350    Lab Status: Final result Specimen: Urine, Clean Catch Updated: 01/23/23 1433     Amph/Meth UR Negative     Barbiturate Ur Negative     Benzodiazepine Urine Negative     Cocaine Urine Negative     Methadone Urine Negative     Opiate Urine Positive     PCP Ur Negative     THC Urine Negative     Oxycodone Urine Negative    Narrative:      Presumptive report  If requested, specimen will be sent to reference lab for confirmation  FOR MEDICAL PURPOSES ONLY  IF CONFIRMATION NEEDED PLEASE CONTACT THE LAB WITHIN 5 DAYS      Drug Screen Cutoff Levels:  AMPHETAMINE/METHAMPHETAMINES  1000 ng/mL  BARBITURATES     200 ng/mL  BENZODIAZEPINES     200 ng/mL  COCAINE      300 ng/mL  METHADONE      300 ng/mL  OPIATES      300 ng/mL  PHENCYCLIDINE     25 ng/mL  THC       50 ng/mL  OXYCODONE      100 ng/mL    Ethanol [[id number]]  (Normal) Collected: 01/23/23 1346    Lab Status: Final result Specimen: Blood from Arm, Right Updated: 01/23/23 1433     Ethanol Lvl <10 mg/dL     Urine Microscopic [[id number]]  (Normal) Collected: 01/23/23 1350    Lab Status: Final result Specimen: Urine, Clean Catch Updated: 01/23/23 1414     RBC, UA 0-1 /hpf      WBC, UA 0-1 /hpf      Epithelial Cells Occasional /hpf      Bacteria, UA None Seen /hpf     Magnesium [[id number]]  (Normal) Collected: 01/23/23 1346    Lab Status: Final result Specimen: Blood from Arm, Right Updated: 01/23/23 1412     Magnesium 1 9 mg/dL     Comprehensive metabolic panel [[id number]]  (Abnormal) Collected: 01/23/23 1346    Lab Status: Final result Specimen: Blood from Arm, Right Updated: 01/23/23 1412     Sodium 135 mmol/L      Potassium 4 4 mmol/L Chloride 101 mmol/L      CO2 30 mmol/L      ANION GAP 4 mmol/L      BUN 15 mg/dL      Creatinine 0 85 mg/dL      Glucose 173 mg/dL      Calcium 9 1 mg/dL      AST 38 U/L      ALT 47 U/L      Alkaline Phosphatase 88 U/L      Total Protein 7 6 g/dL      Albumin 4 1 g/dL      Total Bilirubin 0 62 mg/dL      eGFR 96 ml/min/1 73sq m     Narrative:      National Kidney Disease Foundation guidelines for Chronic Kidney Disease (CKD):   •  Stage 1 with normal or high GFR (GFR > 90 mL/min/1 73 square meters)  •  Stage 2 Mild CKD (GFR = 60-89 mL/min/1 73 square meters)  •  Stage 3A Moderate CKD (GFR = 45-59 mL/min/1 73 square meters)  •  Stage 3B Moderate CKD (GFR = 30-44 mL/min/1 73 square meters)  •  Stage 4 Severe CKD (GFR = 15-29 mL/min/1 73 square meters)  •  Stage 5 End Stage CKD (GFR <15 mL/min/1 73 square meters)  Note: GFR calculation is accurate only with a steady state creatinine    UA (URINE) with reflex to Scope [[id number]]  (Abnormal) Collected: 01/23/23 1350    Lab Status: Final result Specimen: Urine, Clean Catch Updated: 01/23/23 1400     Color, UA Teresa     Clarity, UA Clear     Specific Gravity, UA 1 015     pH, UA 6 0     Leukocytes, UA Negative     Nitrite, UA Negative     Protein, UA 15 (Trace) mg/dl      Glucose,  (1/4%) mg/dl      Ketones, UA Negative mg/dl      Bilirubin, UA Negative     Occult Blood, UA 10 0     UROBILINOGEN UA Negative mg/dL     CBC and differential [[id number]]  (Abnormal) Collected: 01/23/23 1346    Lab Status: Final result Specimen: Blood from Arm, Right Updated: 01/23/23 1357     WBC 12 61 Thousand/uL      RBC 4 41 Million/uL      Hemoglobin 12 6 g/dL      Hematocrit 39 1 %      MCV 89 fL      MCH 28 6 pg      MCHC 32 2 g/dL      RDW 13 4 %      MPV 8 8 fL      Platelets 218 Thousands/uL      nRBC 0 /100 WBCs      Neutrophils Relative 77 %      Immat GRANS % 0 %      Lymphocytes Relative 17 %      Monocytes Relative 5 %      Eosinophils Relative 1 %      Basophils Relative 0 %      Neutrophils Absolute 9 54 Thousands/µL      Immature Grans Absolute 0 04 Thousand/uL      Lymphocytes Absolute 2 19 Thousands/µL      Monocytes Absolute 0 66 Thousand/µL      Eosinophils Absolute 0 16 Thousand/µL      Basophils Absolute 0 02 Thousands/µL     Fingerstick Glucose (POCT) [[id number]]  (Abnormal) Collected: 01/23/23 1354    Lab Status: Final result Updated: 01/23/23 1355     POC Glucose 164 mg/dl                  No orders to display              Procedures  Procedures         ED Course            Clinical Opiate Withdrawal Scale     Row Name 01/23/23 1627                Pulse 47  -MS        Resting Pulse Rate: Measured After Patient is Sitting or Lying for One Minute 0  -MS        GI Upset: Over Last Half Hour 0  -MS        Sweating: Over Past Half Hour Not Accounted for by Room Temperature of Patient Activity 0  -MS        Tremor: Observation of Outstretched Hands 0  -MS        Restlessness: Observation During Assessment 0  -MS        Yawning: Observation During Assessment 0  -MS        Pupil Size 0  -MS        Anxiety and Irritability 1  -MS        Bone or Joint Aches: If Patient was Having Pain Previously, Only the Additional Component Attributed to Opiate Withdrawal is Scored 0  -MS        Gooseflesh Skin 0  -MS        Runny Nose or Tearing: Not Accounted for by Cold Symptoms or Allergies 0  -MS        Clinical Opiate Withdrawal Scale Total Score 1  -MS        Heart Rate Source --        Patient Position - Orthostatic VS --              User Key  (r) = Recorded By, (t) = Taken By, (c) = Cosigned By    [address]  Name    [address] Zhen BOYD RN                                SBIRT 20yo+    [address] Most Recent Value   SBIRT (25 yo +)    In order to provide better care to our patients, we are screening all of our patients for alcohol and drug use  Would it be okay to ask you these screening questions?  No Filed at: 01/23/2023 1365                    Medical Decision Making  Plan for screening blood work, stable can be admitted to the detox unit  Patient does not experience any acute withdrawal symptoms  Given his history of precipitated withdrawal, will need medically managed induction of his Suboxone  Patient agreeable with plan  Opioid abuse [facility]): acute illness or injury  Withdrawal from opioids [facility]): acute illness or injury  Amount and/or Complexity of Data Reviewed  Labs: ordered  Risk  OTC drugs  Prescription drug management  Decision regarding hospitalization  Disposition  Final diagnoses:   Opioid abuse ([facility] [address] )   Withdrawal from opioids [facility])     Time reflects when diagnosis was documented in both MDM as applicable and the Disposition within this note     Time User Action Codes Description Comment    1/23/2023  2:30 PM Deleta Pali Add [F11 10] Opioid abuse ([address] )     1/23/2023  2:30 PM Deleta Pali Add [F11 93] Withdrawal from opioids [facility])       ED Disposition     ED Disposition   Admit    Condition   Stable    Date/Time   Mon Jan 23, 2023  2:29 PM    Comment   Case was discussed with Detox and the patient's admission status was agreed to be Admission Status: observation status to the service of Dr Celeste Lew              Follow-up Information    None         Current Discharge Medication List      CONTINUE these medications which have NOT CHANGED    Details   ACCU-CHEK FASTCLIX LANCETS MISC       aspirin (ECOTRIN LOW STRENGTH) 81 mg EC tablet Take 1 tablet (81 mg total) by mouth daily  Qty: 90 tablet, Refills: 3    Associated Diagnoses: Type 2 diabetes mellitus with complication, without long-term current use of insulin ([facility])      atorvastatin (LIPITOR) 20 mg tablet Take 1 tablet (20 mg total) by mouth daily at bedtime  Qty: 90 tablet, Refills: 1    Associated Diagnoses: Type 2 diabetes mellitus without complication, with long-term current use of insulin ([facility])      Blood Glucose Monitoring Suppl (ACCU-CHEK GUIDE) w/Device KIT       buprenorphine-naloxone (Suboxone) 8-2 mg Place 1 Film (8 mg total) under the tongue 2 (two) times a day  Qty: 60 Film, Refills: 0    Comments: [id number]  Associated Diagnoses: Opioid use disorder      glucose blood (Accu-Chek Guide) test strip Use 1 each 3 (three) times a day Test as directed  Qty: 100 each, Refills: 5    Associated Diagnoses: Type 2 diabetes mellitus with complication, without long-term current use of insulin ([facility])      ibuprofen (MOTRIN) 400 mg tablet Take 1 tablet (400 mg total) by mouth every 6 (six) hours as needed for mild pain for up to 7 days  Qty: 24 tablet, Refills: 0    Associated Diagnoses: Acute pain of right shoulder      Insulin Glargine Solostar (Basaglar KwikPen) 100 UNIT/ML SOPN Inject 0 1 mL (10 Units total) under the skin daily at bedtime  Qty: 15 mL, Refills: 3    Associated Diagnoses: Type 2 diabetes mellitus without complication, with long-term current use of insulin ([facility])      Insulin Pen Needle (BD Pen Needle Cely 2nd Gen) 32G X 4 MM MISC Inject under the skin daily  Qty: 100 each, Refills: 3    Associated Diagnoses: Type 2 diabetes mellitus with complication, without long-term current use of insulin ([facility])      lisinopril (ZESTRIL) 2 5 mg tablet Take 1 tablet (2 5 mg total) by mouth daily  Qty: 30 tablet, Refills: 3    Comments: ZERO refills remain on this prescription  Your patient is requesting advance approval of refills for this medication to [facility] Diagnoses: Type 2 diabetes mellitus without complication, with long-term current use of insulin ([facility])      metFORMIN (GLUCOPHAGE) 1000 MG tablet Take 1 tablet (1,000 mg total) by mouth 2 (two) times a day with meals  Qty: 90 tablet, Refills: 3    Associated Diagnoses: Type 2 diabetes mellitus without complication, with long-term current use of insulin ([facility])      naloxone (NARCAN) 4 mg/0 1 mL nasal spray Administer 1 spray into a nostril   If no response after 2-3 minutes, give another dose in the other nostril using a new spray   Qty: 1 each, Refills: 1    Associated Diagnoses: Opioid use disorder      naproxen (Naprosyn) 500 mg tablet Take 1 tablet (500 mg total) by mouth 2 (two) times a day with meals for 7 days  Qty: 14 tablet, Refills: 0    Associated Diagnoses: Acute pain of right shoulder      sodium chloride, PF, 0 9 % 10 mL by Intracatheter route every 8 (eight) hours PICC Line flush  10ml NSS flush before IV infusion and after IV infusion  10ml flush to opposite port once a day  No discharge procedures on file      PDMP Review       Value Time User    PDMP Reviewed  Yes 1/3/2023  5:14 PM Lennox El, MD          ED Provider  Electronically Signed by           Olga Lidia Swan DO  01/23/23 2034

## 2023-01-24 PROBLEM — R00.1 BRADYCARDIA, DRUG INDUCED: Status: ACTIVE | Noted: 2023-01-24

## 2023-01-24 PROBLEM — T50.905A BRADYCARDIA, DRUG INDUCED: Status: ACTIVE | Noted: 2023-01-24

## 2023-01-24 LAB
ANION GAP SERPL CALCULATED.3IONS-SCNC: 3 MMOL/L (ref 5–14)
ATRIAL RATE: 52 BPM
BUN SERPL-MCNC: 11 MG/DL (ref 5–25)
CALCIUM SERPL-MCNC: 9 MG/DL (ref 8.4–10.2)
CHLORIDE SERPL-SCNC: 109 MMOL/L (ref 96–108)
CO2 SERPL-SCNC: 27 MMOL/L (ref 21–32)
CREAT SERPL-MCNC: 0.73 MG/DL (ref 0.7–1.5)
ERYTHROCYTE [DISTWIDTH] IN BLOOD BY AUTOMATED COUNT: 13.5 % (ref 11.6–15.1)
GFR SERPL CREATININE-BSD FRML MDRD: 102 ML/MIN/1.73SQ M
GLUCOSE SERPL-MCNC: 103 MG/DL (ref 70–99)
GLUCOSE SERPL-MCNC: 104 MG/DL (ref 65–140)
HCT VFR BLD AUTO: 37.7 % (ref 36.5–49.3)
HGB BLD-MCNC: 12.4 G/DL (ref 12–17)
HIV 1+2 AB+HIV1 P24 AG SERPL QL IA: NORMAL
HIV1 P24 AG SER QL: NORMAL
MCH RBC QN AUTO: 29.2 PG (ref 26.8–34.3)
MCHC RBC AUTO-ENTMCNC: 32.9 G/DL (ref 31.4–37.4)
MCV RBC AUTO: 89 FL (ref 82–98)
P AXIS: 30 DEGREES
PLATELET # BLD AUTO: 275 THOUSANDS/UL (ref 149–390)
PMV BLD AUTO: 9.5 FL (ref 8.9–12.7)
POTASSIUM SERPL-SCNC: 4 MMOL/L (ref 3.5–5.3)
PR INTERVAL: 192 MS
QRS AXIS: -28 DEGREES
QRSD INTERVAL: 98 MS
QT INTERVAL: 414 MS
QTC INTERVAL: 385 MS
RBC # BLD AUTO: 4.24 MILLION/UL (ref 3.88–5.62)
SODIUM SERPL-SCNC: 139 MMOL/L (ref 135–147)
T WAVE AXIS: 2 DEGREES
VENTRICULAR RATE: 52 BPM
WBC # BLD AUTO: 9.64 THOUSAND/UL (ref 4.31–10.16)

## 2023-01-24 RX ORDER — BUPRENORPHINE AND NALOXONE 2; .5 MG/1; MG/1
2 FILM, SOLUBLE BUCCAL; SUBLINGUAL
Status: DISCONTINUED | OUTPATIENT
Start: 2023-01-25 | End: 2023-01-24

## 2023-01-24 RX ORDER — BUPRENORPHINE 2 MG/1
0.5 TABLET SUBLINGUAL EVERY 4 HOURS
Status: DISCONTINUED | OUTPATIENT
Start: 2023-01-24 | End: 2023-01-24

## 2023-01-24 RX ORDER — BUPRENORPHINE AND NALOXONE 8; 2 MG/1; MG/1
8 FILM, SOLUBLE BUCCAL; SUBLINGUAL 2 TIMES DAILY
Status: CANCELLED | OUTPATIENT
Start: 2023-01-24

## 2023-01-24 RX ORDER — NALOXONE HYDROCHLORIDE 1 MG/ML
2 INJECTION INTRAMUSCULAR; INTRAVENOUS; SUBCUTANEOUS ONCE AS NEEDED
Status: DISCONTINUED | OUTPATIENT
Start: 2023-01-24 | End: 2023-01-26 | Stop reason: HOSPADM

## 2023-01-24 RX ADMIN — Medication 0.5 MG: at 15:20

## 2023-01-24 RX ADMIN — NICOTINE 1 PATCH: 21 PATCH, EXTENDED RELEASE TRANSDERMAL at 08:03

## 2023-01-24 RX ADMIN — Medication 0.5 MG: at 19:45

## 2023-01-24 RX ADMIN — ASPIRIN 81 MG: 81 TABLET, COATED ORAL at 08:03

## 2023-01-24 RX ADMIN — Medication 0.5 MG: at 11:46

## 2023-01-24 RX ADMIN — ONDANSETRON 4 MG: 2 INJECTION INTRAMUSCULAR; INTRAVENOUS at 21:20

## 2023-01-24 RX ADMIN — GABAPENTIN 300 MG: 300 CAPSULE ORAL at 19:52

## 2023-01-24 RX ADMIN — ENOXAPARIN SODIUM 40 MG: 100 INJECTION SUBCUTANEOUS at 08:03

## 2023-01-24 RX ADMIN — METFORMIN HYDROCHLORIDE 1000 MG: 500 TABLET, FILM COATED ORAL at 08:03

## 2023-01-24 RX ADMIN — METFORMIN HYDROCHLORIDE 1000 MG: 500 TABLET, FILM COATED ORAL at 17:00

## 2023-01-24 RX ADMIN — CLONAZEPAM 2 MG: 1 TABLET ORAL at 09:08

## 2023-01-24 RX ADMIN — SODIUM CHLORIDE 100 ML/HR: 0.9 INJECTION, SOLUTION INTRAVENOUS at 02:10

## 2023-01-24 NOTE — DISCHARGE INSTR - OTHER ORDERS
Drug and Alcohol Resources in [facility]    If you have health insurance, including medical assistance, there should be a phone number on your insurance card that you can call to find out how to access services  The card may say, “For [address] or “For Drug and Alcohol Services” or “For Substance Abuse Services” call the number provided  Or contact [phone]    [facility])  [facility] provides care management for adults with Opioid Use Disorder  [facility] has a team of care managers who will help individuals get into treatment, coordinate behavioral and physical health care, and provide recovery support and guidance  Care managers will also provide information about Medication-Assisted Treatment  Contact [phone]    [facility] Drug and Alcohol Administration [phone] For additional information, contact the Department of Human Services Information and Referral Unit at [phone] between the hours of 8:30 a m  and 4:30 p m , Monday through Friday  Recovery Centers  These centers offer a safe, sober environment to those in recovery  A variety of programming including 12-Step Meetings, Osito Ridgel, Life Skills Workshops, etc  is offered at each location  Recovery Centers  These centers offer a safe, sober environment to those in recovery  A variety of programming including 12-Step Meetings, Osito Ridgel, Life Skills Workshops, etc  is offered at each location  [address]  [phone]  [email or web address] [employer] [address]  Fara Jones, [address]  [phone]  [email or web address]    [place] [address] [facility]  [phone]   [address] [place] [address]  [phone]  [email or web address]  [email or web address], [address] [place] [address]  [phone]  [facility]  MORGAN [facility] is [address] [facility], [place]  Open Tuayanna Fernrachel El, Thursday from 1pm-5pm and Friday, Saturday from 11am-3pm    [employer]  Confidential free help, from public health agencies, to find substance use treatment and information  [phone]    Link for Zoom Codes for Virtual 12 step Meetings Nguyen chang  aspx    [facility]  If you feel you have a problem with alcohol, or if you simply want to know more about AA, call our 24-hour hotline at: [address]: [address] Meetings can be found at [email or web address]  SARAH Meeting list [email or web address]

## 2023-01-24 NOTE — UTILIZATION REVIEW
Initial Clinical Review    Admission: Date/Time/Statement:   Admission Orders (From admission, onward)     Ordered        01/23/23 1445  INPATIENT ADMISSION  Once                      Orders Placed This Encounter   Procedures   • INPATIENT ADMISSION     Standing Status:   Standing     Number of Occurrences:   1     Order Specific Question:   Level of Care     Answer:   Med Surg [16]     Order Specific Question:   Estimated length of stay     Answer:   More than 2 Midnights     Order Specific Question:   Certification     Answer:   I certify that inpatient services are medically necessary for this patient for a duration of greater than two midnights  See H&P and MD Progress Notes for additional information about the patient's course of treatment  ED Arrival Information     Expected   -    Arrival   1/23/2023 12:51    Acuity   Urgent            Means of arrival   Walk-In    Escorted by   Tre Morris [facility] Toxicology    Admission type   Emergency            Arrival complaint   detox           Chief Complaint   Patient presents with   • Detox Evaluation     Pt requesting detox from fentanyl  "I used to be on suboxone but I quit taking that a while ago (1 month ago)  " Reports using 8-10 bags IV/day  Last use was this morning  Initial Presentation: 62 y o  male who presented to [address] [place], [place] [address] ED  Inpatient admission to medical detox unit for evaluation and treatment of acute opioid withdrawal  PMHx: T2DM, Substance use  Presented w/ request for detox from opioids  Uses fentanyl 8-10 bags IV daily, last use 1/23 @ 0900  On exam, anxiety  UDS positive for opiates  COWS 1  Plan: COWS monitoring w/ symptomatic supportive care, Butrans patch, IVF, micro induction of Suboxone when clinically indicated, telemetry, continuous pulse ox, Trend labs, replete electrolytes as needed  Continue home meds  Date: 01/24/23 Day 2: Pt reports feeling worse today, agreeable to test dose of Suboxone   On exam, bradycardic,restless, aches, anxiety  COWS 9  Plan: continue COWS monitoring w/ symptomatic supportive care, Butrans patch, IVF, micro induction of Suboxone when clinically indicated, telemetry, continuous pulse ox, Trend labs, replete electrolytes as needed  Continue home meds        ED Triage Vitals   Temperature Pulse Respirations Blood Pressure SpO2   01/23/23 1316 01/23/23 1316 01/23/23 1316 01/23/23 1316 01/23/23 1316   98 8 °F (37 1 °C) (!) 54 16 148/89 99 %      Temp Source Heart Rate Source Patient Position - Orthostatic VS BP Location FiO2 (%)   01/23/23 1316 01/23/23 1316 01/23/23 1316 01/23/23 1316 --   Oral Monitor Sitting Left arm       Pain Score       01/23/23 1625       No Pain          Wt Readings from Last 1 Encounters:   01/23/23 87 8 kg (193 lb 9 oz)     Additional Vital Signs:   Date/Time Temp Pulse Resp BP SpO2 O2 Device   01/24/23 0752 98 °F (36 7 °C) 47 Abnormal  16 129/77 99 % None (Room air)   01/24/23 0532 98 3 °F (36 8 °C) 63 16 145/71 98 % None (Room air)   01/23/23 2200 98 6 °F (37 °C) 53 Abnormal  16 148/84 -- --   01/23/23 2100 -- -- -- -- 98 % None (Room air)   01/23/23 1636 98 2 °F (36 8 °C) 47 Abnormal  17 132/77 -- --   01/23/23 1627 -- 47 Abnormal  -- -- -- --      01/24/23 0752 01/23/23 1627   Clinical Opiate Withdrawal Scale   Pulse 47 Abnormal   -KH 47 Abnormal   -MS   Resting Pulse Rate: Measured After Patient is Sitting or Lying for One Minute 0  -KH 0  -MS   GI Upset: Over Last Half Hour 0  -KH 0  -MS   Sweating: Over Past Half Hour Not Accounted for by Room Temperature of Patient Activity 1  -KH 0  -MS   Tremor: Observation of Outstretched Hands 0  -KH 0  -MS   Restlessness: Observation During Assessment 3  -KH 0  -MS   Yawning: Observation During Assessment 1  -KH 0  -MS   Pupil Size 1  -KH 0  -MS   Anxiety and Irritability 1  -KH 1  -MS   Bone or Joint Aches: If Patient was Having Pain Previously, Only the Additional Component Attributed to Opiate Withdrawal is Scored 1  -KH 0  -MS   Gooseflesh Skin 0  -KH 0  -MS   Runny Nose or Tearing: Not Accounted for by Cold Symptoms or Allergies 1  -KH 0  -MS   Clinical Opiate Withdrawal Scale Total Score 9  -KH 1  -MS     Pertinent Labs/Diagnostic Test Results:   Results from last 7 days   Lab Units 01/23/23  1346   SARS-COV-2  Negative     Results from last 7 days   Lab Units 01/24/23  0533 01/23/23  1346   WBC Thousand/uL 9 64 12 61*   HEMOGLOBIN g/dL 12 4 12 6   HEMATOCRIT % 37 7 39 1   PLATELETS Thousands/uL 275 302   NEUTROS ABS Thousands/µL  --  9 54*         Results from last 7 days   Lab Units 01/24/23  0533 01/23/23  1346   SODIUM mmol/L 139 135   POTASSIUM mmol/L 4 0 4 4   CHLORIDE mmol/L 109* 101   CO2 mmol/L 27 30   ANION GAP mmol/L 3* 4*   BUN mg/dL 11 15   CREATININE mg/dL 0 73 0 85   EGFR ml/min/1 73sq m 102 96   CALCIUM mg/dL 9 0 9 1   MAGNESIUM mg/dL  --  1 9     Results from last 7 days   Lab Units 01/23/23  1346   AST U/L 38   ALT U/L 47   ALK PHOS U/L 88   TOTAL PROTEIN g/dL 7 6   ALBUMIN g/dL 4 1   TOTAL BILIRUBIN mg/dL 0 62     Results from last 7 days   Lab Units 01/23/23  2149 01/23/23  1354   POC GLUCOSE mg/dl 104 164*     Results from last 7 days   Lab Units 01/24/23  0533 01/23/23  1346   GLUCOSE RANDOM mg/dL 103* 173*           Results from last 7 days   Lab Units 01/23/23  1350   CLARITY UA  Clear   COLOR UA  Teresa*   SPEC GRAV UA  1 015   PH UA  6 0   GLUCOSE UA mg/dl 250 (1/4%)*   KETONES UA mg/dl Negative   BLOOD UA  10 0*   PROTEIN UA mg/dl 15 (Trace)*   NITRITE UA  Negative   BILIRUBIN UA  Negative   UROBILINOGEN UA mg/dL Negative   LEUKOCYTES UA  Negative   WBC UA /hpf 0-1   RBC UA /hpf 0-1   BACTERIA UA /hpf None Seen   EPITHELIAL CELLS WET PREP /hpf Occasional     Results from last 7 days   Lab Units 01/23/23  1346   INFLUENZA A PCR  Negative   INFLUENZA B PCR  Negative   RSV PCR  Negative         Results from last 7 days   Lab Units 01/23/23  1350   AMPH/METH  Negative   BARBITURATE UR  Negative BENZODIAZEPINE UR  Negative   COCAINE UR  Negative   METHADONE URINE  Negative   OPIATE UR  Positive*   PCP UR  Negative   THC UR  Negative     Results from last 7 days   Lab Units 01/23/23  1346   ETHANOL LVL mg/dL <10         ED Treatment:   Medication Administration from 01/23/2023 1250 to 01/23/2023 1618       Date/Time Order Dose Route Action Comments     01/23/2023 1348 EST sodium chloride 0 9 % bolus 1,000 mL 1,000 mL Intravenous New Bag --        Past Medical History:   Diagnosis Date   • Diabetes mellitus ([place] [address] )    • Substance abuse ([place] [address] )      Present on Admission:  • Opioid withdrawal ([place] [address] )  • Opioid use disorder, severe, dependence ([place] [address] )  • Leukocytosis  • Primary hypertension  • Other hyperlipidemia  • Tobacco abuse      Admitting Diagnosis: Opioid abuse ([place] [address] ) [F11 10]  Withdrawal from opioids ([place] [address] ) [F11 93]  Admitted to substance misuse detoxification center [Z78 9]  Age/Sex: 62 y o  male  Admission Orders:  Regular Diet  SCDs  COWS monitoring  Telemetry & Continuous Pulse Ox      Scheduled Medications:  aspirin, 81 mg, Oral, Daily  atorvastatin, 20 mg, Oral, HS  buprenorphine, 0 5 mg, Sublingual, Q4H  enoxaparin, 40 mg, Subcutaneous, Daily  insulin glargine, 10 Units, Subcutaneous, HS  lisinopril, 2 5 mg, Oral, Daily  metFORMIN, 1,000 mg, Oral, BID With Meals  nicotine, 1 patch, Transdermal, Daily  transdermal buprenorphine, 20 mcg, Transdermal, Q7 Days    Continuous IV Infusions:  sodium chloride, 100 mL/hr, Intravenous, Continuous    PRN Meds:  acetaminophen, 650 mg, Oral, Q6H PRN  clonazePAM, 2 mg, Oral, Q6H PRN; 1/23 x2, 1/24 x1  cloNIDine, 0 1 mg, Oral, Q6H PRN  gabapentin, 300 mg, Oral, Q8H PRN; 1/23 x1  ibuprofen, 600 mg, Oral, Q6H PRN  loperamide, 2 mg, Oral, Q4H PRN  ondansetron, 4 mg, Intravenous, Q6H PRN  traZODone, 50 mg, Oral, HS PRN; 1/23 x1        CONSULT TO CERTIFIED   IP CONSULT TO CASE MANAGEMENT    Network Utilization Review Department  ATTENTION: Please call with any questions or concerns to [phone] and carefully listen to the prompts so that you are directed to the right person  All voicemails are confidential   Bruce Calderon all requests for admission clinical reviews, approved or denied determinations and any other requests to dedicated fax number below belonging to the campus where the patient is receiving treatment   List of dedicated fax numbers for the Facilities:  [address] DENIALS (Administrative/Medical Necessity) [phone]   [address] (Maternity/NICU/Pediatrics) [phone]   915 Maribeth Mendez [phone]   [facility] 77 [phone]   [address] [address] Charlette Marcos [place] [address] [phone]   [facility] 134 815 [facility] [phone]

## 2023-01-24 NOTE — CASE MANAGEMENT
Cm attempted to meet with pt to complete intake  Pt did not respond to repeated attempts to awaken by calling pt name

## 2023-01-24 NOTE — PROGRESS NOTES
01/24/23 1243   Referral Data   Referral Source Patient   Referral Name [facility]) [address] [place] [address] ED   Referral Reason Drug/Alcohol [place] [facility]   Readmission Root Cause   30 Day Readmission No   Patient Information   Mental Status Sedated   Primary Caregiver Self   Restoration/Cultural Requests n/a   Legal Information   Legal Issues pt denied   Activities of Daily Living Prior to Admission   Functional Status Independent   Assistive Device No device needed   Living Arrangement Lives alone;Apartment   Ambulation Independent   Access to Firearms   Access to Firearms No  (pt denied)   Income Information   Income Source Employed   [employer] Transport to Appts: [employer] Bus       Pt is a 62year old male who was admitted to detox for opioid withdrawal   Pt had self presented at [facility]) [address] [place] [address] ED requesting detox  Pt's name, date of birth, home address, and telephone number were verified  Pt was informed of case management role and the purpose of the completion of intake with case management  Pt presented as sedated during interview and falling asleep  Cm offered to complete intake at later time but pt became agitated and stated he wanted to complete this now  Pt did not sign any  CARMELO's due to falling asleep during intake  Pt reports he had been using 9-10 bags of heroin/fentanyl IV for the past 6 weeks  Pt states he had been on suboxone but stopped and did not give a reason for stopping  Pt reports last use 1/23/2022 of 6 bags and first use at age 16  Pt denied any other illicit substance use  Pt reports daily nicotine use of  5 PPD cigarette smoking  Pt reports a period of abstinence of 10 years at one time achieved by being   Pt reports previous inpatient ELIN tx, unknown last, current ELIN outpatient at [facility] program with suboxone MAT, and previous 12 step meeting attendance  Pt reports current withdrawal symptoms of feeling tired   Pt states he had overdosed in the past but did stated he could not indicate the last time  Pt denied any family hx of substance abuse needs  AUDIT: no score  PAWSS:0  UDS: +opiates  Ethanol in ED: <10    Pt denied any mental health diagnosis or treatment  Pt denied SI or HI, denied AH/VH, denied hx of abuse or trauma, denied recent losses, denied access to firearms, denied family hx of mental health needs  Pt has current medical conditions of type 2 diabetes, hypertension, Hep c, and hyperlipidemia  Pt has current PCP of [facility]  Pt has current medical insurance of [employer]  Pt did not identify preferred pharmacy  Pt denied current legal issues  Pt reports he is employed  Pt states he has achieved a GED  Pt reports he will use the bus for transportation  Pt denied  service and denied any religous or cultural request     Pt reports he resides alone and stated he did not want any communication with supports  Pt stated he can return to his apartment  Pt would not complete treatment plan and when asked about aftercare, stated he would return to [facility] program  Pt has appointment already scheduled for [facility] program   Pt would not further discuss any relapse prevention or aftercare needs  Pt's clinical presentation at this time displays that pt struggles for motivation to participate in treatment needs  Pt's ongoing lack of success while at the [facility] outpatient program displays pt requires more community support to address his relapse potential   Barriers to pt's treatment appear to be motivation and pt's struggles even with MAT to maintain abstinent  Pt's goals for detox are to successfully complete medical withdrawal and to develop a discharge plan that increases community support  Pt presents in the contemplation stage of change

## 2023-01-24 NOTE — PLAN OF CARE
Problem: SUBSTANCE USE/ABUSE  Goal: By discharge, will develop insight into their chemical dependency and sustain motivation to continue in recovery  Description: INTERVENTIONS:  - Attends all daily group sessions and scheduled AA groups  - Actively practices coping skills through participation in the therapeutic community and adherence to program rules  - Reviews and completes assignments from individual treatment plan  - Assist patient development of understanding of their personal cycle of addiction and relapse triggers  Outcome: Progressing  Goal: By discharge, patient will have ongoing treatment plan addressing chemical dependency  Description: INTERVENTIONS:  - Assist patient with resources and/or appointments for ongoing recovery based living  Outcome: Progressing     Problem: Knowledge Deficit  Goal: Patient/family/caregiver demonstrates understanding of disease process, treatment plan, medications, and discharge instructions  Description: Complete learning assessment and assess knowledge base    Interventions:  - Provide teaching at level of understanding  - Provide teaching via preferred learning methods  Outcome: Progressing

## 2023-01-24 NOTE — PROGRESS NOTES
PROGRESS NOTE  DEPARTMENT OF MEDICAL TOXICOLOGY  LEVEL 4 MEDICAL DETOX UNIT  Jeanie Banegas 62 y o  male MRN: [id number]  Unit/Bed#: 5T DETOX 683-57 Encounter: [id number]      Reason for Admission/Principal Problem: Opioid withdrawal  Rounding Provider: Dong Nascimento DO  Attending Provider: Gene Lester DO   1/23/2023  1:19 PM           Bradycardia, drug induced  Assessment & Plan  Likely secondary to xylazine  Continue to monitor  Reassess ECG  Leukocytosis  Assessment & Plan  · Has consistently had leukocytosis on lab checks going back to 2019  · No current signs/ symptoms of infection, afebrile  · Continue to monitor    Primary hypertension  Assessment & Plan  · Continue lisinopril    Hepatitis C virus carrier state [facility])  Assessment & Plan  · Per ID notes in August 2022, positive HCV antibodies likely represent prior exposure and not active infection given RNA negative  · Continue to follow with primary care    Type 2 diabetes mellitus without complication, with long-term current use of insulin ([facility] )  Assessment & Plan  · Continue home medications  · Continue to monitor blood glucose    Opioid use disorder, severe, dependence ([facility] )  Assessment & Plan  · Had several months of sobriety but relapsed to using 8-10 bags of fentanyl daily intravenously  · Treatment of withdrawal as above  · Known hepatitis C carrier, recommend continued primary care follow-up  · Will check rapid HIV with morning labs  · Case management consult    Tobacco abuse  Assessment & Plan  · Offer nicotine replacement therapy  · Encourage cessation    Other hyperlipidemia  Assessment & Plan  · Continue atorvastatin    * Opioid withdrawal ([facility] )  Assessment & Plan  · Last use of opioids was greater than 24 hours ago  · Butrans patch in place  · Test dose of Subutex 0 5 mg today  If does well, continue with additional 3 doses every 4 hours            VTE Pharmacologic Prophylaxis:   Pharmacologic: Enoxaparin (Lovenox)  Mechanical VTE Prophylaxis in Place: yes    Code Status: Level 1 - Full Code    Patient Centered Rounds: I have performed bedside rounds with nursing staff today  Education and Discussions with Family / Patient: Patient    Time Spent for Care: 30 minutes  More than 50% of total time spent on counseling and coordination of care as described above  Current Length of Stay: 1 day(s)    Current Patient Status: Inpatient     Certification Statement: The patient will continue to require additional inpatient hospital stay due to Continue stabilization of opioid withdrawal Discharge Plan: Potentially discharge late tomorrow or early next day once stabilized      Subjective:   Patient is feeling worse and opioid withdrawal   Agrees to test dose of buprenorphine      Objective:     Clinical Opiate Withdrawal Scale  Pulse: (!) 47  Resting Pulse Rate: Measured After Patient is Sitting or Lying for One Minute: Pulse rate 80 or below  GI Upset: Over Last Half Hour: No GI symptoms  Sweating: Over Past Half Hour Not Accounted for by Room Temperature of Patient Activity: Subjective report of chills or flushing  Tremor: Observation of Outstretched Hands: No tremor  Restlessness: Observation During Assessment: Frequent shifting or extraneous movements of legs/arms  Yawning: Observation During Assessment: Yawning once or twice during assessment  Pupil Size: Pupils possibly larger than normal for room light  Anxiety and Irritability: Patient reports increasing irritability or anxiousness  Bone or Joint Aches: If Patient was Having Pain Previously, Only the Additional Component Attributed to Opiate Withdrawal is Scored: Mild diffuse discomfort  Gooseflesh Skin: Skin is smooth  Runny Nose or Tearing: Not Accounted for by Cold Symptoms or Allergies: Nasal stuffiness of unusually moist eyes  Clinical Opiate Withdrawal Scale Total Score: 9    No data recorded      Last 24 Hours Medication List:   Current Facility-Administered Medications   Medication Dose Route Frequency Provider Last Rate   • acetaminophen  650 mg Oral Q6H PRN Rebeca Vazquez MD     • aspirin  81 mg Oral Daily Rebeca Vazquez MD     • atorvastatin  20 mg Oral HS Rebeca Vazquez MD     • buprenorphine  0 5 mg Sublingual Q4H Pricila Lester DO     • clonazePAM  2 mg Oral Q6H PRN Rebeca Vazquez MD     • cloNIDine  0 1 mg Oral Q6H PRN Rebeca Vazquez MD     • enoxaparin  40 mg Subcutaneous Daily Rebeca Vazquez MD     • gabapentin  300 mg Oral Q8H PRN Rebeca Vazquez MD     • ibuprofen  600 mg Oral Q6H PRN Rebeca Vazquez MD     • insulin glargine  10 Units Subcutaneous HS Rebeca Vazquez MD     • lisinopril  2 5 mg Oral Daily Rebeca Vazquez MD     • loperamide  2 mg Oral Q4H PRN Rebeca Vazquez MD     • metFORMIN  1,000 mg Oral BID With Meals Rebeca Vazquez MD     • nicotine  1 patch Transdermal Daily Rebeca Vazquez MD     • ondansetron  4 mg Intravenous Q6H PRN Rebeca Vazquez MD     • sodium chloride  100 mL/hr Intravenous Continuous Rebeca Vazquez  mL/hr (23 0210)   • transdermal buprenorphine  20 mcg Transdermal Q7 Days Rebeca Vazquez MD     • traZODone  50 mg Oral HS PRN Rebeca Vazquez MD           Vitals:   Temp (24hrs), Av 4 °F (36 9 °C), Min:98 °F (36 7 °C), Max:98 8 °F (37 1 °C)    Temp:  [98 °F (36 7 °C)-98 8 °F (37 1 °C)] 98 °F (36 7 °C)  HR:  [47-63] 47  Resp:  [16-17] 16  BP: (129-148)/(71-89) 129/77  SpO2:  [98 %-99 %] 99 %  Body mass index is 32 21 kg/m²  Input and Output Summary (last 24 hours): Intake/Output Summary (Last 24 hours) at 2023 1139  Last data filed at 2023 0210  Gross per 24 hour   Intake 1950 ml   Output --   Net 1950 ml       Physical Exam:   Physical Exam  Vitals and nursing note reviewed  Constitutional:       Appearance: Normal appearance  HENT:      Head: Normocephalic        Nose: Nose normal       Mouth/Throat:      Mouth: Mucous membranes are moist    Eyes:      Pupils: Pupils are equal, round, and reactive to light  Cardiovascular:      Rate and Rhythm: Regular rhythm  Bradycardia present  Pulmonary:      Effort: Pulmonary effort is normal    Abdominal:      General: Abdomen is flat  Tenderness: There is no abdominal tenderness  Musculoskeletal:         General: No swelling or tenderness  Normal range of motion  Cervical back: Normal range of motion  Right lower leg: No edema  Left lower leg: No edema  Skin:     General: Skin is warm  Neurological:      General: No focal deficit present  Mental Status: He is alert and oriented to person, place, and time  Psychiatric:         Attention and Perception: Attention normal          Cognition and Memory: Cognition normal          Additional Data:     Labs:   Results from last 7 days   Lab Units 01/24/23  0533 01/23/23  1346   WBC Thousand/uL 9 64 12 61*   HEMOGLOBIN g/dL 12 4 12 6   HEMATOCRIT % 37 7 39 1   PLATELETS Thousands/uL 275 302   NEUTROS PCT %  --  77*   LYMPHS PCT %  --  17   MONOS PCT %  --  5   EOS PCT %  --  1      Results from last 7 days   Lab Units 01/24/23  0533 01/23/23  1346   SODIUM mmol/L 139 135   POTASSIUM mmol/L 4 0 4 4   CHLORIDE mmol/L 109* 101   CO2 mmol/L 27 30   BUN mg/dL 11 15   CREATININE mg/dL 0 73 0 85   ANION GAP mmol/L 3* 4*   CALCIUM mg/dL 9 0 9 1   ALBUMIN g/dL  --  4 1   TOTAL BILIRUBIN mg/dL  --  0 62   ALK PHOS U/L  --  88   ALT U/L  --  47   AST U/L  --  38   GLUCOSE RANDOM mg/dL 103* 173*           Results from last 7 days   Lab Units 01/23/23  2149 01/23/23  1354   POC GLUCOSE mg/dl 104 164*                    * I Have Reviewed All Lab Data Listed Above  * Additional Pertinent Lab Tests Reviewed: [facility]        Today, Patient Was Seen By: Phil Cook DO    ** Please Note: Dictation voice to text software may have been used in the creation of this document   **

## 2023-01-24 NOTE — PLAN OF CARE
Problem: SUBSTANCE USE/ABUSE  Goal: By discharge, will develop insight into their chemical dependency and sustain motivation to continue in recovery  Description: INTERVENTIONS:  - Attends all daily group sessions and scheduled AA groups  - Actively practices coping skills through participation in the therapeutic community and adherence to program rules  - Reviews and completes assignments from individual treatment plan  - Assist patient development of understanding of their personal cycle of addiction and relapse triggers  Outcome: Progressing  Goal: By discharge, patient will have ongoing treatment plan addressing chemical dependency  Description: INTERVENTIONS:  - Assist patient with resources and/or appointments for ongoing recovery based living  Outcome: Progressing     Problem: DISCHARGE PLANNING  Goal: Discharge to home or other facility with appropriate resources  Description: INTERVENTIONS:  - Identify barriers to discharge w/patient and caregiver  - Arrange for needed discharge resources and transportation as appropriate  - Identify discharge learning needs (meds, wound care, etc )  - Arrange for interpretive services to assist at discharge as needed  - Refer to Case Management Department for coordinating discharge planning if the patient needs post-hospital services based on physician/advanced practitioner order or complex needs related to functional status, cognitive ability, or social support system  Outcome: Progressing     Problem: Knowledge Deficit  Goal: Patient/family/caregiver demonstrates understanding of disease process, treatment plan, medications, and discharge instructions  Description: Complete learning assessment and assess knowledge base    Interventions:  - Provide teaching at level of understanding  - Provide teaching via preferred learning methods  Outcome: Progressing

## 2023-01-24 NOTE — ASSESSMENT & PLAN NOTE
"  Subjective:     Nadira Urban is a 18 y.o. female who presents with anxiety.    HPI:   Seen in f/u for anxiety.  She has had dramatic improvement in her anxiety, irritability issues.  She has only  Had one angry episode.  Feeling much better.  Able to concentrate more thoroughly.  Feels the zoloft has really helped.  No suicidal ideation.  Was nauseated one day when she took her zoloft early.  Now taking nitely. Sleeping well.  Nausea has now resolved.     bpm today.  She reports that is has always been elevated.  She is having weight loss despite eating lots of carbs. Did just start working out.      Patient Active Problem List    Diagnosis Date Noted   • Major depressive disorder 04/24/2019       Current medicines (including changes today)  Current Outpatient Prescriptions   Medication Sig Dispense Refill   • sertraline (ZOLOFT) 50 MG Tab Take 1 Tab by mouth every day. 90 Tab 3   • JUNEL FE 24 1-20 MG-MCG(24) Tab        No current facility-administered medications for this visit.        No Known Allergies    ROS  Constitutional: Negative. Negative for fever, chills, weight loss, malaise/fatigue and diaphoresis.   HENT: Negative. Negative for hearing loss, ear pain, nosebleeds, congestion, sore throat, neck pain, tinnitus and ear discharge.   Respiratory: Negative. Negative for cough, hemoptysis, sputum production, shortness of breath, wheezing and stridor.   Cardiovascular: Negative. Negative for chest pain, palpitations, orthopnea, claudication, leg swelling and PND.   Gastrointestinal: Denies nausea, vomiting, diarrhea, constipation, heartburn, melena or hematochezia.  Genitourinary: Denies dysuria, hematuria, urinary incontinence, frequency or urgency.        Objective:     /70 (BP Location: Right arm, Patient Position: Sitting)   Pulse (!) 134   Temp 36.3 °C (97.3 °F) (Temporal)   Ht 1.727 m (5' 8\")   Wt 51.3 kg (113 lb)   SpO2 99%  Body mass index is 17.18 kg/m².    Physical Exam:  Vitals " Likely secondary to xylazine  Continue to monitor  Reassess ECG  reviewed.  Constitutional: Oriented to person, place, and time. appears well-developed and well-nourished. No distress.   Cardiovascular: Normal rate, regular rhythm, normal heart sounds and intact distal pulses. Exam reveals no gallop and no friction rub. No murmur heard. No carotid bruits.   Pulmonary/Chest: Effort normal and breath sounds normal. No stridor. No respiratory distress. no wheezes or rales. exhibits no tenderness.   Musculoskeletal: Normal range of motion. exhibits no edema. garrick pedal pulses 2+.  Lymphadenopathy: No cervical or supraclavicular adenopathy.   Neurological: Alert and oriented to person, place, and time. exhibits normal muscle tone.  Skin: Skin is warm and dry. No diaphoresis.   Psychiatric: Normal mood and affect. Behavior is normal.      Assessment and Plan:     The following treatment plan was discussed:    1. Generalized anxiety disorder  sertraline (ZOLOFT) 50 MG Tab    stable on  zoloft 50 mg daily.  f/u yearly or sooner if weight loss continues.  f/u with pt with lab results.    2. Weight loss, non-intentional  TSH    FREE THYROXINE    eating well. increase protein in diet.  check tsh, t4.  f/u with pt with results and if wt loss continues.           Followup: Return in about 1 year (around 5/22/2020).

## 2023-01-25 LAB
ANION GAP SERPL CALCULATED.3IONS-SCNC: 11 MMOL/L (ref 5–14)
BUN SERPL-MCNC: 16 MG/DL (ref 5–25)
CALCIUM SERPL-MCNC: 9.8 MG/DL (ref 8.4–10.2)
CHLORIDE SERPL-SCNC: 100 MMOL/L (ref 96–108)
CO2 SERPL-SCNC: 30 MMOL/L (ref 21–32)
CREAT SERPL-MCNC: 0.93 MG/DL (ref 0.7–1.5)
GFR SERPL CREATININE-BSD FRML MDRD: 90 ML/MIN/1.73SQ M
GLUCOSE SERPL-MCNC: 142 MG/DL (ref 70–99)
GLUCOSE SERPL-MCNC: 146 MG/DL (ref 65–140)
MAGNESIUM SERPL-MCNC: 1.8 MG/DL (ref 1.6–2.3)
POTASSIUM SERPL-SCNC: 3.8 MMOL/L (ref 3.5–5.3)
SODIUM SERPL-SCNC: 141 MMOL/L (ref 135–147)

## 2023-01-25 RX ORDER — WATER 1000 ML/1000ML
INJECTION, SOLUTION INTRAVENOUS
Status: COMPLETED
Start: 2023-01-25 | End: 2023-01-25

## 2023-01-25 RX ORDER — MAGNESIUM HYDROXIDE/ALUMINUM HYDROXICE/SIMETHICONE 120; 1200; 1200 MG/30ML; MG/30ML; MG/30ML
30 SUSPENSION ORAL EVERY 4 HOURS PRN
Status: DISCONTINUED | OUTPATIENT
Start: 2023-01-25 | End: 2023-01-26 | Stop reason: HOSPADM

## 2023-01-25 RX ORDER — BUPRENORPHINE AND NALOXONE 8; 2 MG/1; MG/1
8 FILM, SOLUBLE BUCCAL; SUBLINGUAL 2 TIMES DAILY
Status: DISCONTINUED | OUTPATIENT
Start: 2023-01-25 | End: 2023-01-26 | Stop reason: HOSPADM

## 2023-01-25 RX ORDER — OLANZAPINE 10 MG/1
5 INJECTION, POWDER, LYOPHILIZED, FOR SOLUTION INTRAMUSCULAR ONCE
Status: COMPLETED | OUTPATIENT
Start: 2023-01-25 | End: 2023-01-25

## 2023-01-25 RX ORDER — OLANZAPINE 10 MG/1
5 INJECTION, POWDER, LYOPHILIZED, FOR SOLUTION INTRAMUSCULAR EVERY 6 HOURS PRN
Status: DISCONTINUED | OUTPATIENT
Start: 2023-01-25 | End: 2023-01-26 | Stop reason: HOSPADM

## 2023-01-25 RX ORDER — HALOPERIDOL 5 MG/ML
5 INJECTION INTRAMUSCULAR ONCE AS NEEDED
Status: DISCONTINUED | OUTPATIENT
Start: 2023-01-25 | End: 2023-01-25

## 2023-01-25 RX ORDER — BUPRENORPHINE AND NALOXONE 2; .5 MG/1; MG/1
2 FILM, SOLUBLE BUCCAL; SUBLINGUAL
Status: COMPLETED | OUTPATIENT
Start: 2023-01-25 | End: 2023-01-25

## 2023-01-25 RX ADMIN — INSULIN GLARGINE 10 UNITS: 100 INJECTION, SOLUTION SUBCUTANEOUS at 23:03

## 2023-01-25 RX ADMIN — WATER 2.1 ML: 1 INJECTION INTRAMUSCULAR; INTRAVENOUS; SUBCUTANEOUS at 00:31

## 2023-01-25 RX ADMIN — ONDANSETRON 4 MG: 2 INJECTION INTRAMUSCULAR; INTRAVENOUS at 03:07

## 2023-01-25 RX ADMIN — OLANZAPINE 5 MG: 10 INJECTION, POWDER, FOR SOLUTION INTRAMUSCULAR at 00:31

## 2023-01-25 RX ADMIN — CLONIDINE HYDROCHLORIDE 0.1 MG: 0.1 TABLET ORAL at 07:42

## 2023-01-25 RX ADMIN — METFORMIN HYDROCHLORIDE 1000 MG: 500 TABLET, FILM COATED ORAL at 16:58

## 2023-01-25 RX ADMIN — BUPRENORPHINE AND NALOXONE 2 MG: 2; .5 FILM BUCCAL; SUBLINGUAL at 14:57

## 2023-01-25 RX ADMIN — LISINOPRIL 2.5 MG: 5 TABLET ORAL at 08:07

## 2023-01-25 RX ADMIN — ATORVASTATIN CALCIUM 20 MG: 20 TABLET, FILM COATED ORAL at 23:00

## 2023-01-25 RX ADMIN — BUPRENORPHINE AND NALOXONE 2 MG: 2; .5 FILM BUCCAL; SUBLINGUAL at 13:14

## 2023-01-25 RX ADMIN — BUPRENORPHINE AND NALOXONE 2 MG: 2; .5 FILM BUCCAL; SUBLINGUAL at 16:58

## 2023-01-25 RX ADMIN — METFORMIN HYDROCHLORIDE 1000 MG: 500 TABLET, FILM COATED ORAL at 07:43

## 2023-01-25 RX ADMIN — BUPRENORPHINE AND NALOXONE 2 MG: 2; .5 FILM BUCCAL; SUBLINGUAL at 11:09

## 2023-01-25 RX ADMIN — IBUPROFEN 600 MG: 600 TABLET, FILM COATED ORAL at 09:49

## 2023-01-25 RX ADMIN — GABAPENTIN 300 MG: 300 CAPSULE ORAL at 09:49

## 2023-01-25 RX ADMIN — BUPRENORPHINE AND NALOXONE 8 MG: 8; 2 FILM BUCCAL; SUBLINGUAL at 23:00

## 2023-01-25 RX ADMIN — NICOTINE 1 PATCH: 21 PATCH, EXTENDED RELEASE TRANSDERMAL at 08:06

## 2023-01-25 RX ADMIN — OLANZAPINE 5 MG: 10 INJECTION, POWDER, LYOPHILIZED, FOR SOLUTION INTRAMUSCULAR at 01:10

## 2023-01-25 RX ADMIN — CLONAZEPAM 2 MG: 1 TABLET ORAL at 07:42

## 2023-01-25 RX ADMIN — ASPIRIN 81 MG: 81 TABLET, COATED ORAL at 08:07

## 2023-01-25 RX ADMIN — ALUMINUM HYDROXIDE, MAGNESIUM HYDROXIDE, AND SIMETHICONE 30 ML: 200; 200; 20 SUSPENSION ORAL at 07:42

## 2023-01-25 NOTE — NURSING NOTE
Patient was heard vomiting in his room  Patient was found sitting on the edge of the bed and vomit on the floor  When patient asked if he was feeling ill, he stated "It's just withdrawal " Patient's gown  was changed while sitting on the bed  Patient ambulated to the bathroom to change his pj bottoms  His gait was shuffling  His eyes are minimally open  Writer wanted to change his socks  He refused  Writer was changing his bed while he was in the bathroom and  When the sheet was removed off the bed, the zipper to the mattress was at the head of the bed  was wide open and unzipped  Inside of the mattress was searched and nothing found  When patient ambulated back to bed, he didn't even know he had the dirty pair of pj on with the clean ones  Patient returned to bed  Patient quickly fell to sleep  He responds to his name after several attempts  Hospital supervisor Hanna Aase made aware  Security called for Room search    Patient placed on virtual 1:1

## 2023-01-25 NOTE — CASE MANAGEMENT
Cm consulted with medical staff and informed pt not ready for discharge today  Cm attempted to meet with to complete CARMELO's and relapse prevention plan  Pt maintained he continues to not feel well and does not want to meet at this time

## 2023-01-25 NOTE — QUICK NOTE
MEDICAL DETOX UNIT, LEVEL 4  Department of Medical Toxicology      Reason for Admission/Principal Problem: Opioid withdrawal, opioid use disorder  Reassessing Provider: Stormy Hess PA-C  1/23/2023  1:19 PM    01/24/23  TIME    Patient is currently undergoing Suboxone microinduction and has received 3/4 doses of 0 5 mg Subutex Q4H  I have reevaluated the patient  Notified by staff of pt projectile vomiting all over bed and floor approximately 1 hour after receiving his 3rd dose of Subutex and that he was observed to be barely able to keep his eyes open or stand up straight/ambulate normally to the bathroom  Pt seen and examined at bedside  The zipper at the top of the bed is noted to be unzipped and a noticeable gap between mattress and the sheet  Pt maintains his last opioid use was yesterday morning, pt currently unable to quantify what time, just states he used "in the morning " He denies using any heroin/fentanyl on the unit even when educated on the risk of precipitated withdrawal if Suboxone is given on top of recent full opioid agonist use  He is willing to continue Suboxone microinduction  On exam, VSS  Pt is lethargic but oriented x4  He is barely able to keep his eyes open, +pinpoint pupils  Abd soft, NTND  Pt denies abdominal tenderness but there is mild (likely voluntary) guarding in suprapubic area and LLQ  Pt states he will pee himself if further abdominal palpation performed  No focal neuro deficits- CN II-XII grossly intact, BUE/BLE motor strength intact and symmetric, with pt exhibiting poor voluntary effort when testing  strength  Sensation intact BUE/BLE  Pt is able to ambulate to and from the bathroom with shuffling gait      /80 (BP Location: Right arm)   Pulse 86   Temp 98 3 °F (36 8 °C) (Temporal)   Resp 15   Ht 5' 5" (1 651 m)   Wt 87 8 kg (193 lb 9 oz)   SpO2 94%   BMI 32 21 kg/m²       No data recorded    Clinical Opiate Withdrawal Scale  Pulse: 86  Resting Pulse Rate: Measured After Patient is Sitting or Lying for One Minute: Pulse rate 80 or below  GI Upset: Over Last Half Hour: No GI symptoms  Sweating: Over Past Half Hour Not Accounted for by Room Temperature of Patient Activity: Subjective report of chills or flushing  Tremor: Observation of Outstretched Hands: No tremor  Restlessness: Observation During Assessment: Frequent shifting or extraneous movements of legs/arms  Yawning: Observation During Assessment: Yawning once or twice during assessment  Pupil Size: Pupils possibly larger than normal for room light  Anxiety and Irritability: Patient reports increasing irritability or anxiousness  Bone or Joint Aches: If Patient was Having Pain Previously, Only the Additional Component Attributed to Opiate Withdrawal is Scored: Mild diffuse discomfort  Gooseflesh Skin: Skin is smooth  Runny Nose or Tearing: Not Accounted for by Cold Symptoms or Allergies: Nasal stuffiness of unusually moist eyes  Clinical Opiate Withdrawal Scale Total Score: 9        Discussed patient status with attending? Yes, with Dr Michael Larose:   · Given objective physical exam findings, particularly pinpoint pupils and lethargy, pt is suspected of using opioids on the unit - will hold on further Suboxone microinduction at this time due to risk of worsening/precipitated withdrawal   · Initiate virtual 1:1 observation for pt safety  · Continue monitoring vital signs - Narcan 2 mg IV PRN respiratory depression/opioid reversal ordered and will follow with Suboxone 8 mg (with additional Suboxone per MAT protocol) if Narcan is administered  · Continue symptomatic/supportive care  IV Zofran administered at this time  · If IV Narcan followed by rapid Suboxone induction not needed overnight, plan to reassess pt in the AM regarding continuation of Suboxone induction

## 2023-01-25 NOTE — PROGRESS NOTES
PROGRESS NOTE  DEPARTMENT OF MEDICAL TOXICOLOGY  LEVEL 4 MEDICAL DETOX UNIT  Jorge L Tompkins 62 y o  male MRN: [id number]  Unit/Bed#: 5T DETOX 065-19 Encounter: [id number]      Reason for Admission/Principal Problem: Opioid withdrawal  Rounding Provider: Mary Ann Castellano MD  Attending Provider: Dona Galeazzi Nappe, DO   1/23/2023  1:19 PM           Bradycardia, drug induced  Assessment & Plan  Likely secondary to xylazine  Continue to monitor  Reassess ECG  Leukocytosis  Assessment & Plan  · Has consistently had leukocytosis on lab checks going back to 2019  · No current signs/ symptoms of infection, afebrile  · Continue to monitor    Primary hypertension  Assessment & Plan  · Continue lisinopril    Hepatitis C virus carrier state [facility])  Assessment & Plan  · Per ID notes in August 2022, positive HCV antibodies likely represent prior exposure and not active infection given RNA negative  · Continue to follow with primary care    Type 2 diabetes mellitus without complication, with long-term current use of insulin ([facility] )  Assessment & Plan  · Continue home medications  · Continue to monitor blood glucose    Opioid use disorder, severe, dependence ([facility] )  Assessment & Plan  · Had several months of sobriety but relapsed to using 8-10 bags of fentanyl daily intravenously  · Treatment of withdrawal as above  · Known hepatitis C carrier, recommend continued primary care follow-up  · Rapid HIV 1/2 antigen negative  · Case management consulted    Tobacco abuse  Assessment & Plan  · Offered nicotine replacement therapy  · Encourage cessation    Other hyperlipidemia  Assessment & Plan  · Continue atorvastatin    * Opioid withdrawal ([place] [address] )  Assessment & Plan  · Patient stated last use of opioids was Monday, January 23, 2023  · Butrans patch in place  · Test dose of Subutex 0 5 mg yesterday    Patient reported feeling tired, no sign of withdrawal   · Started micro induction dose of Subutex 2 mg every 2 hours for 4 doses  We will see how he tolerates  VTE Pharmacologic Prophylaxis:   Pharmacologic: Enoxaparin (Lovenox)  Mechanical VTE Prophylaxis in Place: no    Code Status: Level 1 - Full Code    Patient Centered Rounds: I have performed bedside rounds with nursing staff today  Discussions with Specialists or Other Care Team Provider: yes     Education and Discussions with Family / Patient: yes patient    Time Spent for Care: 30 minutes  More than 50% of total time spent on counseling and coordination of care as described above  Current Length of Stay: 2 day(s)    Current Patient Status: Inpatient     Certification Statement: The patient will continue to require additional inpatient hospital stay due to Ongoing treatment Discharge Plan: Anticipated in the next 24 to 48 hours      Subjective:   14-year-old male admitted for opioid withdrawal   Patient is seen at bedside today in no acute distress  He denied any signs of withdrawal; tachycardia, sweating, piloerection, rhinorrhea or muscle ache  Patient however report feeling tired      Objective:     Clinical Opiate Withdrawal Scale  Pulse: 61  Resting Pulse Rate: Measured After Patient is Sitting or Lying for One Minute: Pulse rate   GI Upset: Over Last Half Hour: Vomiting or diarrhea  Sweating: Over Past Half Hour Not Accounted for by Room Temperature of Patient Activity: Beads of sweat on brow or face  Tremor: Observation of Outstretched Hands: Slight tremor observable  Restlessness: Observation During Assessment: Reports difficulty sitting still, but is able to do so  Yawning: Observation During Assessment: No yawning  Pupil Size: Pupils pinned or normal size for room light  Anxiety and Irritability: Patient obviously irritable or anxious  Bone or Joint Aches: If Patient was Having Pain Previously, Only the Additional Component Attributed to Opiate Withdrawal is Scored: Mild diffuse discomfort  Gooseflesh Skin: Skin is smooth  Runny Nose or Tearing: Not Accounted for by Cold Symptoms or Allergies: Nasal stuffiness of unusually moist eyes  Clinical Opiate Withdrawal Scale Total Score: 14    No data recorded      Last 24 Hours Medication List:   Current Facility-Administered Medications   Medication Dose Route Frequency Provider Last Rate   • acetaminophen  650 mg Oral Q6H PRN Phillip Man MD     • aluminum-magnesium hydroxide-simethicone  30 mL Oral Q4H PRN Katherine Cifuentes PA-C     • aspirin  81 mg Oral Daily Phillip Man MD     • atorvastatin  20 mg Oral HS Phillip Man MD     • buprenorphine-naloxone  2 mg Sublingual Q2H Katt Lester DO     • clonazePAM  2 mg Oral Q6H PRN Phillip Man MD     • cloNIDine  0 1 mg Oral Q6H PRN Phillip Man MD     • enoxaparin  40 mg Subcutaneous Daily Phillip Man MD     • gabapentin  300 mg Oral Q8H PRN Phillip Man MD     • ibuprofen  600 mg Oral Q6H PRN Phillip Man MD     • insulin glargine  10 Units Subcutaneous HS Phillip Man MD     • lisinopril  2 5 mg Oral Daily Phillip Man MD     • loperamide  2 mg Oral Q4H PRN Phillip Man MD     • metFORMIN  1,000 mg Oral BID With Meals Phillip Man MD     • naloxone  2 mg Intravenous Once PRN Claritza Carlton PA-C     • nicotine  1 patch Transdermal Daily Phillip Man MD     • OLANZapine  5 mg Intramuscular Q6H PRN Claudia Mata PA-C     • ondansetron  4 mg Intravenous Q6H PRN Phillip Man MD     • transdermal buprenorphine  20 mcg Transdermal Q7 Days Phillip Man MD     • traZODone  50 mg Oral HS PRN Phillip Man MD           Vitals:   Temp (24hrs), Av 7 °F (37 1 °C), Min:98 2 °F (36 8 °C), Max:99 5 °F (37 5 °C)    Temp:  [98 2 °F (36 8 °C)-99 5 °F (37 5 °C)] 98 4 °F (36 9 °C)  HR:  [56-86] 61  Resp:  [15-17] 16  BP: (128-162)/(74-90) 146/74  SpO2:  [94 %-100 %] 94 %  Body mass index is 32 21 kg/m²       Input and Output Summary (last 24 hours):No intake or output data in the 24 hours ending 23 1151    Physical Exam:   Physical Exam  Vitals and nursing note reviewed  Constitutional:       General: He is not in acute distress  Appearance: He is well-developed  HENT:      Head: Normocephalic and atraumatic  Eyes:      Conjunctiva/sclera: Conjunctivae normal    Cardiovascular:      Rate and Rhythm: Normal rate and regular rhythm  Heart sounds: No murmur heard  Pulmonary:      Effort: Pulmonary effort is normal  No respiratory distress  Breath sounds: Normal breath sounds  Abdominal:      Palpations: Abdomen is soft  Tenderness: There is no abdominal tenderness  Musculoskeletal:         General: No swelling  Cervical back: Neck supple  Skin:     General: Skin is warm and dry  Capillary Refill: Capillary refill takes less than 2 seconds  Neurological:      General: No focal deficit present  Mental Status: He is alert and oriented to person, place, and time  Gait: Gait normal    Psychiatric:         Mood and Affect: Mood normal          Additional Data:     Labs:   Results from last 7 days   Lab Units 01/24/23  0533 01/23/23  1346   WBC Thousand/uL 9 64 12 61*   HEMOGLOBIN g/dL 12 4 12 6   HEMATOCRIT % 37 7 39 1   PLATELETS Thousands/uL 275 302   NEUTROS PCT %  --  77*   LYMPHS PCT %  --  17   MONOS PCT %  --  5   EOS PCT %  --  1      Results from last 7 days   Lab Units 01/25/23  0517 01/24/23  0533 01/23/23  1346   SODIUM mmol/L 141   < > 135   POTASSIUM mmol/L 3 8   < > 4 4   CHLORIDE mmol/L 100   < > 101   CO2 mmol/L 30   < > 30   BUN mg/dL 16   < > 15   CREATININE mg/dL 0 93   < > 0 85   ANION GAP mmol/L 11   < > 4*   CALCIUM mg/dL 9 8   < > 9 1   ALBUMIN g/dL  --   --  4 1   TOTAL BILIRUBIN mg/dL  --   --  0 62   ALK PHOS U/L  --   --  88   ALT U/L  --   --  47   AST U/L  --   --  38   GLUCOSE RANDOM mg/dL 142*   < > 173*    < > = values in this interval not displayed             Results from last 7 days   Lab Units 01/24/23 2118 01/23/23 2149 01/23/23  1354   POC GLUCOSE mg/dl 104 104 164*                    * I Have Reviewed All Lab Data Listed Above  * Additional Pertinent Lab Tests Reviewed: All Labs Within Last 24 Hours Reviewed      Imaging Studies: I have personally reviewed pertinent films in PACS      Recent Cultures (last 7 days): Today, Patient Was Seen By: Mikel Barone MD    ** Please Note: Dictation voice to text software may have been used in the creation of this document   **

## 2023-01-26 ENCOUNTER — TELEPHONE (OUTPATIENT)
Dept: PSYCHIATRY | Facility: CLINIC | Age: 58
End: 2023-01-26

## 2023-01-26 VITALS
WEIGHT: 193.56 LBS | TEMPERATURE: 98.4 F | OXYGEN SATURATION: 99 % | HEART RATE: 63 BPM | HEIGHT: 65 IN | DIASTOLIC BLOOD PRESSURE: 80 MMHG | SYSTOLIC BLOOD PRESSURE: 167 MMHG | RESPIRATION RATE: 16 BRPM | BODY MASS INDEX: 32.25 KG/M2

## 2023-01-26 PROBLEM — F11.93 OPIOID WITHDRAWAL (HCC): Status: RESOLVED | Noted: 2023-01-23 | Resolved: 2023-01-26

## 2023-01-26 PROBLEM — D72.829 LEUKOCYTOSIS: Status: RESOLVED | Noted: 2023-01-23 | Resolved: 2023-01-26

## 2023-01-26 RX ORDER — BUPRENORPHINE AND NALOXONE 8; 2 MG/1; MG/1
8 FILM, SOLUBLE BUCCAL; SUBLINGUAL 2 TIMES DAILY
Qty: 20 FILM | Refills: 0 | Status: SHIPPED | OUTPATIENT
Start: 2023-01-26 | End: 2023-02-05

## 2023-01-26 RX ORDER — BUPRENORPHINE AND NALOXONE 8; 2 MG/1; MG/1
8 FILM, SOLUBLE BUCCAL; SUBLINGUAL ONCE
Status: COMPLETED | OUTPATIENT
Start: 2023-01-26 | End: 2023-01-26

## 2023-01-26 RX ADMIN — BUPRENORPHINE AND NALOXONE 8 MG: 8; 2 FILM BUCCAL; SUBLINGUAL at 08:05

## 2023-01-26 RX ADMIN — BUPRENORPHINE AND NALOXONE 8 MG: 8; 2 FILM BUCCAL; SUBLINGUAL at 02:35

## 2023-01-26 RX ADMIN — METFORMIN HYDROCHLORIDE 1000 MG: 500 TABLET, FILM COATED ORAL at 07:43

## 2023-01-26 RX ADMIN — LISINOPRIL 2.5 MG: 5 TABLET ORAL at 08:05

## 2023-01-26 RX ADMIN — NICOTINE 1 PATCH: 21 PATCH, EXTENDED RELEASE TRANSDERMAL at 08:05

## 2023-01-26 RX ADMIN — CLONAZEPAM 2 MG: 1 TABLET ORAL at 07:43

## 2023-01-26 RX ADMIN — ASPIRIN 81 MG: 81 TABLET, COATED ORAL at 08:04

## 2023-01-26 NOTE — ASSESSMENT & PLAN NOTE
· Per ID notes in August 2022, positive HCV antibodies likely represent prior exposure and not active infection given RNA negative  · Continue to follow with primary care  · Recommend outpatient GI follow up- discussed with case management for referral

## 2023-01-26 NOTE — ASSESSMENT & PLAN NOTE
· Continue lisinopril  · 167/80  · Recommend outpatient PCP follow up upon discharge for continued monitoring

## 2023-01-26 NOTE — DISCHARGE SUMMARY
MEDICAL DETOX UNIT, LEVEL 4  Department of Medical Toxicology  Reason for Admission/Principal Problem: Opiate withdrawal, opiate use disorder  Admitting provider: CONCHIS Jasso MD   1/23/2023  1:19 PM       Discharging Physician / Practitioner: Lindy Vazquez PA-C  PCP: Lennox El, MD  Admission Date:   Admission Orders (From admission, onward)     Ordered        01/23/23 1445  1 Medical [facility],[address]  Once                      Discharge Date: 01/26/23    Medical Problems     Resolved Problems  Date Reviewed: 1/3/2023          Resolved    * (Principal) Opioid withdrawal ([facility] ) 1/26/2023     Resolved by  Lindy Vazquez PA-C    Leukocytosis 1/26/2023     Resolved by  Lindy Vazquez PA-C          * Opioid withdrawal (HCC)-resolved as of 1/26/2023  Assessment & Plan  · Patient stated last use of opioids was Monday, January 23, 2023  · Removed Butrans patch   · Patient underwent micro-induction on 1/25/23 and transitioned to maintenance dosing  Patient does not report any further withdrawal symptoms  · Continue with maintenance dosing  Opioid use disorder, severe, dependence ([facility] )  Assessment & Plan  · Had several months of sobriety but relapsed to using 8-10 bags of fentanyl daily intravenously  · Treatment of withdrawal as above  · Known hepatitis C carrier, recommend continued primary care follow-up  · Rapid HIV 1/2 antigen negative  · Case management consulted- patient will follow up with [facility] program  · Per PDMP patient last filled prescription of suboxone on 1/6/23 for 30 days  He reports that he does not have any of his suboxone left  Will prescribe a 5 day supply of Suboxone with 1 thirty day refill  Bradycardia, drug induced  Assessment & Plan  · Likely secondary to xylazine  With activity patient is not bradycardic  HR have improved since admission     · Recommend outpatient PCP for further follow up     Primary hypertension  Assessment & Plan  · Continue lisinopril  · 167/80  · Recommend outpatient PCP follow up upon discharge for continued monitoring     Hepatitis C virus carrier state [facility])  Assessment & Plan  · Per ID notes in August 2022, positive HCV antibodies likely represent prior exposure and not active infection given RNA negative  · Continue to follow with primary care  · Recommend outpatient GI follow up- discussed with case management for referral     Type 2 diabetes mellitus without complication, with long-term current use of insulin ([address] )  Assessment & Plan  · Continue home medications  · Continue to monitor blood glucose    Tobacco abuse  Assessment & Plan  · Offered nicotine replacement therapy  · Encourage cessation    Other hyperlipidemia  Assessment & Plan  · Continue atorvastatin    Leukocytosis-resolved as of 1/26/2023  Assessment & Plan  · Has consistently had leukocytosis on lab checks going back to 2019  · No current signs/ symptoms of infection, afebrile  · Continue to monitor      Consultations During Hospital Stay:  · Case management    Procedures Performed:   · None    Significant Findings / Test Results:   · Leukocytosis: Improved  · 8/2022 positive HCV, currently low reactive    Incidental Findings:   · None    Test Results Pending at Discharge (will require follow up): · None      Outpatient Tests Requested:  · None    Complications:  NA    Reason for Admission: Opioid withdrawal    Hospital Course:     Berkley Silva is a 62 y o  male patient who originally presented to the hospital on 1/23/2023 due to opioid withdrawal   Patient initially presented to the [facility] ED requesting detoxification from opioids and agreed to initiation of Suboxone  Pt was subsequently admitted to the Medical Detox Unit for medically assisted opioid withdrawal and MAT induction with Suboxone  Upon arrival Butrans patch was placed with anticipated low dose induction regimen    On 1/24 after initiating patient began to exhibit symptoms of N/V, lethargy, restlessness  Suspected substance use on unit  Induction was help temporarily for symptomatic care and until improvement of symptoms  On 1/25/23, patient tolerated remainder of microinduction with transition to maintenance 8/2mg BID dosing  Case management was consulted for assistance with disposition planning, and patient was discharged with outpatient resources  Please see above list of diagnoses and related plan for additional information  Condition at Discharge: good     Discharge Day Visit / Exam:     Subjective:  Seen and examined at bedside  Patient denies further withdrawal symptoms  He is agreeable to continue with outpatient MAT upon follow up  Vitals: Blood Pressure: 167/80 (01/26/23 0737)  Pulse: 63 (01/26/23 0737)  Temperature: 98 4 °F (36 9 °C) (01/26/23 0737)  Temp Source: Temporal (01/26/23 0737)  Respirations: 16 (01/26/23 0737)  Height: 5' 5" (165 1 cm) (01/23/23 1636)  Weight - Scale: 87 8 kg (193 lb 9 oz) (01/23/23 1636)  SpO2: 99 % (01/26/23 0737)  Exam:   Physical Exam  Constitutional:       General: He is not in acute distress  Appearance: He is not diaphoretic  Eyes:      General: No scleral icterus  Pupils: Pupils are equal, round, and reactive to light  Cardiovascular:      Rate and Rhythm: Normal rate and regular rhythm  Heart sounds: No murmur heard  Pulmonary:      Effort: No respiratory distress  Breath sounds: Normal breath sounds  No wheezing  Abdominal:      Tenderness: There is no abdominal tenderness  Neurological:      Mental Status: He is alert and oriented to person, place, and time  Psychiatric:         Mood and Affect: Mood is not anxious or depressed  Discussion with Family: patient     Discharge instructions/Information to patient and family:   See after visit summary for information provided to patient and family        Provisions for Follow-Up Care:  See after visit summary for information related to follow-up care and any pertinent home health orders  Disposition:     Home    For Discharges to [facility] SNF:   · Not Applicable to this Patient - Not Applicable to this Patient    Planned Readmission: NA     Discharge Statement:  I spent 35 minutes discharging the patient  This time was spent on the day of discharge  I had direct contact with the patient on the day of discharge  Greater than 50% of the total time was spent examining patient, answering all patient questions, arranging and discussing plan of care with patient as well as directly providing post-discharge instructions  Additional time then spent on discharge activities  Discharge Medications:  See after visit summary for reconciled discharge medications provided to patient and family        ** Please Note: This note has been constructed using a voice recognition system **

## 2023-01-26 NOTE — TELEPHONE ENCOUNTER
Received Sublocade order form  Patient does not need a prior authorization as he has [facility] [employer]  Left message for infusion center to schedule patient

## 2023-01-26 NOTE — ASSESSMENT & PLAN NOTE
· Likely secondary to xylazine  With activity patient is not bradycardic  HR have improved since admission     · Recommend outpatient PCP for further follow up

## 2023-01-26 NOTE — CASE MANAGEMENT
Case Management Discharge Planning Note    Patient name Damaris Simental  Location 5T DETOX 508/5T DETOX 50* MRN [id number]  : 1965 Date 2023       Current Admission Date: 2023  Current Admission Diagnosis:Opioid withdrawal [facility])   Patient Active Problem List    Diagnosis Date Noted   • Bradycardia, drug induced 2023   • Opioid withdrawal ([facility] ) 2023   • Leukocytosis 2023   • Primary hypertension 2022   • Hepatitis C virus carrier state ([facility] ) 2022   • MSSA bacteremia 2022   • Staphylococcal arthritis of right wrist ([facility] ) 2022   • Type 2 diabetes mellitus without complication, with long-term current use of insulin ([facility] )    • PICC (peripherally inserted central catheter) in place    • Right wrist pain 2022   • Opioid use disorder, severe, dependence ([facility] ) 2021   • Class 1 obesity due to excess calories without serious comorbidity with body mass index (BMI) of 32 0 to 32 9 in adult 2020   • Tobacco abuse 2020   • Encounter for screening colonoscopy 2020   • Other hyperlipidemia 2020      LOS (days): 3  Geometric Mean LOS (GMLOS) (days):   Days to GMLOS:     OBJECTIVE:  Risk of Unplanned Readmission Score: 20 37         Current admission status: Inpatient   Preferred Pharmacy:   [address] [place] [address], [address] [address]  Phone: [phone] Fax: [address] [address]  Phone: [phone] Fax: [phone]    Primary Care Provider: Aletha Ngo MD    Primary Insurance: Dinah Gonzalez  Secondary Insurance:     DISCHARGE DETAILS: Cm consulted with medical staff who indicated pt ready for discharge today  Cm met with pt to discuss aftercare planning  Pt continues to state he will arrange all his own aftercare and did not want to sign CARMELO's   Pt continues to state he does not want to complete a relapse prevention plan  Pt indicates he does not need transportation home  Cm met with provider to discuss pt medications needs  Cm investigated if pt could receive a suboxone refill due to pt filling a 30 days supply recently  Cm informed by Lisa Nicholson that it is too early for this refill and pt can not fill for another 5 days  Pt informed that the cost of this would be $45 for 5 days of suboxone  Pt stating he does not have any monies to pay for his medication  Cm will consult with provider regarding how provider would like this managed due to pt having had a prescription filled recently  Pt has a MAT appointment with [facility] listed as scheduled for 2/6/2023  Pt will be discharged home today      Discharge planning discussed with[de-identified] patient  Freedom of Choice: Yes                                  Other Referral/Resources/Interventions Provided:  Referrals Provided[de-identified] Support Group, Therapist, IOP, Crisis Hotline                                                   Family notified[de-identified] No CARMELO's signed

## 2023-01-26 NOTE — NURSING NOTE
Pt verbalized understanding of discharge paperwork  Pt received all home medications from pharmacy and new prescription  All belongings returned to pt from locker/security  Pt escorted to lori via RN

## 2023-01-26 NOTE — ASSESSMENT & PLAN NOTE
· Patient stated last use of opioids was Monday, January 23, 2023  · Removed Butrans patch   · Patient underwent micro-induction on 1/25/23 and transitioned to maintenance dosing  Patient does not report any further withdrawal symptoms  · Continue with maintenance dosing

## 2023-01-26 NOTE — CASE MANAGEMENT
Cm consulted with medical provider and nurse supervisor regarding pt need for medication assistance  Both in agreement with providing pt with assistance for medication  Cm informed by [facility] pharmacy pt has a co-payment for medications  Pt indicates no ability pay for these medications  Cm researched possible assistance programs and discount card applied  Pt eligible for 100% assistance  [employer] financial assistance form completed and faxed to [address] [place] [address], approved by nurse manager  Pt provided this medication prior to discharge  Cm informed pt now requesting transportation to home address at [address] [place] [address]  Pt signed [employer] release form

## 2023-01-26 NOTE — ASSESSMENT & PLAN NOTE
· Had several months of sobriety but relapsed to using 8-10 bags of fentanyl daily intravenously  · Treatment of withdrawal as above  · Known hepatitis C carrier, recommend continued primary care follow-up  · Rapid HIV 1/2 antigen negative  · Case management consulted- patient will follow up with [facility]  · Per PDMP patient last filled prescription of suboxone on 1/6/23 for 30 days  He reports that he does not have any of his suboxone left  Will prescribe a 5 day supply of Suboxone with 1 thirty day refill

## 2023-01-27 NOTE — UTILIZATION REVIEW
NOTIFICATION OF ADMISSION DISCHARGE   This is a Notification of Discharge from [address]  Please be advised that this patient has been discharge from our facility  Below you will find the admission and discharge date and time including the patient’s disposition  UTILIZATION REVIEW CONTACT:  Lisseth Dhillon MA  Utilization   Network Utilization Review Department  Phone: [phone] x carefully listen to the prompts  All voicemails are confidential   Email: [email or web address]     ADMISSION INFORMATION  PRESENTATION DATE: 1/23/2023  1:19 PM  OBERVATION ADMISSION DATE:   INPATIENT ADMISSION DATE: 1/23/23  2:45 PM   DISCHARGE DATE: 1/26/2023 12:22 PM   DISPOSITION:Home/Self Care    IMPORTANT INFORMATION:  Send all requests for admission clinical reviews, approved or denied determinations and any other requests to dedicated fax number below belonging to the campus where the patient is receiving treatment   List of dedicated fax numbers:  [address] DENIALS (Administrative/Medical Necessity) [phone]   [address] (Maternity/NICU/Pediatrics) [phone]   [facility] [phone]   [place] [address] [phone]   [address] [phone]   220 [facility] [phone]   90 [facility] [phone]   [address] [place], [place] [address] [phone]   [facility]  [phone] 4058 [facility] [phone]   412 [facility] [address] [facility] [phone]

## 2023-01-30 ENCOUNTER — TELEPHONE (OUTPATIENT)
Dept: PSYCHIATRY | Facility: CLINIC | Age: 58
End: 2023-01-30

## 2023-01-30 NOTE — TELEPHONE ENCOUNTER
Checking appointment time Patient needs later in the day - advised this was the latest we had an if he doesn't come we will not be able to refill        Patient stated he doesn't wast sublocade injection he will call different provider to see if he can get a later appointment with them and would like to continie taking pills not injection  Pt advised that he will call us back

## 2023-02-06 ENCOUNTER — TELEPHONE (OUTPATIENT)
Dept: PSYCHIATRY | Facility: CLINIC | Age: 58
End: 2023-02-06

## 2023-02-06 NOTE — TELEPHONE ENCOUNTER
Note     Checking appointment time Patient needs later in the day - advised this was the latest we had an if he doesn't come we will not be able to refill          Patient stated he doesn't wast sublocade injection he will call different provider to see if he can get a later appointment with them and would like to continie taking pills not injection  Pt advised that he will call us back

## 2023-03-31 ENCOUNTER — HOSPITAL ENCOUNTER (INPATIENT)
Facility: HOSPITAL | Age: 58
LOS: 3 days | Discharge: HOME | End: 2023-04-03
Attending: EMERGENCY MEDICINE | Admitting: EMERGENCY MEDICINE

## 2023-03-31 DIAGNOSIS — Z79.4 TYPE 2 DIABETES MELLITUS WITHOUT COMPLICATION, WITH LONG-TERM CURRENT USE OF INSULIN (HCC): ICD-10-CM

## 2023-03-31 DIAGNOSIS — F11.10 HEROIN ABUSE (HCC): Primary | ICD-10-CM

## 2023-03-31 DIAGNOSIS — F11.20 OPIOID USE DISORDER, SEVERE, DEPENDENCE (HCC): ICD-10-CM

## 2023-03-31 DIAGNOSIS — E11.9 TYPE 2 DIABETES MELLITUS WITHOUT COMPLICATION, WITH LONG-TERM CURRENT USE OF INSULIN (HCC): ICD-10-CM

## 2023-03-31 LAB
ALBUMIN SERPL BCP-MCNC: 4.4 G/DL (ref 3.5–5)
ALP SERPL-CCNC: 75 U/L (ref 34–104)
ALT SERPL W P-5'-P-CCNC: 23 U/L (ref 7–52)
AMPHETAMINES SERPL QL SCN: NEGATIVE
ANION GAP SERPL CALCULATED.3IONS-SCNC: 7 MMOL/L (ref 4–13)
AST SERPL W P-5'-P-CCNC: 23 U/L (ref 13–39)
BARBITURATES UR QL: NEGATIVE
BASE EX.OXY STD BLDV CALC-SCNC: 89.4 % (ref 60–80)
BASE EXCESS BLDV CALC-SCNC: 2.2 MMOL/L
BASOPHILS # BLD AUTO: 0.05 THOUSANDS/ÂΜL (ref 0–0.1)
BASOPHILS NFR BLD AUTO: 0 % (ref 0–1)
BENZODIAZ UR QL: NEGATIVE
BILIRUB SERPL-MCNC: 0.49 MG/DL (ref 0.2–1)
BUN SERPL-MCNC: 19 MG/DL (ref 5–25)
CALCIUM SERPL-MCNC: 9.5 MG/DL (ref 8.4–10.2)
CHLORIDE SERPL-SCNC: 102 MMOL/L (ref 96–108)
CO2 SERPL-SCNC: 28 MMOL/L (ref 21–32)
COCAINE UR QL: NEGATIVE
CREAT SERPL-MCNC: 0.94 MG/DL (ref 0.6–1.3)
EOSINOPHIL # BLD AUTO: 0.28 THOUSAND/ÂΜL (ref 0–0.61)
EOSINOPHIL NFR BLD AUTO: 3 % (ref 0–6)
ERYTHROCYTE [DISTWIDTH] IN BLOOD BY AUTOMATED COUNT: 13.4 % (ref 11.6–15.1)
ETHANOL EXG-MCNC: 0 MG/DL
GFR SERPL CREATININE-BSD FRML MDRD: 89 ML/MIN/1.73SQ M
GLUCOSE SERPL-MCNC: 158 MG/DL (ref 65–140)
GLUCOSE SERPL-MCNC: 183 MG/DL (ref 65–140)
HCO3 BLDV-SCNC: 28.6 MMOL/L (ref 24–30)
HCT VFR BLD AUTO: 39.2 % (ref 36.5–49.3)
HGB BLD-MCNC: 12.7 G/DL (ref 12–17)
IMM GRANULOCYTES # BLD AUTO: 0.03 THOUSAND/UL (ref 0–0.2)
IMM GRANULOCYTES NFR BLD AUTO: 0 % (ref 0–2)
LYMPHOCYTES # BLD AUTO: 2.41 THOUSANDS/ÂΜL (ref 0.6–4.47)
LYMPHOCYTES NFR BLD AUTO: 21 % (ref 14–44)
MAGNESIUM SERPL-MCNC: 2.1 MG/DL (ref 1.9–2.7)
MCH RBC QN AUTO: 28.7 PG (ref 26.8–34.3)
MCHC RBC AUTO-ENTMCNC: 32.4 G/DL (ref 31.4–37.4)
MCV RBC AUTO: 89 FL (ref 82–98)
METHADONE UR QL: NEGATIVE
MONOCYTES # BLD AUTO: 0.6 THOUSAND/ÂΜL (ref 0.17–1.22)
MONOCYTES NFR BLD AUTO: 5 % (ref 4–12)
NEUTROPHILS # BLD AUTO: 7.89 THOUSANDS/ÂΜL (ref 1.85–7.62)
NEUTS SEG NFR BLD AUTO: 71 % (ref 43–75)
NRBC BLD AUTO-RTO: 0 /100 WBCS
O2 CT BLDV-SCNC: 16.9 ML/DL
OPIATES UR QL SCN: POSITIVE
OXYCODONE+OXYMORPHONE UR QL SCN: NEGATIVE
PCO2 BLDV: 52 MM HG (ref 42–50)
PCP UR QL: NEGATIVE
PH BLDV: 7.36 [PH] (ref 7.3–7.4)
PLATELET # BLD AUTO: 326 THOUSANDS/UL (ref 149–390)
PMV BLD AUTO: 8.7 FL (ref 8.9–12.7)
PO2 BLDV: 74.5 MM HG (ref 35–45)
POTASSIUM SERPL-SCNC: 4.2 MMOL/L (ref 3.5–5.3)
PROT SERPL-MCNC: 7.2 G/DL (ref 6.4–8.4)
RBC # BLD AUTO: 4.42 MILLION/UL (ref 3.88–5.62)
SODIUM SERPL-SCNC: 137 MMOL/L (ref 135–147)
THC UR QL: NEGATIVE
WBC # BLD AUTO: 11.26 THOUSAND/UL (ref 4.31–10.16)

## 2023-03-31 RX ORDER — IBUPROFEN 600 MG/1
600 TABLET ORAL EVERY 6 HOURS PRN
Status: DISCONTINUED | OUTPATIENT
Start: 2023-03-31 | End: 2023-04-03 | Stop reason: HOSPADM

## 2023-03-31 RX ORDER — INSULIN GLARGINE 100 [IU]/ML
10 INJECTION, SOLUTION SUBCUTANEOUS
Status: DISCONTINUED | OUTPATIENT
Start: 2023-03-31 | End: 2023-04-03 | Stop reason: HOSPADM

## 2023-03-31 RX ORDER — GABAPENTIN 300 MG/1
300 CAPSULE ORAL EVERY 8 HOURS PRN
Status: DISCONTINUED | OUTPATIENT
Start: 2023-03-31 | End: 2023-04-03 | Stop reason: HOSPADM

## 2023-03-31 RX ORDER — CLONIDINE HYDROCHLORIDE 0.1 MG/1
0.1 TABLET ORAL EVERY 6 HOURS PRN
Status: DISCONTINUED | OUTPATIENT
Start: 2023-03-31 | End: 2023-04-03 | Stop reason: HOSPADM

## 2023-03-31 RX ORDER — ATORVASTATIN CALCIUM 20 MG/1
20 TABLET, FILM COATED ORAL
Status: DISCONTINUED | OUTPATIENT
Start: 2023-03-31 | End: 2023-04-03 | Stop reason: HOSPADM

## 2023-03-31 RX ORDER — MAGNESIUM HYDROXIDE/ALUMINUM HYDROXICE/SIMETHICONE 120; 1200; 1200 MG/30ML; MG/30ML; MG/30ML
30 SUSPENSION ORAL EVERY 6 HOURS PRN
Status: DISCONTINUED | OUTPATIENT
Start: 2023-03-31 | End: 2023-04-03 | Stop reason: HOSPADM

## 2023-03-31 RX ORDER — ACETAMINOPHEN 325 MG/1
650 TABLET ORAL EVERY 6 HOURS PRN
Status: DISCONTINUED | OUTPATIENT
Start: 2023-03-31 | End: 2023-04-03 | Stop reason: HOSPADM

## 2023-03-31 RX ORDER — NICOTINE 21 MG/24HR
1 PATCH, TRANSDERMAL 24 HOURS TRANSDERMAL DAILY
Status: DISCONTINUED | OUTPATIENT
Start: 2023-04-01 | End: 2023-04-03 | Stop reason: HOSPADM

## 2023-03-31 RX ORDER — ENOXAPARIN SODIUM 100 MG/ML
40 INJECTION SUBCUTANEOUS DAILY
Status: DISCONTINUED | OUTPATIENT
Start: 2023-04-01 | End: 2023-04-03 | Stop reason: HOSPADM

## 2023-03-31 RX ORDER — LISINOPRIL 5 MG/1
2.5 TABLET ORAL DAILY
Status: DISCONTINUED | OUTPATIENT
Start: 2023-04-01 | End: 2023-04-03 | Stop reason: HOSPADM

## 2023-03-31 RX ORDER — BUPRENORPHINE 20 UG/H
20 PATCH TRANSDERMAL
Status: DISCONTINUED | OUTPATIENT
Start: 2023-03-31 | End: 2023-04-03 | Stop reason: HOSPADM

## 2023-03-31 RX ORDER — ASPIRIN 81 MG/1
81 TABLET ORAL DAILY
Status: DISCONTINUED | OUTPATIENT
Start: 2023-04-01 | End: 2023-04-03 | Stop reason: HOSPADM

## 2023-03-31 RX ORDER — ONDANSETRON 2 MG/ML
4 INJECTION INTRAMUSCULAR; INTRAVENOUS EVERY 6 HOURS PRN
Status: DISCONTINUED | OUTPATIENT
Start: 2023-03-31 | End: 2023-04-03 | Stop reason: HOSPADM

## 2023-03-31 RX ADMIN — INSULIN GLARGINE 10 UNITS: 100 INJECTION, SOLUTION SUBCUTANEOUS at 23:19

## 2023-03-31 RX ADMIN — ATORVASTATIN CALCIUM 20 MG: 20 TABLET, FILM COATED ORAL at 23:17

## 2023-04-01 LAB
ANION GAP SERPL CALCULATED.3IONS-SCNC: 4 MMOL/L (ref 4–13)
ATRIAL RATE: 51 BPM
BUN SERPL-MCNC: 18 MG/DL (ref 5–25)
CALCIUM SERPL-MCNC: 9.1 MG/DL (ref 8.4–10.2)
CHLORIDE SERPL-SCNC: 103 MMOL/L (ref 96–108)
CO2 SERPL-SCNC: 30 MMOL/L (ref 21–32)
CREAT SERPL-MCNC: 0.82 MG/DL (ref 0.6–1.3)
ERYTHROCYTE [DISTWIDTH] IN BLOOD BY AUTOMATED COUNT: 13.6 % (ref 11.6–15.1)
GFR SERPL CREATININE-BSD FRML MDRD: 97 ML/MIN/1.73SQ M
GLUCOSE SERPL-MCNC: 101 MG/DL (ref 65–140)
GLUCOSE SERPL-MCNC: 91 MG/DL (ref 65–140)
GLUCOSE SERPL-MCNC: 93 MG/DL (ref 65–140)
HCT VFR BLD AUTO: 38.6 % (ref 36.5–49.3)
HGB BLD-MCNC: 12.4 G/DL (ref 12–17)
MAGNESIUM SERPL-MCNC: 2.3 MG/DL (ref 1.9–2.7)
MCH RBC QN AUTO: 28.4 PG (ref 26.8–34.3)
MCHC RBC AUTO-ENTMCNC: 32.1 G/DL (ref 31.4–37.4)
MCV RBC AUTO: 89 FL (ref 82–98)
P AXIS: 62 DEGREES
PLATELET # BLD AUTO: 316 THOUSANDS/UL (ref 149–390)
PMV BLD AUTO: 9 FL (ref 8.9–12.7)
POTASSIUM SERPL-SCNC: 4.1 MMOL/L (ref 3.5–5.3)
PR INTERVAL: 184 MS
QRS AXIS: -34 DEGREES
QRSD INTERVAL: 98 MS
QT INTERVAL: 400 MS
QTC INTERVAL: 368 MS
RBC # BLD AUTO: 4.36 MILLION/UL (ref 3.88–5.62)
SODIUM SERPL-SCNC: 137 MMOL/L (ref 135–147)
T WAVE AXIS: 19 DEGREES
VENTRICULAR RATE: 51 BPM
WBC # BLD AUTO: 11.26 THOUSAND/UL (ref 4.31–10.16)

## 2023-04-01 RX ORDER — CLONAZEPAM 1 MG/1
1 TABLET ORAL EVERY 6 HOURS PRN
Status: DISCONTINUED | OUTPATIENT
Start: 2023-04-01 | End: 2023-04-03 | Stop reason: HOSPADM

## 2023-04-01 RX ORDER — TRAZODONE HYDROCHLORIDE 50 MG/1
50 TABLET ORAL
Status: DISCONTINUED | OUTPATIENT
Start: 2023-04-01 | End: 2023-04-03 | Stop reason: HOSPADM

## 2023-04-01 RX ADMIN — CLONAZEPAM 1 MG: 1 TABLET ORAL at 19:54

## 2023-04-01 RX ADMIN — METFORMIN HYDROCHLORIDE 1000 MG: 500 TABLET ORAL at 15:58

## 2023-04-01 RX ADMIN — METFORMIN HYDROCHLORIDE 1000 MG: 500 TABLET ORAL at 07:54

## 2023-04-01 RX ADMIN — ASPIRIN 81 MG: 81 TABLET, COATED ORAL at 09:40

## 2023-04-01 RX ADMIN — CLONAZEPAM 1 MG: 1 TABLET ORAL at 13:56

## 2023-04-01 RX ADMIN — IBUPROFEN 600 MG: 600 TABLET, FILM COATED ORAL at 18:37

## 2023-04-01 RX ADMIN — CLONIDINE HYDROCHLORIDE 0.1 MG: 0.1 TABLET ORAL at 15:58

## 2023-04-01 RX ADMIN — GABAPENTIN 300 MG: 300 CAPSULE ORAL at 14:56

## 2023-04-01 RX ADMIN — GABAPENTIN 300 MG: 300 CAPSULE ORAL at 00:30

## 2023-04-01 RX ADMIN — ACETAMINOPHEN 650 MG: 325 TABLET ORAL at 15:58

## 2023-04-01 RX ADMIN — LISINOPRIL 2.5 MG: 5 TABLET ORAL at 10:51

## 2023-04-01 RX ADMIN — MULTIPLE VITAMINS W/ MINERALS TAB 1 TABLET: TAB ORAL at 09:40

## 2023-04-01 NOTE — ASSESSMENT & PLAN NOTE
Lab Results   Component Value Date    WBC 11 26 (H) 03/31/2023        • Mildly Elevated WBC on admission  • Possibly 2/2 leukemoid reaction from acute withdrawal  • No evidence of active infection- Pt afebrile, VSS  • Continue monitoring CBC, VS

## 2023-04-01 NOTE — ASSESSMENT & PLAN NOTE
· Patient with a history of chronic opioid use  · Last use was around 7 PM  · Initiate MAT/opioid withdrawal protocol with plan for eventual microinduction with Suboxone  · Currently experiencing denies significant opioid withdrawal s/sx  · Initiate Butrans 20 mcg/hr patch   · Continue monitoring opioid withdrawal, and consider microinduction when >24 hours have passed since pt's last opioid use, likely 4/1 evening v  4/2 depending on severity of pt's w/d symptoms  · Symptomatic and supportive care  · Continuous pulse oximetry monitoring

## 2023-04-01 NOTE — ASSESSMENT & PLAN NOTE
· Patient with a history of chronic IV fentanyl use  · Uses about 1 bundle daily  · History of prior Suboxone MAT and previous [address] admission 1/23-1/26/23, during which pt was stabilized on maintenance Suboxone   · Pt reports relapsing on opioids about 2-3 weeks ago, denies any specific trigger for relapse  · Management of opioid withdrawal under MAT protocol as above  · Screen for HIV with h/o IVDU - pt is a hepatitis C carrier  · Case management consulted for assistance with aftercare resources

## 2023-04-01 NOTE — DISCHARGE INSTR - OTHER ORDERS
Drug and Alcohol Resources in [facility]    If you have health insurance, including medical assistance, there should be a phone number on your insurance card that you can call to find out how to access services  The card may say, “For [address] or “For Drug and Alcohol Services” or “For Substance Abuse Services” call the number provided  Or contact [phone]    [facility]id Use [facility] ([place])  The [facility] provides care management for adults with Opioid Use Disorder  The [facility] has a team of care managers who will help individuals get into treatment, coordinate behavioral and physical health care, and provide recovery support and guidance  Care managers will also provide information about Medication-Assisted Treatment  Contact [phone]    [facility] Drug and Alcohol Administration [phone] For additional information, contact the Department of Human Services Information and Referral Unit at ([phone] [phone] between the hours of 8:30 a m  and 4:30 p m , Monday through Friday  Recovery Centers  These centers offer a safe, sober environment to those in recovery  A variety of programming including 12-Step Meetings, Canelo Raker, Life Skills Workshops, etc  is offered at each location  Recovery Centers  These centers offer a safe, sober environment to those in recovery  A variety of programming including 12-Step Meetings, Canelo Raker, Life Skills Workshops, etc  is offered at each location  [address] [phone]  [email or web address] Change on Main  aFra Jones, [address] [facility]  [phone]  uuojcn-sh-ujlm    [address], 210 [facility]  [phone]   [address] [place] [address]  [phone]  www  [facility], [address] [place] [address]  [phone]  [facility]  [facility] REHABILITATION is [address] [place]  Open Tu[place]anna, Mookie Julia, Thursday from 1pm-5pm and Friday, Saturday from 11am-3pm    [employer]  Confidential free help, from public health agencies, to find substance use treatment and information  [phone]    Link for Zoom Codes for Virtual 12 step Meetings [employer] tn  aspx    [facility]  If you feel you have a problem with alcohol, or if you simply want to know more about AA, call our 24-hour hotline at: [address]: [phone]    [address] Meetings can be found at http://www colon-wood biz/  NA Meeting list [email or web address]    [facility] mental health resources      Crisis Intervention  24 hour Emergency [address] I[address]  Call [phone]  Outside of [facility]: call 1--530-989-XWFD ([phone]) [phone] mental health: Information & Referral at [phone]  [facility] of [facility]  [address], [place], 98 [facility] [phone] drop in center  [address] [address]  [address] [place] [address] [address]  [phone]

## 2023-04-01 NOTE — ASSESSMENT & PLAN NOTE
Lab Results   Component Value Date    HGBA1C 8 1 (A) 01/03/2023       Recent Labs     03/31/23  2316   POCGLU 183*       Blood Sugar Average: Last 72 hrs:  (P) 183     · Patient with a history of DM2  · Home medications metformin 1000 mg twice daily before meals and insulin glargine (Lantus) 10 units nightly  · Blood glucose 158 on admission CMP  · Repeat Hgb A1C pending  · Continue home medications  · Accuchecks HS  · Carb-controlled diet  · Hypoglycemia protocol

## 2023-04-01 NOTE — ASSESSMENT & PLAN NOTE
· HCV RNA high reactive with negative VL from 08/2022  · Recommend continued outpatient GI follow-up

## 2023-04-01 NOTE — ED PROVIDER NOTES
History  Chief Complaint   Patient presents with   • Detox Evaluation     Pt states that he wants to sign in for detox  Currently injecting at least 1 bundle of Fentanyl daily  Last use approx 4 hours ago  HPI  Patient is a 14-year-old male with past medical history of DM and substance abuse, uses insulin at night  Reports that he is here for detox evaluation  Reports a longstanding history of opioid abuse was admitted at the end of January and started on Suboxone  He reports that he was compliant with his Suboxone for some time however 3 weeks ago began to use fentanyl again  Using IV  He reports that his last use was on 4 hours ago he reports using approximately 1 bundle a day  Denies any coingestions denies any alcohol use  Denies any fevers chills nausea vomiting diarrhea constipation  Denies any withdrawal symptoms at this time  Denies any chest pain or shortness of breath  Reports that he has been taking care of his diabetes and his glucoses have been manageable  Patient does want to restart Suboxone  Prior to Admission Medications   Prescriptions Last Dose Informant Patient Reported? Taking?    ACCU-CHEK FASTCLIX LANCETS MISC   Yes No   Blood Glucose Monitoring Suppl (ACCU-CHEK GUIDE) w/Device KIT   Yes No   Insulin Glargine Solostar (Basaglar KwikPen) 100 UNIT/ML SOPN   No No   Sig: Inject 0 1 mL (10 Units total) under the skin daily at bedtime   Insulin Pen Needle (BD Pen Needle Cely 2nd Gen) 32G X 4 MM MISC   No No   Sig: Inject under the skin daily   aspirin (ECOTRIN LOW STRENGTH) 81 mg EC tablet   No No   Sig: Take 1 tablet (81 mg total) by mouth daily   atorvastatin (LIPITOR) 20 mg tablet   No No   Sig: Take 1 tablet (20 mg total) by mouth daily at bedtime   glucose blood (Accu-Chek Guide) test strip   No No   Sig: Use 1 each 3 (three) times a day Test as directed   lisinopril (ZESTRIL) 2 5 mg tablet   No No   Sig: Take 1 tablet (2 5 mg total) by mouth daily   metFORMIN (GLUCOPHAGE) 1000 MG tablet   No No   Sig: Take 1 tablet (1,000 mg total) by mouth 2 (two) times a day with meals   naloxone (NARCAN) 4 mg/0 1 mL nasal spray   No No   Sig: Administer 1 spray into a nostril  If no response after 2-3 minutes, give another dose in the other nostril using a new spray  naproxen (Naprosyn) 500 mg tablet   No No   Sig: Take 1 tablet (500 mg total) by mouth 2 (two) times a day with meals for 7 days      Facility-Administered Medications: None       Past Medical History:   Diagnosis Date   • Diabetes mellitus ([facility] )    • Substance abuse ([facility] )        Past Surgical History:   Procedure Laterality Date   • NO PAST SURGERIES     • WOUND DEBRIDEMENT Right 8/6/2022    Procedure: DEBRIDEMENT RIGHT UPPER EXTREMITY ([address]) SEPTIC WRIST;  Surgeon: Georgina Gavin MD;  Location: [facility];  Service: Orthopedics       Family History   Problem Relation Age of Onset   • Diabetes Mother    • Alcohol abuse Father    • Diabetes Sister    • Asthma Brother      I have reviewed and agree with the history as documented  E-Cigarette/Vaping   • E-Cigarette Use Never User      E-Cigarette/Vaping Substances   • Nicotine No    • THC No    • CBD No    • Flavoring No    • Other No    • Unknown No      Social History     Tobacco Use   • Smoking status: Every Day     Packs/day: 0 50     Types: Cigarettes   • Smokeless tobacco: Never   • Tobacco comments:     about 12 cigarettes/daily   Vaping Use   • Vaping Use: Never used   Substance Use Topics   • Alcohol use: Not Currently   • Drug use: Yes     Types: Fentanyl       Review of Systems   Constitutional: Negative for chills and fever  HENT: Negative for congestion, rhinorrhea and sore throat  Eyes: Negative for redness and visual disturbance  Respiratory: Negative for cough and shortness of breath  Cardiovascular: Negative for chest pain and palpitations  Gastrointestinal: Negative for constipation, diarrhea, nausea and vomiting     Genitourinary: Negative for dysuria and hematuria  Musculoskeletal: Negative for myalgias and neck pain  Skin: Negative for rash and wound  Allergic/Immunologic: Negative for immunocompromised state  Neurological: Negative for seizures and syncope  Psychiatric/Behavioral: Positive for behavioral problems  Negative for confusion and suicidal ideas  Physical Exam  Physical Exam  Vitals and nursing note reviewed  Constitutional:       General: He is not in acute distress  Appearance: Normal appearance  He is well-developed  HENT:      Head: Normocephalic and atraumatic  No raccoon eyes  Right Ear: External ear normal       Left Ear: External ear normal       Nose: Nose normal  No congestion  Mouth/Throat:      Lips: Pink  Mouth: Mucous membranes are moist    Eyes:      General: Lids are normal  No scleral icterus  Extraocular Movements: Extraocular movements intact  Cardiovascular:      Rate and Rhythm: Normal rate and regular rhythm  Heart sounds: No murmur heard  No friction rub  Pulmonary:      Effort: Pulmonary effort is normal  No respiratory distress  Breath sounds: No wheezing or rhonchi  Abdominal:      General: Abdomen is flat  Palpations: Abdomen is soft  Tenderness: There is no abdominal tenderness  There is no guarding or rebound  Musculoskeletal:      Cervical back: Normal range of motion  No torticollis  Skin:     General: Skin is warm and dry  Coloration: Skin is not jaundiced  Findings: No rash  Neurological:      Mental Status: He is alert and oriented to person, place, and time  Mental status is at baseline  Psychiatric:         Behavior: Behavior normal  Behavior is cooperative           Vital Signs  ED Triage Vitals [03/31/23 2038]   Temperature Pulse Respirations Blood Pressure SpO2   98 1 °F (36 7 °C) 101 18 141/81 96 %      Temp Source Heart Rate Source Patient Position - Orthostatic VS BP Location FiO2 (%)   Tympanic Monitor Sitting Left arm --      Pain Score       No Pain           Vitals:    03/31/23 2038   BP: 141/81   Pulse: 101   Patient Position - Orthostatic VS: Sitting         Visual Acuity      ED Medications  Medications - No data to display    Diagnostic Studies  Results Reviewed     Procedure Component Value Units Date/Time    Rapid drug screen, urine [[id number]]     Lab Status: No result Specimen: Urine     CBC and differential [[id number]]     Lab Status: No result Specimen: Blood     Comprehensive metabolic panel [[id number]]     Lab Status: No result Specimen: Blood     Magnesium [[id number]]     Lab Status: No result Specimen: Blood     POCT alcohol breath test [[id number]]     Lab Status: No result     Blood gas, venous [[id number]]     Lab Status: No result Specimen: Blood                  No orders to display              Procedures  Procedures         ED Course  ED Course as of 04/01/23 1505   Fri Mar 31, 2023   2117 WBC(!): 11 26   2117 Hemoglobin: 12 7   2143 Glucose, Random(!): 158   2143 Anion Gap: 7   2143 Magnesium: 2 1   2143 EXTBreath Alcohol: 0 0     Labs reassuring  Will admit to detox for further management  SBIRT 20yo+    Flowsheet Row Most Recent Value   SBIRT (23 yo +)    In order to provide better care to our patients, we are screening all of our patients for alcohol and drug use  Would it be okay to ask you these screening questions? No Filed at: 03/31/2023 2048                    MDM  Patient on arrival is ambulatory to room is in no acute distress, vital signs stable, afebrile  On exam lungs clear auscultation, heart without murmurs rubs or gallops abdomen soft nontender  Overall well-appearing  Will obtain labs and discuss with detox  Disposition  Final diagnoses:   None     ED Disposition     None      Follow-up Information    None         Patient's Medications   Discharge Prescriptions    No medications on file       No discharge procedures on file      PDMP Review       Value Time User    PDMP Reviewed  Yes 2/6/2023  7:44 AM Katie Ceja MD          ED Provider  Electronically Signed by           Bull Irvin MD  04/01/23 2120

## 2023-04-01 NOTE — PROGRESS NOTES
04/01/23 1158   Referral Data   Referral Source Patient   Referral Name Self   Referral Reason Drug/Alcohol [place] 52   Readmission Root Cause   30 Day Readmission No   Patient Information   Mental Status Alert   Primary Caregiver Self   Accompanied by/Relationship Self   Legal Information   Legal Issues Pt denies   Activities of Daily Living Prior to Admission   Functional Status Independent   Assistive Device No device needed   Living Arrangement House;Lives with someone   Ambulation Independent   Access to Firearms   Access to Firearms No  (Pt denies)   Income Information   Income Source Employed   [employer] to [facility]: [employer] - Bus       Pt is a 62year old male who was admitted to detox for opioid withdrawal   Pt's name, date of birth, home address, and telephone number were verified  Pt was informed of case management role and the purpose of the completion of intake with case management  Pt presented as irritable during intake, stated that he would complete intake with CM however, provided very limited information to questions asked  Pt reports that he relapsed approximately 2-3 weeks ago; pt reports that he has been using 1 bundle of heroin/fentanyl IV  Pt was previously admitted to detox unit in January 2023 and was engaged with [facility] program but did not follow up due to noncompliance; pt stated that the program did not work with his schedule  Pt was unable to identify what led to recent relapse  Pt reports last use on 3/31/2023 and first use at age 16  Pt denied any other illicit substance use  Pt reports daily nicotine use of  5 PPD cigarette smoking  Pt reports a period of abstinence of 10 years at one time achieved by being   Pt reports previous inpatient ELIN tx, unknown last, previous ELIN outpatient at [facility] and [address], and previous 12 step meeting attendance    Pt denies current withdrawal symptoms  Pt states he had overdosed in the past but stated he could not indicate the last time  Pt denied any family hx of substance abuse needs      AUDIT: no score  PAWSS:0  UDS: +opiates  Ethanol in ED: 0 0     Pt denied any mental health diagnosis or treatment  Pt denied SI or HI, denied AH/VH, denied hx of abuse or trauma, denied recent losses, denied access to firearms, denied family hx of mental health needs      Pt has current medical conditions of type 2 diabetes, hypertension, Hep c, and hyperlipidemia  Pt has current PCP of [facility]; declined to sign CARMELO  Pt has current medical insurance of [email or web address]; pt signed ROIs  Pt did not identify preferred pharmacy      Pt denied current legal issues      Pt reports he is employed  Pt states he has achieved a GED  Pt reports he will use the bus for transportation  Pt denied  service and denied any religous or cultural request      Pt reports he resides alone and stated he did not want any communication with supports  Pt stated he can return to his apartment      Pt would not complete treatment plan and when asked about aftercare, stated he would return to [facility] for MAT  Pt signed CARMELO for [facility]; CM contacted them to set up appt however, staff stated that pt would have to call to set up his own appointment  CM relayed this to pt  Pt Pt's clinical presentation at this time displays that pt struggles for motivation to participate in treatment needs and is resistant to engaging in care to address relapse potential  Pt's goals for detox are to successfully complete medical withdrawal and to develop a discharge plan that increases community support  Pt presents in the contemplation stage of change

## 2023-04-01 NOTE — QUICK NOTE
"Evaluated patient who was admitted overnight for opioid dependence and withdrawal  Patient previously seen at [facility] office for maintenance Suboxone  However, struggled with follow up due to work schedules and inability to be seen in the office after 4 PM  Patient began to follow with [facility] after his most recent discharge from the medical detox unit in February  He reports taking Suboxone while using up until 3 weeks ago  On exam today he is uncooperative, does not engage with questions, giving short answers  When being asked about his use while on Suboxone, if less or more than typical use, he responds \"I dont know what youre talking about\"  When asked about any history of Sublocade he states \"Look, im not taking that ok\"  Due to patients history of inconsistent follow up, continued use, and refusing to participate in care, there is suspicion for diversion  Recommended that patient complete low dose induction regimen followed by transition to [address]  If patient declines Sublocade he can continue to follow with [facility] as he has prior to admission  PDMP report shows a 2 week prescription being filled on 3/13  Patient reports not taking Suboxone for approximately one month  Given this history, patient should have Suboxone to bridge until follow up with [facility]  Will continue to treat with symptomatic and supportive care and begin low dose induction regimen when appropriate         "

## 2023-04-01 NOTE — H&P
[facility]  H&P  Name: Lucas Galdamez  MRN: [id number]  Unit/Bed#: ED 11 I Date of Admission: 3/31/2023   Date of Service: 4/1/2023 I Hospital Day: 1  HISTORY & PHYSICAL EXAM  DEPARTMENT OF MEDICAL TOXICOLOGY  LEVEL 4 MEDICAL DETOX UNIT  Juana Sommer 62 y o  male MRN: [id number]  Unit/Bed#: ED 11 Encounter: [id number]      Reason for Admission/Principal Problem: Opioid withdrawal, opioid use disorder  Admitting Provider: Luis Alfredo Aggarwal PA-C  Attending Provider: Sean Hopkins MD   3/31/2023  8:34 PM      * Opioid withdrawal [facility])  Assessment & Plan  · Patient with a history of chronic opioid use  · Last use was around 7 PM  · Initiate MAT/opioid withdrawal protocol with plan for eventual microinduction with Suboxone  · Currently experiencing denies significant opioid withdrawal s/sx  · Initiate Butrans 20 mcg/hr patch   · Continue monitoring opioid withdrawal, and consider microinduction when >24 hours have passed since pt's last opioid use, likely 4/1 evening v  4/2 depending on severity of pt's w/d symptoms  · Symptomatic and supportive care  · Continuous pulse oximetry monitoring    Opioid use disorder, severe, dependence ([facility] [address] )  Assessment & Plan  · Patient with a history of chronic IV fentanyl use  · Uses about 1 bundle daily  · History of prior Suboxone MAT and previous [address] [place] [address] Detox admission 1/23-1/26/23, during which pt was stabilized on maintenance Suboxone   · Pt reports relapsing on opioids about 2-3 weeks ago, denies any specific trigger for relapse  · Management of opioid withdrawal under MAT protocol as above  · Screen for HIV with h/o IVDU - pt is a hepatitis C carrier  · Case management consulted for assistance with aftercare resources    Type 2 diabetes mellitus without complication, with long-term current use of insulin [facility])  Assessment & Plan  Lab Results   Component Value Date    HGBA1C 8 1 (A) 01/03/2023       Recent Labs     03/31/23  8851 POCGLU 183*       Blood Sugar Average: Last 72 hrs:  (P) 183     · Patient with a history of DM2  · Home medications metformin 1000 mg twice daily before meals and insulin glargine (Lantus) 10 units nightly  · Blood glucose 158 on admission CMP  · Repeat Hgb A1C pending  · Continue home medications  · Accuchecks HS  · Carb-controlled diet  · Hypoglycemia protocol    Primary hypertension  Assessment & Plan  Patient with a history of HTN  BP hypertensive upon arrival to the ED in the setting of acute alcohol withdrawal and chronic HTN  Most Recent Blood Pressure: 141/81   · Continue lisinopril, may consider dosing adjustment if BP trending up  · Routine BP monitoring     Other hyperlipidemia  Assessment & Plan  · Continue atorvastatin    Tobacco abuse  Assessment & Plan  · Daily tobacco user  · Offer NRT   · Encourage cessation    Hepatitis C virus carrier state [facility])  Assessment & Plan  · HCV RNA high reactive with negative VL from 08/2022  · Recommend continued outpatient GI follow-up    Leukocytosis  Assessment & Plan  Lab Results   Component Value Date    WBC 11 26 (H) 03/31/2023        • Mildly Elevated WBC on admission  • Possibly 2/2 leukemoid reaction from acute withdrawal  • No evidence of active infection- Pt afebrile, VSS  • Continue monitoring CBC, VS          Additional medical treatment(s) includes as above       VTE Prophylaxis: Enoxaparin (Lovenox)  / sequential compression device   Code Status: Full code      Anticipated Length of Stay:  Patient will be admitted on an Inpatient basis with an anticipated length of stay of greater than 2 midnights     Justification for Hospital Stay: Opioid withdrawal, opioid use disorder    For any questions or concerns, please Tiger Text the advanced practitioner in the role of [facility] On Call      This patient qualifies for Level IV medically managed intensive inpatient services under the criteria set by the American Society of Addiction Medicine, including dimensions 1-3  The patient is in withdrawal (or is intoxicated with high risk of withdrawal), with severe and unstable medical and/or psychiatric (dual diagnosis) problems, requiring requires 24-hour medical and nursing care and the full resources of a [address] patient to medical detox unit and continue supportive care and stabilization of acute opioid withdrawal per medical toxicology/detox medication assisted treatment pathway  Complicated Opioid Withdrawal (ie associated with long acting agents, such as methadone or illicit fentanyl analogues):  • >72 hours: Routine COWS/induction as per uncomplicated opoid withdrawal (below)  • <72 hours:  • Adjunctive medications (see below), including clonazepam 1-2mg Q6 hrs PRN anxiety (or diazepam 5 mg if cannot take PO)  • >48 hours, test dose buprenorphine 0 5-1mg  • If responds well, continue with 2mg Q2 hrs (total 8mg) holding for worsening withdrawal, then start maintenance dosing   • If responds poorly, follow next step below   • <48 hours and/or COWS < 6 or failed test dose, add Butrans transdermal patch 20-40mcg/hr, monitor for signs/symptoms of withdrawal   • If within first 24-48 hrs, can administer buprenorphine 0 5-1mg Q6 hrs x 4, followed by 2mg Q2 hrs x 4 the next day  • If surpassing 48 hrs, can administer buprenorphine 2mg Q2hrs if tolerated without transdermal patch or lower sublingual dose  • When complete, remove transdermal patch and start maintenance dosing   • For moderate-severe withdrawal (COWS >/= 8, may still consider routine induction with buprenorphine 8 mg if appropriate  • For worsened or precipitated withdrawal, consider adjunctive benzodiazepines and other comfort meds   Dexmedetomidine may be considered for severe precipitated withdrawal          Uncomplicated Opioid Withdrawal:  Monitor opioid severity via Clinical Opioid Withdrawal Scale (COWS) Q4 hours and administer buprenorphine/naloxone 8mg/2mg when COWS >8, or when greater than 24 hours have elapsed from most recent opioid use (excluding long-acting opioids, such as methadone)  Continue to monitor opioid severity Q30-60 minutes after first dose and administer additional buprenorphine 2-4mg every 30-60 minutes until COWS < 8 for two consecutive hours  Adjunctive medications administered as needed:  Clonidine 0 1 mg PO Q6 hours PRN anxiety or palpitations    Gabapentin 300mg PO Q8 hours PRN anxiety    Ibuprofen 600 mg PO Q6 hours PRN pain    Acetaminophen 1000mg PO Q8 hours PRN pain    Ondansetron 4 mg PO Q6 hours PRN N/V    Nicotine patch 7, 14, 21 mg  PRN nicotine withdrawal   Trazodone 50 mg PO QHS PRN sleep    Loperamide 4 mg PO PRN diarrhea up to 16 mg/day       The risks, benefits and mechanism of buprenorphine/naloxone were discussed and patient agreed to treatment  Case management consultation will take place to assist with coordination of subsequent treatment after discharge  Administer daily multivitamin  Evaluate and treat for coexisting substance use, such as nicotine  Discuss risk factors for infectious disease, such as history of intravenous drug abuse, and offer hepatitis and HIV screening if indicated  Hx and PE limited by: pt uncooperative    HPI: Ruthellen Rinne is a 62y o  year old male with a PMH of OUD, DM2, HTN, HLD, TAD who presents with opioid withdrawal seeking detoxification  Patient initially presented to the [facility] ED requesting detoxification from opioids and was subsequently admitted to the service of the [facility] medical detox unit for medically assisted opioid withdrawal and Suboxone induction  Of note, patient was admitted to the [facility] medical detox unit 1/23-1/26/23 for opioid withdrawal and was stabilized on maintenance Suboxone  He continued taking Suboxone up until 2-3 weeks ago when he relapsed on fentanyl  Pt denies any specific trigger for his relapse  "Patient reports using about 1 bundle of heroin/fentanyl daily via IV  Last opioid use was about 5 hours ago, approximately 7 PM last evening  Reports he is experiencing w/d at this time but denies all specific opioid withdrawal sx when asked about them individually  Denies any other substance or EtOH use  Patient agreeable to initiation of Butrans 20 mcg/hr patch and supportive care at this time with plan for microinduction with Suboxone when at least 24 hours have passed since last opioid use  Patient expresses interest in receiving medications right now for withdrawal and does not understand \"why we have to wait for my symptoms to worsen\" before administering medications such as Klonopin, even though he denies specific withdrawal sx presently  Opioids currently used: fentanyl  Route of use: intravenous  Date/Time of Last Opioid Use: about 5 hours ago, approximately 7 PM last evening  Current Signs/Symptoms of Opioid Withdrawal: pt denies sx, +restless legs on exam    COWS score:   Clinical Opiate Withdrawal Scale  Pulse: 101          Methadone & Buprenorphine History  History of prior treatment for opioid dependence? yes  Currently on Methadone Maintenance? no  History of prior treatment with Suboxone? yes  Currently taking Suboxone? no  History of using Suboxone without having a prescription? no  History of IVDA? yes  Co-existing substance use? no    Review of PDMP: yes    Social History     Substance and Sexual Activity   Alcohol Use Not Currently     Social History     Substance and Sexual Activity   Drug Use Yes   • Types: Fentanyl     Social History     Tobacco Use   Smoking Status Every Day   • Packs/day: 0 50   • Types: Cigarettes   Smokeless Tobacco Never   Tobacco Comments    about 12 cigarettes/daily       Review of Systems   Reason unable to perform ROS: pt uncooperative  Constitutional: Negative for chills, diaphoresis and fever  HENT: Negative for rhinorrhea      Eyes:        Negative for " lacrimation   Respiratory: Negative for shortness of breath  Cardiovascular: Negative for chest pain  Gastrointestinal: Negative for abdominal pain, diarrhea, nausea and vomiting  Musculoskeletal: Negative for arthralgias and myalgias  Neurological: Negative for tremors  Psychiatric/Behavioral: Negative for suicidal ideas  The patient is not nervous/anxious  Historical Information   Past Medical History:   Diagnosis Date   • Diabetes mellitus ([facility] [address] )    • Substance abuse ([facility] )      Past Surgical History:   Procedure Laterality Date   • NO PAST SURGERIES     • WOUND DEBRIDEMENT Right 8/6/2022    Procedure: DEBRIDEMENT RIGHT UPPER EXTREMITY ([address]) SEPTIC WRIST;  Surgeon: Mickey Proctor MD;  Location: [facility];  Service: Orthopedics     Family History   Problem Relation Age of Onset   • Diabetes Mother    • Alcohol abuse Father    • Diabetes Sister    • Asthma Brother      Social History   Marital Status: /Civil Union   Patient Pre-hospital Living Situation: lives independently  Patient Pre-hospital Level of Mobility: independent  Patient Pre-hospital Diet Restrictions: none    No Known Allergies    Prior to Admission medications    Medication Sig Start Date End Date Taking?  Authorizing Provider   ACCU-CHEK FASTCLIX LANCETS [facility]  6/18/19   Historical Provider, MD   aspirin (ECOTRIN LOW STRENGTH) 81 mg EC tablet Take 1 tablet (81 mg total) by mouth daily 5/8/20   Aren Hensley MD   atorvastatin (LIPITOR) 20 mg tablet Take 1 tablet (20 mg total) by mouth daily at bedtime 1/3/23   Cachorro Escoto MD   Blood Glucose Monitoring Suppl (ACCU-CHEK GUIDE) w/Device KIT  6/18/19   Historical Provider, MD   glucose blood (Accu-Chek Guide) test strip Use 1 each 3 (three) times a day Test as directed 9/30/22   Cachorro Escoto MD   Insulin Glargine Solostar (Basaglar KwikPen) 100 UNIT/ML SOPN Inject 0 1 mL (10 Units total) under the skin daily at bedtime 1/3/23   [address] [place] [address] MD Shannon   Insulin Pen Needle (BD Pen Needle Cely 2nd Gen) 32G X 4 MM MISC Inject under the skin daily 4/28/22   Sherwood Kocher, MD   lisinopril (ZESTRIL) 2 5 mg tablet Take 1 tablet (2 5 mg total) by mouth daily 1/3/23   Vivian Gomez MD   metFORMIN (GLUCOPHAGE) 1000 MG tablet Take 1 tablet (1,000 mg total) by mouth 2 (two) times a day with meals 1/3/23   Vivian Gomez MD   naloxone [facility]) 4 mg/0 1 mL nasal spray Administer 1 spray into a nostril  If no response after 2-3 minutes, give another dose in the other nostril using a new spray   9/16/22   ISMA Chapa   naproxen (Naprosyn) 500 mg tablet Take 1 tablet (500 mg total) by mouth 2 (two) times a day with meals for 7 days 11/10/22 11/17/22  Keo Jarrett MD       Current Facility-Administered Medications   Medication Dose Route Frequency   • acetaminophen (TYLENOL) tablet 650 mg  650 mg Oral Q6H PRN   • aluminum-magnesium hydroxide-simethicone (MYLANTA) oral suspension 30 mL  30 mL Oral Q6H PRN   • aspirin (ECOTRIN LOW STRENGTH) EC tablet 81 mg  81 mg Oral Daily   • atorvastatin (LIPITOR) tablet 20 mg  20 mg Oral HS   • clonazePAM (KlonoPIN) tablet 1 mg  1 mg Oral Q6H PRN   • cloNIDine (CATAPRES) tablet 0 1 mg  0 1 mg Oral Q6H PRN   • enoxaparin (LOVENOX) subcutaneous injection 40 mg  40 mg Subcutaneous Daily   • gabapentin (NEURONTIN) capsule 300 mg  300 mg Oral Q8H PRN   • ibuprofen (MOTRIN) tablet 600 mg  600 mg Oral Q6H PRN   • insulin glargine (LANTUS) subcutaneous injection 10 Units 0 1 mL  10 Units Subcutaneous HS   • lisinopril (ZESTRIL) tablet 2 5 mg  2 5 mg Oral Daily   • metFORMIN (GLUCOPHAGE) tablet 1,000 mg  1,000 mg Oral BID With Meals   • multivitamin-minerals (CENTRUM) tablet 1 tablet  1 tablet Oral Daily   • nicotine (NICODERM CQ) 14 mg/24hr TD 24 hr patch 1 patch  1 patch Transdermal Daily   • nicotine polacrilex (NICORETTE) gum 2 mg  2 mg Oral Q2H PRN   • ondansetron (ZOFRAN) injection 4 mg  4 mg Intravenous Q6H PRN   • transdermal buprenorphine (BUTRANS) 20 mcg/hr TD patch 20 mcg  20 mcg Transdermal Q7 Days       Continuous Infusions:          Objective     No intake or output data in the 24 hours ending 04/01/23 0107    Invasive Devices:   Peripheral IV 03/31/23 Proximal;Right;Ventral (anterior) Forearm (Active)   Site Assessment WDL; Clean;Dry; Intact 03/31/23 2106   Dressing Type Transparent 03/31/23 2106   Line Status Blood return noted 03/31/23 2106   Dressing Status Clean;Dry; Intact 03/31/23 2106       Vitals   Vitals:    03/31/23 2038   BP: 141/81   TempSrc: Tympanic   Pulse: 101   Resp: 18   Patient Position - Orthostatic VS: Sitting   Temp: 98 1 °F (36 7 °C)       Physical Exam  Constitutional:       General: He is not in acute distress  Appearance: He is not diaphoretic  HENT:      Head: Normocephalic and atraumatic  Right Ear: External ear normal       Left Ear: External ear normal       Nose: Nose normal  No rhinorrhea  Mouth/Throat:      Mouth: Mucous membranes are moist       Comments: No tongue fasciculations  Eyes:      Extraocular Movements: Extraocular movements intact  Conjunctiva/sclera: Conjunctivae normal       Pupils: Pupils are equal, round, and reactive to light  Cardiovascular:      Rate and Rhythm: Regular rhythm  Bradycardia present  Heart sounds: Normal heart sounds  No murmur heard  No friction rub  No gallop  Pulmonary:      Effort: Pulmonary effort is normal  No respiratory distress  Breath sounds: Normal breath sounds  No wheezing, rhonchi or rales  Abdominal:      General: Bowel sounds are normal  There is no distension  Palpations: Abdomen is soft  Tenderness: There is no abdominal tenderness  There is no guarding or rebound  Musculoskeletal:         General: No swelling  Cervical back: Neck supple  Right lower leg: No edema  Left lower leg: No edema        Comments: Restless legs   Skin:     General: Skin is warm and dry  Findings: No rash  Neurological:      General: No focal deficit present  Mental Status: He is alert and oriented to person, place, and time  Mental status is at baseline  GCS: GCS eye subscore is 4  GCS verbal subscore is 5  GCS motor subscore is 6  Cranial Nerves: No dysarthria or facial asymmetry  Motor: No tremor  Psychiatric:         Attention and Perception: He does not perceive auditory or visual hallucinations  Mood and Affect: Mood is not anxious  Speech: Speech normal          Behavior: Behavior is uncooperative and withdrawn  Thought Content: Thought content does not include homicidal or suicidal ideation  DATA    EKG, Pathology, and Other Studies: I have personally reviewed pertinent reports  EKG: Sinus bradycardia with sinus arrhythmia, ventricular rate 51 bpm, LAD, no acute ST segment/T wave changes, normal axis and intervals, QTc 368  Lab Results: I have personally reviewed pertinent reports          CBC ETOH     Lab Results   Component Value Date    WBC 11 26 (H) 03/31/2023    RBC 4 42 03/31/2023    HGB 12 7 03/31/2023    HCT 39 2 03/31/2023    MCV 89 03/31/2023    MCH 28 7 03/31/2023    MCHC 32 4 03/31/2023    RDW 13 4 03/31/2023     03/31/2023    MPV 8 7 (L) 03/31/2023      Lab Results   Component Value Date    LACTICACID 0 6 08/05/2022      CMP UA         Component Value Date/Time    K 4 2 03/31/2023 2107     03/31/2023 2107    CO2 28 03/31/2023 2107    BUN 19 03/31/2023 2107    CREATININE 0 94 03/31/2023 2107         Component Value Date/Time    CALCIUM 9 5 03/31/2023 2107    ALKPHOS 75 03/31/2023 2107    AST 23 03/31/2023 2107    ALT 23 03/31/2023 2107      Lab Results   Component Value Date    CLARITYU Clear 01/23/2023    CLARITYU Clear 08/31/2015    COLORU Teresa (A) 01/23/2023    COLORU Yellow 08/31/2015    SPECGRAV 1 015 01/23/2023    SPECGRAV 1 020 08/31/2015    PHUR 6 0 01/23/2023    PHUR 5 5 08/31/2015    GLUCOSEU 250 (1/4%) (A) 01/23/2023    GLUCOSEU 500 (1/2%) (A) 08/31/2015    KETONESU Negative 01/23/2023    KETONESU Negative 08/31/2015    BLOODU 10 0 (A) 01/23/2023    BLOODU Negative 08/31/2015    PROTEIN UA 15 (Trace) (A) 01/23/2023    NITRITE Negative 01/23/2023    NITRITE Negative 08/31/2015    BILIRUBINUR Negative 01/23/2023    BILIRUBINUR Negative 08/31/2015    UROBILINOGEN Negative 01/23/2023    UROBILINOGEN 0 2 08/31/2015    LEUKOCYTESUR Negative 01/23/2023    LEUKOCYTESUR Negative 08/31/2015    WBCUA 0-1 01/23/2023    WBCUA 0-1 08/31/2015    RBCUA 0-1 01/23/2023    RBCUA None seen 08/31/2015    BACTERIA None Seen 01/23/2023    BACTERIA None seen 08/31/2015    EPIS Occasional 01/23/2023    EPIS Occasional 08/31/2015        Liver Function Test: ASA     Lab Results   Component Value Date    TBILI 0 49 03/31/2023    BILIDIR 0 08 08/05/2022    ALKPHOS 75 03/31/2023    AST 23 03/31/2023    ALT 23 03/31/2023    TP 7 2 03/31/2023    ALB 4 4 03/31/2023      No results found for: SALICYLATE   Troponin APAP     No results found for: TROPONINI   No results found for: ACTMNPHEN   VBG HCG     Lab Results   Component Value Date/Time    PHVEN 7 358 03/31/2023 09:21 PM    GCO3NXB 52 0 (H) 03/31/2023 09:21 PM    PO2VEN 74 5 (H) 03/31/2023 09:21 PM    PEO1RLE 28 6 03/31/2023 09:21 PM    BEVEN 2 2 03/31/2023 09:21 PM    [id number] 16 9 03/31/2023 09:21 PM    S2VRNBB 89 4 (H) 03/31/2023 09:21 PM      No results found for: HCGQUANT   ABG Urine Drug Screen     No results found for: PHART, PVA7BNU, PO2ART, OXX6GMV, BEART, F0NEANZJE, O2HGB, SOURC, PITO, VTAC, ACRATE, INSPIREDAIR, PEEP   Lab Results   Component Value Date    AMPMETHUR Negative 03/31/2023    BARBTUR Negative 03/31/2023    BDZUR Negative 03/31/2023    COCAINEUR Negative 03/31/2023    METHADONEUR Negative 03/31/2023    OPIATEUR Positive (A) 03/31/2023    PCPUR Negative 03/31/2023    THCUR Negative 03/31/2023    OXYCODONEUR Negative 03/31/2023 Lactate INR     Lab Results   Component Value Date    LACTICACID 0 6 08/05/2022      Lab Results   Component Value Date    INR 1 03 08/05/2022      PTT Protime     Lab Results   Component Value Date/Time    PTT 36 08/05/2022 10:53 PM      Lab Results   Component Value Date/Time    PROTIME 13 5 08/05/2022 10:53 PM      Hepatitis HIV     Lab Results   Component Value Date    HEPBSAG Non-reactive 08/08/2022    HEPCAB High Reactive (A) 08/08/2022      Lab Results   Component Value Date    [id number] Non-Reactive 01/24/2023    [id number] Non-Reactive 01/24/2023            Imaging Studies: N/A        Counseling / Coordination of Care  Total floor / unit time spent today 45 minutes  Greater than 50% of total time was spent with the patient and / or family counseling and / or coordination of care  ** Please Note: This note has been constructed using a voice recognition system   **

## 2023-04-01 NOTE — ASSESSMENT & PLAN NOTE
Patient with a history of HTN  BP hypertensive upon arrival to the ED in the setting of acute alcohol withdrawal and chronic HTN  Most Recent Blood Pressure: 141/81   · Continue lisinopril, may consider dosing adjustment if BP trending up  · Routine BP monitoring

## 2023-04-02 ENCOUNTER — APPOINTMENT (INPATIENT)
Dept: CT IMAGING | Facility: HOSPITAL | Age: 58
End: 2023-04-02

## 2023-04-02 LAB
ATRIAL RATE: 68 BPM
ATRIAL RATE: 69 BPM
EST. AVERAGE GLUCOSE BLD GHB EST-MCNC: 143 MG/DL
GLUCOSE SERPL-MCNC: 161 MG/DL (ref 65–140)
HBA1C MFR BLD: 6.6 %
P AXIS: 52 DEGREES
P AXIS: 53 DEGREES
PR INTERVAL: 160 MS
PR INTERVAL: 164 MS
QRS AXIS: -52 DEGREES
QRS AXIS: -52 DEGREES
QRSD INTERVAL: 86 MS
QRSD INTERVAL: 86 MS
QT INTERVAL: 360 MS
QT INTERVAL: 380 MS
QTC INTERVAL: 385 MS
QTC INTERVAL: 404 MS
T WAVE AXIS: 42 DEGREES
T WAVE AXIS: 45 DEGREES
VENTRICULAR RATE: 68 BPM
VENTRICULAR RATE: 69 BPM

## 2023-04-02 RX ORDER — HALOPERIDOL 5 MG/ML
5 INJECTION INTRAMUSCULAR ONCE
Status: COMPLETED | OUTPATIENT
Start: 2023-04-02 | End: 2023-04-02

## 2023-04-02 RX ORDER — BUPRENORPHINE AND NALOXONE 8; 2 MG/1; MG/1
8 FILM, SOLUBLE BUCCAL; SUBLINGUAL 2 TIMES DAILY
Status: DISCONTINUED | OUTPATIENT
Start: 2023-04-02 | End: 2023-04-03 | Stop reason: HOSPADM

## 2023-04-02 RX ORDER — DIAZEPAM 5 MG/ML
INJECTION, SOLUTION INTRAMUSCULAR; INTRAVENOUS
Status: COMPLETED
Start: 2023-04-02 | End: 2023-04-02

## 2023-04-02 RX ORDER — NALOXONE HYDROCHLORIDE 0.4 MG/ML
0.1 INJECTION, SOLUTION INTRAMUSCULAR; INTRAVENOUS; SUBCUTANEOUS
Status: DISCONTINUED | OUTPATIENT
Start: 2023-04-02 | End: 2023-04-03 | Stop reason: HOSPADM

## 2023-04-02 RX ORDER — SODIUM CHLORIDE 9 MG/ML
100 INJECTION, SOLUTION INTRAVENOUS CONTINUOUS
Status: DISCONTINUED | OUTPATIENT
Start: 2023-04-02 | End: 2023-04-03 | Stop reason: HOSPADM

## 2023-04-02 RX ORDER — DIAZEPAM 5 MG/ML
10 INJECTION, SOLUTION INTRAMUSCULAR; INTRAVENOUS ONCE AS NEEDED
Status: COMPLETED | OUTPATIENT
Start: 2023-04-02 | End: 2023-04-02

## 2023-04-02 RX ORDER — DIAZEPAM 5 MG/ML
10 INJECTION, SOLUTION INTRAMUSCULAR; INTRAVENOUS ONCE
Status: COMPLETED | OUTPATIENT
Start: 2023-04-02 | End: 2023-04-02

## 2023-04-02 RX ORDER — BUPRENORPHINE AND NALOXONE 8; 2 MG/1; MG/1
8 FILM, SOLUBLE BUCCAL; SUBLINGUAL ONCE
Status: COMPLETED | OUTPATIENT
Start: 2023-04-02 | End: 2023-04-02

## 2023-04-02 RX ORDER — DEXMEDETOMIDINE HYDROCHLORIDE 4 UG/ML
.2-.8 INJECTION, SOLUTION INTRAVENOUS
Status: DISCONTINUED | OUTPATIENT
Start: 2023-04-02 | End: 2023-04-03

## 2023-04-02 RX ADMIN — IOHEXOL 100 ML: 350 INJECTION, SOLUTION INTRAVENOUS at 21:50

## 2023-04-02 RX ADMIN — BUPRENORPHINE AND NALOXONE 8 MG: 8; 2 FILM BUCCAL; SUBLINGUAL at 21:18

## 2023-04-02 RX ADMIN — METFORMIN HYDROCHLORIDE 1000 MG: 500 TABLET ORAL at 16:41

## 2023-04-02 RX ADMIN — DIAZEPAM 10 MG: 5 INJECTION INTRAMUSCULAR; INTRAVENOUS at 15:01

## 2023-04-02 RX ADMIN — DIAZEPAM 10 MG: 5 INJECTION INTRAMUSCULAR; INTRAVENOUS at 21:32

## 2023-04-02 RX ADMIN — HALOPERIDOL LACTATE 5 MG: 5 INJECTION, SOLUTION INTRAMUSCULAR at 15:27

## 2023-04-02 RX ADMIN — BUPRENORPHINE AND NALOXONE 8 MG: 8; 2 FILM BUCCAL; SUBLINGUAL at 17:09

## 2023-04-02 RX ADMIN — BUPRENORPHINE AND NALOXONE 8 MG: 8; 2 FILM BUCCAL; SUBLINGUAL at 16:41

## 2023-04-02 RX ADMIN — SODIUM CHLORIDE 100 ML/HR: 0.9 INJECTION, SOLUTION INTRAVENOUS at 16:41

## 2023-04-02 RX ADMIN — DIAZEPAM 10 MG: 10 INJECTION, SOLUTION INTRAMUSCULAR; INTRAVENOUS at 17:51

## 2023-04-02 RX ADMIN — ONDANSETRON 4 MG: 2 INJECTION INTRAMUSCULAR; INTRAVENOUS at 05:21

## 2023-04-02 RX ADMIN — ACETAMINOPHEN 650 MG: 325 TABLET ORAL at 05:21

## 2023-04-02 RX ADMIN — DEXMEDETOMIDINE HYDROCHLORIDE 0.2 MCG/KG/HR: 100 INJECTION, SOLUTION INTRAVENOUS at 18:02

## 2023-04-02 RX ADMIN — CLONAZEPAM 1 MG: 1 TABLET ORAL at 05:21

## 2023-04-02 RX ADMIN — ONDANSETRON 4 MG: 2 INJECTION INTRAMUSCULAR; INTRAVENOUS at 12:55

## 2023-04-02 RX ADMIN — DIAZEPAM 10 MG: 5 INJECTION, SOLUTION INTRAMUSCULAR; INTRAVENOUS at 17:51

## 2023-04-02 NOTE — QUICK NOTE
"MEDICAL DETOX UNIT, LEVEL 4  Department of Medical Toxicology      Reason for Admission/Principal Problem: Opioid withdrawal, opioid use disorder   Reassessing Provider: Kd Romano PA-C  3/31/2023  8:34 PM    04/02/23  0537    Patient is currently on COWS protocol, approaching low-dose micro induction with buprenorphine  I have reevaluated the patient  Notified by patient's RN that pt had projectile vomited all over the floor and was acutely altered with new-onset lethargy and barely able to respond to RN  At this time, pt is nearing 36 hours since last reported opioid use around 7 PM on 3/31  Pt seen and examined at bedside, does not open his eyes when directly addressed  When asked if patient used any substances on the unit overnight, he responds \"fentanyl,\" and when asked about time of last use, he responds \"last night  \" When asked how he was able to use on the unit, he states \"I got it off the streets  \" And when asked where he disposed of the waste after using drugs, patient states, \"I threw them away\" and will not elaborate further  When questions are clarified regarding how patient brought drugs onto the unit to use and pt awakens more, he denies using any drugs on the unit  Security arrives to perform room check and patient states, \"You're not going to find anything, you're wasting your time  \" No contraband found  Pt is lethargic but oriented x4 with the exception of stating the month is March instead of April but knows year and current president  +Pinpoint pupils on exam  VSS but pt is notably bradycardic at 45 bpm, which is a new change from normal HR yesterday  Skin is smooth with no piloerection  Non-focal neurologic exam  Pt is able to ambulate independently from bed to couch during security room search  IV site examined with dressing clean, dry, intact, and line without evidence of contaminating substances   Of note, spoon with dried white substance is observed next to sink in patient's " "room  Pt notably somnolent soon upon returning to bed and overall withdrawn with very limited willingness to engage with staff  /55 (BP Location: Left arm)   Pulse (!) 45   Temp 97 7 °F (36 5 °C) (Temporal)   Resp 20   Ht 5' 7\" (1 702 m)   Wt 80 7 kg (178 lb)   SpO2 100%   BMI 27 88 kg/m²       No data recorded    Clinical Opiate Withdrawal Scale  Pulse: (!) 45  Resting Pulse Rate: Measured After Patient is Sitting or Lying for One Minute: Pulse rate 80 or below  GI Upset: Over Last Half Hour: No GI symptoms  Sweating: Over Past Half Hour Not Accounted for by Room Temperature of Patient Activity: No report of chills or flushing  Tremor: Observation of Outstretched Hands: No tremor  Restlessness: Observation During Assessment: Able to sit still  Yawning: Observation During Assessment: Yawning once or twice during assessment  Pupil Size: Pupils pinned or normal size for room light  Anxiety and Irritability: None  Bone or Joint Aches: If Patient was Having Pain Previously, Only the Additional Component Attributed to Opiate Withdrawal is Scored: Not present  Gooseflesh Skin: Skin is smooth  Runny Nose or Tearing: Not Accounted for by Cold Symptoms or Allergies: Not present  Clinical Opiate Withdrawal Scale Total Score: 1        Discussed patient status with attending? Yes - attending Dr Shira Pittman notified by phone    Plan:   · Will hold on Suboxone micro induction at this time given the evidence above suggestive of pt most likely using opioids on the unit  · Plan at this time is to administer Narcan PRN respiratory depression (no current indication), and continue with supportive care  · May consider macro-induction with IV Narcan followed by higher dosing of Suboxone during the day today  Plan is to offer Sublocade injection once stabilized on maintenance Suboxone, otherwise, pt will be responsible for continuing Suboxone at home with last script filled 3/13/23 for 2 weeks per PDMP review     · Will " monitor patient closely given repeated pattern of suspected opioid use on the unit, as similar occurrence took place during last admission  No virtual monitors for 1:1 observation currently available, will reassess once more resources available on day shift

## 2023-04-02 NOTE — NURSING NOTE
Approached pt with night time medications  Pt remains silent when asked any questions  Flailing arms and legs in the bed  Pt ignored request from this nurse to take medication, refusing to sit upright for oral medication  Attempted to replace pulse oximeter and pt continues flailing arms and ignoring instructions to make placement successful  AILEEN LEZAMA made aware of such

## 2023-04-02 NOTE — PROGRESS NOTES
PROGRESS NOTE  DEPARTMENT OF MEDICAL TOXICOLOGY  LEVEL 4 MEDICAL DETOX UNIT  Jose Saunders 62 y o  male MRN: [id number]  Unit/Bed#: 5T DETOX 505-01 Encounter: [id number]      Reason for Admission/Principal Problem: opioid withdrawal / opioid use disorder  Rounding Provider: ISMA Read  Attending Provider: Prosper Vasquez MD   3/31/2023  8:34 PM       * Opioid use disorder, severe, dependence ([facility] [address] )  Assessment & Plan  · Patient with a history of chronic IV fentanyl use, using approx 1 bundle daily  · History of prior Suboxone MAT and previous [address] admission 1/23-1/26/23, during which pt was stabilized on maintenance Suboxone   · Pt reports using fentanyl while on Suboxone however does not explain use pattern  · Due to patients history of continued use while receiving Suboxone Rx, and multiple incidence of use while on unit, strong suspicion for diversion  Patient offered Sublocade and declines  Endorses having Suboxone at home, he does not require a script upon discharge  · Will place 1:1 continual observation due to high suspicion for use while admitted  · Screen for HIV with h/o IVDU - pt is a hepatitis C carrier  · Case management consulted for assistance with aftercare resources - Patient to follow with [facility]     Leukocytosis  Assessment & Plan  Lab Results   Component Value Date    WBC 11 26 (H) 04/01/2023      • Mildly Elevated WBC on admission  • Possibly 2/2 leukemoid reaction from acute withdrawal  • No evidence of active infection- Pt afebrile, VSS  • Continue monitoring CBC, VS    Opioid withdrawal (HCC)  Assessment & Plan  · Patient with a history of chronic opioid use, last use was around 7 PM  · Butrans patch placed with anticipated low dose induction regimen when exhibiting moderate symptoms of withdrawal    · See Opioid withdrawal for plan once on maintenance dosing    · Continue symptomatic and supportive care  · Continuous pulse oximetry monitoring    Primary hypertension  Assessment & Plan  Patient with a history of HTN  BP hypertensive upon arrival to the ED in the setting of acute withdrawal and chronic HTN  Most Recent Blood Pressure: 137/80   · Continue lisinopril, may consider dosing adjustment if BP trending up  · Routine BP monitoring     Hepatitis C virus carrier state ([facility] [address] )  Assessment & Plan  · HCV RNA high reactive with negative VL from 08/2022  · Recommend continued outpatient GI follow-up    Type 2 diabetes mellitus without complication, with long-term current use of insulin [facility])  Assessment & Plan  Lab Results   Component Value Date    HGBA1C 6 6 (H) 04/01/2023       Recent Labs     03/31/23  2316 04/01/23  1111 04/01/23  2209   POCGLU 183* 101 91       Blood Sugar Average: Last 72 hrs:  (P) 125     · Patient with a history of DM2  · Home medications metformin 1000 mg twice daily before meals and insulin glargine (Lantus) 10 units nightly  · Repeat Hgb A1C pending  · Continue home medications  · Accuchecks HS  · Carb-controlled diet  · Hypoglycemia protocol    Tobacco abuse  Assessment & Plan  · Daily tobacco user  · Offer NRT   · Encourage cessation    Other hyperlipidemia  Assessment & Plan  · Continue atorvastatin      VTE Pharmacologic Prophylaxis:   Pharmacologic: Enoxaparin (Lovenox)  Mechanical VTE Prophylaxis in Place: yes    Code Status: Level 1 - Full Code    Patient Centered Rounds: I have performed bedside rounds with nursing staff today  Discussions with Specialists or Other Care Team Provider: case management     Education and Discussions with Family / Patient: patient    Time Spent for Care: 20 minutes  More than 50% of total time spent on counseling and coordination of care as described above      Current Length of Stay: 2 day(s)    Current Patient Status: Inpatient     Certification Statement: The patient will continue to require additional inpatient hospital stay due to low dose induction regimen of suboxone Discharge Plan: pending "completion of suboxone induction and case management disposition         Subjective:   Patient resting in bed  Provides minimal cooperation with exam, does not answer questions  Placing on 1:1 due to suspected use and discovery of paraphernalia in patient bathroom over night   Denies any use while on unit reporting \"I dont know what you're talking about\"     Objective:     Clinical Opiate Withdrawal Scale  Pulse: (!) 53  Resting Pulse Rate: Measured After Patient is Sitting or Lying for One Minute: Pulse rate 80 or below  GI Upset: Over Last Half Hour: No GI symptoms  Sweating: Over Past Half Hour Not Accounted for by Room Temperature of Patient Activity: No report of chills or flushing  Tremor: Observation of Outstretched Hands: No tremor  Restlessness: Observation During Assessment: Able to sit still  Yawning: Observation During Assessment: Yawning once or twice during assessment  Pupil Size: Pupils pinned or normal size for room light  Anxiety and Irritability: None  Bone or Joint Aches: If Patient was Having Pain Previously, Only the Additional Component Attributed to Opiate Withdrawal is Scored: Not present  Gooseflesh Skin: Skin is smooth  Runny Nose or Tearing: Not Accounted for by Cold Symptoms or Allergies: Not present  Clinical Opiate Withdrawal Scale Total Score: 1    No data recorded      Last 24 Hours Medication List:   Current Facility-Administered Medications   Medication Dose Route Frequency Provider Last Rate   • acetaminophen  650 mg Oral Q6H PRN Wendy Sanz PA-C     • aluminum-magnesium hydroxide-simethicone  30 mL Oral Q6H PRN Wendy Sanz PA-C     • aspirin  81 mg Oral Daily Wendy Sanz PA-C     • atorvastatin  20 mg Oral HS Wendy Sanz PA-C     • clonazePAM  1 mg Oral Q6H PRN Wendy Sanz PA-C     • cloNIDine  0 1 mg Oral Q6H PRN Wendy Sanz PA-C     • enoxaparin  40 mg Subcutaneous Daily Wendy Sanz PA-C     • " gabapentin  300 mg Oral Q8H PRN Wendy Das PA-C     • ibuprofen  600 mg Oral Q6H PRN Joanne Ferguson PA-C     • insulin glargine  10 Units Subcutaneous HS Wendy Das PA-C     • lisinopril  2 5 mg Oral Daily Wendy Das PA-C     • metFORMIN  1,000 mg Oral BID With Meals Wendy Das PA-C     • multivitamin-minerals  1 tablet Oral Daily Wendy Das PA-C     • naloxone  0 1 mg Intravenous Q1MIN PRN Joanne Ferguson PA-C     • nicotine  1 patch Transdermal Daily Wendy Das PA-C     • nicotine polacrilex  2 mg Oral Q2H PRN Wendy Das PA-C     • ondansetron  4 mg Intravenous Q6H PRN Wendy Das PA-C     • transdermal buprenorphine  20 mcg Transdermal Q7 Days Wendy Das PA-C     • traZODone  50 mg Oral HS PRN Wendy Das PA-C           Vitals:   Temp (24hrs), Av 6 °F (37 °C), Min:97 7 °F (36 5 °C), Max:99 2 °F (37 3 °C)    Temp:  [97 7 °F (36 5 °C)-99 2 °F (37 3 °C)] 98 2 °F (36 8 °C)  HR:  [45-76] 53  Resp:  [16-20] 18  BP: (110-149)/(55-88) 137/80  SpO2:  [96 %-100 %] 100 %  Body mass index is 27 88 kg/m²  Input and Output Summary (last 24 hours): Intake/Output Summary (Last 24 hours) at 2023 1103  Last data filed at 2023 1701  Gross per 24 hour   Intake 240 ml   Output --   Net 240 ml       Physical Exam:   Physical Exam  Vitals and nursing note reviewed  HENT:      Head: Normocephalic  Mouth/Throat:      Mouth: Mucous membranes are moist       Pharynx: Oropharynx is clear  Eyes:      Conjunctiva/sclera: Conjunctivae normal       Pupils: Pupils are equal, round, and reactive to light  Cardiovascular:      Rate and Rhythm: Regular rhythm  Bradycardia present  Pulmonary:      Effort: Pulmonary effort is normal  No respiratory distress  Abdominal:      Palpations: Abdomen is soft  Tenderness: There is no abdominal tenderness     Musculoskeletal: General: Normal range of motion  Cervical back: Normal range of motion  Skin:     General: Skin is warm and dry  Neurological:      Mental Status: He is oriented to person, place, and time  Psychiatric:         Attention and Perception: He is inattentive  Mood and Affect: Affect is flat  Behavior: Behavior is uncooperative and slowed  Additional Data:     Labs:   Results from last 7 days   Lab Units 04/01/23  0510 03/31/23  2107   WBC Thousand/uL 11 26* 11 26*   HEMOGLOBIN g/dL 12 4 12 7   HEMATOCRIT % 38 6 39 2   PLATELETS Thousands/uL 316 326   NEUTROS PCT %  --  71   LYMPHS PCT %  --  21   MONOS PCT %  --  5   EOS PCT %  --  3      Results from last 7 days   Lab Units 04/01/23  0510 03/31/23  2107   SODIUM mmol/L 137 137   POTASSIUM mmol/L 4 1 4 2   CHLORIDE mmol/L 103 102   CO2 mmol/L 30 28   BUN mg/dL 18 19   CREATININE mg/dL 0 82 0 94   ANION GAP mmol/L 4 7   CALCIUM mg/dL 9 1 9 5   ALBUMIN g/dL  --  4 4   TOTAL BILIRUBIN mg/dL  --  0 49   ALK PHOS U/L  --  75   ALT U/L  --  23   AST U/L  --  23   GLUCOSE RANDOM mg/dL 93 158*           Results from last 7 days   Lab Units 04/01/23  2209 04/01/23  1111 03/31/23  2316   POC GLUCOSE mg/dl 91 101 183*      Results from last 7 days   Lab Units 04/01/23  0510   HEMOGLOBIN A1C % 6 6*               * I Have Reviewed All Lab Data Listed Above  * Additional Pertinent Lab Tests Reviewed: [facility] Admission Reviewed      Imaging Studies: I have personally reviewed pertinent reports  Recent Cultures (last 7 days): Today, Patient Was Seen By: ISMA Carlin    ** Please Note: Dictation voice to text software may have been used in the creation of this document   **

## 2023-04-02 NOTE — NURSING NOTE
Pt found this morning to have pinpoint pupils, slurring speech and falling asleep while talking  Pt arousable to speech but falls asleep within 5 seconds  Bradycardic but other vitals WNL  Security called to perform room check with no notable findings  Denies any drug use during stay

## 2023-04-02 NOTE — UTILIZATION REVIEW
Initial Clinical Review    Admission: Date/Time/Statement:   Admission Orders (From admission, onward)     Ordered        03/31/23 2226  INPATIENT ADMISSION  Once                      Orders Placed This Encounter   Procedures   • INPATIENT ADMISSION     Standing Status:   Standing     Number of Occurrences:   1     Order Specific Question:   Level of Care     Answer:   Med Surg [16]     Order Specific Question:   Estimated length of stay     Answer:   More than 2 Midnights     Order Specific Question:   Certification     Answer:   I certify that inpatient services are medically necessary for this patient for a duration of greater than two midnights  See H&P and MD Progress Notes for additional information about the patient's course of treatment  ED Arrival Information     Expected   -    Arrival   3/31/2023 20:27    Acuity   Urgent            Means of arrival   Walk-In    Escorted by   Self    Service   Emergency Medicine    Admission type   Emergency            Arrival complaint   detox           Chief Complaint   Patient presents with   • Detox Evaluation     Pt states that he wants to sign in for detox  Currently injecting at least 1 bundle of Fentanyl daily  Last use approx 4 hours ago  Initial Presentation: 62 y o  male with PMH of Hep C carrier, HTN, HLD, Tobacco abuse, T2DM, opioid use disorder presented to the ED from home with opioid withdrawal seeking detoxification  Pt was recently admitted in the med detox unit on 01/23-01/26 for opioid withdrawal and discharge on Suboxome maintenance  He continued taking Suboxone up until 2-3 weeks ago when he relapsed on fentanyl  denies any specific trigger for his relapse  reports using about 1 bundle of heroin/fentanyl daily via IV  Last opioid use was about 5 hours ago, approximately 7 PM last evening  Reports he is experiencing w/d at this time but denies all specific opioid withdrawal sx when asked about them individually   WBC elevated possibly 2/2 leukemoid reaction from acute withdrawal   On exam, aaox3, normal behavior, normal HR and rhythm  Admitted as Inpatient to the Med detox Unit for opioid withdrawal, severe opioid use disorder, leukocytosis: Initiate MAT/opioid withdrawal protocol with plan for eventual microinduction with Suboxone  Initiate Butrans 20 mcg/hr patch  Continue monitoring opioid withdrawal, and consider microinduction when >24 hours have passed since pt's last opioid use, likely 4/1 evening/ 4/2 depending on severity of pt's w/d symptoms  Symptomatic and supportive care  Continuous pulse oximetry monitoring  Screen for HIV Continue monitoring CBC, VS      Opioids currently used: fentanyl  Route of use: intravenous  Date/Time of Last Opioid Use: about 5 hours ago, approximately 7 PM last evening  Current Signs/Symptoms of Opioid Withdrawal: pt denies sx, +restless legs on exam     COWS score:   Clinical Opiate Withdrawal Scale  Pulse: 101      Methadone & Buprenorphine History  History of prior treatment for opioid dependence? yes  Currently on Methadone Maintenance? no  History of prior treatment with Suboxone? yes  Currently taking Suboxone? no  History of using Suboxone without having a prescription? no  History of IVDA? yes  Co-existing substance use? no    Date: 04/01  Day 2: On exam,  uncooperative, does not engage with questions, giving short answers  Due to patients history of inconsistent follow up, continued use, and refusing to participate in care, there is suspicion for diversion  Recommended to complete low dose induction regimen followed by transition to [address]  continue to treat with symptomatic and supportive care and begin low dose induction regimen when appropriate       ED Triage Vitals [03/31/23 2038]   Temperature Pulse Respirations Blood Pressure SpO2   98 1 °F (36 7 °C) 101 18 141/81 96 %      Temp Source Heart Rate Source Patient Position - Orthostatic VS BP Location FiO2 (%)   Tympanic Monitor Sitting Left arm --      Pain Score       No Pain          Wt Readings from Last 1 Encounters:   04/01/23 80 7 kg (178 lb)     Additional Vital Signs:   Date/Time Temp Pulse Resp BP MAP (mmHg) SpO2 O2 Device Patient Position - Orthostatic VS   04/02/23 1630 98 6 °F (37 °C) 73 17 158/84 -- 98 % None (Room air) Lying   04/02/23 0715 98 2 °F (36 8 °C) 53 Abnormal  18 137/80 99 100 % None (Room air) Lying   04/02/23 0607 97 7 °F (36 5 °C) 45 Abnormal  20 140/55 -- 100 % None (Room air) Lying   04/01/23 2227 99 °F (37 2 °C) 69 18 110/74 -- 99 % None (Room air) Lying   04/01/23 2117 -- -- -- -- -- 96 % None (Room air) --   04/01/23 1900 98 4 °F (36 9 °C) 76 16 149/88 108 98 % None (Room air) Lying   04/01/23 1523 99 °F (37 2 °C) 56 16 127/73 91 98 % None (Room air) Lying   04/01/23 1107 99 2 °F (37 3 °C) 58 16 -- -- 99 % None (Room air) Lying   04/01/23 1050 -- 56 16 125/71 -- 100 % None (Room air) Lying   04/01/23 0900 -- 50 Abnormal  16 125/68 -- 98 % None (Room air) Lying   04/01/23 0715 -- 46 Abnormal  15 147/71 -- 98 % None (Room air) Lying   04/01/23 0436 -- 64 -- -- -- 95 % None (Room air) --   04/01/23 0135 -- 60 -- -- -- 95 % None (Room air) --       Pertinent Labs/Diagnostic Test Results:   04/01 EKG result:Sinus bradycardia with sinus arrhythmia  Left axis deviation        Results from last 7 days   Lab Units 04/01/23  0510 03/31/23  2107   WBC Thousand/uL 11 26* 11 26*   HEMOGLOBIN g/dL 12 4 12 7   HEMATOCRIT % 38 6 39 2   PLATELETS Thousands/uL 316 326   NEUTROS ABS Thousands/µL  --  7 89*         Results from last 7 days   Lab Units 04/01/23  0510 03/31/23  2107   SODIUM mmol/L 137 137   POTASSIUM mmol/L 4 1 4 2   CHLORIDE mmol/L 103 102   CO2 mmol/L 30 28   ANION GAP mmol/L 4 7   BUN mg/dL 18 19   CREATININE mg/dL 0 82 0 94   EGFR ml/min/1 73sq m 97 89   CALCIUM mg/dL 9 1 9 5   MAGNESIUM mg/dL 2 3 2 1     Results from last 7 days   Lab Units 03/31/23  2107   AST U/L 23   ALT U/L 23   ALK PHOS U/L 75   TOTAL PROTEIN g/dL 7  2   ALBUMIN g/dL 4 4   TOTAL BILIRUBIN mg/dL 0 49     Results from last 7 days   Lab Units 04/01/23  2209 04/01/23  1111 03/31/23  2316   POC GLUCOSE mg/dl 91 101 183*     Results from last 7 days   Lab Units 04/01/23  0510 03/31/23  2107   GLUCOSE RANDOM mg/dL 93 158*         Results from last 7 days   Lab Units 04/01/23  0510   HEMOGLOBIN A1C % 6 6*   EAG mg/dl 143     Results from last 7 days   Lab Units 03/31/23  2121   PH TAE  7 358   PCO2 TAE mm Hg 52 0*   PO2 TAE mm Hg 74 5*   HCO3 TAE mmol/L 28 6   BASE EXC TAE mmol/L 2 2   O2 CONTENT TAE ml/dL 16 9   O2 HGB, VENOUS % 89 4*           Results from last 7 days   Lab Units 03/31/23 2107   AMPH/METH  Negative   BARBITURATE UR  Negative   BENZODIAZEPINE UR  Negative   COCAINE UR  Negative   METHADONE URINE  Negative   OPIATE UR  Positive*   PCP UR  Negative   THC UR  Negative         ED Treatment:   Medication Administration from 03/31/2023 2027 to 04/01/2023 1103       Date/Time Order Dose Route Action     04/01/2023 0030 EDT gabapentin (NEURONTIN) capsule 300 mg 300 mg Oral Given     04/01/2023 0940 EDT multivitamin-minerals (CENTRUM) tablet 1 tablet 1 tablet Oral Given     04/01/2023 0940 EDT aspirin (ECOTRIN LOW STRENGTH) EC tablet 81 mg 81 mg Oral Given     03/31/2023 2317 EDT atorvastatin (LIPITOR) tablet 20 mg 20 mg Oral Given     04/01/2023 1051 EDT lisinopril (ZESTRIL) tablet 2 5 mg 2 5 mg Oral Given     04/01/2023 0754 EDT metFORMIN (GLUCOPHAGE) tablet 1,000 mg 1,000 mg Oral Given     03/31/2023 2319 EDT insulin glargine (LANTUS) subcutaneous injection 10 Units 0 1 mL 10 Units Subcutaneous Given        Past Medical History:   Diagnosis Date   • Diabetes mellitus ([facility] )    • Hypertension    • Substance abuse ([facility] )      Present on Admission:  • Other hyperlipidemia  • Tobacco abuse  • Opioid use disorder, severe, dependence ([facility] )  • Opioid withdrawal ([facility] )  • Primary hypertension  • Leukocytosis      Admitting Diagnosis: Heroin abuse ([facility] ) [F11 10]  Encounter for assessment of alcohol and drug use [Z76 89]  Age/Sex: 62 y o  male  Admission Orders:  Continuous pulse ox    Scheduled Medications:  aspirin, 81 mg, Oral, Daily  atorvastatin, 20 mg, Oral, HS  enoxaparin, 40 mg, Subcutaneous, Daily  insulin glargine, 10 Units, Subcutaneous, HS  lisinopril, 2 5 mg, Oral, Daily  metFORMIN, 1,000 mg, Oral, BID With Meals  multivitamin-minerals, 1 tablet, Oral, Daily  nicotine, 1 patch, Transdermal, Daily  transdermal buprenorphine, 20 mcg, Transdermal, Q7 Days      Continuous IV Infusions: none      PRN Meds:  acetaminophen, 650 mg, Oral, Q6H PRN 04/01 x 1  aluminum-magnesium hydroxide-simethicone, 30 mL, Oral, Q6H PRN  clonazePAM, 1 mg, Oral, Q6H PRN 04/01 x 2  cloNIDine, 0 1 mg, Oral, Q6H PRN 04/01 x 1  gabapentin, 300 mg, Oral, Q8H PRN 04/01 x 1  ibuprofen, 600 mg, Oral, Q6H PRN 04/01 x 1  naloxone, 0 1 mg, Intravenous, Q1MIN PRN  nicotine polacrilex, 2 mg, Oral, Q2H PRN  ondansetron, 4 mg, Intravenous, Q6H PRN  traZODone, 50 mg, Oral, HS PRN        IP CONSULT TO CASE MANAGEMENT    Network Utilization Review Department  ATTENTION: Please call with any questions or concerns to [phone] and carefully listen to the prompts so that you are directed to the right person  All voicemails are confidential   Zoraida Scott all requests for admission clinical reviews, approved or denied determinations and any other requests to dedicated fax number below belonging to the campus where the patient is receiving treatment   List of dedicated fax numbers for the Facilities:  [address] [place] [address] DENIALS (Administrative/Medical Necessity) [phone]   [address] [place] [address] (Maternity/NICU/Pediatrics) [phone]   [address] [place] [id number] [address] [facility] 28 U [facility] 310 [facility] 134 540 [facility] [phone]

## 2023-04-02 NOTE — ASSESSMENT & PLAN NOTE
· Patient with a history of chronic opioid use, last use was around 7 PM  · Butrans patch placed with anticipated low dose induction regimen when exhibiting moderate symptoms of withdrawal    · Continue Maintenance Suboxone upon discharge  Will provide 14 day script as patient is stating that he he does not have enough suboxone until his next appointment  Filled script on 3/13 for 14 days  Suspected Diversion however due to harm reduction will provide bridging script  Patient declines sublocade

## 2023-04-02 NOTE — ASSESSMENT & PLAN NOTE
Lab Results   Component Value Date    HGBA1C 6 6 (H) 04/01/2023       Recent Labs     03/31/23  2316 04/01/23  1111 04/01/23  2209 04/02/23 2120   POCGLU 183* 101 91 161*       Blood Sugar Average: Last 72 hrs:  (P) 134     · Patient with a history of DM2  · Home medications metformin 1000 mg twice daily before meals and insulin glargine (Lantus) 10 units nightly  · Repeat Hgb A1C pending  · Continue home medications  · Follow up with PCP upon discharge for on-going monitoring

## 2023-04-02 NOTE — ASSESSMENT & PLAN NOTE
· Patient with a history of chronic IV fentanyl use, using approx 1 bundle daily  · History of prior Suboxone MAT and previous [address] admission 1/23-1/26/23, during which pt was stabilized on maintenance Suboxone   · Pt reports using fentanyl while on Suboxone however does not explain use pattern  · Due to patients history of continued use while receiving Suboxone Rx, and multiple incidence of use while on unit, strong suspicion for diversion  Patient offered Sublocade and declines  Endorses having Suboxone at home, he does not require a script upon discharge  · Placed on  1:1 continual observation due to high suspicion for use while admitted     · Screen for HIV with h/o IVDU - pt is a hepatitis C carrier  · Case management consulted for assistance with aftercare resources - Patient to follow with [facility]

## 2023-04-02 NOTE — ASSESSMENT & PLAN NOTE
Lab Results   Component Value Date    WBC 15 15 (H) 04/03/2023      · Elevated WBC   • Possibly 2/2 leukemoid reaction from acute withdrawal  • No evidence of active infection- Pt afebrile, VSS  • Continue monitoring CBC, VS

## 2023-04-02 NOTE — ASSESSMENT & PLAN NOTE
Patient with a history of HTN  BP hypertensive upon arrival to the ED in the setting of acute withdrawal and chronic HTN  Most Recent Blood Pressure: 136/82   · Continue lisinopril, may consider dosing adjustment if BP trending up  · Routine BP monitoring

## 2023-04-03 ENCOUNTER — TELEPHONE (OUTPATIENT)
Dept: FAMILY MEDICINE CLINIC | Facility: CLINIC | Age: 58
End: 2023-04-03

## 2023-04-03 ENCOUNTER — TRANSITIONAL CARE MANAGEMENT (OUTPATIENT)
Dept: FAMILY MEDICINE CLINIC | Facility: CLINIC | Age: 58
End: 2023-04-03

## 2023-04-03 VITALS
OXYGEN SATURATION: 94 % | BODY MASS INDEX: 27.94 KG/M2 | HEIGHT: 67 IN | WEIGHT: 178 LBS | SYSTOLIC BLOOD PRESSURE: 136 MMHG | HEART RATE: 64 BPM | TEMPERATURE: 98.3 F | DIASTOLIC BLOOD PRESSURE: 82 MMHG | RESPIRATION RATE: 18 BRPM

## 2023-04-03 PROBLEM — F11.93 OPIOID WITHDRAWAL (HCC): Status: RESOLVED | Noted: 2023-01-23 | Resolved: 2023-04-03

## 2023-04-03 LAB
ANION GAP SERPL CALCULATED.3IONS-SCNC: 9 MMOL/L (ref 4–13)
APTT PPP: 37 SECONDS (ref 23–37)
BUN SERPL-MCNC: 23 MG/DL (ref 5–25)
CALCIUM SERPL-MCNC: 9.6 MG/DL (ref 8.4–10.2)
CHLORIDE SERPL-SCNC: 97 MMOL/L (ref 96–108)
CO2 SERPL-SCNC: 29 MMOL/L (ref 21–32)
CREAT SERPL-MCNC: 0.91 MG/DL (ref 0.6–1.3)
ERYTHROCYTE [DISTWIDTH] IN BLOOD BY AUTOMATED COUNT: 13.1 % (ref 11.6–15.1)
GFR SERPL CREATININE-BSD FRML MDRD: 92 ML/MIN/1.73SQ M
GLUCOSE SERPL-MCNC: 126 MG/DL (ref 65–140)
HCT VFR BLD AUTO: 43.6 % (ref 36.5–49.3)
HGB BLD-MCNC: 14.5 G/DL (ref 12–17)
HIV 1+2 AB+HIV1 P24 AG SERPL QL IA: NORMAL
HIV1 P24 AG SER QL: NORMAL
INR PPP: 1.01 (ref 0.84–1.19)
MAGNESIUM SERPL-MCNC: 2.1 MG/DL (ref 1.9–2.7)
MCH RBC QN AUTO: 28.6 PG (ref 26.8–34.3)
MCHC RBC AUTO-ENTMCNC: 33.3 G/DL (ref 31.4–37.4)
MCV RBC AUTO: 86 FL (ref 82–98)
PLATELET # BLD AUTO: 376 THOUSANDS/UL (ref 149–390)
PMV BLD AUTO: 9 FL (ref 8.9–12.7)
POTASSIUM SERPL-SCNC: 3.9 MMOL/L (ref 3.5–5.3)
PROTHROMBIN TIME: 13.6 SECONDS (ref 11.6–14.5)
RBC # BLD AUTO: 5.07 MILLION/UL (ref 3.88–5.62)
SODIUM SERPL-SCNC: 135 MMOL/L (ref 135–147)
WBC # BLD AUTO: 15.15 THOUSAND/UL (ref 4.31–10.16)

## 2023-04-03 RX ORDER — BUPRENORPHINE AND NALOXONE 8; 2 MG/1; MG/1
8 FILM, SOLUBLE BUCCAL; SUBLINGUAL 2 TIMES DAILY
Qty: 28 FILM | Refills: 0 | Status: SHIPPED | OUTPATIENT
Start: 2023-04-03 | End: 2023-04-17

## 2023-04-03 RX ORDER — BUPRENORPHINE AND NALOXONE 8; 2 MG/1; MG/1
8 FILM, SOLUBLE BUCCAL; SUBLINGUAL 2 TIMES DAILY
Qty: 28 FILM | Refills: 0 | Status: SHIPPED | OUTPATIENT
Start: 2023-04-03 | End: 2023-04-03 | Stop reason: SDUPTHER

## 2023-04-03 RX ADMIN — ASPIRIN 81 MG: 81 TABLET, COATED ORAL at 08:49

## 2023-04-03 RX ADMIN — SODIUM CHLORIDE 100 ML/HR: 0.9 INJECTION, SOLUTION INTRAVENOUS at 02:33

## 2023-04-03 RX ADMIN — BUPRENORPHINE AND NALOXONE 8 MG: 8; 2 FILM BUCCAL; SUBLINGUAL at 09:51

## 2023-04-03 RX ADMIN — DEXMEDETOMIDINE HYDROCHLORIDE 0.4 MCG/KG/HR: 100 INJECTION, SOLUTION INTRAVENOUS at 02:33

## 2023-04-03 RX ADMIN — LISINOPRIL 2.5 MG: 5 TABLET ORAL at 08:49

## 2023-04-03 RX ADMIN — MULTIPLE VITAMINS W/ MINERALS TAB 1 TABLET: TAB ORAL at 08:49

## 2023-04-03 NOTE — TELEPHONE ENCOUNTER
04/03/23    Pt returned call / message  Informed pt the reason of my call  Offered a TCM appt with Dr Gemma Mendez for tomorrow, but Pt declined appt  Pt stated that he can only be schedule with evening appt / after 5 pm due to his job  Informed pt that his appt will be reschedule with hi PCP but after 04/06/23 due to him needing to f/u from being in the hospital     Pt stated “whatever, ok”     Pt has been rescheduled 04/13/23 per pt ok  PT UNDERSTOOD & AGREED

## 2023-04-03 NOTE — TELEPHONE ENCOUNTER
Pt is scheduled on 4/6/23  Can you contact patient and offer earlier appointment (time) so we can change it from OVS to TCM   Please notify Tiff when changed to TCM

## 2023-04-03 NOTE — CASE MANAGEMENT
Case Management Discharge Planning Note    Patient name Rufina Norman  Location 5T DETOX 505/5T DETOX 50* MRN [id number]  : 1965 Date 4/3/2023       Current Admission Date: 3/31/2023  Current Admission Diagnosis:Opioid use disorder, severe, dependence [facility])   Patient Active Problem List    Diagnosis Date Noted   • Bradycardia, drug induced 2023   • Leukocytosis 2023   • Primary hypertension 2022   • Hepatitis C virus carrier state ([facility] ) 2022   • MSSA bacteremia 2022   • Staphylococcal arthritis of right wrist ([facility] ) 2022   • Type 2 diabetes mellitus without complication, with long-term current use of insulin ([facility] )    • PICC (peripherally inserted central catheter) in place    • Right wrist pain 2022   • Opioid use disorder, severe, dependence ([facility] ) 2021   • Class 1 obesity due to excess calories without serious comorbidity with body mass index (BMI) of 32 0 to 32 9 in adult 2020   • Tobacco abuse 2020   • Encounter for screening colonoscopy 2020   • Other hyperlipidemia 2020      LOS (days): 3  Geometric Mean LOS (GMLOS) (days):   Days to GMLOS:     OBJECTIVE:  Risk of Unplanned Readmission Score: 17 14         Current admission status: Inpatient   Preferred Pharmacy:   [address] [place] [address], [address]  [address] [place] [id number]  Phone: [phone] Fax: [address] [address]  Phone: [phone] Fax: [phone]    Primary Care Provider: Jada Cueva MD    Primary Insurance: [address] [place] [address]  Secondary Insurance:     DISCHARGE DETAILS: Cm informed by medical staff pt ready for discharge  Cm met with pt and pt indicated he had called [facility] and needed to call back for his scheduled appointment  Pt was provided a bus pass for transportation home    Pt requested medications be sent to [facility]  Pt will be discharged home today      Discharge planning discussed with[de-identified] patient  Freedom of Choice: Yes                   Contacts  Patient Contacts: [facility]  Relationship to Patient[de-identified] Treatment Provider  Reason/Outcome: Continuity of Care, Discharge Planning              Other Referral/Resources/Interventions Provided:  Referrals Provided[de-identified] IOP, Support Group, Therapist                                                   Family notified[de-identified] no supportive CARMELO's signed

## 2023-04-03 NOTE — TELEPHONE ENCOUNTER
04/03/23    first attempt to contact patient  no answer    Left detailed message  If Pt contacts office, please assist Pt with scheduling a TCM Appt (please use his 04/06/23 appt to reschedule as a TCM if pt calls within the 48 Hours from 04/03/23)  If pt contacts office past the TCM rule, please schedule [facility] appt instead  Appt Note: Adm 03/31/23 - Dc 04/03/23 f/u Opioid Use Disorder

## 2023-04-03 NOTE — NURSING NOTE
Discharge instructions given to the patient both written and verbally  Patient did not want to view the discharge instructions while RN was reviewing  Closet unlocked to retrieve personal items including clothes  IV d/c by me with catheter intact  Pressure applied and dressing applied  Bus pass placed with patient's discharge instructions  Reviewed that the prescriptions have been sent to his pharmacy

## 2023-04-03 NOTE — NURSING NOTE
Patient escorted to the lobby with personal items  Items fron the safe returned to patient by security  Patient dressed in street clothes and given a bus pass

## 2023-04-03 NOTE — DISCHARGE SUMMARY
[facility]  [facility] 1965, 62 [facility]  male MRN: [id number]  Unit/Bed#: 5T DETOX 505-01 Encounter: [id number]  Primary Care Provider: Jade Colon MD   Date and time admitted to hospital: 3/31/2023  8:34 PM  [facility], LEVEL 4  Department of Medical Toxicology  Reason for Admission/Principal Problem: Opioid withdrawal, opioid use disorder   Admitting provider: CONCHIS Perera Mc, MD   3/31/2023  8:34 PM       Discharging Physician / Practitioner: Dilma Kuhn PA-C  PCP: Jade Colon MD  Admission Date:   Admission Orders (From admission, onward)     Ordered        03/31/23 2226  INPATIENT ADMISSION  Once                      Discharge Date: 04/03/23    Medical Problems     Resolved Problems  Date Reviewed: 3/31/2023          Resolved    Opioid withdrawal ([facility] [address] ) 4/3/2023     Resolved by  Dilma Kuhn PA-C          Opioid withdrawal (HCC)-resolved as of 4/3/2023  Assessment & Plan  · Patient with a history of chronic opioid use, last use was around 7 PM  · Butrans patch placed with anticipated low dose induction regimen when exhibiting moderate symptoms of withdrawal    · Continue Maintenance Suboxone upon discharge  Will provide 14 day script as patient is stating that he he does not have enough suboxone until his next appointment  Filled script on 3/13 for 14 days  Suspected Diversion however due to harm reduction will provide bridging script  Patient declines sublocade  * Opioid use disorder, severe, dependence ([facility] [address] )  Assessment & Plan  · Patient with a history of chronic IV fentanyl use, using approx 1 bundle daily  · History of prior Suboxone MAT and previous [address] admission 1/23-1/26/23, during which pt was stabilized on maintenance Suboxone   · Pt reports using fentanyl while on Suboxone however does not explain use pattern     · Due to patients history of continued use while receiving Suboxone Rx, and multiple incidence of use while on unit, strong suspicion for diversion  Patient offered Sublocade and declines  Endorses having Suboxone at home, he does not require a script upon discharge  · Placed on  1:1 continual observation due to high suspicion for use while admitted     · Screen for HIV with h/o IVDU - pt is a hepatitis C carrier  · Case management consulted for assistance with aftercare resources - Patient to follow with [facility]     Leukocytosis  Assessment & Plan  Lab Results   Component Value Date    WBC 15 15 (H) 04/03/2023      · Elevated WBC   • Possibly 2/2 leukemoid reaction from acute withdrawal  • No evidence of active infection- Pt afebrile, VSS  • Continue monitoring CBC, VS    Primary hypertension  Assessment & Plan  Patient with a history of HTN  BP hypertensive upon arrival to the ED in the setting of acute withdrawal and chronic HTN  Most Recent Blood Pressure: 136/82   · Continue lisinopril, may consider dosing adjustment if BP trending up  · Routine BP monitoring     Hepatitis C virus carrier state ([facility] )  Assessment & Plan  · HCV RNA high reactive with negative VL from 08/2022  · Recommend continued outpatient GI follow-up    Type 2 diabetes mellitus without complication, with long-term current use of insulin [facility])  Assessment & Plan  Lab Results   Component Value Date    HGBA1C 6 6 (H) 04/01/2023       Recent Labs     03/31/23  2316 04/01/23  1111 04/01/23  2209 04/02/23  2120   POCGLU 183* 101 91 161*       Blood Sugar Average: Last 72 hrs:  (P) 134     · Patient with a history of DM2  · Home medications metformin 1000 mg twice daily before meals and insulin glargine (Lantus) 10 units nightly  · Repeat Hgb A1C pending  · Continue home medications  · Follow up with PCP upon discharge for on-going monitoring     Tobacco abuse  Assessment & Plan  · Daily tobacco user  · Offer NRT   · Encourage cessation    Other hyperlipidemia  Assessment & Plan  · Continue atorvastatin      Consultations During Hospital Stay:  · Case management     Procedures Performed:   · None     Significant Findings / Test Results:   · Leukocytosis   · Hgb A1c 6 6  · CT head 4/2- no acute intracranial abnormality  · CTA head/neck 4/2- No evidence of large vessel occlusion  No evidence of large vessel flow restrictive disease within the head or neck  No evidence of acute arterial dissection  There is multilevel degenerative change of the spine  ·     Incidental Findings:   · None     Test Results Pending at Discharge (will require follow up): · None      Outpatient Tests / Follow Up Requested:  · Recommend f/u with PCP within 1-2 weeks of discharge  · Follow Up with [facility] outpatient for continued MAT    Complications:  Acute agitated delirium requiring sedation with Precedex gtt    Reason for Admission: opioid withdrawal, opioid use disorder    Hospital Course:     Timbo Fajardo is a 62 y o  male patient who originally presented to the hospital on 3/31/2023 due to opioid withdrawal   Patient initially presented to the [facility] ED requesting detoxification from opioids and agreed to initiation of Suboxone  Pt was subsequently admitted to the [address] [place] [address] Detox Unit for medically assisted opioid withdrawal and Suboxone microinduction  He was treated with supportive care for the first 34 hours since last opioid use  Pt suspected of potentially using opioids on the unit on the morning of 4/2/23, so Suboxone induction was delayed until that afternoon and patient received 16 mg Suboxone  Pt became acutely agitated without clear cause which did not resolve with Suboxone so AMS workup was initiated and pt was placed on Precedex gtt for safety as he was not redirectable  CT head showed no acute intracranial abnormality and CTA head/neck showed no evidence of large vessel occlusion   Pt successfully weaned off Precedex overnight without issue and was stabilized on maintenance Suboxone "by that time  Patient was offered Sublocade injection but declined  Case management was consulted for assistance with aftercare resources, and patient will be discharged home with OP MAT f/u through [facility]  Please see above list of diagnoses and related plan for additional information  Condition at Discharge: stable     Discharge Day Visit / Exam:     Subjective:  Patient seen and examined at bedside  Patient is confrontational during initial interview due to not providing transportation home  Patient refused to verify address and began cursing when other forms of transportation were offered to patient  Patient denies further withdrawal symptoms  He will follow up with [facility] upon discharge, continues to decline Suboxone     Vitals: Blood Pressure: 136/82 (04/03/23 0700)  Pulse: 64 (04/03/23 0700)  Temperature: 98 3 °F (36 8 °C) (04/03/23 0700)  Temp Source: Temporal (04/03/23 0700)  Respirations: 18 (04/03/23 0700)  Height: 5' 7\" (170 2 cm) (04/01/23 1107)  Weight - Scale: 80 7 kg (178 lb) (04/01/23 1107)  SpO2: 94 % (04/03/23 0700)  Exam:   Physical Exam  Constitutional:       General: He is not in acute distress  Appearance: He is not diaphoretic  Eyes:      General: No scleral icterus  Pupils: Pupils are equal, round, and reactive to light  Cardiovascular:      Rate and Rhythm: Normal rate and regular rhythm  Heart sounds: No murmur heard  Abdominal:      General: Bowel sounds are normal  There is no distension  Palpations: Abdomen is soft  Neurological:      Mental Status: He is oriented to person, place, and time  Psychiatric:         Mood and Affect: Mood is anxious  Discussion with Family: I personally did not discuss this case with the patient's family  I reviewed the discharge plan with the patient and answered all questions to the best of my ability      Discharge instructions/Information to patient and family:   See after visit summary for information " provided to patient and family  Provisions for Follow-Up Care:  See after visit summary for information related to follow-up care and any pertinent home health orders  Disposition:     Home    For Discharges to [facility] SNF:   · Not Applicable to this Patient - Not Applicable to this Patient    Planned Readmission: n/a     Discharge Statement:  I spent 30 minutes discharging the patient  This time was spent on the day of discharge  I had direct contact with the patient on the day of discharge  Greater than 50% of the total time was spent examining patient, answering all patient questions, arranging and discussing plan of care with patient as well as directly providing post-discharge instructions  Additional time then spent on discharge activities  Discharge Medications:  See after visit summary for reconciled discharge medications provided to patient and family        ** Please Note: This note has been constructed using a voice recognition system **

## 2023-04-04 NOTE — UTILIZATION REVIEW
NOTIFICATION OF ADMISSION DISCHARGE   This is a Notification of Discharge from [facility]  Please be advised that this patient has been discharge from our facility  Below you will find the admission and discharge date and time including the patient’s disposition  UTILIZATION REVIEW CONTACT:  Leatha Isaacs MA  Utilization   Network Utilization Review Department  Phone: [phone] x carefully listen to the prompts  All voicemails are confidential   Email: [email or web address]     ADMISSION INFORMATION  PRESENTATION DATE: 3/31/2023  8:34 PM  OBERVATION ADMISSION DATE:   INPATIENT ADMISSION DATE: 3/31/23 10:26 PM   DISCHARGE DATE: 4/3/2023 12:01 PM   DISPOSITION:Home/Self Care    IMPORTANT INFORMATION:  Send all requests for admission clinical reviews, approved or denied determinations and any other requests to dedicated fax number below belonging to the campus where the patient is receiving treatment   List of dedicated fax numbers:  [address] DENIALS (Administrative/Medical Necessity) [phone]   [address] (Maternity/NICU/Pediatrics) [phone]   [facility] [phone]   [facility] [phone]   [address] [phone]   220 [facility] [phone] 90 [facility] [phone]   [facility] 119 [phone]   [facility]  [phone]   [facility] [phone]   [facility] [address] [facility] [phone]

## 2023-04-22 PROBLEM — F11.10 OPIOID ABUSE (HCC): Status: ACTIVE | Noted: 2023-04-22

## 2023-04-27 ENCOUNTER — OFFICE VISIT (OUTPATIENT)
Dept: FAMILY MEDICINE CLINIC | Facility: CLINIC | Age: 58
End: 2023-04-27

## 2023-04-27 VITALS
BODY MASS INDEX: 27.78 KG/M2 | SYSTOLIC BLOOD PRESSURE: 140 MMHG | WEIGHT: 177 LBS | HEART RATE: 78 BPM | RESPIRATION RATE: 16 BRPM | OXYGEN SATURATION: 97 % | TEMPERATURE: 98.7 F | DIASTOLIC BLOOD PRESSURE: 70 MMHG | HEIGHT: 67 IN

## 2023-04-27 DIAGNOSIS — Z72.0 TOBACCO ABUSE: ICD-10-CM

## 2023-04-27 DIAGNOSIS — E11.9 TYPE 2 DIABETES MELLITUS WITHOUT COMPLICATION, WITH LONG-TERM CURRENT USE OF INSULIN (HCC): ICD-10-CM

## 2023-04-27 DIAGNOSIS — Z79.4 TYPE 2 DIABETES MELLITUS WITHOUT COMPLICATION, WITH LONG-TERM CURRENT USE OF INSULIN (HCC): ICD-10-CM

## 2023-04-27 DIAGNOSIS — F11.10 OPIOID ABUSE (HCC): ICD-10-CM

## 2023-04-27 DIAGNOSIS — F11.20 OPIOID USE DISORDER, SEVERE, DEPENDENCE (HCC): ICD-10-CM

## 2023-04-27 DIAGNOSIS — F11.20 OPIOID USE DISORDER, SEVERE, DEPENDENCE (HCC): Primary | ICD-10-CM

## 2023-04-27 LAB
BUPRENORPHINE UR QL CFM: POSITIVE
SL AMB POCT AMPHETAMINES URINE: NEGATIVE
SL AMB POCT COCAINE URINE: NEGATIVE
SL AMB POCT METHAMPETAMINES URINE: NEGATIVE
SL AMB POCT OPIATES URINE: NEGATIVE
SL AMB POCT PHENCYCLIDINE URINE: NEGATIVE
SL AMB THC URINE: NEGATIVE

## 2023-04-27 RX ORDER — BUPRENORPHINE AND NALOXONE 8; 2 MG/1; MG/1
8 FILM, SOLUBLE BUCCAL; SUBLINGUAL 2 TIMES DAILY
Qty: 12 FILM | Refills: 0 | Status: SHIPPED | OUTPATIENT
Start: 2023-04-27 | End: 2023-05-04 | Stop reason: SDUPTHER

## 2023-04-27 NOTE — PROGRESS NOTES
OUD MAINTENANCE NOTE    Li Ivey 62 y o  male MRN: [id number]  PCP: Lesly Carlson MD      Assessment and Plan    Tobacco abuse  -Smokes 8-9 cigarettes per day  -Tobacco Cessation Counseling: Tobacco cessation counseling and education was provided  The patient is sincerely urged to quit consumption of tobacco  He is not ready to quit tobacco  The numerous health risks of tobacco consumption were discussed  If he decides to quit, there are a number of helpful adjunctive aids, and he can see me to discuss nicotine replacement therapy, chantix, or bupropion anytime in the future   -Spent 5 minutes on tobacco cessation counseling    Opioid use disorder, severe, dependence ([facility] )  Medication management - patient is doing well on current daily dose of 8-2mg daily, which is continued today  POCT UDS positive suboxone only  UDS and confirmation ordered  Follow-up interval: every 1 weeks    This patient has not required  re-commitment contracts while using Buprenorphine in this practice  HPI:   Li Ivey presents for buprenorphine/naloxone follow-up visit visit  I have reviewed the prior induction visit, follow-up visits, and telephone encounters relevant to opiate use disorder (OUD) treatment      Current daily dose: 8-2 mg BID    Number weeks induction: since 4/21/2023    Current follow-up interval, in weeks: 1    UDS: Positive for suboxone    Interval use history:    Opiate use: Clean since 4/3/2023  Alcohol daily use: none  Marijuana daily use: none  Other drug use: none  Overdoses: No prior   Current behavioral health provider: None- feels he is in good mental space    Sleep: 6-8 hours  Bowels: none  Tobacco: Smokes 8-9 cigarettes per day    Patient denies allergy to Buprenorphine and Naloxone; denies history of liver failure, denies suicidal or homicidal ideation, denies seizure disorder, denies treatment for HIV, denies hypnotic sedative use, denies benzodiazapine use, denies abuse of other drugs, denies ethanol abuse  Review of Systems   Constitutional: Negative for appetite change, chills, fatigue and fever  HENT: Negative for congestion, ear discharge and ear pain  Eyes: Negative for pain and redness  Respiratory: Negative for cough, shortness of breath and wheezing  Cardiovascular: Negative for chest pain, palpitations and leg swelling  Gastrointestinal: Negative for abdominal distention, abdominal pain, blood in stool, constipation, diarrhea, nausea and vomiting  Endocrine: Negative for polydipsia, polyphagia and polyuria  Genitourinary: Negative for difficulty urinating, dysuria, frequency, hematuria and urgency  Musculoskeletal: Negative for arthralgias, back pain, myalgias and neck pain  Neurological: Negative for dizziness, tremors, weakness, light-headedness, numbness and headaches         Objective:     Vitals:    23 1807   BP: 140/70   Pulse: 78   Resp: 16   Temp: 98 7 °F (37 1 °C)   SpO2: 97%         Physical Exam:       COWS Scale:1     Resting HR:0  0 = <80  1 =   2 = 101-120  4 = >120     Sweatin  0 = for no chills/flushing  1 = for subjective chills/flushing  2 = flushed or observable sweat on the face  3 =for beads of sweat on brow/face  4 = for sweat streaming of of face     Restlessness:0  0 = able to sit still  1 = subjective difficulty sitting still  3 = for frequent shifting or extraneous movement  5 = for unable to sit still for more than a few seconds      Pupil size:0  0 = pinpoint or normal  1 = for possibly larger than normal  2 = for moderately dilated  5 = for only iris rim visible      Bone/joint pain:0  0 = not present  1 = mild diffuse comfort  2 = sever diffuse aching  4 = objectively rubbing joints/muscles and obviously in pain     Runny nose/tearin  0 = not present  1 = stuff nose/moist eyes  2 = nose running/tearing  4 = nose constantly running or tears streaming down cheeks     GI upset:0  0 = no GI symptoms  1 = stomach cramps  2 = nasuea or loose stools  3 = vomiting or diarrhea  5 = multiple episodes of vomiting or diarrhea     Tremor observation of outstretched hands:0  0 = no tremor  1= tremor felt but not observed  2= slight tremor observable  4= gross tremor or muscle twitching     Yawning  0= no yawning  1 = yawning once or twice during assessment  2 = yawing three or more time during assessment  4 = yawing several times a minute     Anxiety or irritability:0  0 = none  1 = patient reports increasing irritability or anxiousness  2 = patient obviously irritable/anxious  4 = patient so irritable/anxious that assessment is difficult     Gooseflesh:0  0 = skin is smooth  3 = piloerection of skin can be felt or seen  5 = prominent piloerection     TOTAL COWS SCORE:1 (If inducing in office, recommend delaying buprenorphine until COWS scale > 12 to avoid precipitated withdrawal)      Physical Exam  Constitutional:       General: He is not in acute distress  Appearance: Normal appearance  He is well-developed  He is not ill-appearing, toxic-appearing or diaphoretic  HENT:      Head: Normocephalic and atraumatic  Right Ear: External ear normal       Left Ear: External ear normal       Nose: Nose normal       Mouth/Throat:      Pharynx: No oropharyngeal exudate  Eyes:      General: No scleral icterus  Right eye: No discharge  Left eye: No discharge  Conjunctiva/sclera: Conjunctivae normal       Pupils: Pupils are equal, round, and reactive to light  Cardiovascular:      Rate and Rhythm: Normal rate and regular rhythm  Heart sounds: Normal heart sounds  No murmur heard  No friction rub  No gallop  Pulmonary:      Effort: Pulmonary effort is normal  No respiratory distress  Breath sounds: Normal breath sounds  No stridor  No wheezing  Abdominal:      General: Bowel sounds are normal  There is no distension  Palpations: Abdomen is soft  There is no mass        Tenderness: There is no abdominal tenderness  There is no guarding or rebound  Hernia: No hernia is present  Musculoskeletal:         General: Normal range of motion  Cervical back: Normal range of motion and neck supple  Right lower leg: No edema  Left lower leg: No edema  Skin:     General: Skin is warm  Capillary Refill: Capillary refill takes less than 2 seconds  Neurological:      General: No focal deficit present  Mental Status: He is alert and oriented to person, place, and time  Cranial Nerves: No cranial nerve deficit  Sensory: No sensory deficit  Motor: No weakness        Coordination: Coordination normal    Psychiatric:         Mood and Affect: Mood normal          Behavior: Behavior normal

## 2023-04-27 NOTE — ASSESSMENT & PLAN NOTE
-Smokes 8-9 cigarettes per day  -Tobacco Cessation Counseling: Tobacco cessation counseling and education was provided  The patient is sincerely urged to quit consumption of tobacco  He is not ready to quit tobacco  The numerous health risks of tobacco consumption were discussed   If he decides to quit, there are a number of helpful adjunctive aids, and he can see me to discuss nicotine replacement therapy, chantix, or bupropion anytime in the future   -Spent 5 minutes on tobacco cessation counseling

## 2023-04-27 NOTE — ASSESSMENT & PLAN NOTE
Medication management - patient is doing well on current daily dose of 8-2mg daily, which is continued today  POCT UDS positive suboxone only  UDS and confirmation ordered  Follow-up interval: every 1 weeks    This patient has not required  re-commitment contracts while using Buprenorphine in this practice

## 2023-05-04 ENCOUNTER — OFFICE VISIT (OUTPATIENT)
Dept: FAMILY MEDICINE CLINIC | Facility: CLINIC | Age: 58
End: 2023-05-04

## 2023-05-04 VITALS
BODY MASS INDEX: 27.78 KG/M2 | HEIGHT: 67 IN | WEIGHT: 177 LBS | DIASTOLIC BLOOD PRESSURE: 80 MMHG | OXYGEN SATURATION: 97 % | TEMPERATURE: 97.8 F | HEART RATE: 90 BPM | RESPIRATION RATE: 16 BRPM | SYSTOLIC BLOOD PRESSURE: 140 MMHG

## 2023-05-04 DIAGNOSIS — F11.20 OPIOID USE DISORDER, SEVERE, DEPENDENCE (HCC): ICD-10-CM

## 2023-05-04 DIAGNOSIS — F11.20 OPIOID USE DISORDER, SEVERE, DEPENDENCE (HCC): Primary | ICD-10-CM

## 2023-05-04 LAB
BUPRENORPHINE UR QL CFM: NORMAL
SL AMB POCT AMPHETAMINES URINE: NORMAL
SL AMB POCT COCAINE URINE: NORMAL
SL AMB POCT METHAMPETAMINES URINE: NORMAL
SL AMB POCT OPIATES URINE: NORMAL
SL AMB POCT PHENCYCLIDINE URINE: NORMAL
SL AMB THC URINE: NORMAL

## 2023-05-04 RX ORDER — BUPRENORPHINE AND NALOXONE 8; 2 MG/1; MG/1
8 FILM, SOLUBLE BUCCAL; SUBLINGUAL 2 TIMES DAILY
Qty: 14 FILM | Refills: 0 | Status: SHIPPED | OUTPATIENT
Start: 2023-05-04 | End: 2023-05-11

## 2023-05-04 NOTE — PROGRESS NOTES
OUD MAINTENANCE NOTE    Jocelyn Merrill 62 y o  male MRN: [id number]  PCP: Jermaine Castellanos MD      Assessment and Plan    Opioid use disorder, severe, dependence ([facility]  Medication management - patient is doing well on current daily dose of 8-2mg daily, which is continued today  POCT UDS positive suboxone only  UDS and confirmation ordered  Follow-up interval: every 1 weeks    This patient has not required  re-commitment contracts while using Buprenorphine in this practice  HPI:   Jocelyn Merrill presents for buprenorphine/naloxone follow-up visit visit  I have reviewed the prior induction visit, follow-up visits, and telephone encounters relevant to opiate use disorder (OUD) treatment  Current daily dose: 8-2 mg BID    Number weeks induction: since 4/21/2023    Current follow-up interval, in weeks: 1    UDS: positive for suboxone    Interval use history:    Opiate use: Clean since 4/3/2023  Alcohol daily use: none  Marijuana daily use: none  Other drug use: none  Overdoses: No prior   Current behavioral health provider: None- feels he is in good mental space     Sleep: 6-8 hours  Bowels: none  Tobacco: Smokes 8-9 cigarettes per day    Patient denies allergy to Buprenorphine and Naloxone; denies history of liver failure, denies suicidal or homicidal ideation, denies seizure disorder, denies treatment for HIV, denies hypnotic sedative use, denies benzodiazapine use, denies abuse of other drugs, denies ethanol abuse  Review of Systems   Constitutional: Negative for appetite change, chills, fatigue and fever  HENT: Negative for congestion, ear discharge and ear pain  Eyes: Negative for pain and redness  Respiratory: Negative for cough, shortness of breath and wheezing  Cardiovascular: Negative for chest pain, palpitations and leg swelling  Gastrointestinal: Negative for abdominal distention, abdominal pain, blood in stool, constipation, diarrhea, nausea and vomiting     Endocrine: Negative for polydipsia, polyphagia and polyuria  Genitourinary: Negative for difficulty urinating, dysuria, frequency, hematuria and urgency  Musculoskeletal: Negative for arthralgias, back pain, myalgias and neck pain  Neurological: Negative for dizziness, tremors, weakness, light-headedness, numbness and headaches         Objective:     Vitals:    23 1727   BP: 140/80   Pulse: 90   Resp: 16   Temp: 97 8 °F (36 6 °C)   SpO2: 97%         Physical Exam:     COWS Scale:     Resting HR:1  0 = <80  1 =   2 = 101-120  4 = >120     Sweatin  0 = for no chills/flushing  1 = for subjective chills/flushing  2 = flushed or observable sweat on the face  3 =for beads of sweat on brow/face  4 = for sweat streaming of of face     Restlessness:0  0 = able to sit still  1 = subjective difficulty sitting still  3 = for frequent shifting or extraneous movement  5 = for unable to sit still for more than a few seconds      Pupil size:0  0 = pinpoint or normal  1 = for possibly larger than normal  2 = for moderately dilated  5 = for only iris rim visible      Bone/joint pain:0  0 = not present  1 = mild diffuse comfort  2 = sever diffuse aching  4 = objectively rubbing joints/muscles and obviously in pain     Runny nose/tearin  0 = not present  1 = stuff nose/moist eyes  2 = nose running/tearing  4 = nose constantly running or tears streaming down cheeks     GI upset:0  0 = no GI symptoms  1 = stomach cramps  2 = nasuea or loose stools  3 = vomiting or diarrhea  5 = multiple episodes of vomiting or diarrhea     Tremor observation of outstretched hands:0  0 = no tremor  1= tremor felt but not observed  2= slight tremor observable  4= gross tremor or muscle twitching     Yawning  0= no yawning  1 = yawning once or twice during assessment  2 = yawing three or more time during assessment  4 = yawing several times a minute     Anxiety or irritability:0  0 = none  1 = patient reports increasing irritability or anxiousness  2 = patient obviously irritable/anxious  4 = patient so irritable/anxious that assessment is difficult     Gooseflesh:0  0 = skin is smooth  3 = piloerection of skin can be felt or seen  5 = prominent piloerection     TOTAL COWS SCORE:1 (If inducing in office, recommend delaying buprenorphine until COWS scale > 12 to avoid precipitated withdrawal)    Physical Exam  Constitutional:       General: He is not in acute distress  Appearance: Normal appearance  He is well-developed  He is not ill-appearing, toxic-appearing or diaphoretic  HENT:      Head: Normocephalic and atraumatic  Right Ear: External ear normal       Left Ear: External ear normal       Nose: Nose normal       Mouth/Throat:      Pharynx: No oropharyngeal exudate  Eyes:      General: No scleral icterus  Right eye: No discharge  Left eye: No discharge  Conjunctiva/sclera: Conjunctivae normal       Pupils: Pupils are equal, round, and reactive to light  Cardiovascular:      Rate and Rhythm: Normal rate and regular rhythm  Heart sounds: Normal heart sounds  No murmur heard  No friction rub  No gallop  Pulmonary:      Effort: Pulmonary effort is normal  No respiratory distress  Breath sounds: Normal breath sounds  No stridor  No wheezing  Abdominal:      General: Bowel sounds are normal  There is no distension  Palpations: Abdomen is soft  There is no mass  Tenderness: There is no abdominal tenderness  There is no guarding or rebound  Hernia: No hernia is present  Musculoskeletal:         General: Normal range of motion  Cervical back: Normal range of motion and neck supple  Right lower leg: No edema  Left lower leg: No edema  Skin:     General: Skin is warm  Capillary Refill: Capillary refill takes less than 2 seconds  Neurological:      General: No focal deficit present  Mental Status: He is alert and oriented to person, place, and time  Cranial Nerves: No cranial nerve deficit  Sensory: No sensory deficit  Motor: No weakness        Coordination: Coordination normal    Psychiatric:         Mood and Affect: Mood normal          Behavior: Behavior normal

## 2023-05-09 LAB — MISCELLANEOUS LAB TEST RESULT: NORMAL

## 2023-05-10 LAB — MISCELLANEOUS LAB TEST RESULT: NORMAL

## 2023-05-11 ENCOUNTER — TELEPHONE (OUTPATIENT)
Dept: FAMILY MEDICINE CLINIC | Facility: CLINIC | Age: 58
End: 2023-05-11

## 2023-05-11 ENCOUNTER — TELEMEDICINE (OUTPATIENT)
Dept: FAMILY MEDICINE CLINIC | Facility: CLINIC | Age: 58
End: 2023-05-11

## 2023-05-11 DIAGNOSIS — F11.20 OPIOID USE DISORDER, SEVERE, DEPENDENCE (HCC): ICD-10-CM

## 2023-05-11 RX ORDER — BUPRENORPHINE AND NALOXONE 8; 2 MG/1; MG/1
8 FILM, SOLUBLE BUCCAL; SUBLINGUAL 2 TIMES DAILY
Qty: 14 FILM | Refills: 0 | Status: SHIPPED | OUTPATIENT
Start: 2023-05-11 | End: 2023-05-18 | Stop reason: SDUPTHER

## 2023-05-11 NOTE — TELEPHONE ENCOUNTER
first attempt to contact patient  left message to return my call on answering machine  Pt will be moved to Dr Juan Anne 4:20

## 2023-05-12 NOTE — PROGRESS NOTES
OUD MAINTENANCE NOTE    Marina Loya 62 y o  male MRN: [id number]  PCP: Nikki March MD      Assessment and Plan    #Opioid use disorder  Continue with current medication regiment  On Suboxone 8 mg twice daily  Weekly visit with PCP  Currently doing well with medication regiment  HPI:   Marina Loya presents for buprenorphine/naloxone follow-up visit visit  I have reviewed the prior induction visit, follow-up visits, and telephone encounters relevant to opiate use disorder (OUD) treatment  Current daily dose: 8-2 mg BID    Number weeks induction: since 4/21/2023    Current follow-up interval, in weeks: 1    UDS: positive for suboxone    Interval use history:    Opiate use: Clean since 4/3/2023  Alcohol daily use: none  Marijuana daily use: none  Other drug use: none  Overdoses: No prior   Current behavioral health provider: None- feels he is in good mental space     Sleep: 6-8 hours  Bowels: none  Tobacco: Smokes 8-9 cigarettes per day    Patient denies allergy to Buprenorphine and Naloxone; denies history of liver failure, denies suicidal or homicidal ideation, denies seizure disorder, denies treatment for HIV, denies hypnotic sedative use, denies benzodiazapine use, denies abuse of other drugs, denies ethanol abuse  Review of Systems   Constitutional: Negative for appetite change, chills, fatigue and fever  HENT: Negative for congestion, ear discharge and ear pain  Eyes: Negative for pain and redness  Respiratory: Negative for cough, shortness of breath and wheezing  Cardiovascular: Negative for chest pain, palpitations and leg swelling  Gastrointestinal: Negative for abdominal distention, abdominal pain, blood in stool, constipation, diarrhea, nausea and vomiting  Endocrine: Negative for polydipsia, polyphagia and polyuria  Genitourinary: Negative for difficulty urinating, dysuria, frequency, hematuria and urgency     Musculoskeletal: Negative for arthralgias, back pain, myalgias and neck pain  Neurological: Negative for dizziness, tremors, weakness, light-headedness, numbness and headaches         Objective:   Physical exam limited as this was a virtual visit

## 2023-05-18 ENCOUNTER — OFFICE VISIT (OUTPATIENT)
Dept: FAMILY MEDICINE CLINIC | Facility: CLINIC | Age: 58
End: 2023-05-18

## 2023-05-18 VITALS
HEART RATE: 72 BPM | BODY MASS INDEX: 27.78 KG/M2 | RESPIRATION RATE: 16 BRPM | HEIGHT: 67 IN | SYSTOLIC BLOOD PRESSURE: 122 MMHG | WEIGHT: 177 LBS | TEMPERATURE: 98.7 F | OXYGEN SATURATION: 96 % | DIASTOLIC BLOOD PRESSURE: 80 MMHG

## 2023-05-18 DIAGNOSIS — Z72.0 TOBACCO ABUSE: ICD-10-CM

## 2023-05-18 DIAGNOSIS — F11.20 OPIOID USE DISORDER, SEVERE, DEPENDENCE (HCC): Primary | ICD-10-CM

## 2023-05-18 LAB
BARBITURATES UR QL: NORMAL
BUPRENORPHINE UR QL CFM: NORMAL
SL AMB POCT AMPHETAMINES URINE: NORMAL
SL AMB POCT COCAINE URINE: NORMAL
SL AMB POCT METHAMPETAMINES URINE: NORMAL
SL AMB POCT OPIATES URINE: NORMAL
SL AMB POCT PHENCYCLIDINE URINE: NORMAL
SL AMB THC URINE: NORMAL

## 2023-05-18 RX ORDER — BUPRENORPHINE AND NALOXONE 8; 2 MG/1; MG/1
8 FILM, SOLUBLE BUCCAL; SUBLINGUAL 2 TIMES DAILY
Qty: 14 FILM | Refills: 0 | Status: SHIPPED | OUTPATIENT
Start: 2023-05-18 | End: 2023-05-25 | Stop reason: SDUPTHER

## 2023-05-18 NOTE — PROGRESS NOTES
OUD MAINTENANCE NOTE    Reina Au 62 y o  male MRN: [id number]  PCP: Mackenzie Stern MD      Assessment and Plan    Tobacco abuse  -Smokes 10 cigarettes per day  -Tobacco Cessation Counseling: Tobacco cessation counseling and education was provided  The patient is sincerely urged to quit consumption of tobacco  He is not ready to quit tobacco  The numerous health risks of tobacco consumption were discussed  If he decides to quit, there are a number of helpful adjunctive aids, and he can see me to discuss nicotine replacement therapy, chantix, or bupropion anytime in the future   -Spent 3 minutes on tobacco cessation counseling    Opioid use disorder, severe, dependence ([facility] [address] )  -Medication management - patient is doing well on current daily dose of 8-2mg daily, which is continued today  -POCT UDS positive suboxone only  -Urine toxicology report did show positive diazepam metabolites which was provided to him in his recent hospital visit (patient denies using any benzo or any other substances other than Suboxone)  -Suboxone films was counted and accounted for in the clinic    Follow-up interval: every 1 weeks            HPI:   Reina Au presents for buprenorphine/naloxone follow-up visit visit  I have reviewed the prior induction visit, follow-up visits, and telephone encounters relevant to opiate use disorder (OUD) treatment      Current daily dose: 8-2 mg BID    Number weeks induction: since 4/21/2023    Current follow-up interval, in weeks: 1    UDS: positive for suboxone    Interval use history:    Opiate use: Clean since 4/3/2023  Alcohol daily use: none  Marijuana daily use: none  Other drug use: none  Overdoses: No prior   Current behavioral health provider: None- feels he is in good mental space     Sleep: 6-7 hours  Bowels: none  Tobacco: Smokes 10 cigarettes per day    Patient denies allergy to Buprenorphine and Naloxone; denies history of liver failure, denies suicidal or homicidal ideation, denies seizure disorder, denies treatment for HIV, denies hypnotic sedative use, denies benzodiazapine use, denies abuse of other drugs, denies ethanol abuse  Review of Systems   Constitutional: Negative for appetite change, chills, fatigue and fever  HENT: Negative for congestion, ear discharge and ear pain  Eyes: Negative for pain and redness  Respiratory: Negative for cough, shortness of breath and wheezing  Cardiovascular: Negative for chest pain, palpitations and leg swelling  Gastrointestinal: Negative for abdominal distention, abdominal pain, blood in stool, constipation, diarrhea, nausea and vomiting  Endocrine: Negative for polydipsia, polyphagia and polyuria  Genitourinary: Negative for difficulty urinating, dysuria, frequency, hematuria and urgency  Musculoskeletal: Negative for arthralgias, back pain, myalgias and neck pain  Neurological: Negative for dizziness, tremors, weakness, light-headedness, numbness and headaches         Objective:     Vitals:    23 1716   BP: 122/80   Pulse: 72   Resp: 16   Temp: 98 7 °F (37 1 °C)   SpO2: 96%         Physical Exam:     COWS Scale:     Resting HR: 0  0 = <80  1 =   2 = 101-120  4 = >120     Sweatin  0 = for no chills/flushing  1 = for subjective chills/flushing  2 = flushed or observable sweat on the face  3 =for beads of sweat on brow/face  4 = for sweat streaming of of face     Restlessness:0  0 = able to sit still  1 = subjective difficulty sitting still  3 = for frequent shifting or extraneous movement  5 = for unable to sit still for more than a few seconds      Pupil size:0  0 = pinpoint or normal  1 = for possibly larger than normal  2 = for moderately dilated  5 = for only iris rim visible      Bone/joint pain:0  0 = not present  1 = mild diffuse comfort  2 = sever diffuse aching  4 = objectively rubbing joints/muscles and obviously in pain     Runny nose/tearin  0 = not present  1 = stuff nose/moist eyes  2 = nose running/tearing  4 = nose constantly running or tears streaming down cheeks     GI upset:0  0 = no GI symptoms  1 = stomach cramps  2 = nasuea or loose stools  3 = vomiting or diarrhea  5 = multiple episodes of vomiting or diarrhea     Tremor observation of outstretched hands:0  0 = no tremor  1= tremor felt but not observed  2= slight tremor observable  4= gross tremor or muscle twitching     Yawning  0= no yawning  1 = yawning once or twice during assessment  2 = yawing three or more time during assessment  4 = yawing several times a minute     Anxiety or irritability:0  0 = none  1 = patient reports increasing irritability or anxiousness  2 = patient obviously irritable/anxious  4 = patient so irritable/anxious that assessment is difficult     Gooseflesh:0  0 = skin is smooth  3 = piloerection of skin can be felt or seen  5 = prominent piloerection     TOTAL COWS SCORE:0 (If inducing in office, recommend delaying buprenorphine until COWS scale > 12 to avoid precipitated withdrawal)    Physical Exam  Constitutional:       General: He is not in acute distress  Appearance: Normal appearance  He is well-developed  He is not ill-appearing, toxic-appearing or diaphoretic  HENT:      Head: Normocephalic and atraumatic  Right Ear: External ear normal       Left Ear: External ear normal       Nose: Nose normal       Mouth/Throat:      Pharynx: No oropharyngeal exudate  Eyes:      General: No scleral icterus  Right eye: No discharge  Left eye: No discharge  Conjunctiva/sclera: Conjunctivae normal       Pupils: Pupils are equal, round, and reactive to light  Cardiovascular:      Rate and Rhythm: Normal rate and regular rhythm  Heart sounds: Normal heart sounds  No murmur heard  No friction rub  No gallop  Pulmonary:      Effort: Pulmonary effort is normal  No respiratory distress  Breath sounds: Normal breath sounds  No stridor  No wheezing     Abdominal: General: Bowel sounds are normal  There is no distension  Palpations: Abdomen is soft  There is no mass  Tenderness: There is no abdominal tenderness  There is no guarding or rebound  Hernia: No hernia is present  Musculoskeletal:         General: Normal range of motion  Cervical back: Normal range of motion and neck supple  Right lower leg: No edema  Left lower leg: No edema  Skin:     General: Skin is warm  Capillary Refill: Capillary refill takes less than 2 seconds  Neurological:      General: No focal deficit present  Mental Status: He is alert and oriented to person, place, and time  Cranial Nerves: No cranial nerve deficit  Sensory: No sensory deficit  Motor: No weakness        Coordination: Coordination normal    Psychiatric:         Mood and Affect: Mood normal          Behavior: Behavior normal

## 2023-05-18 NOTE — ASSESSMENT & PLAN NOTE
-Medication management - patient is doing well on current daily dose of 8-2mg daily, which is continued today    -POCT UDS positive suboxone only  -Urine toxicology report did show positive diazepam metabolites which was provided to him in his recent hospital visit (patient denies using any benzo or any other substances other than Suboxone)  -Suboxone films was counted and accounted for in the clinic    Follow-up interval: every 1 weeks

## 2023-05-18 NOTE — ASSESSMENT & PLAN NOTE
-Smokes 10 cigarettes per day  -Tobacco Cessation Counseling: Tobacco cessation counseling and education was provided  The patient is sincerely urged to quit consumption of tobacco  He is not ready to quit tobacco  The numerous health risks of tobacco consumption were discussed   If he decides to quit, there are a number of helpful adjunctive aids, and he can see me to discuss nicotine replacement therapy, chantix, or bupropion anytime in the future   -Spent 3 minutes on tobacco cessation counseling

## 2023-05-25 ENCOUNTER — OFFICE VISIT (OUTPATIENT)
Dept: FAMILY MEDICINE CLINIC | Facility: CLINIC | Age: 58
End: 2023-05-25

## 2023-05-25 VITALS
HEIGHT: 67 IN | HEART RATE: 75 BPM | OXYGEN SATURATION: 96 % | RESPIRATION RATE: 16 BRPM | TEMPERATURE: 98.6 F | SYSTOLIC BLOOD PRESSURE: 120 MMHG | DIASTOLIC BLOOD PRESSURE: 70 MMHG | WEIGHT: 178 LBS | BODY MASS INDEX: 27.94 KG/M2

## 2023-05-25 DIAGNOSIS — F11.20 OPIOID USE DISORDER, SEVERE, DEPENDENCE (HCC): ICD-10-CM

## 2023-05-25 RX ORDER — BUPRENORPHINE AND NALOXONE 8; 2 MG/1; MG/1
8 FILM, SOLUBLE BUCCAL; SUBLINGUAL 2 TIMES DAILY
Qty: 28 FILM | Refills: 0 | Status: SHIPPED | OUTPATIENT
Start: 2023-05-25 | End: 2023-06-08

## 2023-05-25 NOTE — ASSESSMENT & PLAN NOTE
-Medication management - patient is doing well on current daily dose of 8-2mg daily, which is continued today    -POCT UDS positive suboxone only  -Urine toxicology report did show positive diazepam metabolites which was provided to him in his recent hospital visit (patient denies using any benzo or any other substances other than Suboxone)  -Follow up Urine toxicology pending  -Suboxone films was counted and accounted for in the clinic    Follow-up interval: every 2 weeks

## 2023-05-25 NOTE — PROGRESS NOTES
OUD MAINTENANCE NOTE    Kiko Sheth 62 y o  male MRN: [id number]  PCP: Pina Chaudhry MD      Assessment and Plan    Opioid use disorder, severe, dependence ([facility] [address] )  -Medication management - patient is doing well on current daily dose of 8-2mg daily, which is continued today  -POCT UDS positive suboxone only  -Urine toxicology report did show positive diazepam metabolites which was provided to him in his recent hospital visit (patient denies using any benzo or any other substances other than Suboxone)  -Follow up Urine toxicology pending  -Suboxone films was counted and accounted for in the clinic    Follow-up interval: every 2 weeks            HPI:   Kiko Sheth presents for buprenorphine/naloxone follow-up visit visit  I have reviewed the prior induction visit, follow-up visits, and telephone encounters relevant to opiate use disorder (OUD) treatment      Current daily dose: 8-2 mg BID     Number weeks induction: since 4/21/2023     Current follow-up interval, in weeks: 2     UDS: positive for suboxone     Interval use history:     Opiate use: Clean since 4/3/2023  Alcohol daily use: none  Marijuana daily use: none  Other drug use: none  Overdoses: No prior   Current behavioral health provider: None- feels he is in good mental space     Sleep: 6-7 hours  Bowels: none  Tobacco: Smokes 10 cigarettes per day  Patient denies allergy to Buprenorphine and Naloxone; denies history of liver failure, denies suicidal or homicidal ideation, denies seizure disorder, denies treatment for HIV, denies hypnotic sedative use, denies benzodiazapine use, denies abuse of other drugs, denies ethanol abuse  Review of Systems   Constitutional: Negative for appetite change, chills, fatigue and fever  HENT: Negative for congestion  Respiratory: Negative for cough, shortness of breath and wheezing  Cardiovascular: Negative for chest pain, palpitations and leg swelling     Gastrointestinal: Negative for abdominal distention, abdominal pain, blood in stool, constipation, diarrhea, nausea and vomiting  Genitourinary: Negative for dysuria, frequency, hematuria and urgency  Musculoskeletal: Negative for back pain  Skin: Negative for rash  Neurological: Negative for dizziness, tremors, weakness, light-headedness, numbness and headaches         Objective:     Vitals:    23 1650   BP: 120/70   Pulse: 75   Resp: 16   Temp: 98 6 °F (37 °C)   SpO2: 96%   COWS Scale:0     Resting HR: 0  0 = <80  1 =   2 = 101-120  4 = >120     Sweatin  0 = for no chills/flushing  1 = for subjective chills/flushing  2 = flushed or observable sweat on the face  3 =for beads of sweat on brow/face  4 = for sweat streaming of of face     Restlessness:0  0 = able to sit still  1 = subjective difficulty sitting still  3 = for frequent shifting or extraneous movement  5 = for unable to sit still for more than a few seconds      Pupil size:0  0 = pinpoint or normal  1 = for possibly larger than normal  2 = for moderately dilated  5 = for only iris rim visible      Bone/joint pain:0  0 = not present  1 = mild diffuse comfort  2 = sever diffuse aching  4 = objectively rubbing joints/muscles and obviously in pain     Runny nose/tearin  0 = not present  1 = stuff nose/moist eyes  2 = nose running/tearing  4 = nose constantly running or tears streaming down cheeks     GI upset:0  0 = no GI symptoms  1 = stomach cramps  2 = nasuea or loose stools  3 = vomiting or diarrhea  5 = multiple episodes of vomiting or diarrhea     Tremor observation of outstretched hands:0  0 = no tremor  1= tremor felt but not observed  2= slight tremor observable  4= gross tremor or muscle twitching     Yawning  0= no yawning  1 = yawning once or twice during assessment  2 = yawing three or more time during assessment  4 = yawing several times a minute     Anxiety or irritability:0  0 = none  1 = patient reports increasing irritability or anxiousness  2 = patient obviously irritable/anxious  4 = patient so irritable/anxious that assessment is difficult     Gooseflesh:0  0 = skin is smooth  3 = piloerection of skin can be felt or seen  5 = prominent piloerection     TOTAL COWS SCORE:0 (If inducing in office, recommend delaying buprenorphine until COWS scale > 12 to avoid precipitated withdrawal)         Physical Exam:     Physical Exam  Constitutional:       General: He is not in acute distress  Appearance: Normal appearance  He is well-developed  He is not ill-appearing, toxic-appearing or diaphoretic  HENT:      Head: Normocephalic and atraumatic  Right Ear: External ear normal       Left Ear: External ear normal       Nose: Nose normal       Mouth/Throat:      Pharynx: No oropharyngeal exudate  Eyes:      General: No scleral icterus  Right eye: No discharge  Left eye: No discharge  Conjunctiva/sclera: Conjunctivae normal       Pupils: Pupils are equal, round, and reactive to light  Cardiovascular:      Rate and Rhythm: Normal rate and regular rhythm  Heart sounds: Normal heart sounds  No murmur heard  No friction rub  No gallop  Pulmonary:      Effort: Pulmonary effort is normal  No respiratory distress  Breath sounds: Normal breath sounds  No stridor  No wheezing  Abdominal:      General: Bowel sounds are normal  There is no distension  Palpations: Abdomen is soft  There is no mass  Tenderness: There is no abdominal tenderness  There is no guarding or rebound  Hernia: No hernia is present  Musculoskeletal:         General: Normal range of motion  Cervical back: Normal range of motion and neck supple  Right lower leg: No edema  Left lower leg: No edema  Skin:     General: Skin is warm  Capillary Refill: Capillary refill takes less than 2 seconds  Neurological:      General: No focal deficit present  Mental Status: He is alert and oriented to person, place, and time  Cranial Nerves: No cranial nerve deficit  Sensory: No sensory deficit  Motor: No weakness        Coordination: Coordination normal    Psychiatric:         Mood and Affect: Mood normal          Behavior: Behavior normal

## 2023-05-26 LAB — MISCELLANEOUS LAB TEST RESULT: NORMAL

## 2023-06-06 LAB — MISCELLANEOUS LAB TEST RESULT: NORMAL

## 2023-06-08 ENCOUNTER — OFFICE VISIT (OUTPATIENT)
Dept: FAMILY MEDICINE CLINIC | Facility: CLINIC | Age: 58
End: 2023-06-08

## 2023-06-08 VITALS
OXYGEN SATURATION: 95 % | BODY MASS INDEX: 27.47 KG/M2 | HEART RATE: 83 BPM | RESPIRATION RATE: 16 BRPM | WEIGHT: 175 LBS | TEMPERATURE: 98.2 F | DIASTOLIC BLOOD PRESSURE: 70 MMHG | HEIGHT: 67 IN | SYSTOLIC BLOOD PRESSURE: 130 MMHG

## 2023-06-08 DIAGNOSIS — Z79.4 TYPE 2 DIABETES MELLITUS WITHOUT COMPLICATION, WITH LONG-TERM CURRENT USE OF INSULIN (HCC): ICD-10-CM

## 2023-06-08 DIAGNOSIS — E11.9 TYPE 2 DIABETES MELLITUS WITHOUT COMPLICATION, WITH LONG-TERM CURRENT USE OF INSULIN (HCC): ICD-10-CM

## 2023-06-08 DIAGNOSIS — F11.20 OPIOID USE DISORDER, SEVERE, DEPENDENCE (HCC): Primary | ICD-10-CM

## 2023-06-08 PROCEDURE — 80331 ANALGESICS NON-OPIOID 6/MORE: CPT | Performed by: FAMILY MEDICINE

## 2023-06-08 PROCEDURE — 80358 DRUG SCREENING METHADONE: CPT | Performed by: FAMILY MEDICINE

## 2023-06-08 PROCEDURE — 80371 STIMULANTS SYNTHETIC: CPT | Performed by: FAMILY MEDICINE

## 2023-06-08 PROCEDURE — 80364 OPIOID &OPIATE ANALOG 5/MORE: CPT | Performed by: FAMILY MEDICINE

## 2023-06-08 PROCEDURE — 82043 UR ALBUMIN QUANTITATIVE: CPT | Performed by: FAMILY MEDICINE

## 2023-06-08 PROCEDURE — 80337 TRICYCLIC & CYCLICALS 6/MORE: CPT | Performed by: FAMILY MEDICINE

## 2023-06-08 PROCEDURE — 3078F DIAST BP <80 MM HG: CPT | Performed by: FAMILY MEDICINE

## 2023-06-08 PROCEDURE — 80360 METHYLPHENIDATE: CPT | Performed by: FAMILY MEDICINE

## 2023-06-08 PROCEDURE — 80355 GABAPENTIN NON-BLOOD: CPT | Performed by: FAMILY MEDICINE

## 2023-06-08 PROCEDURE — 80338 ANTIDEPRESSANT NOT SPECIFIED: CPT | Performed by: FAMILY MEDICINE

## 2023-06-08 PROCEDURE — 80347 BENZODIAZEPINES 13 OR MORE: CPT | Performed by: FAMILY MEDICINE

## 2023-06-08 PROCEDURE — 80361 OPIATES 1 OR MORE: CPT | Performed by: FAMILY MEDICINE

## 2023-06-08 PROCEDURE — 80366 DRUG SCREENING PREGABALIN: CPT | Performed by: FAMILY MEDICINE

## 2023-06-08 PROCEDURE — 80372 DRUG SCREENING TAPENTADOL: CPT | Performed by: FAMILY MEDICINE

## 2023-06-08 PROCEDURE — 80341 ANTIEPILEPTICS NOS 7/MORE: CPT | Performed by: FAMILY MEDICINE

## 2023-06-08 PROCEDURE — 80307 DRUG TEST PRSMV CHEM ANLYZR: CPT | Performed by: FAMILY MEDICINE

## 2023-06-08 PROCEDURE — 99214 OFFICE O/P EST MOD 30 MIN: CPT | Performed by: FAMILY MEDICINE

## 2023-06-08 PROCEDURE — 83992 ASSAY FOR PHENCYCLIDINE: CPT | Performed by: FAMILY MEDICINE

## 2023-06-08 PROCEDURE — 80357 KETAMINE AND NORKETAMINE: CPT | Performed by: FAMILY MEDICINE

## 2023-06-08 PROCEDURE — 82570 ASSAY OF URINE CREATININE: CPT | Performed by: FAMILY MEDICINE

## 2023-06-08 PROCEDURE — 80353 DRUG SCREENING COCAINE: CPT | Performed by: FAMILY MEDICINE

## 2023-06-08 PROCEDURE — 80354 DRUG SCREENING FENTANYL: CPT | Performed by: FAMILY MEDICINE

## 2023-06-08 PROCEDURE — 3075F SYST BP GE 130 - 139MM HG: CPT | Performed by: FAMILY MEDICINE

## 2023-06-08 PROCEDURE — 80326 AMPHETAMINES 5 OR MORE: CPT | Performed by: FAMILY MEDICINE

## 2023-06-08 PROCEDURE — 80370 SKEL MUSC RELAXANT 3 OR MORE: CPT | Performed by: FAMILY MEDICINE

## 2023-06-08 PROCEDURE — 80373 DRUG SCREENING TRAMADOL: CPT | Performed by: FAMILY MEDICINE

## 2023-06-08 PROCEDURE — 80348 DRUG SCREENING BUPRENORPHINE: CPT | Performed by: FAMILY MEDICINE

## 2023-06-08 PROCEDURE — 80344 ANTIPSYCHOTICS NOS 7/MORE: CPT | Performed by: FAMILY MEDICINE

## 2023-06-08 PROCEDURE — 80367 DRUG SCREENING PROPOXYPHENE: CPT | Performed by: FAMILY MEDICINE

## 2023-06-08 PROCEDURE — 80305 DRUG TEST PRSMV DIR OPT OBS: CPT | Performed by: FAMILY MEDICINE

## 2023-06-08 PROCEDURE — 80359 METHYLENEDIOXYAMPHETAMINES: CPT | Performed by: FAMILY MEDICINE

## 2023-06-08 PROCEDURE — 80377 DRUG/SUBSTANCE NOS 7/MORE: CPT | Performed by: FAMILY MEDICINE

## 2023-06-08 PROCEDURE — 80334 ANTIDEPRESSANTS CLASS 6/MORE: CPT | Performed by: FAMILY MEDICINE

## 2023-06-08 RX ORDER — BUPRENORPHINE AND NALOXONE 8; 2 MG/1; MG/1
8 FILM, SOLUBLE BUCCAL; SUBLINGUAL 2 TIMES DAILY
Qty: 28 FILM | Refills: 0 | Status: SHIPPED | OUTPATIENT
Start: 2023-06-08 | End: 2023-06-22

## 2023-06-08 NOTE — ASSESSMENT & PLAN NOTE
-Medication management - patient is doing well on current daily dose of 8-2mg daily, which is continued today    -POCT UDS positive suboxone only  -PDMP reviewed no red flags  -Follow up Urine toxicology pending  -Has narcan at home  -Follow-up interval: every 2 weeks

## 2023-06-08 NOTE — PROGRESS NOTES
OUD MAINTENANCE NOTE    Sofiya Garcia 62 y o  male MRN: [id number]  PCP: John Patel MD      Assessment and Plan    Opioid use disorder, severe, dependence ([facility] [address] )  -Medication management - patient is doing well on current daily dose of 8-2mg daily, which is continued today  -POCT UDS positive suboxone only  -PDMP reviewed no red flags  -Follow up Urine toxicology pending  -Has narcan at home  -Follow-up interval: every 2 weeks            HPI:   Sofiya Garcia presents for buprenorphine/naloxone follow-up visit visit  I have reviewed the prior induction visit, follow-up visits, and telephone encounters relevant to opiate use disorder (OUD) treatment      Current daily dose: 8-2 mg BID     Number weeks induction: since 4/21/2023     Current follow-up interval, in weeks: 2     UDS: positive for suboxone     Interval use history:     Opiate use: Clean since 4/3/2023  Alcohol daily use: none  Marijuana daily use: none  Other drug use: none  Overdoses: No prior   Current behavioral health provider: None- feels he is in good mental space     Sleep: 7 hours  Bowels: none  Tobacco: Smokes 10 cigarettes per day  Patient denies allergy to Buprenorphine and Naloxone; denies history of liver failure, denies suicidal or homicidal ideation, denies seizure disorder, denies treatment for HIV, denies hypnotic sedative use, denies benzodiazapine use, denies abuse of other drugs, denies ethanol abuse  Review of Systems   Constitutional: Negative for appetite change, chills, fatigue and fever  HENT: Negative for congestion  Respiratory: Negative for cough, shortness of breath and wheezing  Cardiovascular: Negative for chest pain, palpitations and leg swelling  Gastrointestinal: Negative for abdominal distention, abdominal pain, blood in stool, constipation, diarrhea, nausea and vomiting  Genitourinary: Negative for dysuria, frequency, hematuria and urgency  Musculoskeletal: Negative for back pain  Skin: Negative for rash  Neurological: Negative for dizziness, tremors, weakness, light-headedness, numbness and headaches         Objective:     Vitals:    23 1805   BP: 130/70   Pulse: 83   Resp: 16   Temp: 98 2 °F (36 8 °C)   SpO2: 95%   COWS Scale:1     Resting HR: 0  0 = <80  1 =   2 = 101-120  4 = >120     Sweatin  0 = for no chills/flushing  1 = for subjective chills/flushing  2 = flushed or observable sweat on the face  3 =for beads of sweat on brow/face  4 = for sweat streaming of of face     Restlessness:0  0 = able to sit still  1 = subjective difficulty sitting still  3 = for frequent shifting or extraneous movement  5 = for unable to sit still for more than a few seconds      Pupil size:0  0 = pinpoint or normal  1 = for possibly larger than normal  2 = for moderately dilated  5 = for only iris rim visible      Bone/joint pain:0  0 = not present  1 = mild diffuse comfort  2 = sever diffuse aching  4 = objectively rubbing joints/muscles and obviously in pain     Runny nose/tearin  0 = not present  1 = stuff nose/moist eyes  2 = nose running/tearing  4 = nose constantly running or tears streaming down cheeks     GI upset:0  0 = no GI symptoms  1 = stomach cramps  2 = nasuea or loose stools  3 = vomiting or diarrhea  5 = multiple episodes of vomiting or diarrhea     Tremor observation of outstretched hands:0  0 = no tremor  1= tremor felt but not observed  2= slight tremor observable  4= gross tremor or muscle twitching     Yawning  0= no yawning  1 = yawning once or twice during assessment  2 = yawing three or more time during assessment  4 = yawing several times a minute     Anxiety or irritability:0  0 = none  1 = patient reports increasing irritability or anxiousness  2 = patient obviously irritable/anxious  4 = patient so irritable/anxious that assessment is difficult     Gooseflesh:0  0 = skin is smooth  3 = piloerection of skin can be felt or seen  5 = prominent piloerection     TOTAL COWS SCORE:1 (If inducing in office, recommend delaying buprenorphine until COWS scale > 12 to avoid precipitated withdrawal)         Physical Exam:     Physical Exam  Constitutional:       General: He is not in acute distress  Appearance: Normal appearance  He is well-developed  He is not ill-appearing, toxic-appearing or diaphoretic  HENT:      Head: Normocephalic and atraumatic  Right Ear: External ear normal       Left Ear: External ear normal       Nose: Nose normal       Mouth/Throat:      Pharynx: No oropharyngeal exudate  Eyes:      General: No scleral icterus  Extraocular Movements: Extraocular movements intact  Conjunctiva/sclera: Conjunctivae normal       Pupils: Pupils are equal, round, and reactive to light  Cardiovascular:      Rate and Rhythm: Normal rate and regular rhythm  Heart sounds: Normal heart sounds  No murmur heard  No friction rub  No gallop  Pulmonary:      Effort: Pulmonary effort is normal  No respiratory distress  Breath sounds: Normal breath sounds  No wheezing or rales  Abdominal:      General: Bowel sounds are normal  There is no distension  Palpations: Abdomen is soft  There is no mass  Tenderness: There is no abdominal tenderness  Musculoskeletal:         General: Normal range of motion  Skin:     General: Skin is warm  Capillary Refill: Capillary refill takes less than 2 seconds  Neurological:      General: No focal deficit present  Mental Status: He is alert and oriented to person, place, and time  Cranial Nerves: No cranial nerve deficit  Motor: No weakness        Gait: Gait normal    Psychiatric:         Mood and Affect: Mood normal          Behavior: Behavior normal

## 2023-06-09 LAB
CREAT UR-MCNC: 201 MG/DL
MICROALBUMIN UR-MCNC: 42.1 MG/L (ref 0–20)
MICROALBUMIN/CREAT 24H UR: 21 MG/G CREATININE (ref 0–30)

## 2023-06-17 ENCOUNTER — HOSPITAL ENCOUNTER (EMERGENCY)
Facility: HOSPITAL | Age: 58
Discharge: HOME | End: 2023-06-17
Attending: EMERGENCY MEDICINE | Admitting: EMERGENCY MEDICINE
Payer: COMMERCIAL

## 2023-06-17 VITALS
OXYGEN SATURATION: 97 % | BODY MASS INDEX: 27.31 KG/M2 | DIASTOLIC BLOOD PRESSURE: 70 MMHG | WEIGHT: 174.4 LBS | SYSTOLIC BLOOD PRESSURE: 116 MMHG | HEART RATE: 78 BPM | TEMPERATURE: 98.6 F | RESPIRATION RATE: 16 BRPM

## 2023-06-17 DIAGNOSIS — J32.9 SINUSITIS: ICD-10-CM

## 2023-06-17 DIAGNOSIS — J40 BRONCHITIS: ICD-10-CM

## 2023-06-17 DIAGNOSIS — H10.9 CONJUNCTIVITIS: Primary | ICD-10-CM

## 2023-06-17 DIAGNOSIS — Z20.822 ENCOUNTER FOR LABORATORY TESTING FOR COVID-19 VIRUS: ICD-10-CM

## 2023-06-17 LAB
FLUAV RNA RESP QL NAA+PROBE: NEGATIVE
FLUBV RNA RESP QL NAA+PROBE: POSITIVE
RSV RNA RESP QL NAA+PROBE: NEGATIVE
SARS-COV-2 RNA RESP QL NAA+PROBE: NEGATIVE

## 2023-06-17 PROCEDURE — 0241U HB NFCT DS VIR RESP RNA 4 TRGT: CPT | Performed by: PHYSICIAN ASSISTANT

## 2023-06-17 PROCEDURE — 99283 EMERGENCY DEPT VISIT LOW MDM: CPT

## 2023-06-17 RX ORDER — GENTAMICIN SULFATE 3 MG/ML
2 SOLUTION/ DROPS OPHTHALMIC 4 TIMES DAILY
Qty: 5 ML | Refills: 0 | Status: SHIPPED | OUTPATIENT
Start: 2023-06-17 | End: 2023-06-27

## 2023-06-17 RX ORDER — KETOTIFEN FUMARATE 0.35 MG/ML
1 SOLUTION/ DROPS OPHTHALMIC 2 TIMES DAILY
Qty: 5 ML | Refills: 0 | Status: SHIPPED | OUTPATIENT
Start: 2023-06-17

## 2023-06-17 RX ORDER — AZITHROMYCIN 250 MG/1
TABLET, FILM COATED ORAL
Qty: 6 TABLET | Refills: 0 | Status: SHIPPED | OUTPATIENT
Start: 2023-06-17 | End: 2023-06-21

## 2023-06-17 NOTE — ED PROVIDER NOTES
History  Chief Complaint   Patient presents with   • Cold Like Symptoms     Cough and b/l eye redness x1 week     Pt with nasal congestion productive cough and bilat eye irritation for several days, eyelids stuck together in mornings      URI  Presenting symptoms: congestion and cough    Presenting symptoms: no fatigue and no fever    Severity:  Mild  Onset quality:  Gradual  Duration:  2 days  Timing:  Constant  Progression:  Unchanged  Chronicity:  New  Relieved by:  Nothing  Worsened by:  Nothing  Ineffective treatments:  None tried  Associated symptoms: sinus pain    Risk factors: not elderly        Prior to Admission Medications   Prescriptions Last Dose Informant Patient Reported? Taking? ACCU-CHEK FASTCLIX LANCETS MISC   Yes No   Blood Glucose Monitoring Suppl (ACCU-CHEK GUIDE) w/Device KIT   Yes No   Insulin Glargine Solostar (Basaglar KwikPen) 100 UNIT/ML SOPN   No No   Sig: Inject 0 1 mL (10 Units total) under the skin daily at bedtime   Insulin Pen Needle (BD Pen Needle Cely 2nd Gen) 32G X 4 MM MISC   No No   Sig: Inject under the skin daily   aspirin (ECOTRIN LOW STRENGTH) 81 mg EC tablet   No No   Sig: Take 1 tablet (81 mg total) by mouth daily   atorvastatin (LIPITOR) 20 mg tablet   No No   Sig: Take 1 tablet (20 mg total) by mouth daily at bedtime   buprenorphine-naloxone (Suboxone) 8-2 mg   No No   Sig: Place 1 Film (8 mg total) under the tongue 2 (two) times a day for 14 days   glucose blood (Accu-Chek Guide) test strip   No No   Sig: Use 1 each 3 (three) times a day Test as directed   lisinopril (ZESTRIL) 2 5 mg tablet   No No   Sig: Take 1 tablet (2 5 mg total) by mouth daily   metFORMIN (GLUCOPHAGE) 1000 MG tablet   No No   Sig: Take 1 tablet (1,000 mg total) by mouth 2 (two) times a day with meals   naloxone (NARCAN) 4 mg/0 1 mL nasal spray   No No   Sig: Administer 1 spray into a nostril  If no response after 2-3 minutes, give another dose in the other nostril using a new spray     naproxen (Naprosyn) 500 mg tablet   No No   Sig: Take 1 tablet (500 mg total) by mouth 2 (two) times a day with meals for 7 days      Facility-Administered Medications: None       Past Medical History:   Diagnosis Date   • Diabetes mellitus ([facility] )    • Hypertension    • Substance abuse ([facility] )        Past Surgical History:   Procedure Laterality Date   • NO PAST SURGERIES     • WOUND DEBRIDEMENT Right 8/6/2022    Procedure: DEBRIDEMENT RIGHT UPPER EXTREMITY ([address]) SEPTIC WRIST;  Surgeon: Filippo Aragon MD;  Location: [facility];  Service: Orthopedics       Family History   Problem Relation Age of Onset   • Diabetes Mother    • Alcohol abuse Father    • Diabetes Sister    • Asthma Brother      I have reviewed and agree with the history as documented  E-Cigarette/Vaping   • E-Cigarette Use Never User      E-Cigarette/Vaping Substances   • Nicotine No    • THC No    • CBD No    • Flavoring No    • Other No    • Unknown No      Social History     Tobacco Use   • Smoking status: Every Day     Packs/day: 0 50     Types: Cigarettes   • Smokeless tobacco: Never   • Tobacco comments:     about 12 cigarettes/daily   Vaping Use   • Vaping Use: Never used   Substance Use Topics   • Alcohol use: Not Currently   • Drug use: Not Currently     Frequency: 7 0 times per week     Types: Fentanyl       Review of Systems   Constitutional: Negative  Negative for fatigue and fever  HENT: Positive for congestion, sinus pressure and sinus pain  Eyes: Positive for discharge and redness  Respiratory: Positive for cough  Cardiovascular: Negative  Gastrointestinal: Negative  Endocrine: Negative  Genitourinary: Negative  Musculoskeletal: Negative  Skin: Negative  Allergic/Immunologic: Negative  Neurological: Negative  Hematological: Negative  Psychiatric/Behavioral: Negative  All other systems reviewed and are negative  Physical Exam  Physical Exam  Vitals and nursing note reviewed     Constitutional: Appearance: Normal appearance  He is normal weight  HENT:      Head: Normocephalic and atraumatic  Right Ear: Tympanic membrane, ear canal and external ear normal       Left Ear: Tympanic membrane, ear canal and external ear normal       Nose: Congestion and rhinorrhea present  Comments: Boggy mucosa max sinus tender to palp      Mouth/Throat:      Mouth: Mucous membranes are moist       Pharynx: Oropharynx is clear  Eyes:      Extraocular Movements: Extraocular movements intact  Pupils: Pupils are equal, round, and reactive to light  Comments: Conj bilat mild injection  perrl eom wnl  + red reflex bilat anterior chamber wnl , no change in his vision, no f/o seen lids wnl     Cardiovascular:      Rate and Rhythm: Normal rate and regular rhythm  Pulses: Normal pulses  Heart sounds: Normal heart sounds  Pulmonary:      Effort: Pulmonary effort is normal       Comments: Mild coarse sounds bilat  Abdominal:      General: Abdomen is flat  Bowel sounds are normal       Palpations: Abdomen is soft  Musculoskeletal:         General: Normal range of motion  Cervical back: Normal range of motion  Skin:     General: Skin is warm  Capillary Refill: Capillary refill takes less than 2 seconds  Neurological:      General: No focal deficit present  Mental Status: He is alert and oriented to person, place, and time     Psychiatric:         Mood and Affect: Mood normal          Vital Signs  ED Triage Vitals [06/17/23 0946]   Temperature Pulse Respirations Blood Pressure SpO2   98 6 °F (37 °C) 78 16 116/70 97 %      Temp Source Heart Rate Source Patient Position - Orthostatic VS BP Location FiO2 (%)   Tympanic Monitor Sitting Left arm --      Pain Score       --           Vitals:    06/17/23 0946   BP: 116/70   Pulse: 78   Patient Position - Orthostatic VS: Sitting         Visual Acuity      ED Medications  Medications - No data to display    Diagnostic Studies  Results Reviewed     Procedure Component Value Units Date/Time    COVID/FLU/RSV [[id number]]  (Abnormal) Collected: 06/17/23 1005    Lab Status: Final result Specimen: Nares from Nose Updated: 06/17/23 1054     SARS-CoV-2 Negative     INFLUENZA A PCR Negative     INFLUENZA B PCR Positive     RSV PCR Negative    Narrative:      FOR PEDIATRIC PATIENTS - copy/paste COVID Guidelines URL to browser: [email or web address]  [employer]    SARS-CoV-2 assay is a Nucleic Acid Amplification assay intended for the  qualitative detection of nucleic acid from SARS-CoV-2 in nasopharyngeal  swabs  Results are for the presumptive identification of SARS-CoV-2 RNA  Positive results are indicative of infection with SARS-CoV-2, the virus  causing COVID-19, but do not rule out bacterial infection or co-infection  with other viruses  Laboratories within the [place] and its  territories are required to report all positive results to the appropriate  public health authorities  Negative results do not preclude SARS-CoV-2  infection and should not be used as the sole basis for treatment or other  patient management decisions  Negative results must be combined with  clinical observations, patient history, and epidemiological information  This test has not been FDA cleared or approved  This test has been authorized by FDA under an Emergency Use Authorization  (EUA)  This test is only authorized for the duration of time the  declaration that circumstances exist justifying the authorization of the  emergency use of an in vitro diagnostic tests for detection of SARS-CoV-2  virus and/or diagnosis of COVID-19 infection under section 564(b)(1) of  the Act, 21 U  S C  096MZA-8(Y)(2), unless the authorization is terminated  or revoked sooner  The test has been validated but independent review by FDA  and CLIA is pending  Test performed using [employer]:  This RT-PCR assay targets N2,  a region unique to SARS-CoV-2  A conserved region in the E-gene was chosen  for pan-Sarbecovirus detection which includes SARS-CoV-2  According to CMS-2020-01-R, this platform meets the definition of high-throughput technology  No orders to display              Procedures  Procedures         ED Course                               SBIRT 22yo+    Flowsheet Row Most Recent Value   Initial Alcohol Screen: US AUDIT-C     1  How often do you have a drink containing alcohol? 0 Filed at: 06/17/2023 0946   2  How many drinks containing alcohol do you have on a typical day you are drinking? 0 Filed at: 06/17/2023 0946   3a  Male UNDER 65: How often do you have five or more drinks on one occasion? 0 Filed at: 06/17/2023 0946   3b  FEMALE Any Age, or MALE 65+: How often do you have 4 or more drinks on one occassion? 0 Filed at: 06/17/2023 0946   Audit-C Score 0 Filed at: 06/17/2023 2095   MAUREEN: How many times in the past year have you    Used an illegal drug or used a prescription medication for non-medical reasons? Never Filed at: 06/17/2023 0602                    MDM    Disposition  Final diagnoses:   Conjunctivitis   Bronchitis   Sinusitis   Encounter for laboratory testing for COVID-19 virus     Time reflects when diagnosis was documented in both MDM as applicable and the Disposition within this note     Time User Action Codes Description Comment    6/17/2023 10:04 AM Peri Loosen  Add [H10 9] Conjunctivitis     6/17/2023 10:04 AM Norris Mahajan  Add [J40] Bronchitis     6/17/2023 10:04 AM Nathan Parish  Add [J32 9] Sinusitis     6/17/2023 10:06 AM Peri Loosen  Add [Z20 822] Encounter for laboratory testing for COVID-19 virus       ED Disposition     ED Disposition   Discharge    Condition   Stable    Date/Time   Sat Jun 17, 2023 10:04 AM    Comment   Sherrie Gonzales discharge to home/self care                 Follow-up Information     Follow up With Specialties Details Why [address] [place], [address]  [phone]            Discharge Medication List as of 6/17/2023 10:08 AM      START taking these medications    Details   azithromycin (ZITHROMAX) 250 mg tablet Take 2 tablets today then 1 tablet daily x 4 days, Print      gentamicin (GARAMYCIN) 0 3 % ophthalmic solution Administer 2 drops to both eyes 4 (four) times a day for 10 days, Starting Sat 6/17/2023, Until Tue 6/27/2023, Print      ketotifen (ZADITOR) 0 025 % ophthalmic solution Administer 1 drop to both eyes 2 (two) times a day, Starting Sat 6/17/2023, Print         CONTINUE these medications which have NOT CHANGED    Details   ACCU-CHEK FASTCLIX LANCETS MISC Starting Tue 6/18/2019, Historical Med      aspirin (ECOTRIN LOW STRENGTH) 81 mg EC tablet Take 1 tablet (81 mg total) by mouth daily, Starting Fri 5/8/2020, Normal      atorvastatin (LIPITOR) 20 mg tablet Take 1 tablet (20 mg total) by mouth daily at bedtime, Starting Thu 4/13/2023, Normal      Blood Glucose Monitoring Suppl (ACCU-CHEK GUIDE) w/Device KIT Starting Tue 6/18/2019, Historical Med      buprenorphine-naloxone (Suboxone) 8-2 mg Place 1 Film (8 mg total) under the tongue 2 (two) times a day for 14 days, Starting Thu 6/8/2023, Until Thu 6/22/2023, Normal      glucose blood (Accu-Chek Guide) test strip Use 1 each 3 (three) times a day Test as directed, Starting Fri 9/30/2022, Normal      Insulin Glargine Solostar (Basaglar KwikPen) 100 UNIT/ML SOPN Inject 0 1 mL (10 Units total) under the skin daily at bedtime, Starting Thu 4/13/2023, Normal      Insulin Pen Needle (BD Pen Needle Cely 2nd Gen) 32G X 4 MM MISC Inject under the skin daily, Starting Thu 4/28/2022, Normal      lisinopril (ZESTRIL) 2 5 mg tablet Take 1 tablet (2 5 mg total) by mouth daily, Starting Tue 1/3/2023, Normal      metFORMIN (GLUCOPHAGE) 1000 MG tablet Take 1 tablet (1,000 mg total) by mouth 2 (two) times a day with meals, Starting Thu 4/27/2023, Until Wed 7/26/2023, Normal      naloxone (NARCAN) 4 mg/0 1 mL nasal spray Administer 1 spray into a nostril  If no response after 2-3 minutes, give another dose in the other nostril using a new spray , Normal      naproxen (Naprosyn) 500 mg tablet Take 1 tablet (500 mg total) by mouth 2 (two) times a day with meals for 7 days, Starting Thu 11/10/2022, Until u 11/17/2022, Normal             No discharge procedures on file      PDMP Review       Value Time User    PDMP Reviewed  Yes 6/8/2023  6:20 PM Hung Mcfadden MD          ED Provider  Electronically Signed by           Timbo Vaughan PA-C  06/17/23 1541

## 2023-06-21 LAB — MISCELLANEOUS LAB TEST RESULT: NORMAL

## 2023-06-22 ENCOUNTER — OFFICE VISIT (OUTPATIENT)
Dept: FAMILY MEDICINE CLINIC | Facility: CLINIC | Age: 58
End: 2023-06-22

## 2023-06-22 VITALS
DIASTOLIC BLOOD PRESSURE: 80 MMHG | BODY MASS INDEX: 27.78 KG/M2 | WEIGHT: 177 LBS | HEART RATE: 74 BPM | TEMPERATURE: 97.6 F | OXYGEN SATURATION: 96 % | SYSTOLIC BLOOD PRESSURE: 130 MMHG | RESPIRATION RATE: 16 BRPM | HEIGHT: 67 IN

## 2023-06-22 DIAGNOSIS — F11.20 OPIOID USE DISORDER, SEVERE, DEPENDENCE (HCC): ICD-10-CM

## 2023-06-22 PROCEDURE — 3079F DIAST BP 80-89 MM HG: CPT | Performed by: FAMILY MEDICINE

## 2023-06-22 PROCEDURE — 3075F SYST BP GE 130 - 139MM HG: CPT | Performed by: FAMILY MEDICINE

## 2023-06-22 PROCEDURE — 99214 OFFICE O/P EST MOD 30 MIN: CPT | Performed by: FAMILY MEDICINE

## 2023-06-22 PROCEDURE — 80305 DRUG TEST PRSMV DIR OPT OBS: CPT | Performed by: FAMILY MEDICINE

## 2023-06-22 RX ORDER — BUPRENORPHINE AND NALOXONE 8; 2 MG/1; MG/1
8 FILM, SOLUBLE BUCCAL; SUBLINGUAL 2 TIMES DAILY
Qty: 28 FILM | Refills: 0 | Status: SHIPPED | OUTPATIENT
Start: 2023-06-22 | End: 2023-07-06

## 2023-06-22 NOTE — PROGRESS NOTES
[facility] MAINTENANCE NOTE    Laura Berry 62 y o  male MRN: [id number]  PCP: Hung Mcfadden MD      Assessment and Plan    Opioid use disorder, severe, dependence ([facility] )  -Medication management - patient is doing well on current daily dose of 8-2mg daily, which is continued today  -POCT UDS positive suboxone only  -PDMP reviewed no red flags  -Follow up Urine toxicology pending  -Has narcan at home  -Follow-up interval: every 2 weeks            HPI:   Laura Berry presents for buprenorphine/naloxone follow-up visit visit  I have reviewed the prior induction visit, follow-up visits, and telephone encounters relevant to opiate use disorder ([facility]) treatment      Current daily dose: 8-2 mg BID     Number weeks induction: since 4/21/2023     Current follow-up interval, in weeks: 2     UDS: positive for suboxone     Interval use history:     Opiate use: Clean since 4/3/2023  Alcohol daily use: none  Marijuana daily use: none  Other drug use: none  Overdoses: No prior   Current behavioral health provider: None- feels he is in good mental space     Sleep: 6-7 hours  Bowels: none  Tobacco: Smokes 10 cigarettes per day  Patient denies allergy to Buprenorphine and Naloxone; denies history of liver failure, denies suicidal or homicidal ideation, denies seizure disorder, denies treatment for HIV, denies hypnotic sedative use, denies benzodiazapine use, denies abuse of other drugs, denies ethanol abuse  Review of Systems   Constitutional: Negative for appetite change, chills, fatigue and fever  HENT: Negative for congestion  Respiratory: Negative for cough, shortness of breath and wheezing  Cardiovascular: Negative for chest pain and palpitations  Gastrointestinal: Negative for abdominal pain, blood in stool, constipation, diarrhea, nausea and vomiting  Genitourinary: Negative for dysuria, frequency, hematuria and urgency  Musculoskeletal: Negative for back pain  Skin: Negative for rash  Neurological: Negative for dizziness, tremors, weakness and numbness         Objective:     Vitals:    23 1723   BP: 130/80   Pulse: 74   Resp: 16   Temp: 97 6 °F (36 4 °C)   SpO2: 96%   COWS Scale:0     Resting HR: 0  0 = <80  1 =   2 = 101-120  4 = >120     Sweatin  0 = for no chills/flushing  1 = for subjective chills/flushing  2 = flushed or observable sweat on the face  3 =for beads of sweat on brow/face  4 = for sweat streaming of of face     Restlessness:0  0 = able to sit still  1 = subjective difficulty sitting still  3 = for frequent shifting or extraneous movement  5 = for unable to sit still for more than a few seconds      Pupil size:0  0 = pinpoint or normal  1 = for possibly larger than normal  2 = for moderately dilated  5 = for only iris rim visible      Bone/joint pain:0  0 = not present  1 = mild diffuse comfort  2 = sever diffuse aching  4 = objectively rubbing joints/muscles and obviously in pain     Runny nose/tearin  0 = not present  1 = stuff nose/moist eyes  2 = nose running/tearing  4 = nose constantly running or tears streaming down cheeks     GI upset:0  0 = no GI symptoms  1 = stomach cramps  2 = nasuea or loose stools  3 = vomiting or diarrhea  5 = multiple episodes of vomiting or diarrhea     Tremor observation of outstretched hands:0  0 = no tremor  1= tremor felt but not observed  2= slight tremor observable  4= gross tremor or muscle twitching     Yawning  0= no yawning  1 = yawning once or twice during assessment  2 = yawing three or more time during assessment  4 = yawing several times a minute     Anxiety or irritability:0  0 = none  1 = patient reports increasing irritability or anxiousness  2 = patient obviously irritable/anxious  4 = patient so irritable/anxious that assessment is difficult     Gooseflesh:0  0 = skin is smooth  3 = piloerection of skin can be felt or seen  5 = prominent piloerection     TOTAL COWS SCORE:0 (If inducing in office, recommend delaying buprenorphine until COWS scale > 12 to avoid precipitated withdrawal)         Physical Exam:     Physical Exam  Constitutional:       General: He is not in acute distress  Appearance: Normal appearance  He is well-developed  He is not ill-appearing, toxic-appearing or diaphoretic  HENT:      Head: Normocephalic and atraumatic  Right Ear: External ear normal       Left Ear: External ear normal       Nose: Nose normal       Mouth/Throat:      Pharynx: No oropharyngeal exudate  Eyes:      General: No scleral icterus  Extraocular Movements: Extraocular movements intact  Conjunctiva/sclera: Conjunctivae normal       Pupils: Pupils are equal, round, and reactive to light  Cardiovascular:      Rate and Rhythm: Normal rate and regular rhythm  Heart sounds: Normal heart sounds  No murmur heard  No friction rub  No gallop  Pulmonary:      Effort: Pulmonary effort is normal  No respiratory distress  Breath sounds: Normal breath sounds  No wheezing or rales  Abdominal:      General: Bowel sounds are normal  There is no distension  Palpations: Abdomen is soft  There is no mass  Tenderness: There is no abdominal tenderness  Musculoskeletal:         General: Normal range of motion  Skin:     General: Skin is warm  Capillary Refill: Capillary refill takes less than 2 seconds  Neurological:      General: No focal deficit present  Mental Status: He is alert and oriented to person, place, and time  Cranial Nerves: No cranial nerve deficit  Motor: No weakness        Gait: Gait normal    Psychiatric:         Mood and Affect: Mood normal          Behavior: Behavior normal

## 2023-06-23 PROCEDURE — 36415 COLL VENOUS BLD VENIPUNCTURE: CPT | Performed by: FAMILY MEDICINE

## 2023-06-23 PROCEDURE — 80307 DRUG TEST PRSMV CHEM ANLYZR: CPT | Performed by: FAMILY MEDICINE

## 2023-06-27 LAB
AMPHETAMINES UR QL SCN: NEGATIVE NG/ML
BARBITURATES UR QL SCN: NEGATIVE NG/ML
BENZODIAZ UR QL: NEGATIVE NG/ML
BZE UR QL: NEGATIVE NG/ML
CANNABINOIDS UR QL SCN: NEGATIVE NG/ML
METHADONE UR QL SCN: NEGATIVE NG/ML
OPIATES UR QL: NEGATIVE NG/ML
PCP UR QL: NEGATIVE NG/ML
PROPOXYPH UR QL SCN: NEGATIVE NG/ML

## 2023-07-06 ENCOUNTER — OFFICE VISIT (OUTPATIENT)
Dept: FAMILY MEDICINE CLINIC | Facility: CLINIC | Age: 58
End: 2023-07-06

## 2023-07-06 VITALS
WEIGHT: 173 LBS | HEART RATE: 76 BPM | TEMPERATURE: 97.9 F | RESPIRATION RATE: 16 BRPM | SYSTOLIC BLOOD PRESSURE: 130 MMHG | BODY MASS INDEX: 27.15 KG/M2 | DIASTOLIC BLOOD PRESSURE: 80 MMHG | OXYGEN SATURATION: 98 % | HEIGHT: 67 IN

## 2023-07-06 DIAGNOSIS — Z12.11 COLON CANCER SCREENING: ICD-10-CM

## 2023-07-06 DIAGNOSIS — E78.49 OTHER HYPERLIPIDEMIA: ICD-10-CM

## 2023-07-06 DIAGNOSIS — E11.9 TYPE 2 DIABETES MELLITUS WITHOUT COMPLICATION, WITH LONG-TERM CURRENT USE OF INSULIN (HCC): ICD-10-CM

## 2023-07-06 DIAGNOSIS — Z72.0 TOBACCO ABUSE: ICD-10-CM

## 2023-07-06 DIAGNOSIS — F11.20 OPIOID USE DISORDER, SEVERE, DEPENDENCE (HCC): Primary | ICD-10-CM

## 2023-07-06 DIAGNOSIS — E66.3 OVERWEIGHT (BMI 25.0-29.9): ICD-10-CM

## 2023-07-06 DIAGNOSIS — Z79.4 TYPE 2 DIABETES MELLITUS WITHOUT COMPLICATION, WITH LONG-TERM CURRENT USE OF INSULIN (HCC): ICD-10-CM

## 2023-07-06 PROBLEM — T50.905A BRADYCARDIA, DRUG INDUCED: Status: RESOLVED | Noted: 2023-01-24 | Resolved: 2023-07-06

## 2023-07-06 PROBLEM — R78.81 MSSA BACTEREMIA: Status: RESOLVED | Noted: 2022-08-27 | Resolved: 2023-07-06

## 2023-07-06 PROBLEM — R00.1 BRADYCARDIA, DRUG INDUCED: Status: RESOLVED | Noted: 2023-01-24 | Resolved: 2023-07-06

## 2023-07-06 PROBLEM — B95.61 MSSA BACTEREMIA: Status: RESOLVED | Noted: 2022-08-27 | Resolved: 2023-07-06

## 2023-07-06 PROBLEM — M00.031: Status: RESOLVED | Noted: 2022-08-27 | Resolved: 2023-07-06

## 2023-07-06 PROBLEM — M25.531 RIGHT WRIST PAIN: Status: RESOLVED | Noted: 2022-08-06 | Resolved: 2023-07-06

## 2023-07-06 LAB
BUPRENORPHINE UR QL CFM: NORMAL
SL AMB POCT AMPHETAMINES URINE: NORMAL
SL AMB POCT COCAINE URINE: NORMAL
SL AMB POCT HEMOGLOBIN AIC: 6.4 (ref ?–6.5)
SL AMB POCT METHAMPETAMINES URINE: NORMAL
SL AMB POCT OPIATES URINE: NORMAL
SL AMB POCT PHENCYCLIDINE URINE: NORMAL
SL AMB THC URINE: NORMAL

## 2023-07-06 PROCEDURE — 80361 OPIATES 1 OR MORE: CPT | Performed by: FAMILY MEDICINE

## 2023-07-06 PROCEDURE — 80305 DRUG TEST PRSMV DIR OPT OBS: CPT | Performed by: FAMILY MEDICINE

## 2023-07-06 PROCEDURE — 80344 ANTIPSYCHOTICS NOS 7/MORE: CPT | Performed by: FAMILY MEDICINE

## 2023-07-06 PROCEDURE — 80364 OPIOID &OPIATE ANALOG 5/MORE: CPT | Performed by: FAMILY MEDICINE

## 2023-07-06 PROCEDURE — 80367 DRUG SCREENING PROPOXYPHENE: CPT | Performed by: FAMILY MEDICINE

## 2023-07-06 PROCEDURE — 80348 DRUG SCREENING BUPRENORPHINE: CPT | Performed by: FAMILY MEDICINE

## 2023-07-06 PROCEDURE — 99214 OFFICE O/P EST MOD 30 MIN: CPT | Performed by: FAMILY MEDICINE

## 2023-07-06 PROCEDURE — 3079F DIAST BP 80-89 MM HG: CPT | Performed by: FAMILY MEDICINE

## 2023-07-06 PROCEDURE — 80377 DRUG/SUBSTANCE NOS 7/MORE: CPT | Performed by: FAMILY MEDICINE

## 2023-07-06 PROCEDURE — 80368 SEDATIVE HYPNOTICS: CPT | Performed by: FAMILY MEDICINE

## 2023-07-06 PROCEDURE — 80355 GABAPENTIN NON-BLOOD: CPT | Performed by: FAMILY MEDICINE

## 2023-07-06 PROCEDURE — 80365 DRUG SCREENING OXYCODONE: CPT | Performed by: FAMILY MEDICINE

## 2023-07-06 PROCEDURE — 80341 ANTIEPILEPTICS NOS 7/MORE: CPT | Performed by: FAMILY MEDICINE

## 2023-07-06 PROCEDURE — 80338 ANTIDEPRESSANT NOT SPECIFIED: CPT | Performed by: FAMILY MEDICINE

## 2023-07-06 PROCEDURE — 80331 ANALGESICS NON-OPIOID 6/MORE: CPT | Performed by: FAMILY MEDICINE

## 2023-07-06 PROCEDURE — 80353 DRUG SCREENING COCAINE: CPT | Performed by: FAMILY MEDICINE

## 2023-07-06 PROCEDURE — 80357 KETAMINE AND NORKETAMINE: CPT | Performed by: FAMILY MEDICINE

## 2023-07-06 PROCEDURE — 80371 STIMULANTS SYNTHETIC: CPT | Performed by: FAMILY MEDICINE

## 2023-07-06 PROCEDURE — 83036 HEMOGLOBIN GLYCOSYLATED A1C: CPT | Performed by: FAMILY MEDICINE

## 2023-07-06 PROCEDURE — 80359 METHYLENEDIOXYAMPHETAMINES: CPT | Performed by: FAMILY MEDICINE

## 2023-07-06 PROCEDURE — 83992 ASSAY FOR PHENCYCLIDINE: CPT | Performed by: FAMILY MEDICINE

## 2023-07-06 PROCEDURE — 80358 DRUG SCREENING METHADONE: CPT | Performed by: FAMILY MEDICINE

## 2023-07-06 PROCEDURE — 80347 BENZODIAZEPINES 13 OR MORE: CPT | Performed by: FAMILY MEDICINE

## 2023-07-06 PROCEDURE — 80360 METHYLPHENIDATE: CPT | Performed by: FAMILY MEDICINE

## 2023-07-06 PROCEDURE — 80366 DRUG SCREENING PREGABALIN: CPT | Performed by: FAMILY MEDICINE

## 2023-07-06 PROCEDURE — 80370 SKEL MUSC RELAXANT 3 OR MORE: CPT | Performed by: FAMILY MEDICINE

## 2023-07-06 PROCEDURE — 80337 TRICYCLIC & CYCLICALS 6/MORE: CPT | Performed by: FAMILY MEDICINE

## 2023-07-06 PROCEDURE — 80354 DRUG SCREENING FENTANYL: CPT | Performed by: FAMILY MEDICINE

## 2023-07-06 PROCEDURE — 80372 DRUG SCREENING TAPENTADOL: CPT | Performed by: FAMILY MEDICINE

## 2023-07-06 PROCEDURE — 3075F SYST BP GE 130 - 139MM HG: CPT | Performed by: FAMILY MEDICINE

## 2023-07-06 PROCEDURE — 80334 ANTIDEPRESSANTS CLASS 6/MORE: CPT | Performed by: FAMILY MEDICINE

## 2023-07-06 PROCEDURE — 80307 DRUG TEST PRSMV CHEM ANLYZR: CPT | Performed by: FAMILY MEDICINE

## 2023-07-06 PROCEDURE — 80373 DRUG SCREENING TRAMADOL: CPT | Performed by: FAMILY MEDICINE

## 2023-07-06 PROCEDURE — 80326 AMPHETAMINES 5 OR MORE: CPT | Performed by: FAMILY MEDICINE

## 2023-07-06 RX ORDER — BUPRENORPHINE AND NALOXONE 8; 2 MG/1; MG/1
8 FILM, SOLUBLE BUCCAL; SUBLINGUAL 2 TIMES DAILY
Qty: 28 FILM | Refills: 0 | Status: SHIPPED | OUTPATIENT
Start: 2023-07-06 | End: 2023-07-20

## 2023-07-06 NOTE — ASSESSMENT & PLAN NOTE
Medication management - patient is doing well on current daily dose of 8-2mg BID daily, which is continued today. UDS and confirmation ordered. PDMP reviewed  UDS positive for Suboxone  Urine toxicology report reviewed which was unremarkable  Patient has Suboxone at home      Follow-up interval: every 2 weeks    This patient has not required re-commitment contracts while using Buprenorphine in this practice.

## 2023-07-06 NOTE — PROGRESS NOTES
OUD MAINTENANCE NOTE    Meliza Ledesma 62 y.o. male MRN: [id number]  PCP: Prasad Colon MD      Assessment and Plan    Type 2 diabetes mellitus without complication, with long-term current use of insulin ([address])    Lab Results   Component Value Date    HGBA1C 6.4 07/06/2023   -Well-controlled  -Continue metformin 1000mg BID and basaglar 10 units at bedtime  -Dietary counseling provided  -Recommend annual podiatry and ophthalmology evaluation  Continue statin    Other hyperlipidemia  Continue Statin  Dietary and exercise counseling provided to patient    Tobacco abuse  Smokes 1/2 PPD of cigarettes daily  Tobacco Cessation Counseling: Tobacco cessation counseling and education was provided. The patient is sincerely urged to quit consumption of tobacco. He is not ready to quit tobacco. The numerous health risks of tobacco consumption were discussed. If he decides to quit, there are a number of helpful adjunctive aids, and he can see me to discuss nicotine replacement therapy, chantix, or bupropion anytime in the future. Spent 3 minutes on tobacco cessation counseling    Opioid use disorder, severe, dependence ([address])  Medication management - patient is doing well on current daily dose of 8-2mg BID daily, which is continued today. UDS and confirmation ordered. PDMP reviewed  UDS positive for Suboxone  Urine toxicology report reviewed which was unremarkable  Patient has Suboxone at home      Follow-up interval: every 2 weeks    This patient has not required re-commitment contracts while using Buprenorphine in this practice. HPI:   Meliza Ledesma presents for buprenorphine/naloxone follow-up visit visit.   I have reviewed the prior induction visit, follow-up visits, and telephone encounters relevant to opiate use disorder (OUD) treatment.     Current daily dose: 8-2 mg BID     Number weeks induction: since 4/21/2023     Current follow-up interval, in weeks: 2     UDS: positive for suboxone     Interval use history:     Opiate use: Clean since 4/3/2023  Alcohol daily use: none  Marijuana daily use: none  Other drug use: none  Overdoses: No prior   Current behavioral health provider: None- [facility]     Sleep: 5-6 hours  Bowels: none  Tobacco: Smokes 1/2 PPD cigarettes per day  Patient denies allergy to Buprenorphine and Naloxone; denies history of liver failure, denies suicidal or homicidal ideation, denies seizure disorder, denies treatment for HIV, denies hypnotic sedative use, denies benzodiazapine use, denies abuse of other drugs, denies ethanol abuse. Review of Systems   Constitutional: Negative for appetite change, chills, fatigue and fever. HENT: Negative for congestion. Respiratory: Negative for cough, shortness of breath and wheezing. Cardiovascular: Negative for chest pain and palpitations. Gastrointestinal: Negative for abdominal pain, blood in stool, constipation, diarrhea, nausea and vomiting. Genitourinary: Negative for dysuria, frequency, hematuria and urgency. Musculoskeletal: Negative for back pain. Skin: Negative for rash. Neurological: Negative for dizziness, tremors, weakness and numbness.        Objective:     Vitals:    23 1718   BP: 130/80   Pulse: 76   Resp: 16   Temp: 97.9 °F (36.6 °C)   SpO2: 98%   COWS Scale:0     Resting HR: 0  0 = <80  1 =   2 = 101-120  4 = >120     Sweatin  0 = for no chills/flushing  1 = for subjective chills/flushing  2 = flushed or observable sweat on the face  3 =for beads of sweat on brow/face  4 = for sweat streaming of of face     Restlessness:0  0 = able to sit still  1 = subjective difficulty sitting still  3 = for frequent shifting or extraneous movement  5 = for unable to sit still for more than a few seconds      Pupil size:0  0 = pinpoint or normal  1 = for possibly larger than normal  2 = for moderately dilated  5 = for only iris rim visible      Bone/joint pain:0  0 = not present  1 = mild diffuse comfort  2 = sever diffuse aching  4 = objectively rubbing joints/muscles and obviously in pain     Runny nose/tearin  0 = not present  1 = stuff nose/moist eyes  2 = nose running/tearing  4 = nose constantly running or tears streaming down cheeks     GI upset:0  0 = no GI symptoms  1 = stomach cramps  2 = nasuea or loose stools  3 = vomiting or diarrhea  5 = multiple episodes of vomiting or diarrhea     Tremor observation of outstretched hands:0  0 = no tremor  1= tremor felt but not observed  2= slight tremor observable  4= gross tremor or muscle twitching     Yawning  0= no yawning  1 = yawning once or twice during assessment  2 = yawing three or more time during assessment  4 = yawing several times a minute     Anxiety or irritability:0  0 = none  1 = patient reports increasing irritability or anxiousness  2 = patient obviously irritable/anxious  4 = patient so irritable/anxious that assessment is difficult     Gooseflesh:0  0 = skin is smooth  3 = piloerection of skin can be felt or seen  5 = prominent piloerection     TOTAL COWS SCORE:0 (If inducing in office, recommend delaying buprenorphine until COWS scale > 12 to avoid precipitated withdrawal)         Physical Exam:     Physical Exam  Constitutional:       General: He is not in acute distress. Appearance: Normal appearance. He is well-developed. He is not ill-appearing, toxic-appearing or diaphoretic. HENT:      Head: Normocephalic and atraumatic. Right Ear: External ear normal.      Left Ear: External ear normal.      Nose: Nose normal.      Mouth/Throat:      Pharynx: No oropharyngeal exudate. Eyes:      General: No scleral icterus. Extraocular Movements: Extraocular movements intact. Conjunctiva/sclera: Conjunctivae normal.      Pupils: Pupils are equal, round, and reactive to light. Cardiovascular:      Rate and Rhythm: Normal rate and regular rhythm. Heart sounds: Normal heart sounds. No murmur heard. No friction rub. No gallop. Pulmonary:      Effort: Pulmonary effort is normal. No respiratory distress. Breath sounds: Normal breath sounds. No wheezing or rales. Abdominal:      General: Bowel sounds are normal. There is no distension. Palpations: Abdomen is soft. There is no mass. Tenderness: There is no abdominal tenderness. Musculoskeletal:         General: Normal range of motion. Skin:     General: Skin is warm. Capillary Refill: Capillary refill takes less than 2 seconds. Neurological:      General: No focal deficit present. Mental Status: He is alert and oriented to person, place, and time. Cranial Nerves: No cranial nerve deficit. Motor: No weakness. Gait: Gait normal.   Psychiatric:         Mood and Affect: Mood normal.         Behavior: Behavior normal.       BMI Counseling: Body mass index is 27.1 kg/m². The BMI is above normal. Nutrition recommendations include reducing portion sizes, 3-5 servings of fruits/vegetables daily, consuming healthier snacks and moderation in carbohydrate intake. Exercise recommendations include moderate aerobic physical activity for 150 minutes/week.

## 2023-07-06 NOTE — ASSESSMENT & PLAN NOTE
Smokes 1/2 PPD of cigarettes daily  Tobacco Cessation Counseling: Tobacco cessation counseling and education was provided. The patient is sincerely urged to quit consumption of tobacco. He is not ready to quit tobacco. The numerous health risks of tobacco consumption were discussed. If he decides to quit, there are a number of helpful adjunctive aids, and he can see me to discuss nicotine replacement therapy, chantix, or bupropion anytime in the future.   Spent 3 minutes on tobacco cessation counseling

## 2023-07-06 NOTE — ASSESSMENT & PLAN NOTE
Lab Results   Component Value Date    HGBA1C 6.4 07/06/2023   -Well-controlled  -Continue metformin 1000mg BID and basaglar 10 units at bedtime  -Dietary counseling provided  -Recommend annual podiatry and ophthalmology evaluation  Continue statin

## 2023-07-16 LAB — MISCELLANEOUS LAB TEST RESULT: NORMAL

## 2023-07-20 ENCOUNTER — OFFICE VISIT (OUTPATIENT)
Dept: FAMILY MEDICINE CLINIC | Facility: CLINIC | Age: 58
End: 2023-07-20

## 2023-07-20 VITALS
OXYGEN SATURATION: 96 % | HEART RATE: 68 BPM | SYSTOLIC BLOOD PRESSURE: 120 MMHG | DIASTOLIC BLOOD PRESSURE: 70 MMHG | HEIGHT: 67 IN | BODY MASS INDEX: 26.84 KG/M2 | RESPIRATION RATE: 16 BRPM | TEMPERATURE: 98.7 F | WEIGHT: 171 LBS

## 2023-07-20 DIAGNOSIS — F11.20 OPIOID USE DISORDER, SEVERE, DEPENDENCE (HCC): Primary | ICD-10-CM

## 2023-07-20 DIAGNOSIS — N52.9 ERECTILE DYSFUNCTION, UNSPECIFIED ERECTILE DYSFUNCTION TYPE: ICD-10-CM

## 2023-07-20 PROCEDURE — 80305 DRUG TEST PRSMV DIR OPT OBS: CPT | Performed by: FAMILY MEDICINE

## 2023-07-20 PROCEDURE — 3078F DIAST BP <80 MM HG: CPT | Performed by: FAMILY MEDICINE

## 2023-07-20 PROCEDURE — 99214 OFFICE O/P EST MOD 30 MIN: CPT | Performed by: FAMILY MEDICINE

## 2023-07-20 PROCEDURE — 3074F SYST BP LT 130 MM HG: CPT | Performed by: FAMILY MEDICINE

## 2023-07-20 RX ORDER — BUPRENORPHINE AND NALOXONE 8; 2 MG/1; MG/1
8 FILM, SOLUBLE BUCCAL; SUBLINGUAL 2 TIMES DAILY
Qty: 60 FILM | Refills: 0 | Status: SHIPPED | OUTPATIENT
Start: 2023-07-20 | End: 2023-08-19

## 2023-07-20 RX ORDER — TADALAFIL 10 MG/1
10 TABLET ORAL DAILY PRN
Qty: 10 TABLET | Refills: 1 | Status: SHIPPED | OUTPATIENT
Start: 2023-07-20

## 2023-07-20 NOTE — ASSESSMENT & PLAN NOTE
Likely multifactorial etiology including tobacco smoking, diabetes, and history of substance abuse  Sleep hygiene counseling provided  Smoking cessation counseling provided  We will try Cialis as needed

## 2023-07-20 NOTE — PROGRESS NOTES
OUD MAINTENANCE NOTE    Pilar Fregoso 62 y.o. male MRN: [id number]  PCP: Yovany Blas MD      Assessment and Plan    Opioid use disorder, severe, dependence ([address])  Medication management - patient is doing well on current daily dose of 8-2mg BID daily, which is continued today. UDS and confirmation ordered. PDMP reviewed  UDS positive for Suboxone  Urine toxicology report reviewed which was unremarkable  Patient has Narcan at home      Follow-up interval: every 1 month    This patient has not required re-commitment contracts while using Buprenorphine in this practice. Erectile dysfunction  Likely multifactorial etiology including tobacco smoking, diabetes, and history of substance abuse  Sleep hygiene counseling provided  Smoking cessation counseling provided  We will try Cialis as needed        HPI:   Pilar Fregoso presents for buprenorphine/naloxone follow-up visit visit. I have reviewed the prior induction visit, follow-up visits, and telephone encounters relevant to opiate use disorder (OUD) treatment.     Current daily dose: 8-2 mg BID     Number weeks induction: since 4/21/2023     Current follow-up interval, in weeks: 2     UDS: positive for suboxone     Interval use history:     Opiate use: Clean since 4/3/2023  Alcohol daily use: none  Marijuana daily use: none  Other drug use: none  Overdoses: No prior   Current behavioral health provider: None- [facility]     Sleep: 5-6 hours  Bowels: none  Tobacco: Smokes 1/2 PPD cigarettes per day  Patient denies allergy to Buprenorphine and Naloxone; denies history of liver failure, denies suicidal or homicidal ideation, denies seizure disorder, denies treatment for HIV, denies hypnotic sedative use, denies benzodiazapine use, denies abuse of other drugs, denies ethanol abuse. Review of Systems   Constitutional: Negative for appetite change, chills, diaphoresis, fatigue and fever. HENT: Negative for congestion.     Respiratory: Negative for cough, shortness of breath and wheezing. Cardiovascular: Negative for chest pain and palpitations. Gastrointestinal: Negative for abdominal pain, blood in stool, constipation, diarrhea, nausea and vomiting. Genitourinary: Negative for dysuria, frequency, hematuria and urgency. Erectile dysfunction   Musculoskeletal: Negative for back pain. Skin: Negative for rash. Neurological: Negative for dizziness, tremors, weakness and numbness.        Objective:     Vitals:    23 1807   BP: 120/70   Pulse: 68   Resp: 16   Temp: 98.7 °F (37.1 °C)   SpO2: 96%   COWS Scale:0     Resting HR: 0  0 = <80  1 =   2 = 101-120  4 = >120     Sweatin  0 = for no chills/flushing  1 = for subjective chills/flushing  2 = flushed or observable sweat on the face  3 =for beads of sweat on brow/face  4 = for sweat streaming of of face     Restlessness:0  0 = able to sit still  1 = subjective difficulty sitting still  3 = for frequent shifting or extraneous movement  5 = for unable to sit still for more than a few seconds      Pupil size:0  0 = pinpoint or normal  1 = for possibly larger than normal  2 = for moderately dilated  5 = for only iris rim visible      Bone/joint pain:0  0 = not present  1 = mild diffuse comfort  2 = sever diffuse aching  4 = objectively rubbing joints/muscles and obviously in pain     Runny nose/tearin  0 = not present  1 = stuff nose/moist eyes  2 = nose running/tearing  4 = nose constantly running or tears streaming down cheeks     GI upset:0  0 = no GI symptoms  1 = stomach cramps  2 = nasuea or loose stools  3 = vomiting or diarrhea  5 = multiple episodes of vomiting or diarrhea     Tremor observation of outstretched hands:0  0 = no tremor  1= tremor felt but not observed  2= slight tremor observable  4= gross tremor or muscle twitching     Yawning  0= no yawning  1 = yawning once or twice during assessment  2 = yawing three or more time during assessment  4 = yawing several times a minute     Anxiety or irritability:0  0 = none  1 = patient reports increasing irritability or anxiousness  2 = patient obviously irritable/anxious  4 = patient so irritable/anxious that assessment is difficult     Gooseflesh:0  0 = skin is smooth  3 = piloerection of skin can be felt or seen  5 = prominent piloerection     TOTAL COWS SCORE:0 (If inducing in office, recommend delaying buprenorphine until COWS scale > 12 to avoid precipitated withdrawal)         Physical Exam:     Physical Exam  Constitutional:       General: He is not in acute distress. Appearance: Normal appearance. He is well-developed. He is not ill-appearing, toxic-appearing or diaphoretic. HENT:      Head: Normocephalic and atraumatic. Right Ear: External ear normal.      Left Ear: External ear normal.      Nose: Nose normal.      Mouth/Throat:      Pharynx: No oropharyngeal exudate. Eyes:      General: No scleral icterus. Extraocular Movements: Extraocular movements intact. Conjunctiva/sclera: Conjunctivae normal.      Pupils: Pupils are equal, round, and reactive to light. Cardiovascular:      Rate and Rhythm: Normal rate and regular rhythm. Heart sounds: Normal heart sounds. No murmur heard. No friction rub. No gallop. Pulmonary:      Effort: Pulmonary effort is normal. No respiratory distress. Breath sounds: Normal breath sounds. No wheezing or rales. Abdominal:      General: Bowel sounds are normal. There is no distension. Palpations: Abdomen is soft. There is no mass. Tenderness: There is no abdominal tenderness. Musculoskeletal:         General: Normal range of motion. Skin:     General: Skin is warm. Capillary Refill: Capillary refill takes less than 2 seconds. Neurological:      General: No focal deficit present. Mental Status: He is alert and oriented to person, place, and time. Cranial Nerves: No cranial nerve deficit. Motor: No weakness.       Gait: Gait normal.   Psychiatric:         Mood and Affect: Mood normal.         Behavior: Behavior normal.

## 2023-07-20 NOTE — ASSESSMENT & PLAN NOTE
Medication management - patient is doing well on current daily dose of 8-2mg BID daily, which is continued today. UDS and confirmation ordered. PDMP reviewed  UDS positive for Suboxone  Urine toxicology report reviewed which was unremarkable  Patient has Narcan at home      Follow-up interval: every 1 month    This patient has not required re-commitment contracts while using Buprenorphine in this practice.

## 2023-08-17 ENCOUNTER — OFFICE VISIT (OUTPATIENT)
Dept: FAMILY MEDICINE CLINIC | Facility: CLINIC | Age: 58
End: 2023-08-17

## 2023-08-17 VITALS
RESPIRATION RATE: 16 BRPM | HEART RATE: 65 BPM | SYSTOLIC BLOOD PRESSURE: 126 MMHG | WEIGHT: 167 LBS | BODY MASS INDEX: 26.21 KG/M2 | DIASTOLIC BLOOD PRESSURE: 70 MMHG | TEMPERATURE: 98.3 F | HEIGHT: 67 IN | OXYGEN SATURATION: 97 %

## 2023-08-17 DIAGNOSIS — F11.20 OPIOID USE DISORDER, SEVERE, DEPENDENCE (HCC): Primary | ICD-10-CM

## 2023-08-17 PROCEDURE — 99213 OFFICE O/P EST LOW 20 MIN: CPT | Performed by: FAMILY MEDICINE

## 2023-08-17 PROCEDURE — 80305 DRUG TEST PRSMV DIR OPT OBS: CPT | Performed by: FAMILY MEDICINE

## 2023-08-17 PROCEDURE — 80307 DRUG TEST PRSMV CHEM ANLYZR: CPT | Performed by: FAMILY MEDICINE

## 2023-08-17 PROCEDURE — 3078F DIAST BP <80 MM HG: CPT | Performed by: FAMILY MEDICINE

## 2023-08-17 PROCEDURE — 3074F SYST BP LT 130 MM HG: CPT | Performed by: FAMILY MEDICINE

## 2023-08-17 RX ORDER — BUPRENORPHINE AND NALOXONE 8; 2 MG/1; MG/1
8 FILM, SOLUBLE BUCCAL; SUBLINGUAL 2 TIMES DAILY
Qty: 60 FILM | Refills: 0 | Status: SHIPPED | OUTPATIENT
Start: 2023-08-17 | End: 2023-09-16

## 2023-08-17 NOTE — PROGRESS NOTES
OUD MAINTENANCE NOTE    Neo Glynn 62 y.o. male MRN: [id number]  PCP: Jessika Morin MD      Assessment and Plan    Opioid use disorder, severe, dependence ([address])  Medication management - patient is doing well on current daily dose of 8-2mg BID daily, which is continued today. UDS and confirmation ordered. PDMP reviewed  UDS positive for Suboxone  Urine toxicology report reviewed which was unremarkable  Patient has Narcan at home      Follow-up interval: every 1 month    This patient has not required re-commitment contracts while using Buprenorphine in this practice. HPI:   Neo Glynn presents for buprenorphine/naloxone follow-up visit visit. I have reviewed the prior induction visit, follow-up visits, and telephone encounters relevant to opiate use disorder (OUD) treatment.     Current daily dose: 8-2 mg BID     Number weeks induction: since 4/21/2023     Current follow-up interval, in weeks: 2     UDS: positive for suboxone     Interval use history:     Opiate use: Clean since 4/3/2023  Alcohol daily use: none  Marijuana daily use: none  Other drug use: none  Overdoses: No prior   Current behavioral health provider: None- [facility]     Sleep: 5-6 hours  Bowels: none  Tobacco: Smokes 1/2 PPD cigarettes per day  Patient denies allergy to Buprenorphine and Naloxone; denies history of liver failure, denies suicidal or homicidal ideation, denies seizure disorder, denies treatment for HIV, denies hypnotic sedative use, denies benzodiazapine use, denies abuse of other drugs, denies ethanol abuse. Review of Systems   Constitutional: Negative for appetite change, chills, diaphoresis, fatigue and fever. HENT: Negative for congestion. Respiratory: Negative for cough, shortness of breath and wheezing. Cardiovascular: Negative for chest pain and palpitations. Gastrointestinal: Negative for abdominal pain, blood in stool, constipation, diarrhea, nausea and vomiting. Genitourinary: Negative for dysuria, frequency, hematuria and urgency. Musculoskeletal: Negative for back pain. Skin: Negative for rash. Neurological: Negative for dizziness, tremors, weakness and numbness.        Objective:     Vitals:    23 1728   BP: 126/70   Pulse: 65   Resp: 16   Temp: 98.3 °F (36.8 °C)   SpO2: 97%   COWS Scale:0     Resting HR: 0  0 = <80  1 =   2 = 101-120  4 = >120     Sweatin  0 = for no chills/flushing  1 = for subjective chills/flushing  2 = flushed or observable sweat on the face  3 =for beads of sweat on brow/face  4 = for sweat streaming of of face     Restlessness:0  0 = able to sit still  1 = subjective difficulty sitting still  3 = for frequent shifting or extraneous movement  5 = for unable to sit still for more than a few seconds      Pupil size:0  0 = pinpoint or normal  1 = for possibly larger than normal  2 = for moderately dilated  5 = for only iris rim visible      Bone/joint pain:0  0 = not present  1 = mild diffuse comfort  2 = sever diffuse aching  4 = objectively rubbing joints/muscles and obviously in pain     Runny nose/tearin  0 = not present  1 = stuff nose/moist eyes  2 = nose running/tearing  4 = nose constantly running or tears streaming down cheeks     GI upset:0  0 = no GI symptoms  1 = stomach cramps  2 = nasuea or loose stools  3 = vomiting or diarrhea  5 = multiple episodes of vomiting or diarrhea     Tremor observation of outstretched hands:0  0 = no tremor  1= tremor felt but not observed  2= slight tremor observable  4= gross tremor or muscle twitching     Yawning  0= no yawning  1 = yawning once or twice during assessment  2 = yawing three or more time during assessment  4 = yawing several times a minute     Anxiety or irritability:0  0 = none  1 = patient reports increasing irritability or anxiousness  2 = patient obviously irritable/anxious  4 = patient so irritable/anxious that assessment is difficult     Gooseflesh:0  0 = skin is smooth  3 = piloerection of skin can be felt or seen  5 = prominent piloerection     TOTAL COWS SCORE:0 (If inducing in office, recommend delaying buprenorphine until COWS scale > 12 to avoid precipitated withdrawal)         Physical Exam:     Physical Exam  Constitutional:       General: He is not in acute distress. Appearance: Normal appearance. He is well-developed. He is not ill-appearing, toxic-appearing or diaphoretic. HENT:      Head: Normocephalic and atraumatic. Right Ear: External ear normal.      Left Ear: External ear normal.      Nose: Nose normal.      Mouth/Throat:      Pharynx: No oropharyngeal exudate. Eyes:      General: No scleral icterus. Extraocular Movements: Extraocular movements intact. Conjunctiva/sclera: Conjunctivae normal.      Pupils: Pupils are equal, round, and reactive to light. Cardiovascular:      Rate and Rhythm: Normal rate and regular rhythm. Heart sounds: Normal heart sounds. No murmur heard. No friction rub. No gallop. Pulmonary:      Effort: Pulmonary effort is normal. No respiratory distress. Breath sounds: Normal breath sounds. No wheezing or rales. Abdominal:      General: Bowel sounds are normal. There is no distension. Palpations: Abdomen is soft. There is no mass. Tenderness: There is no abdominal tenderness. Musculoskeletal:         General: Normal range of motion. Skin:     General: Skin is warm. Capillary Refill: Capillary refill takes less than 2 seconds. Neurological:      General: No focal deficit present. Mental Status: He is alert and oriented to person, place, and time. Cranial Nerves: No cranial nerve deficit. Motor: No weakness.       Gait: Gait normal.   Psychiatric:         Mood and Affect: Mood normal.         Behavior: Behavior normal.

## 2023-09-13 ENCOUNTER — PATIENT OUTREACH (OUTPATIENT)
Dept: FAMILY MEDICINE CLINIC | Facility: CLINIC | Age: 58
End: 2023-09-13

## 2023-09-13 NOTE — PROGRESS NOTES
Chart review indicates that a referral was placed to follow up with patient regarding connection to OP drug rehab on 4/13/23. He was recently discharged from detox unit and was having trouble connecting to [facility] program. Review indicates that patient has been receiving MAT treatments at [facility] since that time and is currently coming monthly for follow up. Patient with severe opioid use and getitng medication management, daily buprenorphine/naloxone. [facility] outreached patient to assess for any other needs at this time. Patient did not , VM left. [facility] to follow.

## 2023-09-19 ENCOUNTER — TELEPHONE (OUTPATIENT)
Dept: FAMILY MEDICINE CLINIC | Facility: CLINIC | Age: 58
End: 2023-09-19

## 2023-09-19 NOTE — TELEPHONE ENCOUNTER
09/19/23    first attempt to contact patient.  no answer     LEFT MESSAGE       IF PT CONTACT OFFICE, PLEASE ASSIST PT WITH RESCHE 09/21/23    PCP WON’T BE AVAILABLE

## 2023-09-20 ENCOUNTER — TELEPHONE (OUTPATIENT)
Dept: FAMILY MEDICINE CLINIC | Facility: CLINIC | Age: 58
End: 2023-09-20

## 2023-09-20 NOTE — TELEPHONE ENCOUNTER
09/20/23    second & final attempt to contact patient.  no answer    LEFT MESSAGE         IF PT CONTACT OFFICE, PLEASE ASSIST PT WITH RESCHE 09/21/23     PCP WON’T BE AVAILABLE    APPT CANCELED       NOTE: LETTER TO CONTACT OFFICE HAS BEEN SENT

## 2023-09-20 NOTE — TELEPHONE ENCOUNTER
Due to changes on evening schedule, pt is unable to schedule appt due to work schedule. Per our conversation, please send to pharmacy one month supply.    buprenorphine-naloxone (Suboxone) 8-2 mg ()      Pt has been scheduled for  at 5:40pm

## 2023-09-22 DIAGNOSIS — Z79.4 TYPE 2 DIABETES MELLITUS WITHOUT COMPLICATION, WITH LONG-TERM CURRENT USE OF INSULIN (HCC): ICD-10-CM

## 2023-09-22 DIAGNOSIS — E11.9 TYPE 2 DIABETES MELLITUS WITHOUT COMPLICATION, WITH LONG-TERM CURRENT USE OF INSULIN (HCC): ICD-10-CM

## 2023-09-23 RX ORDER — ATORVASTATIN CALCIUM 20 MG/1
20 TABLET, FILM COATED ORAL
Qty: 90 TABLET | Refills: 1 | Status: SHIPPED | OUTPATIENT
Start: 2023-09-23

## 2023-10-05 ENCOUNTER — TELEPHONE (OUTPATIENT)
Dept: OTHER | Facility: OTHER | Age: 58
End: 2023-10-05

## 2023-10-05 NOTE — TELEPHONE ENCOUNTER
Pt calling returning a phone call. Someone called him regarding his appt on 10/19. He is unsure what it was about. Please call him back tomorrow.

## 2023-10-10 ENCOUNTER — PATIENT OUTREACH (OUTPATIENT)
Dept: FAMILY MEDICINE CLINIC | Facility: CLINIC | Age: 58
End: 2023-10-10

## 2023-10-10 NOTE — PROGRESS NOTES
CHAU GOVEA made second outreach attempt today to follow up on referral that was made. CHAU GOVEA was unable to speak with pt and a message was left requesting a return call. CHAU GOVEA sent unable to reach letter to address on file as pt does not have an active MyChart. CHAU GOVEA will close referral at this time due to not being able to get in contact with pt. CHAU GOVEA will be available for any additional needs as requested.

## 2023-10-10 NOTE — LETTER
[id number] [address]    Re: Social Work   10/10/2023       Dear June Young,    We tried to reach you by phone on 10/10/2023 and was unfortunately unable to reach you. It is important that you contact the [address] [place] [address] as soon as possible at: [phone].     Sincerely,         Phillip Rocha

## 2023-10-11 ENCOUNTER — VBI (OUTPATIENT)
Dept: ADMINISTRATIVE | Facility: OTHER | Age: 58
End: 2023-10-11

## 2023-10-12 ENCOUNTER — VBI (OUTPATIENT)
Dept: ADMINISTRATIVE | Facility: OTHER | Age: 58
End: 2023-10-12

## 2023-10-18 ENCOUNTER — OFFICE VISIT (OUTPATIENT)
Dept: FAMILY MEDICINE CLINIC | Facility: CLINIC | Age: 58
End: 2023-10-18

## 2023-10-18 VITALS
SYSTOLIC BLOOD PRESSURE: 126 MMHG | RESPIRATION RATE: 16 BRPM | TEMPERATURE: 98 F | HEIGHT: 67 IN | HEART RATE: 89 BPM | OXYGEN SATURATION: 96 % | WEIGHT: 170 LBS | BODY MASS INDEX: 26.68 KG/M2 | DIASTOLIC BLOOD PRESSURE: 80 MMHG

## 2023-10-18 DIAGNOSIS — F11.20 OPIOID USE DISORDER, SEVERE, DEPENDENCE (HCC): Primary | ICD-10-CM

## 2023-10-18 DIAGNOSIS — Z79.4 TYPE 2 DIABETES MELLITUS WITHOUT COMPLICATION, WITH LONG-TERM CURRENT USE OF INSULIN (HCC): ICD-10-CM

## 2023-10-18 DIAGNOSIS — E11.9 TYPE 2 DIABETES MELLITUS WITHOUT COMPLICATION, WITH LONG-TERM CURRENT USE OF INSULIN (HCC): ICD-10-CM

## 2023-10-18 LAB — SL AMB POCT HEMOGLOBIN AIC: 6.6 (ref ?–6.5)

## 2023-10-18 PROCEDURE — 83036 HEMOGLOBIN GLYCOSYLATED A1C: CPT | Performed by: FAMILY MEDICINE

## 2023-10-18 PROCEDURE — 99214 OFFICE O/P EST MOD 30 MIN: CPT | Performed by: FAMILY MEDICINE

## 2023-10-18 PROCEDURE — 80305 DRUG TEST PRSMV DIR OPT OBS: CPT | Performed by: FAMILY MEDICINE

## 2023-10-18 PROCEDURE — 3074F SYST BP LT 130 MM HG: CPT | Performed by: FAMILY MEDICINE

## 2023-10-18 PROCEDURE — 3079F DIAST BP 80-89 MM HG: CPT | Performed by: FAMILY MEDICINE

## 2023-10-18 RX ORDER — BUPRENORPHINE AND NALOXONE 8; 2 MG/1; MG/1
8 FILM, SOLUBLE BUCCAL; SUBLINGUAL 2 TIMES DAILY
Qty: 60 FILM | Refills: 0 | Status: SHIPPED | OUTPATIENT
Start: 2023-10-21 | End: 2023-11-20

## 2023-10-18 NOTE — ASSESSMENT & PLAN NOTE
Lab Results   Component Value Date    HGBA1C 6.4 07/06/2023   Patient recently discontinue insulin  Has been managing with metformin alone  He has also made dietary modifications  Continue statin  Recommend yearly ophthalmology and podiatry evaluation

## 2023-10-18 NOTE — PROGRESS NOTES
OUD MAINTENANCE NOTE    Lolita Piedra 62 y.o. male MRN: [id number]  PCP: César Ortega MD      Assessment and Plan    Type 2 diabetes mellitus without complication, with long-term current use of insulin ([address])    Lab Results   Component Value Date    HGBA1C 6.4 07/06/2023   Patient recently discontinue insulin  Has been managing with metformin alone  He has also made dietary modifications  Continue statin  Recommend yearly ophthalmology and podiatry evaluation    Opioid use disorder, severe, dependence ([address])  Medication management - patient is doing well on current daily dose of 8-2mg BID daily, which is continued today. UDS and confirmation ordered. PDMP reviewed  UDS positive for Suboxone  Urine toxicology report reviewed which was unremarkable  Patient has Narcan at home      Follow-up interval: every 1 month    This patient has not required re-commitment contracts while using Buprenorphine in this practice. HPI:   Lolita Piedra presents for buprenorphine/naloxone follow-up visit visit. I have reviewed the prior induction visit, follow-up visits, and telephone encounters relevant to opiate use disorder (OUD) treatment. Current daily dose: 8-2 mg BID     Number weeks induction: since 4/21/2023     Current follow-up interval, in weeks: 2     UDS: positive for suboxone     Interval use history:     Opiate use: Clean since 4/3/2023  Alcohol daily use: none  Marijuana daily use: none  Other drug use: none  Overdoses: No prior   Current behavioral health provider: None- [facility]     Sleep: 5-6 hours  Bowels: none  Tobacco: Smokes 1/2 PPD cigarettes per day  Patient denies allergy to Buprenorphine and Naloxone; denies history of liver failure, denies suicidal or homicidal ideation, denies seizure disorder, denies treatment for HIV, denies hypnotic sedative use, denies benzodiazapine use, denies abuse of other drugs, denies ethanol abuse.      Review of Systems   Constitutional: Negative for appetite change, chills, diaphoresis, fatigue and fever. HENT:  Negative for congestion. Respiratory:  Negative for cough, shortness of breath and wheezing. Cardiovascular:  Negative for chest pain and palpitations. Gastrointestinal:  Negative for abdominal pain, blood in stool, constipation, diarrhea, nausea and vomiting. Genitourinary:  Negative for dysuria, frequency, hematuria and urgency. Musculoskeletal:  Negative for back pain. Skin:  Negative for rash. Neurological:  Negative for dizziness, tremors, weakness and numbness.        Objective:     Vitals:    10/18/23 1717   BP: 126/80   Pulse: 89   Resp: 16   Temp: 98 °F (36.7 °C)   SpO2: 96%   COWS Scale:0     Resting HR: 0  0 = <80  1 =   2 = 101-120  4 = >120     Sweatin  0 = for no chills/flushing  1 = for subjective chills/flushing  2 = flushed or observable sweat on the face  3 =for beads of sweat on brow/face  4 = for sweat streaming of of face     Restlessness:0  0 = able to sit still  1 = subjective difficulty sitting still  3 = for frequent shifting or extraneous movement  5 = for unable to sit still for more than a few seconds      Pupil size:0  0 = pinpoint or normal  1 = for possibly larger than normal  2 = for moderately dilated  5 = for only iris rim visible      Bone/joint pain:0  0 = not present  1 = mild diffuse comfort  2 = sever diffuse aching  4 = objectively rubbing joints/muscles and obviously in pain     Runny nose/tearin  0 = not present  1 = stuff nose/moist eyes  2 = nose running/tearing  4 = nose constantly running or tears streaming down cheeks     GI upset:0  0 = no GI symptoms  1 = stomach cramps  2 = nasuea or loose stools  3 = vomiting or diarrhea  5 = multiple episodes of vomiting or diarrhea     Tremor observation of outstretched hands:0  0 = no tremor  1= tremor felt but not observed  2= slight tremor observable  4= gross tremor or muscle twitching     Yawning  0= no yawning  1 = yawning once or twice during assessment  2 = yawing three or more time during assessment  4 = yawing several times a minute     Anxiety or irritability:0  0 = none  1 = patient reports increasing irritability or anxiousness  2 = patient obviously irritable/anxious  4 = patient so irritable/anxious that assessment is difficult     Gooseflesh:0  0 = skin is smooth  3 = piloerection of skin can be felt or seen  5 = prominent piloerection     TOTAL COWS SCORE:0 (If inducing in office, recommend delaying buprenorphine until COWS scale > 12 to avoid precipitated withdrawal)         Physical Exam:     Physical Exam  Constitutional:       General: He is not in acute distress. Appearance: Normal appearance. He is well-developed. He is not ill-appearing, toxic-appearing or diaphoretic. HENT:      Head: Normocephalic and atraumatic. Right Ear: External ear normal.      Left Ear: External ear normal.      Nose: Nose normal.      Mouth/Throat:      Pharynx: No oropharyngeal exudate. Eyes:      General: No scleral icterus. Extraocular Movements: Extraocular movements intact. Conjunctiva/sclera: Conjunctivae normal.      Pupils: Pupils are equal, round, and reactive to light. Cardiovascular:      Rate and Rhythm: Normal rate and regular rhythm. Pulses: no weak pulses          Dorsalis pedis pulses are 2+ on the right side and 2+ on the left side. Posterior tibial pulses are 2+ on the right side and 2+ on the left side. Heart sounds: Normal heart sounds. No murmur heard. No friction rub. No gallop. Pulmonary:      Effort: Pulmonary effort is normal. No respiratory distress. Breath sounds: Normal breath sounds. No wheezing or rales. Abdominal:      General: Bowel sounds are normal. There is no distension. Palpations: Abdomen is soft. There is no mass. Tenderness: There is no abdominal tenderness. Musculoskeletal:         General: Normal range of motion.    Feet: Right foot:      Skin integrity: No ulcer, skin breakdown, erythema, warmth, callus or dry skin. Left foot:      Skin integrity: No ulcer, skin breakdown, erythema, warmth, callus or dry skin. Skin:     General: Skin is warm. Capillary Refill: Capillary refill takes less than 2 seconds. Neurological:      General: No focal deficit present. Mental Status: He is alert and oriented to person, place, and time. Cranial Nerves: No cranial nerve deficit. Motor: No weakness. Gait: Gait normal.   Psychiatric:         Mood and Affect: Mood normal.         Behavior: Behavior normal.       Diabetic Foot Exam    Patient's shoes and socks removed. Right Foot/Ankle   Right Foot Inspection  Skin Exam: skin normal and skin intact. No dry skin, no warmth, no callus, no erythema, no maceration, no abnormal color, no pre-ulcer, no ulcer and no callus. Toe Exam: ROM and strength within normal limits. No swelling, no tenderness, erythema and  no right toe deformity    Sensory   Proprioception: intact  Monofilament testing: intact    Vascular  Capillary refills: < 3 seconds  The right DP pulse is 2+. The right PT pulse is 2+. Left Foot/Ankle  Left Foot Inspection  Skin Exam: skin normal and skin intact. No dry skin, no warmth, no erythema, no maceration, normal color, no pre-ulcer, no ulcer and no callus. Toe Exam: ROM and strength within normal limits. No swelling, no tenderness, no erythema and no left toe deformity. Sensory   Proprioception: intact  Monofilament testing: intact    Vascular  Capillary refills: < 3 seconds  The left DP pulse is 2+. The left PT pulse is 2+.      Assign Risk Category  No deformity present  No loss of protective sensation  No weak pulses  Risk: 0

## 2023-10-19 PROCEDURE — 80307 DRUG TEST PRSMV CHEM ANLYZR: CPT | Performed by: FAMILY MEDICINE

## 2023-10-19 PROCEDURE — 80362 OPIOIDS & OPIATE ANALOGS 1/2: CPT | Performed by: FAMILY MEDICINE

## 2023-10-19 PROCEDURE — 80348 DRUG SCREENING BUPRENORPHINE: CPT | Performed by: FAMILY MEDICINE

## 2023-10-25 LAB
6MAM UR QL SCN: NEGATIVE NG/ML
ACCEPTABLE CREAT UR QL: 169 MG/DL
AMPHET UR QL SCN: NEGATIVE NG/ML
BARBITURATES UR QL SCN: NEGATIVE NG/ML
BENZODIAZ UR QL SCN: NEGATIVE NG/ML
BUPRENORPHINE UR QL CFM: ABNORMAL NG/ML
CANNABINOIDS UR QL SCN: NEGATIVE NG/ML
CARISOPRODOL UR QL: NEGATIVE NG/ML
COCAINE+BZE UR QL SCN: NEGATIVE NG/ML
ETHYL GLUCURONIDE UR QL SCN: NEGATIVE NG/ML
FENTANYL UR QL SCN: NEGATIVE NG/ML
GABAPENTIN SERPLBLD QL SCN: NEGATIVE UG/ML
METHADONE UR QL SCN: NEGATIVE NG/ML
NALOXONE UR CFM-MCNC: >592 NG/MG CREAT
NITRITE UR QL STRIP: NEGATIVE UG/ML
NORBUP/BUP RATIO: 2.51
NORBUPRENORPHINE UR CFM-MCNC: 566 NG/MG CREAT
NORBUPRENORPHINE/CREAT UR: 225 NG/MG CREAT
OPIATES UR QL SCN: NEGATIVE NG/ML
OXYCODONE+OXYMORPHONE UR QL SCN: NEGATIVE NG/ML
PCP UR QL SCN: NEGATIVE NG/ML
PROPOXYPH UR QL SCN: NEGATIVE NG/ML
SPECIMEN PH ACCEPTABLE UR: 5.9 (ref 4.5–8.9)
TAPENTADOL UR QL SCN: NEGATIVE NG/ML
TRAMADOL UR QL SCN: NEGATIVE NG/ML

## 2023-11-16 ENCOUNTER — OFFICE VISIT (OUTPATIENT)
Dept: FAMILY MEDICINE CLINIC | Facility: CLINIC | Age: 58
End: 2023-11-16

## 2023-11-16 VITALS
DIASTOLIC BLOOD PRESSURE: 70 MMHG | OXYGEN SATURATION: 96 % | RESPIRATION RATE: 16 BRPM | WEIGHT: 173 LBS | SYSTOLIC BLOOD PRESSURE: 130 MMHG | BODY MASS INDEX: 27.15 KG/M2 | HEIGHT: 67 IN | HEART RATE: 73 BPM | TEMPERATURE: 98.7 F

## 2023-11-16 DIAGNOSIS — F11.20 OPIOID USE DISORDER, SEVERE, DEPENDENCE (HCC): ICD-10-CM

## 2023-11-16 DIAGNOSIS — Z72.0 TOBACCO ABUSE: ICD-10-CM

## 2023-11-16 PROCEDURE — 80348 DRUG SCREENING BUPRENORPHINE: CPT | Performed by: FAMILY MEDICINE

## 2023-11-16 PROCEDURE — 3078F DIAST BP <80 MM HG: CPT | Performed by: FAMILY MEDICINE

## 2023-11-16 PROCEDURE — 3075F SYST BP GE 130 - 139MM HG: CPT | Performed by: FAMILY MEDICINE

## 2023-11-16 PROCEDURE — 80362 OPIOIDS & OPIATE ANALOGS 1/2: CPT | Performed by: FAMILY MEDICINE

## 2023-11-16 PROCEDURE — 80305 DRUG TEST PRSMV DIR OPT OBS: CPT | Performed by: FAMILY MEDICINE

## 2023-11-16 PROCEDURE — 99214 OFFICE O/P EST MOD 30 MIN: CPT | Performed by: FAMILY MEDICINE

## 2023-11-16 PROCEDURE — 80307 DRUG TEST PRSMV CHEM ANLYZR: CPT | Performed by: FAMILY MEDICINE

## 2023-11-16 PROCEDURE — 99406 BEHAV CHNG SMOKING 3-10 MIN: CPT | Performed by: FAMILY MEDICINE

## 2023-11-16 RX ORDER — BUPRENORPHINE AND NALOXONE 8; 2 MG/1; MG/1
8 FILM, SOLUBLE BUCCAL; SUBLINGUAL 2 TIMES DAILY
Qty: 60 FILM | Refills: 0 | Status: SHIPPED | OUTPATIENT
Start: 2023-11-16 | End: 2023-12-16
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
## 2023-11-16 NOTE — ASSESSMENT & PLAN NOTE
Smokes half a pack of cigarettes per day  Smoking cessation counseling provided to patient  Patient is not ready to quit smoking  We discussed various options for smoking cessation  Spent 5 minutes on tobacco cessation counseling
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
## 2023-11-22 LAB
6MAM UR QL SCN: NEGATIVE NG/ML
ACCEPTABLE CREAT UR QL: 157 MG/DL
AMPHET UR QL SCN: NEGATIVE NG/ML
BARBITURATES UR QL SCN: NEGATIVE NG/ML
BENZODIAZ UR QL SCN: NEGATIVE NG/ML
BUPRENORPHINE UR QL CFM: ABNORMAL NG/ML
CANNABINOIDS UR QL SCN: NEGATIVE NG/ML
CARISOPRODOL UR QL: NEGATIVE NG/ML
COCAINE+BZE UR QL SCN: NEGATIVE NG/ML
ETHYL GLUCURONIDE UR QL SCN: NEGATIVE NG/ML
FENTANYL UR QL SCN: NEGATIVE NG/ML
GABAPENTIN SERPLBLD QL SCN: NEGATIVE UG/ML
METHADONE UR QL SCN: NEGATIVE NG/ML
NALOXONE UR CFM-MCNC: 508 NG/MG CREAT
NITRITE UR QL STRIP: NEGATIVE UG/ML
NORBUP/BUP RATIO: 1.37
NORBUPRENORPHINE UR CFM-MCNC: 277 NG/MG CREAT
NORBUPRENORPHINE/CREAT UR: 202 NG/MG CREAT
OPIATES UR QL SCN: NEGATIVE NG/ML
OXYCODONE+OXYMORPHONE UR QL SCN: NEGATIVE NG/ML
PCP UR QL SCN: NEGATIVE NG/ML
PROPOXYPH UR QL SCN: NEGATIVE NG/ML
SPECIMEN PH ACCEPTABLE UR: 6.2 (ref 4.5–8.9)
TAPENTADOL UR QL SCN: NEGATIVE NG/ML
TRAMADOL UR QL SCN: NEGATIVE NG/ML

## 2023-12-18 ENCOUNTER — OFFICE VISIT (OUTPATIENT)
Dept: FAMILY MEDICINE CLINIC | Facility: CLINIC | Age: 58
End: 2023-12-18

## 2023-12-18 VITALS
WEIGHT: 172.5 LBS | RESPIRATION RATE: 18 BRPM | TEMPERATURE: 98.7 F | DIASTOLIC BLOOD PRESSURE: 68 MMHG | HEART RATE: 87 BPM | HEIGHT: 67 IN | SYSTOLIC BLOOD PRESSURE: 124 MMHG | BODY MASS INDEX: 27.07 KG/M2 | OXYGEN SATURATION: 99 %

## 2023-12-18 DIAGNOSIS — F11.20 OPIOID USE DISORDER, SEVERE, DEPENDENCE (HCC): Primary | ICD-10-CM

## 2023-12-18 DIAGNOSIS — Z72.0 TOBACCO ABUSE: ICD-10-CM

## 2023-12-18 PROCEDURE — 3074F SYST BP LT 130 MM HG: CPT | Performed by: FAMILY MEDICINE

## 2023-12-18 PROCEDURE — 3078F DIAST BP <80 MM HG: CPT | Performed by: FAMILY MEDICINE

## 2023-12-18 PROCEDURE — 99214 OFFICE O/P EST MOD 30 MIN: CPT | Performed by: FAMILY MEDICINE

## 2023-12-18 PROCEDURE — 99406 BEHAV CHNG SMOKING 3-10 MIN: CPT | Performed by: FAMILY MEDICINE

## 2023-12-18 PROCEDURE — 80305 DRUG TEST PRSMV DIR OPT OBS: CPT | Performed by: FAMILY MEDICINE

## 2023-12-18 RX ORDER — BUPRENORPHINE AND NALOXONE 8; 2 MG/1; MG/1
8 FILM, SOLUBLE BUCCAL; SUBLINGUAL 2 TIMES DAILY
Qty: 60 FILM | Refills: 0 | Status: SHIPPED | OUTPATIENT
Start: 2023-12-18 | End: 2024-01-17

## 2023-12-18 NOTE — ASSESSMENT & PLAN NOTE
Smokes half a pack of cigarettes per day  Smoking cessation counseling provided to patient  Patient is not ready to quit smoking  We discussed various options for smoking cessation  Spent 3 minutes on tobacco cessation counseling

## 2023-12-18 NOTE — PROGRESS NOTES
OUD MAINTENANCE NOTE    Tavon Villatoro 58 y.o. male MRN: [id number]  PCP: Harjit Ortega MD      Assessment and Plan    Tobacco abuse  Smokes half a pack of cigarettes per day  Smoking cessation counseling provided to patient  Patient is not ready to quit smoking  We discussed various options for smoking cessation  Spent 3 minutes on tobacco cessation counseling    Opioid use disorder, severe, dependence ([facility])  -Medication management - patient is doing well on current daily dose of 8-2mg BID daily, which is continued today.  -UDS and confirmation ordered.  -PDMP reviewed  -UDS positive for Suboxone     Follow-up interval: every 1 month    This patient has not required re-commitment contracts while using Buprenorphine in this practice.          HPI:   Tavon Villatoro presents for buprenorphine/naloxone follow-up visit visit.  I have reviewed the prior induction visit, follow-up visits, and telephone encounters relevant to opiate use disorder (OUD) treatment.      Current daily dose: 8-2 mg BID     Number weeks induction: since 4/21/2023     Current follow-up interval, in weeks: 4     UDS: positive for suboxone     Interval use history:     Opiate use: Clean since 4/3/2023  Alcohol daily use: none  Marijuana daily use: none  Other drug use: none  Overdoses: No prior overdose  Current behavioral health provider: None- [facility]     Sleep:6 hours  Bowels: none  Tobacco: Smokes 1/2 PPD cigarettes per day  Patient denies allergy to Buprenorphine and Naloxone; denies history of liver failure, denies suicidal or homicidal ideation, denies seizure disorder, denies treatment for HIV, denies hypnotic sedative use, denies benzodiazapine use, denies abuse of other drugs, denies ethanol abuse.     Review of Systems   Constitutional:  Negative for appetite change, chills, diaphoresis, fatigue and fever.   HENT:  Negative for congestion.    Respiratory:  Negative for cough, shortness of breath and wheezing.     Cardiovascular:  Negative for chest pain and palpitations.   Gastrointestinal:  Negative for abdominal pain, blood in stool, constipation, diarrhea, nausea and vomiting.   Genitourinary:  Negative for dysuria, frequency, hematuria and urgency.   Musculoskeletal:  Negative for back pain.   Skin:  Negative for rash.   Neurological:  Negative for dizziness, tremors, weakness and numbness.       Objective:     Vitals:    23 1728   BP: 124/68   Pulse: 87   Resp: 18   Temp: 98.7 °F (37.1 °C)   SpO2: 99%     COWS Scale:1     Resting HR: 1  0 = <80  1 =   2 = 101-120  4 = >120     Sweatin  0 = for no chills/flushing  1 = for subjective chills/flushing  2 = flushed or observable sweat on the face  3 =for beads of sweat on brow/face  4 = for sweat streaming of of face     Restlessness:0  0 = able to sit still  1 = subjective difficulty sitting still  3 = for frequent shifting or extraneous movement  5 = for unable to sit still for more than a few seconds      Pupil size:0  0 = pinpoint or normal  1 = for possibly larger than normal  2 = for moderately dilated  5 = for only iris rim visible      Bone/joint pain:0  0 = not present  1 = mild diffuse comfort  2 = sever diffuse aching  4 = objectively rubbing joints/muscles and obviously in pain     Runny nose/tearin  0 = not present  1 = stuff nose/moist eyes  2 = nose running/tearing  4 = nose constantly running or tears streaming down cheeks     GI upset:0  0 = no GI symptoms  1 = stomach cramps  2 = nasuea or loose stools  3 = vomiting or diarrhea  5 = multiple episodes of vomiting or diarrhea     Tremor observation of outstretched hands:0  0 = no tremor  1= tremor felt but not observed  2= slight tremor observable  4= gross tremor or muscle twitching     Yawning 1  0= no yawning  1 = yawning once or twice during assessment  2 = yawing three or more time during assessment  4 = yawing several times a minute     Anxiety or irritability:0  0 = none  1 =  patient reports increasing irritability or anxiousness  2 = patient obviously irritable/anxious  4 = patient so irritable/anxious that assessment is difficult     Gooseflesh:0  0 = skin is smooth  3 = piloerection of skin can be felt or seen  5 = prominent piloerection     TOTAL COWS SCORE:1 (If inducing in office, recommend delaying buprenorphine until COWS scale > 12 to avoid precipitated withdrawal)         Physical Exam:     Physical Exam  Constitutional:       General: He is not in acute distress.     Appearance: Normal appearance. He is well-developed. He is not ill-appearing, toxic-appearing or diaphoretic.   HENT:      Head: Normocephalic and atraumatic.      Right Ear: External ear normal.      Left Ear: External ear normal.      Nose: Nose normal.      Mouth/Throat:      Pharynx: No oropharyngeal exudate.   Eyes:      General: No scleral icterus.     Extraocular Movements: Extraocular movements intact.      Conjunctiva/sclera: Conjunctivae normal.      Pupils: Pupils are equal, round, and reactive to light.   Cardiovascular:      Rate and Rhythm: Normal rate and regular rhythm.      Heart sounds: Normal heart sounds. No murmur heard.     No friction rub. No gallop.   Pulmonary:      Effort: Pulmonary effort is normal. No respiratory distress.      Breath sounds: Normal breath sounds. No wheezing or rales.   Abdominal:      General: Bowel sounds are normal. There is no distension.      Palpations: Abdomen is soft. There is no mass.      Tenderness: There is no abdominal tenderness.   Musculoskeletal:         General: Normal range of motion.   Skin:     General: Skin is warm.      Capillary Refill: Capillary refill takes less than 2 seconds.   Neurological:      General: No focal deficit present.      Mental Status: He is alert and oriented to person, place, and time.      Cranial Nerves: No cranial nerve deficit.      Motor: No weakness.      Gait: Gait normal.   Psychiatric:         Mood and Affect: Mood  normal.         Behavior: Behavior normal.

## 2023-12-19 NOTE — ASSESSMENT & PLAN NOTE
-Medication management - patient is doing well on current daily dose of 8-2mg BID daily, which is continued today.  -UDS and confirmation ordered.  -PDMP reviewed  -UDS positive for Suboxone     Follow-up interval: every 1 month    This patient has not required re-commitment contracts while using Buprenorphine in this practice.

## 2023-12-27 DIAGNOSIS — E11.9 TYPE 2 DIABETES MELLITUS WITHOUT COMPLICATION, WITH LONG-TERM CURRENT USE OF INSULIN (HCC): ICD-10-CM

## 2023-12-27 DIAGNOSIS — Z79.4 TYPE 2 DIABETES MELLITUS WITHOUT COMPLICATION, WITH LONG-TERM CURRENT USE OF INSULIN (HCC): ICD-10-CM

## 2023-12-28 RX ORDER — INSULIN GLARGINE 100 [IU]/ML
0.1 INJECTION, SOLUTION SUBCUTANEOUS
Qty: 15 ML | Refills: 3 | Status: SHIPPED | OUTPATIENT
Start: 2023-12-28

## 2024-01-11 ENCOUNTER — HOSPITAL ENCOUNTER (EMERGENCY)
Facility: HOSPITAL | Age: 59
Discharge: HOME | End: 2024-01-11
Attending: EMERGENCY MEDICINE
Payer: COMMERCIAL

## 2024-01-11 VITALS
SYSTOLIC BLOOD PRESSURE: 130 MMHG | WEIGHT: 176.4 LBS | BODY MASS INDEX: 27.63 KG/M2 | TEMPERATURE: 98.4 F | OXYGEN SATURATION: 97 % | RESPIRATION RATE: 18 BRPM | HEART RATE: 66 BPM | DIASTOLIC BLOOD PRESSURE: 77 MMHG

## 2024-01-11 DIAGNOSIS — M25.519 SHOULDER PAIN: Primary | ICD-10-CM

## 2024-01-11 PROCEDURE — 99283 EMERGENCY DEPT VISIT LOW MDM: CPT

## 2024-01-11 PROCEDURE — 96372 THER/PROPH/DIAG INJ SC/IM: CPT

## 2024-01-11 PROCEDURE — 99284 EMERGENCY DEPT VISIT MOD MDM: CPT

## 2024-01-11 RX ORDER — KETOROLAC TROMETHAMINE 30 MG/ML
15 INJECTION, SOLUTION INTRAMUSCULAR; INTRAVENOUS ONCE
Status: COMPLETED | OUTPATIENT
Start: 2024-01-11 | End: 2024-01-11

## 2024-01-11 RX ORDER — LIDOCAINE 50 MG/G
1 PATCH TOPICAL ONCE
Status: DISCONTINUED | OUTPATIENT
Start: 2024-01-11 | End: 2024-01-11 | Stop reason: HOSPADM

## 2024-01-11 RX ORDER — LIDOCAINE 50 MG/G
1 PATCH TOPICAL EVERY 24 HOURS
Qty: 15 PATCH | Refills: 0 | Status: SHIPPED | OUTPATIENT
Start: 2024-01-11 | End: 2024-01-26

## 2024-01-11 RX ORDER — SENNOSIDES 8.6 MG
650 CAPSULE ORAL EVERY 8 HOURS PRN
Qty: 30 TABLET | Refills: 0 | Status: SHIPPED | OUTPATIENT
Start: 2024-01-11

## 2024-01-11 RX ADMIN — LIDOCAINE 5% 1 PATCH: 700 PATCH TOPICAL at 11:46

## 2024-01-11 RX ADMIN — KETOROLAC TROMETHAMINE 15 MG: 30 INJECTION, SOLUTION INTRAMUSCULAR; INTRAVENOUS at 11:46

## 2024-01-11 NOTE — Clinical Note
Tavon Villatoro was seen and treated in our emergency department on 1/11/2024.    Occupational Medicine Follow Up Within 24 hours    ?    should be seen/cleared by PCP/Occupational Medicine/ or Orthopedics    Diagnosis: shoulder pain    Tavon  ?.    He may return on this date: 01/15/2024    ?     If you have any questions or concerns, please don't hesitate to call.      Darby Sarkar PA-C    ______________________________           _______________          _______________  Hospital Representative                              Date                                Time

## 2024-01-11 NOTE — ED PROVIDER NOTES
History  Chief Complaint   Patient presents with    Shoulder Pain     Right - had SX years ago. Has been triggered when working     58-year-old male past medical history of hypertension, diabetes mellitus, substance abuse presents to emergency department complaining of right shoulder pain for 2 days.  Reports that he has a history of this pain in the past.  But denies any injuries.  Worsened by work.  States that he works he does a lot of lifting.  Denies chest pain, shortness of breath.  Worse with above head range of motion.    Per chart review has history of arthritis in the right shoulder on x-ray imaging 2 months ago.      Shoulder Pain  Ineffective treatments:  None tried  Associated symptoms: no back pain, no decreased range of motion, no fatigue, no fever, no muscle weakness, no neck pain, no numbness, no stiffness, no swelling and no tingling        Prior to Admission Medications   Prescriptions Last Dose Informant Patient Reported? Taking?   ACCU-CHEK FASTCLIX LANCETS MISC   Yes No   Blood Glucose Monitoring Suppl (ACCU-CHEK GUIDE) w/Device KIT   Yes No   Insulin Pen Needle (BD Pen Needle Cely 2nd Gen) 32G X 4 MM MISC   No No   Sig: Inject under the skin daily   Lantus SoloStar 100 units/mL SOPN   No No   Sig: INJECT 0.1 ML (10 UNITS TOTAL) UNDER THE SKIN DAILY AT BEDTIME   aspirin (ECOTRIN LOW STRENGTH) 81 mg EC tablet   No No   Sig: Take 1 tablet (81 mg total) by mouth daily   atorvastatin (LIPITOR) 20 mg tablet   No No   Sig: TAKE 1 TABLET (20 MG TOTAL) BY MOUTH DAILY AT BEDTIME   buprenorphine-naloxone (Suboxone) 8-2 mg   No No   Sig: Place 1 Film (8 mg total) under the tongue 2 (two) times a day   glucose blood (Accu-Chek Guide) test strip   No No   Sig: Use 1 each 3 (three) times a day Test as directed   ketotifen (ZADITOR) 0.025 % ophthalmic solution   No No   Sig: Administer 1 drop to both eyes 2 (two) times a day   lisinopril (ZESTRIL) 2.5 mg tablet   No No   Sig: Take 1 tablet (2.5 mg total) by  mouth daily   metFORMIN (GLUCOPHAGE) 1000 MG tablet   No No   Sig: Take 1 tablet (1,000 mg total) by mouth 2 (two) times a day with meals   naloxone (NARCAN) 4 mg/0.1 mL nasal spray   No No   Sig: Administer 1 spray into a nostril. If no response after 2-3 minutes, give another dose in the other nostril using a new spray.   naproxen (Naprosyn) 500 mg tablet   No No   Sig: Take 1 tablet (500 mg total) by mouth 2 (two) times a day with meals for 7 days   tadalafil (CIALIS) 10 MG tablet   No No   Sig: Take 1 tablet (10 mg total) by mouth daily as needed for erectile dysfunction      Facility-Administered Medications: None       Past Medical History:   Diagnosis Date    Diabetes mellitus (HCC)     Hypertension     Substance abuse (HCC)        Past Surgical History:   Procedure Laterality Date    NO PAST SURGERIES      WOUND DEBRIDEMENT Right 8/6/2022    Procedure: DEBRIDEMENT RIGHT UPPER EXTREMITY (WASH OUT) SEPTIC WRIST;  Surgeon: Casey Hahn MD;  Location: [facility];  Service: Orthopedics       Family History   Problem Relation Age of Onset    Diabetes Mother     Alcohol abuse Father     Diabetes Sister     Asthma Brother      I have reviewed and agree with the history as documented.    E-Cigarette/Vaping    E-Cigarette Use Never User      E-Cigarette/Vaping Substances    Nicotine No     THC No     CBD No     Flavoring No     Other No     Unknown No      Social History     Tobacco Use    Smoking status: Every Day     Current packs/day: 0.50     Types: Cigarettes     Passive exposure: Never    Smokeless tobacco: Never    Tobacco comments:     about 12 cigarettes/daily   Vaping Use    Vaping status: Never Used   Substance Use Topics    Alcohol use: Not Currently    Drug use: Not Currently     Frequency: 7.0 times per week     Types: Fentanyl       Review of Systems   Constitutional:  Negative for chills, diaphoresis, fatigue and fever.   Respiratory:  Negative for shortness of breath.    Cardiovascular:   Negative for chest pain.   Musculoskeletal:  Negative for back pain, joint swelling, neck pain and stiffness.        + R shoulder pain    Skin:  Negative for color change, rash and wound.   Neurological:  Negative for weakness and numbness.   All other systems reviewed and are negative.      Physical Exam  Physical Exam  Vitals and nursing note reviewed.   Constitutional:       General: He is awake. He is not in acute distress.     Appearance: Normal appearance. He is not ill-appearing.   HENT:      Head: Normocephalic and atraumatic.      Mouth/Throat:      Lips: Pink.      Mouth: Mucous membranes are moist.   Eyes:      General: Vision grossly intact.   Cardiovascular:      Rate and Rhythm: Normal rate and regular rhythm.      Pulses:           Radial pulses are 2+ on the right side.      Heart sounds: Normal heart sounds.   Pulmonary:      Effort: Pulmonary effort is normal. No respiratory distress.      Breath sounds: Normal breath sounds.   Chest:      Chest wall: No tenderness.   Abdominal:      General: There is no distension.   Musculoskeletal:      Right shoulder: Tenderness present. No swelling, effusion or crepitus. Normal range of motion (pain with abduction). Normal strength.      Right upper arm: Normal.      Cervical back: Normal and neck supple.      Thoracic back: Normal.   Skin:     General: Skin is warm and dry.      Capillary Refill: Capillary refill takes less than 2 seconds.      Findings: No bruising, erythema or rash.   Neurological:      Mental Status: He is alert.      Sensory: No sensory deficit (UE).      Motor: No weakness (UE).      Gait: Gait normal.   Psychiatric:         Mood and Affect: Mood normal.         Behavior: Behavior normal.         Vital Signs  ED Triage Vitals [01/11/24 1055]   Temperature Pulse Respirations Blood Pressure SpO2   98.4 °F (36.9 °C) 66 18 130/77 97 %      Temp Source Heart Rate Source Patient Position - Orthostatic VS BP Location FiO2 (%)   Tympanic Monitor  Lying Left arm --      Pain Score       4           Vitals:    01/11/24 1055   BP: 130/77   Pulse: 66   Patient Position - Orthostatic VS: Lying         Visual Acuity      ED Medications  Medications   ketorolac (TORADOL) injection 15 mg (15 mg Intramuscular Given 1/11/24 1146)       Diagnostic Studies  Results Reviewed       None                   No orders to display              Procedures  Procedures         ED Course  ED Course as of 01/13/24 0944   Thu Jan 11, 2024   1137 No pain at rest, only with motion. Acute worsening x 1 day. Neurovascularly intact.    1140 FINDINGS R SHOULDER XRAY 11/2022:     There is no acute fracture or dislocation.     Moderate osteoarthritis glenohumeral joint.                                 SBIRT 22yo+      Flowsheet Row Most Recent Value   Initial Alcohol Screen: US AUDIT-C     1. How often do you have a drink containing alcohol? 0 Filed at: 01/11/2024 1059   2. How many drinks containing alcohol do you have on a typical day you are drinking?  0 Filed at: 01/11/2024 1059   3a. Male UNDER 65: How often do you have five or more drinks on one occasion? 0 Filed at: 01/11/2024 1059   3b. FEMALE Any Age, or MALE 65+: How often do you have 4 or more drinks on one occassion? 0 Filed at: 01/11/2024 1059   Audit-C Score 0 Filed at: 01/11/2024 1059   MAUREEN: How many times in the past year have you...    Used an illegal drug or used a prescription medication for non-medical reasons? Never Filed at: 01/11/2024 1059                      Medical Decision Making  Recurrent R shoulder pain. Hx of arthritis. No swelling or effusion. No overlying skin changes.  No findings concerning for acute skin infection or joint infection.  Neurovascularly intact.  Recent x-ray 2 months ago, no trauma since. Via shared decision making will not get repeat xray today. Plan for supportive care for msk shoulder pain, ortho follow up.     All imaging and/or lab testing discussed with patient, strict return to ED  "precautions discussed. Patient recommended to follow up promptly with appropriate outpatient provider. Patient and/or family members verbalizes understanding and agrees with plan. Patient and/or family members were given opportunity to ask questions, all questions were answered at this time. Patient is stable for discharge.     Portions of the record may have been created with voice recognition software. Occasional wrong word or \"sound a like\" substitutions may have occurred due to the inherent limitations of voice recognition software. Read the chart carefully and recognize, using context, where substitutions have occurred.       Risk  OTC drugs.  Prescription drug management.             Disposition  Final diagnoses:   Shoulder pain     Time reflects when diagnosis was documented in both MDM as applicable and the Disposition within this note       Time User Action Codes Description Comment    1/11/2024 12:08 PM Darby Sarkar Add [M25.519] Shoulder pain           ED Disposition       ED Disposition   Discharge    Condition   Stable    Date/Time   Thu Jan 11, 2024 1210    Comment   Tavon Villatoro discharge to home/self care.                   Follow-up Information       Follow up With Specialties Details Why Contact Info Additional Information    Harjit Ortega MD Family Medicine   [address] [place] [id number]  [phone]       [facility] Schedule an appointment as soon as possible for a visit  For follow up regarding your symptoms [address]  [facility] [address] [phone] [facility] [place], [facility] [address], [place], [place], [address] [phone]    [facility]  In 1 day  [address] [facility] [id number] [phone]             Discharge Medication List as of 1/11/2024 12:10 PM        START taking " these medications    Details   acetaminophen (TYLENOL) 650 mg CR tablet Take 1 tablet (650 mg total) by mouth every 8 (eight) hours as needed for mild pain, Starting Thu 1/11/2024, Normal      Diclofenac Sodium (VOLTAREN) 1 % Apply 2 g topically 4 (four) times a day, Starting Thu 1/11/2024, Normal      lidocaine (LIDODERM) 5 % Apply 1 patch topically over 12 hours every 24 hours for 15 days Remove & Discard patch within 12 hours or as directed by MD, Starting Thu 1/11/2024, Until Fri 1/26/2024, Normal           CONTINUE these medications which have NOT CHANGED    Details   ACCU-CHEK FASTCLIX LANCETS MISC Starting Tue 6/18/2019, Historical Med      aspirin (ECOTRIN LOW STRENGTH) 81 mg EC tablet Take 1 tablet (81 mg total) by mouth daily, Starting Fri 5/8/2020, Normal      atorvastatin (LIPITOR) 20 mg tablet TAKE 1 TABLET (20 MG TOTAL) BY MOUTH DAILY AT BEDTIME, Starting Sat 9/23/2023, Normal      Blood Glucose Monitoring Suppl (ACCU-CHEK GUIDE) w/Device KIT Starting Tue 6/18/2019, Historical Med      buprenorphine-naloxone (Suboxone) 8-2 mg Place 1 Film (8 mg total) under the tongue 2 (two) times a day, Starting Mon 12/18/2023, Until Wed 1/17/2024, Normal      glucose blood (Accu-Chek Guide) test strip Use 1 each 3 (three) times a day Test as directed, Starting Fri 9/30/2022, Normal      Insulin Pen Needle (BD Pen Needle Cely 2nd Gen) 32G X 4 MM MISC Inject under the skin daily, Starting Thu 4/28/2022, Normal      ketotifen (ZADITOR) 0.025 % ophthalmic solution Administer 1 drop to both eyes 2 (two) times a day, Starting Sat 6/17/2023, Print      Lantus SoloStar 100 units/mL SOPN INJECT 0.1 ML (10 UNITS TOTAL) UNDER THE SKIN DAILY AT BEDTIME, Starting Thu 12/28/2023, Normal      lisinopril (ZESTRIL) 2.5 mg tablet Take 1 tablet (2.5 mg total) by mouth daily, Starting Tue 1/3/2023, Normal      metFORMIN (GLUCOPHAGE) 1000 MG tablet Take 1 tablet (1,000 mg total) by mouth 2 (two) times a day with meals, Starting Wed  10/18/2023, Until Tue 1/16/2024, Normal      naloxone (NARCAN) 4 mg/0.1 mL nasal spray Administer 1 spray into a nostril. If no response after 2-3 minutes, give another dose in the other nostril using a new spray., Normal      naproxen (Naprosyn) 500 mg tablet Take 1 tablet (500 mg total) by mouth 2 (two) times a day with meals for 7 days, Starting u 11/10/2022, Until Thu 11/17/2022, Normal      tadalafil (CIALIS) 10 MG tablet Take 1 tablet (10 mg total) by mouth daily as needed for erectile dysfunction, Starting u 7/20/2023, Normal             No discharge procedures on file.    PDMP Review         Value Time User    PDMP Reviewed  Yes 12/18/2023  4:24 PM Harjit Ortega MD            ED Provider  Electronically Signed by             Darby Sarkar PA-C  01/13/24 0944

## 2024-01-11 NOTE — DISCHARGE INSTRUCTIONS
Follow-up with orthopedics.  Follow-up with PCP and or occupational medicine if you require work restrictions.  Return to ED for new or worsening symptoms as discussed.

## 2024-01-18 ENCOUNTER — OFFICE VISIT (OUTPATIENT)
Dept: FAMILY MEDICINE CLINIC | Facility: CLINIC | Age: 59
End: 2024-01-18

## 2024-01-18 VITALS
TEMPERATURE: 98.2 F | RESPIRATION RATE: 16 BRPM | HEIGHT: 67 IN | OXYGEN SATURATION: 95 % | HEART RATE: 92 BPM | WEIGHT: 174 LBS | BODY MASS INDEX: 27.31 KG/M2 | SYSTOLIC BLOOD PRESSURE: 160 MMHG | DIASTOLIC BLOOD PRESSURE: 80 MMHG

## 2024-01-18 DIAGNOSIS — M19.011 OSTEOARTHRITIS OF RIGHT SHOULDER, UNSPECIFIED OSTEOARTHRITIS TYPE: ICD-10-CM

## 2024-01-18 DIAGNOSIS — F11.20 OPIOID USE DISORDER, SEVERE, DEPENDENCE (HCC): Primary | ICD-10-CM

## 2024-01-18 DIAGNOSIS — Z72.0 TOBACCO ABUSE: ICD-10-CM

## 2024-01-18 PROCEDURE — 99214 OFFICE O/P EST MOD 30 MIN: CPT | Performed by: FAMILY MEDICINE

## 2024-01-18 PROCEDURE — 80305 DRUG TEST PRSMV DIR OPT OBS: CPT | Performed by: FAMILY MEDICINE

## 2024-01-18 PROCEDURE — 3077F SYST BP >= 140 MM HG: CPT | Performed by: FAMILY MEDICINE

## 2024-01-18 PROCEDURE — 99406 BEHAV CHNG SMOKING 3-10 MIN: CPT | Performed by: FAMILY MEDICINE

## 2024-01-18 PROCEDURE — 3079F DIAST BP 80-89 MM HG: CPT | Performed by: FAMILY MEDICINE

## 2024-01-18 RX ORDER — BUPRENORPHINE AND NALOXONE 8; 2 MG/1; MG/1
8 FILM, SOLUBLE BUCCAL; SUBLINGUAL 2 TIMES DAILY
Qty: 60 FILM | Refills: 0 | Status: SHIPPED | OUTPATIENT
Start: 2024-01-18 | End: 2024-02-17

## 2024-01-18 NOTE — ASSESSMENT & PLAN NOTE
Acute on chronic right shoulder pain  Reviewed prior Xray which showed osteoarthritis  He declined formal Physical therapy but we reviewed home exercises to perform on his own  Continue tylenol and topical NSAID  Consider cortisone injection in the future

## 2024-01-18 NOTE — PROGRESS NOTES
OUD MAINTENANCE NOTE    Tavon Villatoro 58 y.o. male MRN: [id number]  PCP: Harjit Ortega MD      Assessment and Plan    Opioid use disorder, severe, dependence (HCC)  -Medication management - patient is doing well on current daily dose of 8-2mg BID daily, which is continued today.  -UDS and confirmation ordered.  -PDMP reviewed  -UDS positive for Suboxone     Follow-up interval: every 1 month    This patient has not required re-commitment contracts while using Buprenorphine in this practice.    Osteoarthritis of right shoulder  Acute on chronic right shoulder pain  Reviewed prior Xray which showed osteoarthritis  He declined formal Physical therapy but we reviewed home exercises to perform on his own  Continue tylenol and topical NSAID  Consider cortisone injection in the future    Tobacco abuse  Smokes half a pack of cigarettes per day  Smoking cessation counseling provided to patient  Patient is not ready to quit smoking  We discussed various options for smoking cessation  Spent 3 minutes on tobacco cessation counseling            HPI:   Tavon Villatoro presents for buprenorphine/naloxone follow-up visit visit.  I have reviewed the prior induction visit, follow-up visits, and telephone encounters relevant to opiate use disorder (OUD) treatment. Patient reports worsening right shoulder pain for the past few days. He has a history of shoulder arthritis. He denies any recent fall or injury. He has been working a lot at his job which is flaring up his shoulder pain. He was recently in the emergency room. He has been applying topical Voltaren gel which helps a little with symptoms control. He cannot go to physical therapy due to his busy work schedule.      Current daily dose: 8-2 mg BID     Number weeks induction: since 4/21/2023     Current follow-up interval, in weeks: 4     UDS: positive for suboxone     Interval use history:     Opiate use: Clean since 4/3/2023  Alcohol daily use: none  Marijuana daily  use: none  Other drug use: none  Overdoses: No prior overdose  Current behavioral health provider: None- stable      Sleep:6 hours  Bowels: none  Tobacco: Smokes 1/2 PPD cigarettes per day  Patient denies allergy to Buprenorphine and Naloxone; denies history of liver failure, denies suicidal or homicidal ideation, denies seizure disorder, denies treatment for HIV, denies hypnotic sedative use, denies benzodiazapine use, denies abuse of other drugs, denies ethanol abuse.     Review of Systems   Constitutional:  Negative for appetite change, chills, diaphoresis, fatigue and fever.   HENT:  Negative for congestion.    Respiratory:  Negative for cough, shortness of breath and wheezing.    Cardiovascular:  Negative for chest pain and palpitations.   Gastrointestinal:  Negative for abdominal pain, constipation, diarrhea, nausea and vomiting.   Genitourinary:  Negative for dysuria, frequency, hematuria and urgency.   Musculoskeletal:  Positive for arthralgias.   Neurological:  Negative for dizziness, tremors, weakness and numbness.       Objective:     Vitals:    24 1710   BP: 160/80   Pulse: 92   Resp: 16   Temp: 98.2 °F (36.8 °C)   SpO2: 95%       COWS Scale:1     Resting HR: 1  0 = <80  1 =   2 = 101-120  4 = >120     Sweatin  0 = for no chills/flushing  1 = for subjective chills/flushing  2 = flushed or observable sweat on the face  3 =for beads of sweat on brow/face  4 = for sweat streaming of of face     Restlessness:0  0 = able to sit still  1 = subjective difficulty sitting still  3 = for frequent shifting or extraneous movement  5 = for unable to sit still for more than a few seconds      Pupil size:0  0 = pinpoint or normal  1 = for possibly larger than normal  2 = for moderately dilated  5 = for only iris rim visible      Bone/joint pain:0  0 = not present  1 = mild diffuse comfort  2 = sever diffuse aching  4 = objectively rubbing joints/muscles and obviously in pain     Runny nose/tearin  0 =  not present  1 = stuff nose/moist eyes  2 = nose running/tearing  4 = nose constantly running or tears streaming down cheeks     GI upset:0  0 = no GI symptoms  1 = stomach cramps  2 = nasuea or loose stools  3 = vomiting or diarrhea  5 = multiple episodes of vomiting or diarrhea     Tremor observation of outstretched hands:0  0 = no tremor  1= tremor felt but not observed  2= slight tremor observable  4= gross tremor or muscle twitching     Yawning 0  0= no yawning  1 = yawning once or twice during assessment  2 = yawing three or more time during assessment  4 = yawing several times a minute     Anxiety or irritability:0  0 = none  1 = patient reports increasing irritability or anxiousness  2 = patient obviously irritable/anxious  4 = patient so irritable/anxious that assessment is difficult     Gooseflesh:0  0 = skin is smooth  3 = piloerection of skin can be felt or seen  5 = prominent piloerection     TOTAL COWS SCORE:1 (If inducing in office, recommend delaying buprenorphine until COWS scale > 12 to avoid precipitated withdrawal)         Physical Exam:     Physical Exam  Constitutional:       General: He is not in acute distress.     Appearance: Normal appearance. He is well-developed. He is not ill-appearing, toxic-appearing or diaphoretic.   HENT:      Head: Normocephalic and atraumatic.      Right Ear: External ear normal.      Left Ear: External ear normal.      Nose: Nose normal.      Mouth/Throat:      Pharynx: No oropharyngeal exudate.   Eyes:      General: No scleral icterus.     Extraocular Movements: Extraocular movements intact.      Conjunctiva/sclera: Conjunctivae normal.      Pupils: Pupils are equal, round, and reactive to light.   Cardiovascular:      Rate and Rhythm: Normal rate and regular rhythm.      Heart sounds: Normal heart sounds. No murmur heard.     No friction rub. No gallop.   Pulmonary:      Effort: Pulmonary effort is normal. No respiratory distress.      Breath sounds: Normal  breath sounds. No wheezing or rales.   Abdominal:      General: Bowel sounds are normal. There is no distension.      Palpations: Abdomen is soft. There is no mass.      Tenderness: There is no abdominal tenderness.   Musculoskeletal:      Right shoulder: Tenderness and crepitus present. No swelling, deformity, effusion or bony tenderness. Decreased range of motion.      Left shoulder: No swelling, deformity, effusion, tenderness, bony tenderness or crepitus. Normal range of motion.   Skin:     General: Skin is warm.      Capillary Refill: Capillary refill takes less than 2 seconds.   Neurological:      General: No focal deficit present.      Mental Status: He is alert and oriented to person, place, and time.      Cranial Nerves: No cranial nerve deficit.      Motor: No weakness.      Gait: Gait normal.   Psychiatric:         Mood and Affect: Mood normal.         Behavior: Behavior normal.

## 2024-01-22 LAB
6MAM UR QL SCN: NEGATIVE NG/ML
ACCEPTABLE CREAT UR QL: 74 MG/DL
AMPHET UR QL SCN: NEGATIVE NG/ML
BARBITURATES UR QL SCN: NEGATIVE NG/ML
BENZODIAZ UR QL SCN: NEGATIVE NG/ML
BUPRENORPHINE UR QL CFM: ABNORMAL NG/ML
CANNABINOIDS UR QL SCN: NEGATIVE NG/ML
CARISOPRODOL UR QL: NEGATIVE NG/ML
COCAINE+BZE UR QL SCN: NEGATIVE NG/ML
ETHYL GLUCURONIDE UR QL SCN: NEGATIVE NG/ML
FENTANYL UR QL SCN: NEGATIVE NG/ML
GABAPENTIN SERPLBLD QL SCN: NEGATIVE UG/ML
METHADONE UR QL SCN: NEGATIVE NG/ML
NALOXONE UR CFM-MCNC: 274 NG/MG CREAT
NITRITE UR QL STRIP: NEGATIVE UG/ML
NORBUP/BUP RATIO: 3.21
NORBUPRENORPHINE UR CFM-MCNC: 520 NG/MG CREAT
NORBUPRENORPHINE/CREAT UR: 162 NG/MG CREAT
OPIATES UR QL SCN: NEGATIVE NG/ML
OXYCODONE+OXYMORPHONE UR QL SCN: NEGATIVE NG/ML
PCP UR QL SCN: NEGATIVE NG/ML
PROPOXYPH UR QL SCN: NEGATIVE NG/ML
SPECIMEN PH ACCEPTABLE UR: 6.9 (ref 4.5–8.9)
TAPENTADOL UR QL SCN: NEGATIVE NG/ML
TRAMADOL UR QL SCN: NEGATIVE NG/ML

## 2024-01-23 DIAGNOSIS — E11.9 TYPE 2 DIABETES MELLITUS WITHOUT COMPLICATION, WITH LONG-TERM CURRENT USE OF INSULIN (HCC): ICD-10-CM

## 2024-01-23 DIAGNOSIS — Z79.4 TYPE 2 DIABETES MELLITUS WITHOUT COMPLICATION, WITH LONG-TERM CURRENT USE OF INSULIN (HCC): ICD-10-CM

## 2024-02-19 ENCOUNTER — TELEPHONE (OUTPATIENT)
Dept: FAMILY MEDICINE CLINIC | Facility: CLINIC | Age: 59
End: 2024-02-19

## 2024-02-19 ENCOUNTER — OFFICE VISIT (OUTPATIENT)
Dept: FAMILY MEDICINE CLINIC | Facility: CLINIC | Age: 59
End: 2024-02-19

## 2024-02-19 VITALS
TEMPERATURE: 98 F | SYSTOLIC BLOOD PRESSURE: 130 MMHG | HEIGHT: 67 IN | HEART RATE: 79 BPM | OXYGEN SATURATION: 98 % | RESPIRATION RATE: 16 BRPM | BODY MASS INDEX: 27.62 KG/M2 | DIASTOLIC BLOOD PRESSURE: 70 MMHG | WEIGHT: 176 LBS

## 2024-02-19 DIAGNOSIS — Z79.4 TYPE 2 DIABETES MELLITUS WITHOUT COMPLICATION, WITH LONG-TERM CURRENT USE OF INSULIN (HCC): ICD-10-CM

## 2024-02-19 DIAGNOSIS — F11.20 OPIOID USE DISORDER, SEVERE, DEPENDENCE (HCC): Primary | ICD-10-CM

## 2024-02-19 DIAGNOSIS — E11.9 TYPE 2 DIABETES MELLITUS WITHOUT COMPLICATION, WITH LONG-TERM CURRENT USE OF INSULIN (HCC): ICD-10-CM

## 2024-02-19 DIAGNOSIS — I10 PRIMARY HYPERTENSION: ICD-10-CM

## 2024-02-19 PROCEDURE — 3075F SYST BP GE 130 - 139MM HG: CPT | Performed by: FAMILY MEDICINE

## 2024-02-19 PROCEDURE — 80305 DRUG TEST PRSMV DIR OPT OBS: CPT | Performed by: FAMILY MEDICINE

## 2024-02-19 PROCEDURE — 3078F DIAST BP <80 MM HG: CPT | Performed by: FAMILY MEDICINE

## 2024-02-19 PROCEDURE — 80362 OPIOIDS & OPIATE ANALOGS 1/2: CPT | Performed by: FAMILY MEDICINE

## 2024-02-19 PROCEDURE — 80348 DRUG SCREENING BUPRENORPHINE: CPT | Performed by: FAMILY MEDICINE

## 2024-02-19 PROCEDURE — 80307 DRUG TEST PRSMV CHEM ANLYZR: CPT | Performed by: FAMILY MEDICINE

## 2024-02-19 PROCEDURE — 99214 OFFICE O/P EST MOD 30 MIN: CPT | Performed by: FAMILY MEDICINE

## 2024-02-19 RX ORDER — BUPRENORPHINE AND NALOXONE 8; 2 MG/1; MG/1
8 FILM, SOLUBLE BUCCAL; SUBLINGUAL 2 TIMES DAILY
Qty: 60 FILM | Refills: 0 | Status: SHIPPED | OUTPATIENT
Start: 2024-02-19 | End: 2024-02-20

## 2024-02-19 NOTE — ASSESSMENT & PLAN NOTE
Lab Results   Component Value Date    HGBA1C 6.6 (A) 10/18/2023   Stable  Ambulatory blood sugar readings in the low 100s  Continue metformin  Patient admits that he is not compliant with metformin  Stressed the importance of medication adherence  Continue statin

## 2024-02-19 NOTE — PROGRESS NOTES
OUD MAINTENANCE NOTE    Tavon Villatoro 58 y.o. male MRN: [id number]  PCP: Harjit Ortega MD      Assessment and Plan    Opioid use disorder, severe, dependence (HCC)  -Medication management - patient is doing well on current daily dose of 8-2mg BID daily, which is continued today.  -UDS and confirmation ordered.  -PDMP reviewed  -UDS positive for Suboxone     Follow-up interval: every 1 month    This patient has not required re-commitment contracts while using Buprenorphine in this practice.    Primary hypertension  Well-controlled  At goal of less than 140/90  Continue lisinopril    Type 2 diabetes mellitus without complication, with long-term current use of insulin (HCC)    Lab Results   Component Value Date    HGBA1C 6.6 (A) 10/18/2023   Stable  Ambulatory blood sugar readings in the low 100s  Continue metformin  Patient admits that he is not compliant with metformin  Stressed the importance of medication adherence  Continue statin              HPI:   Tavon Villatoro presents for buprenorphine/naloxone follow-up visit visit.  I have reviewed the prior induction visit, follow-up visits, and telephone encounters relevant to opiate use disorder (OUD) treatment.      He does not have insurance at the moment. He is going to meet with our  for assistance with insurance coverage.       Current daily dose: 8-2 mg BID     Number weeks induction: since 4/21/2023     Current follow-up interval, in weeks: 4     UDS: positive for suboxone     Interval use history:     Opiate use: Clean since 4/3/2023  Alcohol daily use: none  Marijuana daily use: none  Other drug use: none  Overdoses: No prior overdose  Current behavioral health provider: None- stable      Sleep:5-6 hours  Bowels: none  Tobacco: Smokes 1/2 PPD cigarettes per day  Patient denies allergy to Buprenorphine and Naloxone; denies history of liver failure, denies suicidal or homicidal ideation, denies seizure disorder, denies treatment  for HIV, denies hypnotic sedative use, denies benzodiazapine use, denies abuse of other drugs, denies ethanol abuse.     Review of Systems   Constitutional:  Negative for appetite change, chills, diaphoresis, fatigue and fever.   HENT:  Negative for congestion.    Respiratory:  Negative for cough, shortness of breath and wheezing.    Cardiovascular:  Negative for chest pain and palpitations.   Gastrointestinal:  Negative for abdominal pain, constipation, diarrhea, nausea and vomiting.   Genitourinary:  Negative for dysuria, frequency, hematuria and urgency.   Musculoskeletal:  Negative for arthralgias.   Neurological:  Negative for dizziness, tremors, weakness and numbness.       Objective:     Vitals:    24 1628   BP: 130/70   Pulse: 79   Resp: 16   Temp: 98 °F (36.7 °C)   SpO2: 98%         COWS Scale:0     Resting HR: 0  0 = <80  1 =   2 = 101-120  4 = >120     Sweatin  0 = for no chills/flushing  1 = for subjective chills/flushing  2 = flushed or observable sweat on the face  3 =for beads of sweat on brow/face  4 = for sweat streaming of of face     Restlessness:0  0 = able to sit still  1 = subjective difficulty sitting still  3 = for frequent shifting or extraneous movement  5 = for unable to sit still for more than a few seconds      Pupil size:0  0 = pinpoint or normal  1 = for possibly larger than normal  2 = for moderately dilated  5 = for only iris rim visible      Bone/joint pain:0  0 = not present  1 = mild diffuse comfort  2 = sever diffuse aching  4 = objectively rubbing joints/muscles and obviously in pain     Runny nose/tearin  0 = not present  1 = stuff nose/moist eyes  2 = nose running/tearing  4 = nose constantly running or tears streaming down cheeks     GI upset:0  0 = no GI symptoms  1 = stomach cramps  2 = nasuea or loose stools  3 = vomiting or diarrhea  5 = multiple episodes of vomiting or diarrhea     Tremor observation of outstretched hands:0  0 = no tremor  1= tremor  felt but not observed  2= slight tremor observable  4= gross tremor or muscle twitching     Yawning 0  0= no yawning  1 = yawning once or twice during assessment  2 = yawing three or more time during assessment  4 = yawing several times a minute     Anxiety or irritability:0  0 = none  1 = patient reports increasing irritability or anxiousness  2 = patient obviously irritable/anxious  4 = patient so irritable/anxious that assessment is difficult     Gooseflesh:0  0 = skin is smooth  3 = piloerection of skin can be felt or seen  5 = prominent piloerection     TOTAL COWS SCORE:0 (If inducing in office, recommend delaying buprenorphine until COWS scale > 12 to avoid precipitated withdrawal)         Physical Exam:     Physical Exam  Constitutional:       General: He is not in acute distress.     Appearance: Normal appearance. He is well-developed. He is not ill-appearing, toxic-appearing or diaphoretic.   HENT:      Head: Normocephalic and atraumatic.      Right Ear: External ear normal.      Left Ear: External ear normal.      Nose: Nose normal.      Mouth/Throat:      Pharynx: No oropharyngeal exudate.   Eyes:      General: No scleral icterus.        Right eye: No discharge.         Left eye: No discharge.      Extraocular Movements: Extraocular movements intact.      Conjunctiva/sclera: Conjunctivae normal.      Pupils: Pupils are equal, round, and reactive to light.   Cardiovascular:      Rate and Rhythm: Normal rate and regular rhythm.      Heart sounds: Normal heart sounds. No murmur heard.     No friction rub. No gallop.   Pulmonary:      Effort: Pulmonary effort is normal. No respiratory distress.      Breath sounds: Normal breath sounds. No stridor. No wheezing.   Abdominal:      General: Bowel sounds are normal. There is no distension.      Palpations: Abdomen is soft.      Tenderness: There is no abdominal tenderness. There is no guarding.   Musculoskeletal:         General: Normal range of motion.       Cervical back: Normal range of motion.      Right lower leg: No edema.      Left lower leg: No edema.   Skin:     General: Skin is warm.      Capillary Refill: Capillary refill takes less than 2 seconds.   Neurological:      Mental Status: He is alert and oriented to person, place, and time.      Cranial Nerves: No cranial nerve deficit.      Motor: No weakness.      Gait: Gait normal.   Psychiatric:         Mood and Affect: Mood normal.         Behavior: Behavior normal.

## 2024-02-19 NOTE — TELEPHONE ENCOUNTER
Patient came in after visit because he went to pharmacy to get medication. However, because he is uninsured at this time, they are requiring patient to pay. I informed the patient that at this time pcp was not available and I could only send a message. Patient was upset and stated this could cause him to relapse. Please advise.

## 2024-02-20 ENCOUNTER — PATIENT OUTREACH (OUTPATIENT)
Dept: FAMILY MEDICINE CLINIC | Facility: CLINIC | Age: 59
End: 2024-02-20

## 2024-02-20 ENCOUNTER — TELEPHONE (OUTPATIENT)
Dept: FAMILY MEDICINE CLINIC | Facility: CLINIC | Age: 59
End: 2024-02-20

## 2024-02-20 DIAGNOSIS — Z59.6 PATIENT CANNOT AFFORD MEDICATIONS: Primary | ICD-10-CM

## 2024-02-20 DIAGNOSIS — Z59.89 INSURANCE COVERAGE PROBLEMS: ICD-10-CM

## 2024-02-20 DIAGNOSIS — F11.20 OPIOID USE DISORDER, SEVERE, DEPENDENCE (HCC): Primary | ICD-10-CM

## 2024-02-20 SDOH — ECONOMIC STABILITY - INCOME SECURITY: OTHER PROBLEMS RELATED TO HOUSING AND ECONOMIC CIRCUMSTANCES: Z59.89

## 2024-02-20 SDOH — ECONOMIC STABILITY - INCOME SECURITY: LOW INCOME: Z59.6

## 2024-02-20 NOTE — PROGRESS NOTES
CHAU GOVEA received referral requesting assistance for pt as he reported that he is not able to afford his medications, especially his Suboxone and was fearful of relapse.     CHAU GOVEA completed chart review and it was noted that pt was connected with the clinic Financial Counselors and pt was approved for [facility] (2/15/2024 - 2/15/2025). It was also determined that pt should be a good candidate for MAWD. CHAU GOVEA sent teams message to [facility] to verify that pt is a good candidate.     CHAU GOVEA attempted to call pt today to compete assessment, however, the pt's phone was not accepting calls at this time. CHAU GOVEA will be available for any additional needs as requested.

## 2024-02-20 NOTE — PROGRESS NOTES
CHAU GOVEA in-person consult from [facility], that pt was on the phone returning CHAU Coronel call but could not get a hold of the CHAU GOVEA. Pt asking for help to get his Suboxone.   Per chart review CHAU Coronel is trying to connect pt with the FC to assist pt apply for MAWD.   Per Josefa, pt has a job and might be able to pay for it. CHAU RESENDIZ is aware there is a genetic brand that pt could get for a cheaper price. CHAU GOVEA do not remember the name if the generic brand. CHAU GOVEA placed call to [facility] pharmacy. CHAU GOVEA was informs pt could get Zubsolb with a coupon for free for 30 days supply. CHAU GOVEA informs pt could uses the coupon for one time only for a life time. CHAU villasenor informs 2 scripts of 15 days need to be sent to the pharmacy. CHAU GOVEA verbalized understanding.     Josefa states she will ask the Provider to send the script and will also follow-up with the pt. CHAU GOVEA thanked Josefa for her help.   CHAU GOVEA route note to CHAU Coronel so she could continue to follow-up.

## 2024-02-20 NOTE — TELEPHONE ENCOUNTER
HEALTH-SUSTAINING MEDICATION ASSESSMENT form received on 2/20/2024  to be completed by PCP. Copy made and placed in PCP folder.    Forms to be delivered to PCP mailbox at assigned time.       -->500 on admission, 4 units aspart IV given in ED  --SSI prn initiated  --spot dose SQ insulin and q2h accuchecks until BG normalizes

## 2024-02-20 NOTE — PROGRESS NOTES
Medication Patient Assistance Program (MPAP) Specialist  received IB message from provider requesting patient assistance with Suboxone. MPAP specialist reviewed patient chart prior to outreach. Suboxone is not currently within MPAP specialist's scope at this time. MPAP specialist will close referral to Medication Patient Assistance  at this time.

## 2024-02-21 ENCOUNTER — PATIENT OUTREACH (OUTPATIENT)
Dept: FAMILY MEDICINE CLINIC | Facility: CLINIC | Age: 59
End: 2024-02-21

## 2024-02-21 ENCOUNTER — TELEPHONE (OUTPATIENT)
Dept: FAMILY MEDICINE CLINIC | Facility: CLINIC | Age: 59
End: 2024-02-21

## 2024-02-21 DIAGNOSIS — F11.20 OPIOID USE DISORDER, SEVERE, DEPENDENCE (HCC): ICD-10-CM

## 2024-02-21 NOTE — PROGRESS NOTES
CHAU GOVEA received incoming call from Yulisa () reporting that she heard from pt and he stated that when he went to  his medications he was told he could not get them as he had already received the free supply. She stated that pt informed her that he should be free around 1300/1330 at work and he could receive an update at that time.     CHAU GOVEA informed Yulisa that CHAU GOVEA will outreach pt at that time.     CHAU GOVEA attempted to call pt at 1300, however, CHAU GOVEA was not able to speak to pt or leave a voicemail.     CHAU GOVEA will be available for any additional needs as requested.    Addendum    CHAU GOVEA made several attempts to call pt between hrs of 1300 and 1400. However, CHAU JEFERSON received same unable to reach message and was not able to get into contact with pt.     CHAU JEFERSON will be available for any additional needs as requested.    Addendum     CHAU JEFERSON was able to speak with pt and introduced self and role. Pt expressed frustration that he has not had his Suboxone for two days now. CHAU GOVEA explained that CHAU GOVEA will attempt to see if [facility] could assist with covering the cost. Pt expressed frustration that even though he has been apart of the program we did not have any on supply to provide pt with. He shared that he feels like he is going trough withdrawal and he had to leave work early and does not think he will be able to make it in tomorrow so now he will out of income. CHAU GOVEA explained that we are doing the best we can to assist pt with the resources that we have available. CHAU GOVEA encouraged pt to got to the ED if he feels ill. Pt expressed understanding.      CHAU GOVEA completed assessment with pt he reported that he is working. He stated that he gets $18 hr and works 35-40 hrs a week. He reported that he lives in a recovery house and pays $175 a week. Pt denies SNAP benefits. Denies food insecurity.  Pt reports that he takes the bus. He denies OP MH pt reported that he does not want to get connected  to services as he was with them all his life.     Pt denies any social supports in the area. He reported an older son in [place] and stated that he has a 6 yo son that he sees weekly and pays $119 a week in child support for.     Pt reported that he did have MA but he is not sure why he lost it if it was due to his increase in income to due to him missing completing paperwork.     CHAU GOVEA reached out to provider to request scripts be sent over to [facility] to price check. CHAU GOVEA contacted pharmacy and made aware that generic suboxone would be the cheapest option. CHAU GOVEA updated provider accordingly.     CHAU GOVEA will continue to be available for any additional needs as requested.

## 2024-02-21 NOTE — TELEPHONE ENCOUNTER
I spoke with Dr. Ortega in regards of placing 2 separate with a 15 day treatment which equals to a 30 day supply as per pharmacist from [facility]. Scripts were placed and pt was informed.       [facility] pharmacy only offers a 1 time free treatment for suboxone.     Pt already had the help from [facility] pharmacy for treatment In the past, which pt wasn't able to get.     Pt is without ins at this moment, but our team is working really hard to help pt with medication.     2/21/24, pt contacted office again today in regards of fearing of relapsing due to not having the treatment. I spoke with narayan RITCHIE and informed her that pt is expecting a phone call from 1-130 due to his job schedule. Narayan RITCHIE is working on a plan for pt.

## 2024-02-22 ENCOUNTER — PATIENT OUTREACH (OUTPATIENT)
Dept: FAMILY MEDICINE CLINIC | Facility: CLINIC | Age: 59
End: 2024-02-22

## 2024-02-22 DIAGNOSIS — F11.20 OPIOID USE DISORDER, SEVERE, DEPENDENCE (HCC): Primary | ICD-10-CM

## 2024-02-22 DIAGNOSIS — F11.20 OPIOID USE DISORDER, SEVERE, DEPENDENCE (HCC): ICD-10-CM

## 2024-02-22 RX ORDER — BUPRENORPHINE AND NALOXONE 8; 2 MG/1; MG/1
8 FILM, SOLUBLE BUCCAL; SUBLINGUAL 2 TIMES DAILY
Qty: 60 FILM | Refills: 0 | Status: SHIPPED | OUTPATIENT
Start: 2024-02-22 | End: 2024-03-23

## 2024-02-22 RX ORDER — BUPRENORPHINE AND NALOXONE 8; 2 MG/1; MG/1
8 FILM, SOLUBLE BUCCAL; SUBLINGUAL 2 TIMES DAILY
Qty: 60 FILM | Refills: 0 | Status: SHIPPED | OUTPATIENT
Start: 2024-02-22 | End: 2024-02-22 | Stop reason: SDUPTHER

## 2024-02-22 NOTE — PROGRESS NOTES
CHAU GOVEA spoke to provider and medications where sent to the pharmacy, however, upon further inspection the medications where sent to the wrong pharmacy. CHAU GOVEA updated provider an medications where sent to  Pharmacy.     CHAU GOVEA completed price check with pharmacy and was made aware that the cost of medications where $121.50 for a 30 day supply. CHAU GOVEA sent email to Karen from [facility] requesting assistance with covering the cost of the medications.     CHAU GOVEA will continue to be available for any additional needs as requested.      Addendum    CHAU GOVEA was made aware by Karen from [facility] that pt has been approved for assistance. [facility]AU GOVEA called  [facility] and was made aware that pt could come and pick medication up at 1400.     CHAU GOVEA made multiple attempts to reach pt, however, the call was not able to be connected and CHAU GOVEA was unable to leave a voicemail. CHAU GOVEA called pt's emergency contact and was unable to speak to them as well.     CHAU GOVEA will continue to be available for any additional needs as requested.

## 2024-02-23 ENCOUNTER — PATIENT OUTREACH (OUTPATIENT)
Dept: FAMILY MEDICINE CLINIC | Facility: CLINIC | Age: 59
End: 2024-02-23

## 2024-02-23 DIAGNOSIS — Z79.4 TYPE 2 DIABETES MELLITUS WITHOUT COMPLICATION, WITH LONG-TERM CURRENT USE OF INSULIN (HCC): ICD-10-CM

## 2024-02-23 DIAGNOSIS — E11.9 TYPE 2 DIABETES MELLITUS WITHOUT COMPLICATION, WITH LONG-TERM CURRENT USE OF INSULIN (HCC): ICD-10-CM

## 2024-02-23 RX ORDER — ATORVASTATIN CALCIUM 20 MG/1
20 TABLET, FILM COATED ORAL
Qty: 90 TABLET | Refills: 1 | Status: SHIPPED | OUTPATIENT
Start: 2024-02-23

## 2024-02-23 NOTE — PROGRESS NOTES
CHAU GOVEA made multiple attempts to reach pt via phone today to notify him that his medication are available at  Pharmacy for , however, pt's phone not accepting calls at this time and CHAU GOVEA unable to leave a message. CHAU GOVEA called pt's emergency contact, however, the call went to voicemail. CHAU GOVEA left a generic message to pt's emergency contact requesting a return call.    CHAU GOVEA routed note to clerical to inform them that pt's medications are availble if pt comes into office.    CHAU GOVEA will continue to be available for any additional needs as requested.

## 2024-02-25 LAB
6MAM UR QL SCN: NEGATIVE NG/ML
ACCEPTABLE CREAT UR QL: 114 MG/DL
AMPHET UR QL SCN: NEGATIVE NG/ML
BARBITURATES UR QL SCN: NEGATIVE NG/ML
BENZODIAZ UR QL SCN: NEGATIVE NG/ML
BUPRENORPHINE UR QL CFM: ABNORMAL NG/ML
CANNABINOIDS UR QL SCN: NEGATIVE NG/ML
CARISOPRODOL UR QL: NEGATIVE NG/ML
COCAINE+BZE UR QL SCN: NEGATIVE NG/ML
ETHYL GLUCURONIDE UR QL SCN: NEGATIVE NG/ML
FENTANYL UR QL SCN: NEGATIVE NG/ML
GABAPENTIN SERPLBLD QL SCN: NEGATIVE UG/ML
METHADONE UR QL SCN: NEGATIVE NG/ML
NALOXONE UR CFM-MCNC: 302 NG/MG CREAT
NITRITE UR QL STRIP: NEGATIVE UG/ML
NORBUP/BUP RATIO: 2.78
NORBUPRENORPHINE UR CFM-MCNC: 249 NG/MG CREAT
NORBUPRENORPHINE/CREAT UR: 89 NG/MG CREAT
OPIATES UR QL SCN: NEGATIVE NG/ML
OXYCODONE+OXYMORPHONE UR QL SCN: NEGATIVE NG/ML
PCP UR QL SCN: NEGATIVE NG/ML
PROPOXYPH UR QL SCN: NEGATIVE NG/ML
SPECIMEN PH ACCEPTABLE UR: 5.9 (ref 4.5–8.9)
TAPENTADOL UR QL SCN: NEGATIVE NG/ML
TRAMADOL UR QL SCN: NEGATIVE NG/ML

## 2024-02-27 ENCOUNTER — PATIENT OUTREACH (OUTPATIENT)
Dept: FAMILY MEDICINE CLINIC | Facility: CLINIC | Age: 59
End: 2024-02-27

## 2024-02-27 NOTE — LETTER
[address] [place] [address], [facility] [address]  [facility] [id number] [phone]    Re: Social Work Outreach   2/27/2024       Dear Tavon,    Please be advised that you where approved for funding to cover the cost of your medications for this month under the [employer]. Please be aware that it is a one time assistance. If you have any questions or need further assistance please feel free to call me at [phone].    Sincerely,         ELYSIA Herbert

## 2024-02-27 NOTE — PROGRESS NOTES
CHAU GOVEA received incoming call from pt. Pt reported that his phone has been giving problems. CHAU GOVEA informed pt that his medication are available for  at  pharmacy and if he reported that he will come to the pharmacy after work to pick it up.     CHAU GOVEA encouraged pt to contact CHAU GOVEA if any issues come up. Pt expressed understanding. CHAU GOVEA inquired if there was a better emergency contact and pt stated that his Manager at [employer] [facility] Kyler [phone].     CHAU GOVEA also informed pt that he will need to continue to work with FC to apply for MAWD. Pt expressed understanding and stated that he needs proof of address. CHAU GOVEA informed him that CHAU GOVEA can write a letter to pt and verified address. CHAU GOVEA also informed pt that he will need to save the $122 to cover the cost of the medication for next month as there was a chance his MAWD will not be approved on time. Pt expressed understanding and stated that he should be able to have funds available.     CHAU GOVEA will continue to be available for any additional needs as requested.

## 2024-03-06 ENCOUNTER — PATIENT OUTREACH (OUTPATIENT)
Dept: FAMILY MEDICINE CLINIC | Facility: CLINIC | Age: 59
End: 2024-03-06

## 2024-03-06 NOTE — PROGRESS NOTES
CHAU GOVEA completed chart review and MAWD application was submitted. CHAU GOVEA called pt to follow up with him, however, CHAU GOVEA was unable to speak with pt or leave a message.     CHAU GOVEA will continue to be available for any additional needs as requested.

## 2024-03-22 ENCOUNTER — OFFICE VISIT (OUTPATIENT)
Dept: FAMILY MEDICINE CLINIC | Facility: CLINIC | Age: 59
End: 2024-03-22

## 2024-03-22 VITALS
WEIGHT: 170 LBS | HEIGHT: 67 IN | OXYGEN SATURATION: 99 % | SYSTOLIC BLOOD PRESSURE: 120 MMHG | TEMPERATURE: 98 F | BODY MASS INDEX: 26.68 KG/M2 | RESPIRATION RATE: 16 BRPM | HEART RATE: 71 BPM | DIASTOLIC BLOOD PRESSURE: 68 MMHG

## 2024-03-22 DIAGNOSIS — F11.20 OPIOID USE DISORDER, SEVERE, DEPENDENCE (HCC): Primary | ICD-10-CM

## 2024-03-22 PROCEDURE — 80348 DRUG SCREENING BUPRENORPHINE: CPT

## 2024-03-22 PROCEDURE — 80307 DRUG TEST PRSMV CHEM ANLYZR: CPT

## 2024-03-22 PROCEDURE — 99213 OFFICE O/P EST LOW 20 MIN: CPT | Performed by: FAMILY MEDICINE

## 2024-03-22 PROCEDURE — 80362 OPIOIDS & OPIATE ANALOGS 1/2: CPT

## 2024-03-22 RX ORDER — BUPRENORPHINE AND NALOXONE 8; 2 MG/1; MG/1
8 FILM, SOLUBLE BUCCAL; SUBLINGUAL 2 TIMES DAILY
Qty: 60 FILM | Refills: 0 | Status: SHIPPED | OUTPATIENT
Start: 2024-03-22 | End: 2024-04-21

## 2024-03-22 RX ORDER — BUPRENORPHINE AND NALOXONE 8; 2 MG/1; MG/1
8 FILM, SOLUBLE BUCCAL; SUBLINGUAL 2 TIMES DAILY
Qty: 60 FILM | Refills: 0 | Status: SHIPPED | OUTPATIENT
Start: 2024-03-22 | End: 2024-03-22 | Stop reason: SDUPTHER

## 2024-03-22 RX ORDER — NALOXONE HYDROCHLORIDE 4 MG/.1ML
SPRAY NASAL
Qty: 1 EACH | Refills: 1 | Status: SHIPPED | OUTPATIENT
Start: 2024-03-22 | End: 2025-03-22

## 2024-03-22 NOTE — PROGRESS NOTES
Tavon Villatoro 59 y.o. male MRN: [id number]  PCP: Harjit Ortega MD      Assessment and Plan    Opioid use disorder, severe, dependence (HCC)  -Medication management - patient is doing well on current daily dose of 8-2mg BID daily, which is continued today.  -UDS and confirmation ordered.  -PDMP reviewed  -UDS positive for Suboxone     Follow-up interval: every 1 month     This patient has not required re-commitment contracts while using Buprenorphine in this practice.         HPI:   Tavon Villatoro presents for buprenorphine/naloxone follow-up visit visit.  I have reviewed the prior induction visit, follow-up visits, and telephone encounters relevant to opiate use disorder (OUD) treatment.    Current Daily Dose: 8-2 mg BID      Number Of Weeks Since Induction: 2023     Current follow-up interval, in weeks: 4    UDS: positive for Suboxone      Interval Use History:    Opiate use: Clean since 4/3/2023  Alcohol daily use: none  Marijuana daily use: none  Other drug use: none  Overdoses: No prior overdose  Current behavioral health provider: None- stable      Sleep: 6 hours   Bowels: regular BM    Tobacco:  Smokes 1/2 PPD cigarettes per day     Patient denies allergy to Buprenorphine and Naloxone; denies history of liver failure, denies suicidal or homicidal ideation, denies seizure disorder, denies treatment for HIV, denies hypnotic sedative use, denies benzodiazapine use, denies abuse of other drugs, denies ethanol abuse.     COWS Scale:    Resting HR: 0  0 = <80  1 =   2 = 101-120  4 = >120     Sweatin  0 = for no chills/flushing  1 = for subjective chills/flushing  2 = flushed or observable sweat on the face  3 =for beads of sweat on brow/face  4 = for sweat streaming of of face     Restlessness: 0  0 = able to sit still  1 = subjective difficulty sitting still  3 = for frequent shifting or extraneous movement  5 = for unable to sit still for more than a few seconds      Pupil Size: 0  0  = pinpoint or normal  1 = for possibly larger than normal  2 = for moderately dilated  5 = for only iris rim visible      Bone/Joint Pain: 0  0 = not present  1 = mild diffuse comfort  2 = sever diffuse aching  4 = objectively rubbing joints/muscles and obviously in pain     Runny Nose/Tearin  0 = not present  1 = stuff nose/moist eyes  2 = nose running/tearing  4 = nose constantly running or tears streaming down cheeks     GI Upset: 0  0 = no GI symptoms  1 = stomach cramps  2 = nausea or loose stools  3 = vomiting or diarrhea  5 = multiple episodes of vomiting or diarrhea     Tremor Observation of Outstretched Hands: 0  0 = no tremor  1= tremor felt but not observed  2= slight tremor observable  4= gross tremor or muscle twitching     Yawnin  0= no yawning  1 = yawning once or twice during assessment  2 = yawing three or more time during assessment  4 = yawing several times a minute     Anxiety or Irritability: 0  0 = none  1 = patient reports increasing irritability or anxiousness  2 = patient obviously irritable/anxious  4 = patient so irritable/anxious that assessment is difficult     Gooseflesh: 0  0 = skin is smooth  3 = piloerection of skin can be felt or seen  5 = prominent piloerection     Total COWS Score: 0      Review of Systems   Constitutional:  Negative for fatigue and fever.   HENT:  Negative for congestion, rhinorrhea, sinus pressure, sinus pain, sore throat and tinnitus.    Eyes:  Negative for visual disturbance.   Respiratory:  Negative for cough, chest tightness and shortness of breath.    Cardiovascular:  Negative for chest pain and leg swelling.   Gastrointestinal:  Negative for abdominal pain, constipation, diarrhea, nausea and vomiting.   Genitourinary:  Negative for difficulty urinating, frequency and urgency.   Musculoskeletal:  Negative for arthralgias.   Skin:  Negative for rash.   Neurological:  Negative for seizures, syncope, light-headedness and headaches.   All other systems  reviewed and are negative.      Objective:     Vitals:    03/22/24 1611   BP: 120/68   Pulse: 71   Resp: 16   Temp: 98 °F (36.7 °C)   SpO2: 99%       Physical Exam:     Physical Exam  Constitutional:       General: He is not in acute distress.     Appearance: He is normal weight. He is not ill-appearing or toxic-appearing.   HENT:      Head: Normocephalic and atraumatic.      Right Ear: External ear normal. There is no impacted cerumen.      Left Ear: External ear normal. There is no impacted cerumen.      Nose: Nose normal. No congestion or rhinorrhea.      Mouth/Throat:      Mouth: Mucous membranes are moist.      Pharynx: Oropharynx is clear. No posterior oropharyngeal erythema.   Eyes:      General: No scleral icterus.     Extraocular Movements: Extraocular movements intact.   Neck:      Vascular: No carotid bruit.   Cardiovascular:      Rate and Rhythm: Normal rate and regular rhythm.      Pulses: Normal pulses.      Heart sounds: Normal heart sounds. No murmur heard.     No friction rub.   Pulmonary:      Effort: Pulmonary effort is normal. No respiratory distress.      Breath sounds: Normal breath sounds. No wheezing.   Abdominal:      General: Abdomen is flat. Bowel sounds are normal. There is no distension.      Palpations: Abdomen is soft.      Tenderness: There is no abdominal tenderness.   Musculoskeletal:         General: Normal range of motion.      Cervical back: No rigidity.      Right lower leg: No edema.      Left lower leg: No edema.   Lymphadenopathy:      Cervical: No cervical adenopathy.   Skin:     General: Skin is warm.      Coloration: Skin is not jaundiced.      Findings: No erythema or rash.   Neurological:      General: No focal deficit present.      Mental Status: He is alert.   Psychiatric:         Mood and Affect: Mood normal.         David Walker MD

## 2024-03-25 LAB
6MAM UR QL SCN: NEGATIVE NG/ML
ACCEPTABLE CREAT UR QL: 133 MG/DL
AMPHET UR QL SCN: NEGATIVE NG/ML
BARBITURATES UR QL SCN: NEGATIVE NG/ML
BENZODIAZ UR QL SCN: NEGATIVE NG/ML
BUPRENORPHINE UR QL CFM: ABNORMAL NG/ML
CANNABINOIDS UR QL SCN: NEGATIVE NG/ML
CARISOPRODOL UR QL: NEGATIVE NG/ML
COCAINE+BZE UR QL SCN: NEGATIVE NG/ML
ETHYL GLUCURONIDE UR QL SCN: NEGATIVE NG/ML
FENTANYL UR QL SCN: NEGATIVE NG/ML
GABAPENTIN SERPLBLD QL SCN: NEGATIVE UG/ML
METHADONE UR QL SCN: NEGATIVE NG/ML
NALOXONE UR CFM-MCNC: 343 NG/MG CREAT
NITRITE UR QL STRIP: NEGATIVE UG/ML
NORBUP/BUP RATIO: 4.79
NORBUPRENORPHINE UR CFM-MCNC: 479 NG/MG CREAT
NORBUPRENORPHINE/CREAT UR: 100 NG/MG CREAT
OPIATES UR QL SCN: NEGATIVE NG/ML
OXYCODONE+OXYMORPHONE UR QL SCN: NEGATIVE NG/ML
PCP UR QL SCN: NEGATIVE NG/ML
PROPOXYPH UR QL SCN: NEGATIVE NG/ML
SPECIMEN PH ACCEPTABLE UR: 6.2 (ref 4.5–8.9)
TAPENTADOL UR QL SCN: NEGATIVE NG/ML
TRAMADOL UR QL SCN: NEGATIVE NG/ML

## 2024-03-28 ENCOUNTER — PATIENT OUTREACH (OUTPATIENT)
Dept: FAMILY MEDICINE CLINIC | Facility: CLINIC | Age: 59
End: 2024-03-28

## 2024-03-28 NOTE — PROGRESS NOTES
"CHAU GOVEA completed chart review to follow up on status of MAWD application and per guarantor account note by [employer] written on 3/28/24  \"Per compass status \"denied\" pt never submitted banking statements. Pt was informed certain documents would be requested and need to be submitted to local [employer].\"    CHAU GOVEA called pt to follow up with pt, however, the call went to voicemail. CHAU GOVEA left a message requesting a return call.     CHAU GOVEA will continue to be available for any additional needs as requested.    Addendum     CHAU GOVEA received return call from pt pt reported that when he went to  his medications he got the films instead of the pills and it was much more expensive. Pt asked if he could get the pills going forward. CHAU GOVEA routed note to provider requesting assistance.     CHAU GOVEA and pt discussed insurance. Pt reported that he received a letter from [place] stating that he was not approved and should enroll in [facility]. Pt reported that he did not know what [facility] was. CHAU GOVEA explained that [facility] was the [place] health insurance market place. CHAU GOVEA and pt discussed options for securing health insurance and following up with documents needed. Pt expressed understanding.     Pt reported that with his work schedule he would not be able to make it to the office till 5:30 pm. He reported that he gets a break at work at 9:00 AM, 11:00 AM and 1:00 PM so that would be the best time to call. CHAU GOVEA sent teams message to [employer] to see if there was any assistance that could be provided with deciding on next steps regarding her insurance.     CHAU GOVEA will continue to be available for any additional needs as requested.         "

## 2024-04-03 ENCOUNTER — PATIENT OUTREACH (OUTPATIENT)
Dept: OTHER | Facility: OTHER | Age: 59
End: 2024-04-03

## 2024-04-03 NOTE — PROGRESS NOTES
CHAU GOVEA called pt to follow up with the pt on applying for insurance. However, the call went to voicemail. CHAU GOVEA left  a message requesting a return call. CHAU GOVEA will continue to be available for any additional needs as requested.

## 2024-04-04 ENCOUNTER — PATIENT OUTREACH (OUTPATIENT)
Dept: FAMILY MEDICINE CLINIC | Facility: CLINIC | Age: 59
End: 2024-04-04

## 2024-04-04 NOTE — PROGRESS NOTES
CHAU GOVEA called pt to provide update and was able to speak to pt. CHAU GOVEA provided pt with update regarding his medications.     CHAU GOVEA also informed pt that he will need to re-apply for MAWD. CHAU GOVEA informed pt that he will need to submit financial information to [employer] at [email or web address]. Pt expressed understanding.    CHAU GOVEA will continue to be available for any additional needs as requested.

## 2024-04-17 ENCOUNTER — OFFICE VISIT (OUTPATIENT)
Dept: FAMILY MEDICINE CLINIC | Facility: CLINIC | Age: 59
End: 2024-04-17

## 2024-04-17 VITALS
HEART RATE: 62 BPM | OXYGEN SATURATION: 99 % | TEMPERATURE: 97.9 F | SYSTOLIC BLOOD PRESSURE: 120 MMHG | BODY MASS INDEX: 26.74 KG/M2 | DIASTOLIC BLOOD PRESSURE: 66 MMHG | RESPIRATION RATE: 18 BRPM | WEIGHT: 170.4 LBS | HEIGHT: 67 IN

## 2024-04-17 DIAGNOSIS — F11.20 OPIOID USE DISORDER, SEVERE, DEPENDENCE (HCC): ICD-10-CM

## 2024-04-17 DIAGNOSIS — I10 PRIMARY HYPERTENSION: ICD-10-CM

## 2024-04-17 DIAGNOSIS — Z23 ENCOUNTER FOR IMMUNIZATION: Primary | ICD-10-CM

## 2024-04-17 PROCEDURE — 99214 OFFICE O/P EST MOD 30 MIN: CPT | Performed by: FAMILY MEDICINE

## 2024-04-17 PROCEDURE — 80348 DRUG SCREENING BUPRENORPHINE: CPT | Performed by: FAMILY MEDICINE

## 2024-04-17 PROCEDURE — 80362 OPIOIDS & OPIATE ANALOGS 1/2: CPT | Performed by: FAMILY MEDICINE

## 2024-04-17 PROCEDURE — 80305 DRUG TEST PRSMV DIR OPT OBS: CPT | Performed by: FAMILY MEDICINE

## 2024-04-17 PROCEDURE — 80307 DRUG TEST PRSMV CHEM ANLYZR: CPT | Performed by: FAMILY MEDICINE

## 2024-04-17 RX ORDER — BUPRENORPHINE HYDROCHLORIDE AND NALOXONE HYDROCHLORIDE DIHYDRATE 8; 2 MG/1; MG/1
1 TABLET SUBLINGUAL 2 TIMES DAILY
Qty: 28 TABLET | Refills: 0 | Status: SHIPPED | OUTPATIENT
Start: 2024-04-17 | End: 2024-05-01

## 2024-04-17 NOTE — PROGRESS NOTES
OUD MAINTENANCE NOTE    Tavon Villatoro 59 y.o. male MRN: [id number]  PCP: Harjit Oretga MD      Assessment and Plan    Primary hypertension  At goal of less than 140/90  Continue lisinopril    Opioid use disorder, severe, dependence (HCC)  -Medication management - patient is doing well on current daily dose of 8-2mg BID daily, which is continued today.  -UDS and confirmation ordered.  -PDMP reviewed  -UDS positive for Suboxone  -Patient has been transitioned to Suboxone tablets instead of Suboxone films because he found the tablets to be more beneficial for him       Follow-up interval: every 2 weeks    This patient has not required re-commitment contracts while using Buprenorphine in this practice.                HPI:   Tavon Villatoro presents for buprenorphine/naloxone follow-up visit visit.  I have reviewed the prior induction visit, follow-up visits, and telephone encounters relevant to opiate use disorder (OUD) treatment.    Patient reports that he has been using the Suboxone films more than 2 times a day on some occasions.  Patient reports that Suboxone tablets were better for him. He is worried about the cost of this medication moving forward since he does not have insurance coverage at the moment    Current daily dose: 8-2 mg BID     Number weeks induction: since 4/21/2023     Current follow-up interval, in weeks: 2     UDS: positive for suboxone     Interval use history:     Opiate use: Clean since 4/3/2023  Alcohol daily use: none  Marijuana daily use: none  Other drug use: none  Overdoses: No prior overdose  Current behavioral health provider: None- stable      Sleep:6 hours  Bowels: none  Tobacco: Smokes 1/2 PPD cigarettes per day  Patient denies allergy to Buprenorphine and Naloxone; denies history of liver failure, denies suicidal or homicidal ideation, denies seizure disorder, denies treatment for HIV, denies hypnotic sedative use, denies benzodiazapine use, denies abuse of other drugs,  denies ethanol abuse.     Review of Systems   Constitutional:  Negative for appetite change, chills, diaphoresis, fatigue and fever.   HENT:  Negative for congestion.    Eyes:  Negative for visual disturbance.   Respiratory:  Negative for cough, shortness of breath and wheezing.    Cardiovascular:  Negative for chest pain and palpitations.   Gastrointestinal:  Negative for abdominal pain, constipation, diarrhea, nausea and vomiting.   Genitourinary:  Negative for dysuria, frequency, hematuria and urgency.   Skin:  Negative for rash.   Neurological:  Negative for seizures, syncope, weakness and headaches.       Objective:     Vitals:    24 1702   BP: 120/66   Pulse: 62   Resp: 18   Temp: 97.9 °F (36.6 °C)   SpO2: 99%           COWS Scale:0     Resting HR: 0  0 = <80  1 =   2 = 101-120  4 = >120     Sweatin  0 = for no chills/flushing  1 = for subjective chills/flushing  2 = flushed or observable sweat on the face  3 =for beads of sweat on brow/face  4 = for sweat streaming of of face     Restlessness:0  0 = able to sit still  1 = subjective difficulty sitting still  3 = for frequent shifting or extraneous movement  5 = for unable to sit still for more than a few seconds      Pupil size:0  0 = pinpoint or normal  1 = for possibly larger than normal  2 = for moderately dilated  5 = for only iris rim visible      Bone/joint pain:0  0 = not present  1 = mild diffuse comfort  2 = sever diffuse aching  4 = objectively rubbing joints/muscles and obviously in pain     Runny nose/tearin  0 = not present  1 = stuff nose/moist eyes  2 = nose running/tearing  4 = nose constantly running or tears streaming down cheeks     GI upset:0  0 = no GI symptoms  1 = stomach cramps  2 = nasuea or loose stools  3 = vomiting or diarrhea  5 = multiple episodes of vomiting or diarrhea     Tremor observation of outstretched hands:0  0 = no tremor  1= tremor felt but not observed  2= slight tremor observable  4= gross tremor or  muscle twitching     Yawning 0  0= no yawning  1 = yawning once or twice during assessment  2 = yawing three or more time during assessment  4 = yawing several times a minute     Anxiety or irritability:0  0 = none  1 = patient reports increasing irritability or anxiousness  2 = patient obviously irritable/anxious  4 = patient so irritable/anxious that assessment is difficult     Gooseflesh:0  0 = skin is smooth  3 = piloerection of skin can be felt or seen  5 = prominent piloerection     TOTAL COWS SCORE:0 (If inducing in office, recommend delaying buprenorphine until COWS scale > 12 to avoid precipitated withdrawal)         Physical Exam:     Physical Exam  Constitutional:       General: He is not in acute distress.     Appearance: Normal appearance. He is well-developed. He is not ill-appearing, toxic-appearing or diaphoretic.   HENT:      Head: Normocephalic and atraumatic.      Right Ear: External ear normal.      Left Ear: External ear normal.      Nose: Nose normal.      Mouth/Throat:      Pharynx: No oropharyngeal exudate.   Eyes:      General: No scleral icterus.        Right eye: No discharge.         Left eye: No discharge.      Extraocular Movements: Extraocular movements intact.      Conjunctiva/sclera: Conjunctivae normal.      Pupils: Pupils are equal, round, and reactive to light.   Cardiovascular:      Rate and Rhythm: Normal rate and regular rhythm.      Heart sounds: Normal heart sounds. No murmur heard.     No friction rub. No gallop.   Pulmonary:      Effort: Pulmonary effort is normal. No respiratory distress.      Breath sounds: Normal breath sounds. No wheezing.   Abdominal:      General: Bowel sounds are normal. There is no distension.      Palpations: Abdomen is soft.      Tenderness: There is no abdominal tenderness. There is no guarding.   Musculoskeletal:         General: Normal range of motion.      Cervical back: Normal range of motion.   Skin:     General: Skin is warm.      Capillary  Refill: Capillary refill takes less than 2 seconds.   Neurological:      Mental Status: He is alert and oriented to person, place, and time.      Cranial Nerves: No cranial nerve deficit.      Motor: No weakness.      Gait: Gait normal.   Psychiatric:         Mood and Affect: Mood normal.         Behavior: Behavior normal.

## 2024-04-17 NOTE — ASSESSMENT & PLAN NOTE
-Medication management - patient is doing well on current daily dose of 8-2mg BID daily, which is continued today.  -UDS and confirmation ordered.  -PDMP reviewed  -UDS positive for Suboxone  -Patient has been transitioned to Suboxone tablets instead of Suboxone films because he found the tablets to be more beneficial for him       Follow-up interval: every 2 weeks    This patient has not required re-commitment contracts while using Buprenorphine in this practice.

## 2024-04-22 LAB
6MAM UR QL SCN: NEGATIVE NG/ML
ACCEPTABLE CREAT UR QL: 87 MG/DL
AMPHET UR QL SCN: NEGATIVE NG/ML
BARBITURATES UR QL SCN: NEGATIVE NG/ML
BENZODIAZ UR QL SCN: NEGATIVE NG/ML
BUPRENORPHINE UR QL CFM: ABNORMAL NG/ML
CANNABINOIDS UR QL SCN: NEGATIVE NG/ML
CARISOPRODOL UR QL: NEGATIVE NG/ML
COCAINE+BZE UR QL SCN: NEGATIVE NG/ML
ETHYL GLUCURONIDE UR QL SCN: NEGATIVE NG/ML
FENTANYL UR QL SCN: NEGATIVE NG/ML
GABAPENTIN SERPLBLD QL SCN: NEGATIVE UG/ML
METHADONE UR QL SCN: NEGATIVE NG/ML
NALOXONE UR CFM-MCNC: 515 NG/MG CREAT
NITRITE UR QL STRIP: NEGATIVE UG/ML
NORBUP/BUP RATIO: 4.37
NORBUPRENORPHINE UR CFM-MCNC: 437 NG/MG CREAT
NORBUPRENORPHINE/CREAT UR: 100 NG/MG CREAT
OPIATES UR QL SCN: NEGATIVE NG/ML
OXYCODONE+OXYMORPHONE UR QL SCN: NEGATIVE NG/ML
PCP UR QL SCN: NEGATIVE NG/ML
PROPOXYPH UR QL SCN: NEGATIVE NG/ML
SPECIMEN PH ACCEPTABLE UR: 6.6 (ref 4.5–8.9)
TAPENTADOL UR QL SCN: NEGATIVE NG/ML
TRAMADOL UR QL SCN: NEGATIVE NG/ML

## 2024-04-24 DIAGNOSIS — E11.9 TYPE 2 DIABETES MELLITUS WITHOUT COMPLICATION, WITH LONG-TERM CURRENT USE OF INSULIN (HCC): ICD-10-CM

## 2024-04-24 DIAGNOSIS — Z79.4 TYPE 2 DIABETES MELLITUS WITHOUT COMPLICATION, WITH LONG-TERM CURRENT USE OF INSULIN (HCC): ICD-10-CM

## 2024-05-01 ENCOUNTER — OFFICE VISIT (OUTPATIENT)
Dept: FAMILY MEDICINE CLINIC | Facility: CLINIC | Age: 59
End: 2024-05-01

## 2024-05-01 ENCOUNTER — PATIENT OUTREACH (OUTPATIENT)
Dept: FAMILY MEDICINE CLINIC | Facility: CLINIC | Age: 59
End: 2024-05-01

## 2024-05-01 VITALS
TEMPERATURE: 98.3 F | RESPIRATION RATE: 18 BRPM | OXYGEN SATURATION: 99 % | DIASTOLIC BLOOD PRESSURE: 69 MMHG | HEIGHT: 67 IN | BODY MASS INDEX: 26.06 KG/M2 | WEIGHT: 166 LBS | SYSTOLIC BLOOD PRESSURE: 129 MMHG | HEART RATE: 59 BPM

## 2024-05-01 DIAGNOSIS — Z23 ENCOUNTER FOR IMMUNIZATION: Primary | ICD-10-CM

## 2024-05-01 DIAGNOSIS — E11.9 TYPE 2 DIABETES MELLITUS WITHOUT COMPLICATION, WITH LONG-TERM CURRENT USE OF INSULIN (HCC): ICD-10-CM

## 2024-05-01 DIAGNOSIS — Z79.4 TYPE 2 DIABETES MELLITUS WITHOUT COMPLICATION, WITH LONG-TERM CURRENT USE OF INSULIN (HCC): ICD-10-CM

## 2024-05-01 DIAGNOSIS — F11.20 OPIOID USE DISORDER, SEVERE, DEPENDENCE (HCC): ICD-10-CM

## 2024-05-01 PROCEDURE — 82043 UR ALBUMIN QUANTITATIVE: CPT | Performed by: FAMILY MEDICINE

## 2024-05-01 PROCEDURE — 80362 OPIOIDS & OPIATE ANALOGS 1/2: CPT | Performed by: FAMILY MEDICINE

## 2024-05-01 PROCEDURE — 80348 DRUG SCREENING BUPRENORPHINE: CPT | Performed by: FAMILY MEDICINE

## 2024-05-01 PROCEDURE — 80305 DRUG TEST PRSMV DIR OPT OBS: CPT | Performed by: FAMILY MEDICINE

## 2024-05-01 PROCEDURE — 99213 OFFICE O/P EST LOW 20 MIN: CPT | Performed by: FAMILY MEDICINE

## 2024-05-01 PROCEDURE — 82570 ASSAY OF URINE CREATININE: CPT | Performed by: FAMILY MEDICINE

## 2024-05-01 PROCEDURE — 80307 DRUG TEST PRSMV CHEM ANLYZR: CPT | Performed by: FAMILY MEDICINE

## 2024-05-01 RX ORDER — BUPRENORPHINE HYDROCHLORIDE AND NALOXONE HYDROCHLORIDE DIHYDRATE 8; 2 MG/1; MG/1
1 TABLET SUBLINGUAL 2 TIMES DAILY
Qty: 28 TABLET | Refills: 0 | Status: SHIPPED | OUTPATIENT
Start: 2024-05-01 | End: 2024-05-15

## 2024-05-01 NOTE — PROGRESS NOTES
CHAU GOVEA called pt today to follow up with pt regarding applying for MAWD. However, the call went to voicemail. CHAU GOVEA left a message requesting a return call.     CHAU GOVEA will continue to be available for any additional needs as requested.

## 2024-05-01 NOTE — PROGRESS NOTES
OUD MAINTENANCE NOTE    Tavon Villatoro 59 y.o. male MRN: [id number]  PCP: Harjit Ortega MD      Assessment and Plan    Opioid use disorder, severe, dependence (HCC)  -Medication management - patient is doing well on current daily dose of 8-2mg BID daily, which is continued today.  -UDS and confirmation ordered.  -PDMP reviewed  -UDS positive for Suboxone       Follow-up interval: every 2 weeks    This patient has not required re-commitment contracts while using Buprenorphine in this practice.                  HPI:   Tavon Villatoro presents for buprenorphine/naloxone follow-up visit visit.  I have reviewed the prior induction visit, follow-up visits, and telephone encounters relevant to opiate use disorder (OUD) treatment.    Current daily dose: 8-2 mg BID     Number weeks induction: since 4/21/2023     Current follow-up interval, in weeks: 2     UDS: positive for suboxone     Interval use history:     Opiate use: Clean since 4/3/2023  Alcohol daily use: none  Marijuana daily use: none  Other drug use: none  Overdoses: No prior overdose  Current behavioral health provider: None- stable      Sleep:6 hours  Bowels: none  Tobacco: Smokes 1/2 PPD cigarettes per day  Patient denies allergy to Buprenorphine and Naloxone; denies history of liver failure, denies suicidal or homicidal ideation, denies seizure disorder, denies treatment for HIV, denies hypnotic sedative use, denies benzodiazapine use, denies abuse of other drugs, denies ethanol abuse.     Review of Systems   Constitutional:  Negative for appetite change, chills, diaphoresis, fatigue and fever.   HENT:  Negative for congestion.    Eyes:  Negative for visual disturbance.   Respiratory:  Negative for cough, shortness of breath and wheezing.    Cardiovascular:  Negative for chest pain and palpitations.   Gastrointestinal:  Negative for abdominal pain, constipation, diarrhea, nausea and vomiting.   Genitourinary:  Negative for dysuria, frequency,  hematuria and urgency.   Skin:  Negative for rash.   Neurological:  Negative for seizures, syncope, weakness and headaches.       Objective:     Vitals:    24 1717   BP: 129/69   Pulse: 59   Resp: 18   Temp: 98.3 °F (36.8 °C)   SpO2: 99%             COWS Scale:0     Resting HR: 0  0 = <80  1 =   2 = 101-120  4 = >120     Sweatin  0 = for no chills/flushing  1 = for subjective chills/flushing  2 = flushed or observable sweat on the face  3 =for beads of sweat on brow/face  4 = for sweat streaming of of face     Restlessness:0  0 = able to sit still  1 = subjective difficulty sitting still  3 = for frequent shifting or extraneous movement  5 = for unable to sit still for more than a few seconds      Pupil size:0  0 = pinpoint or normal  1 = for possibly larger than normal  2 = for moderately dilated  5 = for only iris rim visible      Bone/joint pain:0  0 = not present  1 = mild diffuse comfort  2 = sever diffuse aching  4 = objectively rubbing joints/muscles and obviously in pain     Runny nose/tearin  0 = not present  1 = stuff nose/moist eyes  2 = nose running/tearing  4 = nose constantly running or tears streaming down cheeks     GI upset:0  0 = no GI symptoms  1 = stomach cramps  2 = nasuea or loose stools  3 = vomiting or diarrhea  5 = multiple episodes of vomiting or diarrhea     Tremor observation of outstretched hands:0  0 = no tremor  1= tremor felt but not observed  2= slight tremor observable  4= gross tremor or muscle twitching     Yawning 0  0= no yawning  1 = yawning once or twice during assessment  2 = yawing three or more time during assessment  4 = yawing several times a minute     Anxiety or irritability:0  0 = none  1 = patient reports increasing irritability or anxiousness  2 = patient obviously irritable/anxious  4 = patient so irritable/anxious that assessment is difficult     Gooseflesh:0  0 = skin is smooth  3 = piloerection of skin can be felt or seen  5 = prominent  piloerection     TOTAL COWS SCORE:0 (If inducing in office, recommend delaying buprenorphine until COWS scale > 12 to avoid precipitated withdrawal)         Physical Exam:     Physical Exam  Constitutional:       General: He is not in acute distress.     Appearance: Normal appearance. He is well-developed. He is not ill-appearing, toxic-appearing or diaphoretic.   HENT:      Head: Normocephalic and atraumatic.      Right Ear: External ear normal.      Left Ear: External ear normal.      Nose: Nose normal.      Mouth/Throat:      Pharynx: No oropharyngeal exudate.   Eyes:      General: No scleral icterus.        Right eye: No discharge.         Left eye: No discharge.      Extraocular Movements: Extraocular movements intact.      Conjunctiva/sclera: Conjunctivae normal.      Pupils: Pupils are equal, round, and reactive to light.   Cardiovascular:      Rate and Rhythm: Normal rate and regular rhythm.      Heart sounds: Normal heart sounds. No murmur heard.     No friction rub. No gallop.   Pulmonary:      Effort: Pulmonary effort is normal. No respiratory distress.      Breath sounds: Normal breath sounds. No wheezing.   Abdominal:      General: Bowel sounds are normal. There is no distension.      Palpations: Abdomen is soft.      Tenderness: There is no abdominal tenderness. There is no guarding.   Musculoskeletal:         General: Normal range of motion.      Cervical back: Normal range of motion.   Skin:     General: Skin is warm.      Capillary Refill: Capillary refill takes less than 2 seconds.   Neurological:      Mental Status: He is alert and oriented to person, place, and time.      Cranial Nerves: No cranial nerve deficit.      Motor: No weakness.      Gait: Gait normal.   Psychiatric:         Mood and Affect: Mood normal.         Behavior: Behavior normal.

## 2024-05-02 LAB
CREAT UR-MCNC: 101 MG/DL
MICROALBUMIN UR-MCNC: 14.3 MG/L
MICROALBUMIN/CREAT 24H UR: 14 MG/G CREATININE (ref 0–30)

## 2024-05-02 NOTE — ASSESSMENT & PLAN NOTE
-Medication management - patient is doing well on current daily dose of 8-2mg BID daily, which is continued today.  -UDS and confirmation ordered.  -PDMP reviewed  -UDS positive for Suboxone       Follow-up interval: every 2 weeks    This patient has not required re-commitment contracts while using Buprenorphine in this practice.

## 2024-05-06 LAB
6MAM UR QL SCN: NEGATIVE NG/ML
ACCEPTABLE CREAT UR QL: 93 MG/DL
AMPHET UR QL SCN: NEGATIVE NG/ML
BARBITURATES UR QL SCN: NEGATIVE NG/ML
BENZODIAZ UR QL SCN: NEGATIVE NG/ML
BUPRENORPHINE UR QL CFM: ABNORMAL NG/ML
CANNABINOIDS UR QL SCN: NEGATIVE NG/ML
CARISOPRODOL UR QL: NEGATIVE NG/ML
COCAINE+BZE UR QL SCN: NEGATIVE NG/ML
ETHYL GLUCURONIDE UR QL SCN: NEGATIVE NG/ML
FENTANYL UR QL SCN: NEGATIVE NG/ML
GABAPENTIN SERPLBLD QL SCN: NEGATIVE UG/ML
METHADONE UR QL SCN: NEGATIVE NG/ML
NALOXONE UR CFM-MCNC: 409 NG/MG CREAT
NITRITE UR QL STRIP: NEGATIVE UG/ML
NORBUP/BUP RATIO: 7.76
NORBUPRENORPHINE UR CFM-MCNC: 601 NG/MG CREAT
NORBUPRENORPHINE/CREAT UR: 77 NG/MG CREAT
OPIATES UR QL SCN: NEGATIVE NG/ML
OXYCODONE+OXYMORPHONE UR QL SCN: NEGATIVE NG/ML
PCP UR QL SCN: NEGATIVE NG/ML
PROPOXYPH UR QL SCN: NEGATIVE NG/ML
SPECIMEN PH ACCEPTABLE UR: 6.1 (ref 4.5–8.9)
TAPENTADOL UR QL SCN: NEGATIVE NG/ML
TRAMADOL UR QL SCN: NEGATIVE NG/ML

## 2024-05-16 ENCOUNTER — PATIENT OUTREACH (OUTPATIENT)
Dept: FAMILY MEDICINE CLINIC | Facility: CLINIC | Age: 59
End: 2024-05-16

## 2024-05-16 ENCOUNTER — OFFICE VISIT (OUTPATIENT)
Dept: FAMILY MEDICINE CLINIC | Facility: CLINIC | Age: 59
End: 2024-05-16

## 2024-05-16 VITALS
DIASTOLIC BLOOD PRESSURE: 70 MMHG | HEIGHT: 67 IN | HEART RATE: 59 BPM | TEMPERATURE: 98.7 F | RESPIRATION RATE: 18 BRPM | OXYGEN SATURATION: 99 % | WEIGHT: 168 LBS | BODY MASS INDEX: 26.37 KG/M2 | SYSTOLIC BLOOD PRESSURE: 142 MMHG

## 2024-05-16 DIAGNOSIS — F11.20 OPIOID USE DISORDER, SEVERE, DEPENDENCE (HCC): Primary | ICD-10-CM

## 2024-05-16 PROCEDURE — 99213 OFFICE O/P EST LOW 20 MIN: CPT | Performed by: FAMILY MEDICINE

## 2024-05-16 PROCEDURE — 80362 OPIOIDS & OPIATE ANALOGS 1/2: CPT | Performed by: FAMILY MEDICINE

## 2024-05-16 PROCEDURE — 80305 DRUG TEST PRSMV DIR OPT OBS: CPT | Performed by: FAMILY MEDICINE

## 2024-05-16 PROCEDURE — 80307 DRUG TEST PRSMV CHEM ANLYZR: CPT | Performed by: FAMILY MEDICINE

## 2024-05-16 PROCEDURE — 80348 DRUG SCREENING BUPRENORPHINE: CPT | Performed by: FAMILY MEDICINE

## 2024-05-16 RX ORDER — BUPRENORPHINE HYDROCHLORIDE AND NALOXONE HYDROCHLORIDE DIHYDRATE 8; 2 MG/1; MG/1
1 TABLET SUBLINGUAL 2 TIMES DAILY
Qty: 28 TABLET | Refills: 0 | Status: SHIPPED | OUTPATIENT
Start: 2024-05-16 | End: 2024-05-30 | Stop reason: SDUPTHER

## 2024-05-16 NOTE — PROGRESS NOTES
OUD MAINTENANCE NOTE    Tavon Villatoro 59 y.o. male MRN: [id number]  PCP: Harjit Ortega MD      Assessment and Plan    Opioid use disorder, severe, dependence (HCC)  -Medication management - patient is doing well on current daily dose of 8-2mg BID daily, which is continued today.  -UDS and confirmation ordered.  -PDMP reviewed  -UDS positive for Suboxone       Follow-up interval: every 2 weeks    This patient has not required re-commitment contracts while using Buprenorphine in this practice.                    HPI:   Tavon Villatoro presents for buprenorphine/naloxone follow-up visit visit.  I have reviewed the prior induction visit, follow-up visits, and telephone encounters relevant to opiate use disorder (OUD) treatment.    Current daily dose: 8-2 mg BID     Number weeks induction: since 4/21/2023     Current follow-up interval, in weeks: 2     UDS: positive for suboxone     Interval use history:     Opiate use: Clean since 4/3/2023  Alcohol daily use: none  Marijuana daily use: none  Other drug use: none  Overdoses: No prior overdose  Current behavioral health provider: None- stable      Sleep:6 hours  Bowels: none  Tobacco: Smokes 1/2 PPD cigarettes per day  Patient denies allergy to Buprenorphine and Naloxone; denies history of liver failure, denies suicidal or homicidal ideation, denies seizure disorder, denies treatment for HIV, denies hypnotic sedative use, denies benzodiazapine use, denies abuse of other drugs, denies ethanol abuse.     Review of Systems   Constitutional:  Negative for appetite change, chills, diaphoresis, fatigue and fever.   HENT:  Negative for congestion.    Eyes:  Negative for visual disturbance.   Respiratory:  Negative for cough, shortness of breath and wheezing.    Cardiovascular:  Negative for chest pain and palpitations.   Gastrointestinal:  Negative for abdominal pain, constipation, diarrhea, nausea and vomiting.   Genitourinary:  Negative for dysuria, frequency,  hematuria and urgency.   Skin:  Negative for rash.   Neurological:  Negative for seizures, syncope, weakness and headaches.       Objective:     Vitals:    24 1701   BP: 142/70   Pulse: 59   Resp: 18   Temp: 98.7 °F (37.1 °C)   SpO2: 99%               COWS Scale:0     Resting HR: 0  0 = <80  1 =   2 = 101-120  4 = >120     Sweatin  0 = for no chills/flushing  1 = for subjective chills/flushing  2 = flushed or observable sweat on the face  3 =for beads of sweat on brow/face  4 = for sweat streaming of of face     Restlessness:0  0 = able to sit still  1 = subjective difficulty sitting still  3 = for frequent shifting or extraneous movement  5 = for unable to sit still for more than a few seconds      Pupil size:0  0 = pinpoint or normal  1 = for possibly larger than normal  2 = for moderately dilated  5 = for only iris rim visible      Bone/joint pain:0  0 = not present  1 = mild diffuse comfort  2 = sever diffuse aching  4 = objectively rubbing joints/muscles and obviously in pain     Runny nose/tearin  0 = not present  1 = stuff nose/moist eyes  2 = nose running/tearing  4 = nose constantly running or tears streaming down cheeks     GI upset:0  0 = no GI symptoms  1 = stomach cramps  2 = nasuea or loose stools  3 = vomiting or diarrhea  5 = multiple episodes of vomiting or diarrhea     Tremor observation of outstretched hands:0  0 = no tremor  1= tremor felt but not observed  2= slight tremor observable  4= gross tremor or muscle twitching     Yawning 0  0= no yawning  1 = yawning once or twice during assessment  2 = yawing three or more time during assessment  4 = yawing several times a minute     Anxiety or irritability:0  0 = none  1 = patient reports increasing irritability or anxiousness  2 = patient obviously irritable/anxious  4 = patient so irritable/anxious that assessment is difficult     Gooseflesh:0  0 = skin is smooth  3 = piloerection of skin can be felt or seen  5 = prominent  piloerection     TOTAL COWS SCORE:0 (If inducing in office, recommend delaying buprenorphine until COWS scale > 12 to avoid precipitated withdrawal)         Physical Exam:     Physical Exam  Constitutional:       General: He is not in acute distress.     Appearance: Normal appearance. He is well-developed. He is not ill-appearing, toxic-appearing or diaphoretic.   HENT:      Head: Normocephalic and atraumatic.      Right Ear: External ear normal.      Left Ear: External ear normal.      Nose: Nose normal.      Mouth/Throat:      Pharynx: No oropharyngeal exudate.   Eyes:      General: No scleral icterus.        Right eye: No discharge.         Left eye: No discharge.      Extraocular Movements: Extraocular movements intact.      Conjunctiva/sclera: Conjunctivae normal.      Pupils: Pupils are equal, round, and reactive to light.   Cardiovascular:      Rate and Rhythm: Normal rate and regular rhythm.      Heart sounds: Normal heart sounds. No murmur heard.     No friction rub. No gallop.   Pulmonary:      Effort: Pulmonary effort is normal. No respiratory distress.      Breath sounds: Normal breath sounds. No wheezing.   Abdominal:      General: Bowel sounds are normal. There is no distension.      Palpations: Abdomen is soft.      Tenderness: There is no abdominal tenderness. There is no guarding.   Musculoskeletal:         General: Normal range of motion.      Cervical back: Normal range of motion.   Skin:     General: Skin is warm.      Capillary Refill: Capillary refill takes less than 2 seconds.   Neurological:      Mental Status: He is alert and oriented to person, place, and time.      Cranial Nerves: No cranial nerve deficit.      Motor: No weakness.      Gait: Gait normal.   Psychiatric:         Mood and Affect: Mood normal.         Behavior: Behavior normal.

## 2024-05-16 NOTE — PROGRESS NOTES
CHAU GOVEA completed chart review and called pt to follow up with progress. However, call went to voicemail. CHAU GOVEA left a message requesting a return call. CHAU GOVEA will continue to be available for any additional needs as requested.

## 2024-05-21 LAB
6MAM UR QL SCN: NEGATIVE NG/ML
ACCEPTABLE CREAT UR QL: 110 MG/DL
AMPHET UR QL SCN: NEGATIVE NG/ML
BARBITURATES UR QL SCN: NEGATIVE NG/ML
BENZODIAZ UR QL SCN: NEGATIVE NG/ML
BUPRENORPHINE UR QL CFM: ABNORMAL NG/ML
CANNABINOIDS UR QL SCN: NEGATIVE NG/ML
CARISOPRODOL UR QL: NEGATIVE NG/ML
COCAINE+BZE UR QL SCN: NEGATIVE NG/ML
ETHYL GLUCURONIDE UR QL SCN: NEGATIVE NG/ML
FENTANYL UR QL SCN: NEGATIVE NG/ML
GABAPENTIN SERPLBLD QL SCN: NEGATIVE UG/ML
METHADONE UR QL SCN: NEGATIVE NG/ML
NALOXONE UR CFM-MCNC: 619 NG/MG CREAT
NITRITE UR QL STRIP: NEGATIVE UG/ML
NORBUP/BUP RATIO: 3.94
NORBUPRENORPHINE UR CFM-MCNC: 465 NG/MG CREAT
NORBUPRENORPHINE/CREAT UR: 118 NG/MG CREAT
OPIATES UR QL SCN: NEGATIVE NG/ML
OXYCODONE+OXYMORPHONE UR QL SCN: NEGATIVE NG/ML
PCP UR QL SCN: NEGATIVE NG/ML
PROPOXYPH UR QL SCN: NEGATIVE NG/ML
SPECIMEN PH ACCEPTABLE UR: 6.4 (ref 4.5–8.9)
TAPENTADOL UR QL SCN: NEGATIVE NG/ML
TRAMADOL UR QL SCN: NEGATIVE NG/ML

## 2024-05-30 ENCOUNTER — OFFICE VISIT (OUTPATIENT)
Dept: FAMILY MEDICINE CLINIC | Facility: CLINIC | Age: 59
End: 2024-05-30

## 2024-05-30 VITALS
BODY MASS INDEX: 25.74 KG/M2 | SYSTOLIC BLOOD PRESSURE: 131 MMHG | DIASTOLIC BLOOD PRESSURE: 66 MMHG | HEIGHT: 67 IN | TEMPERATURE: 97.7 F | RESPIRATION RATE: 18 BRPM | HEART RATE: 65 BPM | WEIGHT: 164 LBS | OXYGEN SATURATION: 96 %

## 2024-05-30 DIAGNOSIS — I10 PRIMARY HYPERTENSION: ICD-10-CM

## 2024-05-30 DIAGNOSIS — F11.20 OPIOID USE DISORDER, SEVERE, DEPENDENCE (HCC): ICD-10-CM

## 2024-05-30 DIAGNOSIS — Z79.4 TYPE 2 DIABETES MELLITUS WITHOUT COMPLICATION, WITH LONG-TERM CURRENT USE OF INSULIN (HCC): Primary | ICD-10-CM

## 2024-05-30 DIAGNOSIS — E11.9 TYPE 2 DIABETES MELLITUS WITHOUT COMPLICATION, WITH LONG-TERM CURRENT USE OF INSULIN (HCC): Primary | ICD-10-CM

## 2024-05-30 LAB
BUPRENORPHINE UR QL CFM: NORMAL
SL AMB POCT AMPHETAMINES URINE: NORMAL
SL AMB POCT COCAINE URINE: NORMAL
SL AMB POCT HEMOGLOBIN AIC: 6 (ref ?–6.5)
SL AMB POCT METHAMPETAMINES URINE: NORMAL
SL AMB POCT OPIATES URINE: NORMAL
SL AMB POCT PHENCYCLIDINE URINE: NORMAL
SL AMB THC URINE: NORMAL

## 2024-05-30 PROCEDURE — 99214 OFFICE O/P EST MOD 30 MIN: CPT | Performed by: FAMILY MEDICINE

## 2024-05-30 PROCEDURE — 80307 DRUG TEST PRSMV CHEM ANLYZR: CPT | Performed by: FAMILY MEDICINE

## 2024-05-30 PROCEDURE — 83036 HEMOGLOBIN GLYCOSYLATED A1C: CPT | Performed by: FAMILY MEDICINE

## 2024-05-30 PROCEDURE — 80305 DRUG TEST PRSMV DIR OPT OBS: CPT | Performed by: FAMILY MEDICINE

## 2024-05-30 PROCEDURE — 80348 DRUG SCREENING BUPRENORPHINE: CPT | Performed by: FAMILY MEDICINE

## 2024-05-30 PROCEDURE — 80362 OPIOIDS & OPIATE ANALOGS 1/2: CPT | Performed by: FAMILY MEDICINE

## 2024-05-30 RX ORDER — BUPRENORPHINE HYDROCHLORIDE AND NALOXONE HYDROCHLORIDE DIHYDRATE 8; 2 MG/1; MG/1
1 TABLET SUBLINGUAL 2 TIMES DAILY
Qty: 28 TABLET | Refills: 0 | Status: SHIPPED | OUTPATIENT
Start: 2024-05-30 | End: 2024-06-13

## 2024-05-30 NOTE — ASSESSMENT & PLAN NOTE
Lab Results   Component Value Date    HGBA1C 6.0 05/30/2024   He self discontinued his metformin and he is adopted lifestyle modification for management of his diabetes.  His hemoglobin A1c is stable.  Continue lifestyle modification particularly low carbohydrate diet.  Recommend continued exercise frequently

## 2024-05-30 NOTE — ASSESSMENT & PLAN NOTE
-Medication management - patient is doing well on current daily dose of 8-2mg BID daily, which is continued today.  -UDS and confirmation ordered.  -PDMP reviewed  -UDS positive for Suboxone       Follow-up interval: every 2 weeks    This patient has not required re-commitment contracts while using Buprenorphine in this practice.   PT HERE WITH MOM FOR ROUTINE ELOCTATE INJECTION. MOTHER BRINGS ALL MEDS IN FACTORY SEALED BOXES. RIGHT UPPER ABDOMINAL PORT ACCESSED WITH 20 GA 1 INCH VERDUZCO NEEDLE. EXCELLENT BLOOD RETURN AND FLUSHES EASILY. MEDICATION GIVEN AS ORDERED. PORT THEN FLUSHED WITH 20 CC NS, THEN 300 U HEPARIN FLUSH. NEEDLE REMOVED, BANDAID APPLIED. PT D/C'D AMBULATORY IN STABLE CONDITION WITH MOM.

## 2024-05-30 NOTE — PROGRESS NOTES
OUD MAINTENANCE NOTE    Tavon Villatoro 59 y.o. male MRN: [id number]  PCP: Harjit Ortega MD      Assessment and Plan    Opioid use disorder, severe, dependence (HCC)  -Medication management - patient is doing well on current daily dose of 8-2mg BID daily, which is continued today.  -UDS and confirmation ordered.  -PDMP reviewed  -UDS positive for Suboxone       Follow-up interval: every 2 weeks    This patient has not required re-commitment contracts while using Buprenorphine in this practice.    Type 2 diabetes mellitus without complication, with long-term current use of insulin (HCC)    Lab Results   Component Value Date    HGBA1C 6.0 05/30/2024   He self discontinued his metformin and he is adopted lifestyle modification for management of his diabetes.  His hemoglobin A1c is stable.  Continue lifestyle modification particularly low carbohydrate diet.  Recommend continued exercise frequently      Primary hypertension  At goal of less than 140/90  Continue lisinopril                      HPI:   Tavon Villatoro presents for buprenorphine/naloxone follow-up visit visit.  I have reviewed the prior induction visit, follow-up visits, and telephone encounters relevant to opiate use disorder (OUD) treatment.    He is doing well overall.  He discontinued his diabetic medication.  He has been doing lifestyle modification including low carbohydrate diet and regular exercise for his diabetes.      Current daily dose: 8-2 mg BID     Number weeks induction: since 4/21/2023     Current follow-up interval, in weeks: 2     UDS: positive for suboxone     Interval use history:     Opiate use: Clean since 4/3/2023  Alcohol daily use: none  Marijuana daily use: none  Other drug use: none  Overdoses: No prior overdose  Current behavioral health provider: None- stable      Sleep:6 hours  Bowels: none  Tobacco: Smokes 1/2 PPD cigarettes per day  Patient denies allergy to Buprenorphine and Naloxone; denies history of liver  failure, denies suicidal or homicidal ideation, denies seizure disorder, denies treatment for HIV, denies hypnotic sedative use, denies benzodiazapine use, denies abuse of other drugs, denies ethanol abuse.     Review of Systems   Constitutional:  Negative for appetite change, chills, diaphoresis, fatigue and fever.   HENT:  Negative for congestion.    Eyes:  Negative for visual disturbance.   Respiratory:  Negative for cough, shortness of breath and wheezing.    Cardiovascular:  Negative for chest pain and palpitations.   Gastrointestinal:  Negative for abdominal pain, constipation, diarrhea, nausea and vomiting.   Genitourinary:  Negative for dysuria, frequency, hematuria and urgency.   Skin:  Negative for rash.   Neurological:  Negative for seizures, syncope, weakness and headaches.       Objective:     Vitals:    24 1643   BP: 131/66   Pulse: 65   Resp: 18   Temp: 97.7 °F (36.5 °C)   SpO2: 96%                 COWS Scale:0     Resting HR: 0  0 = <80  1 =   2 = 101-120  4 = >120     Sweatin  0 = for no chills/flushing  1 = for subjective chills/flushing  2 = flushed or observable sweat on the face  3 =for beads of sweat on brow/face  4 = for sweat streaming of of face     Restlessness:0  0 = able to sit still  1 = subjective difficulty sitting still  3 = for frequent shifting or extraneous movement  5 = for unable to sit still for more than a few seconds      Pupil size:0  0 = pinpoint or normal  1 = for possibly larger than normal  2 = for moderately dilated  5 = for only iris rim visible      Bone/joint pain:0  0 = not present  1 = mild diffuse comfort  2 = sever diffuse aching  4 = objectively rubbing joints/muscles and obviously in pain     Runny nose/tearin  0 = not present  1 = stuff nose/moist eyes  2 = nose running/tearing  4 = nose constantly running or tears streaming down cheeks     GI upset:0  0 = no GI symptoms  1 = stomach cramps  2 = nasuea or loose stools  3 = vomiting or  diarrhea  5 = multiple episodes of vomiting or diarrhea     Tremor observation of outstretched hands:0  0 = no tremor  1= tremor felt but not observed  2= slight tremor observable  4= gross tremor or muscle twitching     Yawning 0  0= no yawning  1 = yawning once or twice during assessment  2 = yawing three or more time during assessment  4 = yawing several times a minute     Anxiety or irritability:0  0 = none  1 = patient reports increasing irritability or anxiousness  2 = patient obviously irritable/anxious  4 = patient so irritable/anxious that assessment is difficult     Gooseflesh:0  0 = skin is smooth  3 = piloerection of skin can be felt or seen  5 = prominent piloerection     TOTAL COWS SCORE:0 (If inducing in office, recommend delaying buprenorphine until COWS scale > 12 to avoid precipitated withdrawal)         Physical Exam:     Physical Exam  Constitutional:       General: He is not in acute distress.     Appearance: Normal appearance. He is well-developed. He is not ill-appearing, toxic-appearing or diaphoretic.   HENT:      Head: Normocephalic and atraumatic.      Right Ear: External ear normal.      Left Ear: External ear normal.      Nose: Nose normal.      Mouth/Throat:      Pharynx: No oropharyngeal exudate.   Eyes:      General: No scleral icterus.        Right eye: No discharge.         Left eye: No discharge.      Extraocular Movements: Extraocular movements intact.      Conjunctiva/sclera: Conjunctivae normal.      Pupils: Pupils are equal, round, and reactive to light.   Cardiovascular:      Rate and Rhythm: Normal rate and regular rhythm.      Heart sounds: Normal heart sounds. No murmur heard.     No friction rub. No gallop.   Pulmonary:      Effort: Pulmonary effort is normal. No respiratory distress.      Breath sounds: Normal breath sounds. No wheezing.   Abdominal:      General: Bowel sounds are normal. There is no distension.      Palpations: Abdomen is soft.      Tenderness: There is  no abdominal tenderness. There is no guarding.   Musculoskeletal:         General: Normal range of motion.      Cervical back: Normal range of motion.   Skin:     General: Skin is warm.      Capillary Refill: Capillary refill takes less than 2 seconds.   Neurological:      Mental Status: He is alert and oriented to person, place, and time.      Cranial Nerves: No cranial nerve deficit.      Motor: No weakness.      Gait: Gait normal.   Psychiatric:         Mood and Affect: Mood normal.         Behavior: Behavior normal.

## 2024-06-04 ENCOUNTER — PATIENT OUTREACH (OUTPATIENT)
Dept: FAMILY MEDICINE CLINIC | Facility: CLINIC | Age: 59
End: 2024-06-04

## 2024-06-04 LAB
6MAM UR QL SCN: NEGATIVE NG/ML
ACCEPTABLE CREAT UR QL: 103 MG/DL
AMPHET UR QL SCN: NEGATIVE NG/ML
BARBITURATES UR QL SCN: NEGATIVE NG/ML
BENZODIAZ UR QL SCN: NEGATIVE NG/ML
BUPRENORPHINE UR QL CFM: ABNORMAL NG/ML
CANNABINOIDS UR QL SCN: NEGATIVE NG/ML
CARISOPRODOL UR QL: NEGATIVE NG/ML
COCAINE+BZE UR QL SCN: NEGATIVE NG/ML
ETHYL GLUCURONIDE UR QL SCN: NEGATIVE NG/ML
FENTANYL UR QL SCN: NEGATIVE NG/ML
GABAPENTIN SERPLBLD QL SCN: NEGATIVE UG/ML
METHADONE UR QL SCN: NEGATIVE NG/ML
NALOXONE UR CFM-MCNC: 771 NG/MG CREAT
NITRITE UR QL STRIP: NEGATIVE UG/ML
NORBUP/BUP RATIO: 3.88
NORBUPRENORPHINE UR CFM-MCNC: 509 NG/MG CREAT
NORBUPRENORPHINE/CREAT UR: 131 NG/MG CREAT
OPIATES UR QL SCN: NEGATIVE NG/ML
OXYCODONE+OXYMORPHONE UR QL SCN: NEGATIVE NG/ML
PCP UR QL SCN: NEGATIVE NG/ML
PROPOXYPH UR QL SCN: NEGATIVE NG/ML
SPECIMEN PH ACCEPTABLE UR: 6.1 (ref 4.5–8.9)
TAPENTADOL UR QL SCN: NEGATIVE NG/ML
TRAMADOL UR QL SCN: NEGATIVE NG/ML

## 2024-06-04 NOTE — LETTER
[address] [place] [address], [address]  [facility] [address] [phone]    Re: Social Work Outreach   6/4/2024       Dear Tavon,    We tried to reach you by phone on 6/4/2024 and was unfortunately unable to reach you.  It is important that you contact the [facility] as soon as possible at: [phone].     Sincerely,         Opal Santos

## 2024-06-04 NOTE — PROGRESS NOTES
CHAU GOVEA made outreach attempt today, however, the call went to voicemail. CHAU GOVEA left a message requesting a return call. CHAU GOVEA will close referral at this time due to not being able to get into contact with pt. CHAU GOVEA sent unable to reach letter to address on file. CHAU GOVEA will be available for any additional needs as requested.

## 2024-06-12 ENCOUNTER — OFFICE VISIT (OUTPATIENT)
Dept: FAMILY MEDICINE CLINIC | Facility: CLINIC | Age: 59
End: 2024-06-12

## 2024-06-12 VITALS
OXYGEN SATURATION: 97 % | DIASTOLIC BLOOD PRESSURE: 79 MMHG | HEART RATE: 85 BPM | TEMPERATURE: 98.7 F | SYSTOLIC BLOOD PRESSURE: 133 MMHG | HEIGHT: 67 IN | WEIGHT: 168.8 LBS | RESPIRATION RATE: 20 BRPM | BODY MASS INDEX: 26.49 KG/M2

## 2024-06-12 DIAGNOSIS — F11.20 OPIOID USE DISORDER, SEVERE, DEPENDENCE (HCC): ICD-10-CM

## 2024-06-12 PROCEDURE — 80307 DRUG TEST PRSMV CHEM ANLYZR: CPT | Performed by: FAMILY MEDICINE

## 2024-06-12 PROCEDURE — 99213 OFFICE O/P EST LOW 20 MIN: CPT | Performed by: FAMILY MEDICINE

## 2024-06-12 PROCEDURE — 80348 DRUG SCREENING BUPRENORPHINE: CPT | Performed by: FAMILY MEDICINE

## 2024-06-12 PROCEDURE — 80362 OPIOIDS & OPIATE ANALOGS 1/2: CPT | Performed by: FAMILY MEDICINE

## 2024-06-12 PROCEDURE — 80305 DRUG TEST PRSMV DIR OPT OBS: CPT | Performed by: FAMILY MEDICINE

## 2024-06-12 RX ORDER — BUPRENORPHINE HYDROCHLORIDE AND NALOXONE HYDROCHLORIDE DIHYDRATE 8; 2 MG/1; MG/1
1 TABLET SUBLINGUAL 2 TIMES DAILY
Qty: 28 TABLET | Refills: 0 | Status: SHIPPED | OUTPATIENT
Start: 2024-06-13 | End: 2024-06-27

## 2024-06-12 NOTE — PROGRESS NOTES
OUD MAINTENANCE NOTE    Tavon Villatoro 59 y.o. male MRN: [id number]  PCP: Harjit Ortega MD      Assessment and Plan    Opioid use disorder, severe, dependence (HCC)  -Medication management - patient is doing well on current daily dose of 8-2mg BID daily, which is continued today.  -UDS and confirmation ordered.  -PDMP reviewed  -UDS positive for Suboxone       Follow-up interval: every 2 weeks    This patient has not required re-commitment contracts while using Buprenorphine in this practice.                      HPI:   Tavon Villatoro presents for buprenorphine/naloxone follow-up visit visit.  I have reviewed the prior induction visit, follow-up visits, and telephone encounters relevant to opiate use disorder (OUD) treatment.    Current daily dose: 8-2 mg BID     Number weeks induction: since 4/21/2023     Current follow-up interval, in weeks: 2     UDS: positive for suboxone     Interval use history:     Opiate use: Clean since 4/3/2023  Alcohol daily use: none  Marijuana daily use: none  Other drug use: none  Overdoses: No prior overdose  Current behavioral health provider: None- stable      Sleep:6 hours  Bowels: none  Tobacco: Smokes 1/2 PPD cigarettes per day  Patient denies allergy to Buprenorphine and Naloxone; denies history of liver failure, denies suicidal or homicidal ideation, denies seizure disorder, denies treatment for HIV, denies hypnotic sedative use, denies benzodiazapine use, denies abuse of other drugs, denies ethanol abuse.     Review of Systems   Constitutional:  Negative for appetite change, chills, diaphoresis, fatigue and fever.   HENT:  Negative for congestion.    Respiratory:  Negative for cough, shortness of breath and wheezing.    Cardiovascular:  Negative for chest pain and palpitations.   Gastrointestinal:  Negative for abdominal pain, diarrhea, nausea and vomiting.   Musculoskeletal:  Negative for arthralgias, joint swelling and myalgias.   Skin:  Negative for rash.    Neurological:  Negative for seizures, syncope and weakness.   Psychiatric/Behavioral:  The patient is not nervous/anxious.        Objective:     Vitals:    24 1658   BP: 133/79   Pulse: 85   Resp: 20   Temp: 98.7 °F (37.1 °C)   SpO2: 97%       COWS Scale:1     Resting HR: 0  0 = <80  1 =   2 = 101-120  4 = >120     Sweatin  0 = for no chills/flushing  1 = for subjective chills/flushing  2 = flushed or observable sweat on the face  3 =for beads of sweat on brow/face  4 = for sweat streaming of of face     Restlessness:0  0 = able to sit still  1 = subjective difficulty sitting still  3 = for frequent shifting or extraneous movement  5 = for unable to sit still for more than a few seconds      Pupil size:0  0 = pinpoint or normal  1 = for possibly larger than normal  2 = for moderately dilated  5 = for only iris rim visible      Bone/joint pain:0  0 = not present  1 = mild diffuse comfort  2 = sever diffuse aching  4 = objectively rubbing joints/muscles and obviously in pain     Runny nose/tearin  0 = not present  1 = stuff nose/moist eyes  2 = nose running/tearing  4 = nose constantly running or tears streaming down cheeks     GI upset:0  0 = no GI symptoms  1 = stomach cramps  2 = nasuea or loose stools  3 = vomiting or diarrhea  5 = multiple episodes of vomiting or diarrhea     Tremor observation of outstretched hands:0  0 = no tremor  1= tremor felt but not observed  2= slight tremor observable  4= gross tremor or muscle twitching     Yawning 0  0= no yawning  1 = yawning once or twice during assessment  2 = yawing three or more time during assessment  4 = yawing several times a minute     Anxiety or irritability:0  0 = none  1 = patient reports increasing irritability or anxiousness  2 = patient obviously irritable/anxious  4 = patient so irritable/anxious that assessment is difficult     Gooseflesh:0  0 = skin is smooth  3 = piloerection of skin can be felt or seen  5 = prominent  piloerection     TOTAL COWS SCORE:1 (If inducing in office, recommend delaying buprenorphine until COWS scale > 12 to avoid precipitated withdrawal)         Physical Exam:     Physical Exam  Constitutional:       General: He is not in acute distress.     Appearance: Normal appearance. He is well-developed. He is not ill-appearing, toxic-appearing or diaphoretic.   HENT:      Head: Normocephalic and atraumatic.      Right Ear: External ear normal.      Left Ear: External ear normal.      Nose: Nose normal.      Mouth/Throat:      Pharynx: No oropharyngeal exudate.   Eyes:      General: No scleral icterus.        Right eye: No discharge.         Left eye: No discharge.      Extraocular Movements: Extraocular movements intact.      Conjunctiva/sclera: Conjunctivae normal.      Pupils: Pupils are equal, round, and reactive to light.   Cardiovascular:      Rate and Rhythm: Normal rate and regular rhythm.      Heart sounds: Normal heart sounds. No murmur heard.     No friction rub. No gallop.   Pulmonary:      Effort: Pulmonary effort is normal. No respiratory distress.      Breath sounds: Normal breath sounds. No wheezing.   Abdominal:      General: Bowel sounds are normal. There is no distension.      Palpations: Abdomen is soft.      Tenderness: There is no abdominal tenderness. There is no guarding.   Musculoskeletal:         General: Normal range of motion.      Cervical back: Normal range of motion.   Skin:     General: Skin is warm.      Capillary Refill: Capillary refill takes less than 2 seconds.   Neurological:      Mental Status: He is alert and oriented to person, place, and time.      Cranial Nerves: No cranial nerve deficit.      Motor: No weakness.      Gait: Gait normal.   Psychiatric:         Mood and Affect: Mood normal.         Behavior: Behavior normal.

## 2024-06-18 LAB
6MAM UR QL SCN: NEGATIVE NG/ML
ACCEPTABLE CREAT UR QL: 191 MG/DL
AMPHET UR QL SCN: NEGATIVE NG/ML
BARBITURATES UR QL SCN: NEGATIVE NG/ML
BENZODIAZ UR QL SCN: NEGATIVE NG/ML
BUPRENORPHINE UR QL CFM: ABNORMAL NG/ML
CANNABINOIDS UR QL SCN: NEGATIVE NG/ML
CARISOPRODOL UR QL: NEGATIVE NG/ML
COCAINE+BZE UR QL SCN: NEGATIVE NG/ML
ETHYL GLUCURONIDE UR QL SCN: NEGATIVE NG/ML
FENTANYL UR QL SCN: NEGATIVE NG/ML
GABAPENTIN SERPLBLD QL SCN: NEGATIVE UG/ML
METHADONE UR QL SCN: NEGATIVE NG/ML
NALOXONE UR CFM-MCNC: >524 NG/MG CREAT
NITRITE UR QL STRIP: NEGATIVE UG/ML
NORBUP/BUP RATIO: >2
NORBUPRENORPHINE UR CFM-MCNC: >524 NG/MG CREAT
NORBUPRENORPHINE/CREAT UR: 261 NG/MG CREAT
OPIATES UR QL SCN: NEGATIVE NG/ML
OXYCODONE+OXYMORPHONE UR QL SCN: NEGATIVE NG/ML
PCP UR QL SCN: NEGATIVE NG/ML
PROPOXYPH UR QL SCN: NEGATIVE NG/ML
SPECIMEN PH ACCEPTABLE UR: 5.7 (ref 4.5–8.9)
TAPENTADOL UR QL SCN: NEGATIVE NG/ML
TRAMADOL UR QL SCN: NEGATIVE NG/ML

## 2024-06-24 ENCOUNTER — OFFICE VISIT (OUTPATIENT)
Dept: FAMILY MEDICINE CLINIC | Facility: CLINIC | Age: 59
End: 2024-06-24

## 2024-06-24 VITALS
OXYGEN SATURATION: 99 % | RESPIRATION RATE: 16 BRPM | DIASTOLIC BLOOD PRESSURE: 79 MMHG | BODY MASS INDEX: 25.58 KG/M2 | HEART RATE: 63 BPM | WEIGHT: 163 LBS | HEIGHT: 67 IN | SYSTOLIC BLOOD PRESSURE: 130 MMHG | TEMPERATURE: 98 F

## 2024-06-24 DIAGNOSIS — F11.20 OPIOID USE DISORDER, SEVERE, DEPENDENCE (HCC): ICD-10-CM

## 2024-06-24 PROCEDURE — 80348 DRUG SCREENING BUPRENORPHINE: CPT | Performed by: FAMILY MEDICINE

## 2024-06-24 PROCEDURE — 80305 DRUG TEST PRSMV DIR OPT OBS: CPT | Performed by: FAMILY MEDICINE

## 2024-06-24 PROCEDURE — 80362 OPIOIDS & OPIATE ANALOGS 1/2: CPT | Performed by: FAMILY MEDICINE

## 2024-06-24 PROCEDURE — 80307 DRUG TEST PRSMV CHEM ANLYZR: CPT | Performed by: FAMILY MEDICINE

## 2024-06-24 PROCEDURE — 99213 OFFICE O/P EST LOW 20 MIN: CPT | Performed by: FAMILY MEDICINE

## 2024-06-24 RX ORDER — BUPRENORPHINE HYDROCHLORIDE AND NALOXONE HYDROCHLORIDE DIHYDRATE 8; 2 MG/1; MG/1
1 TABLET SUBLINGUAL 2 TIMES DAILY
Qty: 28 TABLET | Refills: 0 | Status: SHIPPED | OUTPATIENT
Start: 2024-06-27 | End: 2024-07-11

## 2024-06-24 NOTE — PROGRESS NOTES
OUD MAINTENANCE NOTE    Tavon Villatoro 59 y.o. male MRN: [id number]  PCP: Harjit Ortega MD      Assessment and Plan    Opioid use disorder, severe, dependence (HCC)  -Medication management - patient is doing well on current daily dose of 8-2mg BID daily, which is continued today.  -UDS and confirmation ordered.  -PDMP reviewed  -UDS positive for Suboxone       Follow-up interval: every 2 weeks    This patient has not required re-commitment contracts while using Buprenorphine in this practice.              HPI:   Tavon Villatoro presents for buprenorphine/naloxone follow-up visit visit.  I have reviewed the prior induction visit, follow-up visits, and telephone encounters relevant to opiate use disorder (OUD) treatment.    Current daily dose: 8-2 mg BID     Number weeks induction: since 4/21/2023     Current follow-up interval, in weeks: 2     UDS: positive for suboxone     Interval use history:     Opiate use: Clean since 4/3/2023  Alcohol daily use: none  Marijuana daily use: none  Other drug use: none  Overdoses: No prior overdose  Current behavioral health provider: None- stable      Sleep:5-6 hours  Bowels: none  Tobacco: Smokes 1/2 PPD cigarettes per day  Patient denies allergy to Buprenorphine and Naloxone; denies history of liver failure, denies suicidal or homicidal ideation, denies seizure disorder, denies treatment for HIV, denies hypnotic sedative use, denies benzodiazapine use, denies abuse of other drugs, denies ethanol abuse.     Review of Systems   Constitutional:  Negative for appetite change, chills, diaphoresis, fatigue and fever.   HENT:  Negative for congestion.    Respiratory:  Negative for cough, shortness of breath and wheezing.    Cardiovascular:  Negative for chest pain and palpitations.   Gastrointestinal:  Negative for abdominal pain, diarrhea, nausea and vomiting.   Musculoskeletal:  Negative for arthralgias, joint swelling and myalgias.   Skin:  Negative for rash.    Neurological:  Negative for seizures, syncope and weakness.   Psychiatric/Behavioral:  The patient is not nervous/anxious.        Objective:     Vitals:    24 1632   BP: 130/79   Pulse: 63   Resp: 16   Temp: 98 °F (36.7 °C)   SpO2: 99%       COWS Scale:1     Resting HR: 0  0 = <80  1 =   2 = 101-120  4 = >120     Sweatin  0 = for no chills/flushing  1 = for subjective chills/flushing  2 = flushed or observable sweat on the face  3 =for beads of sweat on brow/face  4 = for sweat streaming of of face     Restlessness:0  0 = able to sit still  1 = subjective difficulty sitting still  3 = for frequent shifting or extraneous movement  5 = for unable to sit still for more than a few seconds      Pupil size:0  0 = pinpoint or normal  1 = for possibly larger than normal  2 = for moderately dilated  5 = for only iris rim visible      Bone/joint pain:0  0 = not present  1 = mild diffuse comfort  2 = sever diffuse aching  4 = objectively rubbing joints/muscles and obviously in pain     Runny nose/tearin  0 = not present  1 = stuff nose/moist eyes  2 = nose running/tearing  4 = nose constantly running or tears streaming down cheeks     GI upset:0  0 = no GI symptoms  1 = stomach cramps  2 = nasuea or loose stools  3 = vomiting or diarrhea  5 = multiple episodes of vomiting or diarrhea     Tremor observation of outstretched hands:0  0 = no tremor  1= tremor felt but not observed  2= slight tremor observable  4= gross tremor or muscle twitching     Yawning 0  0= no yawning  1 = yawning once or twice during assessment  2 = yawing three or more time during assessment  4 = yawing several times a minute     Anxiety or irritability:0  0 = none  1 = patient reports increasing irritability or anxiousness  2 = patient obviously irritable/anxious  4 = patient so irritable/anxious that assessment is difficult     Gooseflesh:0  0 = skin is smooth  3 = piloerection of skin can be felt or seen  5 = prominent piloerection      TOTAL COWS SCORE:0 (If inducing in office, recommend delaying buprenorphine until COWS scale > 12 to avoid precipitated withdrawal)         Physical Exam:     Physical Exam  Constitutional:       General: He is not in acute distress.     Appearance: Normal appearance. He is well-developed. He is not ill-appearing, toxic-appearing or diaphoretic.   HENT:      Head: Normocephalic and atraumatic.      Right Ear: External ear normal.      Left Ear: External ear normal.      Nose: Nose normal.      Mouth/Throat:      Pharynx: No oropharyngeal exudate.   Eyes:      General: No scleral icterus.        Right eye: No discharge.         Left eye: No discharge.      Extraocular Movements: Extraocular movements intact.      Conjunctiva/sclera: Conjunctivae normal.      Pupils: Pupils are equal, round, and reactive to light.   Cardiovascular:      Rate and Rhythm: Normal rate and regular rhythm.      Heart sounds: Normal heart sounds. No murmur heard.     No friction rub. No gallop.   Pulmonary:      Effort: Pulmonary effort is normal. No respiratory distress.      Breath sounds: Normal breath sounds. No wheezing.   Abdominal:      General: Bowel sounds are normal. There is no distension.      Palpations: Abdomen is soft.      Tenderness: There is no abdominal tenderness. There is no guarding.   Musculoskeletal:         General: Normal range of motion.      Cervical back: Normal range of motion.   Skin:     General: Skin is warm.      Capillary Refill: Capillary refill takes less than 2 seconds.   Neurological:      Mental Status: He is alert and oriented to person, place, and time.      Cranial Nerves: No cranial nerve deficit.      Motor: No weakness.      Gait: Gait normal.   Psychiatric:         Mood and Affect: Mood normal.         Behavior: Behavior normal.

## 2024-06-28 LAB
6MAM UR QL SCN: NEGATIVE NG/ML
ACCEPTABLE CREAT UR QL: 178 MG/DL
AMPHET UR QL SCN: NEGATIVE NG/ML
BARBITURATES UR QL SCN: NEGATIVE NG/ML
BENZODIAZ UR QL SCN: NEGATIVE NG/ML
BUPRENORPHINE UR QL CFM: ABNORMAL NG/ML
CANNABINOIDS UR QL SCN: NEGATIVE NG/ML
CARISOPRODOL UR QL: NEGATIVE NG/ML
COCAINE+BZE UR QL SCN: NEGATIVE NG/ML
ETHYL GLUCURONIDE UR QL SCN: NEGATIVE NG/ML
FENTANYL UR QL SCN: NEGATIVE NG/ML
GABAPENTIN SERPLBLD QL SCN: NEGATIVE UG/ML
METHADONE UR QL SCN: NEGATIVE NG/ML
NALOXONE UR CFM-MCNC: 496 NG/MG CREAT
NITRITE UR QL STRIP: NEGATIVE UG/ML
NORBUP/BUP RATIO: 4.73
NORBUPRENORPHINE UR CFM-MCNC: 531 NG/MG CREAT
NORBUPRENORPHINE/CREAT UR: 112 NG/MG CREAT
OPIATES UR QL SCN: NEGATIVE NG/ML
OXYCODONE+OXYMORPHONE UR QL SCN: NEGATIVE NG/ML
PCP UR QL SCN: NEGATIVE NG/ML
PROPOXYPH UR QL SCN: NEGATIVE NG/ML
SPECIMEN PH ACCEPTABLE UR: 6 (ref 4.5–8.9)
TAPENTADOL UR QL SCN: NEGATIVE NG/ML
TRAMADOL UR QL SCN: NEGATIVE NG/ML

## 2024-07-08 ENCOUNTER — OFFICE VISIT (OUTPATIENT)
Dept: FAMILY MEDICINE CLINIC | Facility: CLINIC | Age: 59
End: 2024-07-08

## 2024-07-08 VITALS
BODY MASS INDEX: 24.96 KG/M2 | SYSTOLIC BLOOD PRESSURE: 109 MMHG | DIASTOLIC BLOOD PRESSURE: 65 MMHG | RESPIRATION RATE: 16 BRPM | OXYGEN SATURATION: 97 % | TEMPERATURE: 98 F | WEIGHT: 159 LBS | HEIGHT: 67 IN | HEART RATE: 69 BPM

## 2024-07-08 DIAGNOSIS — F11.20 OPIOID USE DISORDER, SEVERE, DEPENDENCE (HCC): Primary | ICD-10-CM

## 2024-07-08 PROCEDURE — 80362 OPIOIDS & OPIATE ANALOGS 1/2: CPT | Performed by: FAMILY MEDICINE

## 2024-07-08 PROCEDURE — 99213 OFFICE O/P EST LOW 20 MIN: CPT | Performed by: FAMILY MEDICINE

## 2024-07-08 PROCEDURE — 80305 DRUG TEST PRSMV DIR OPT OBS: CPT | Performed by: FAMILY MEDICINE

## 2024-07-08 PROCEDURE — 80307 DRUG TEST PRSMV CHEM ANLYZR: CPT | Performed by: FAMILY MEDICINE

## 2024-07-08 PROCEDURE — 80348 DRUG SCREENING BUPRENORPHINE: CPT | Performed by: FAMILY MEDICINE

## 2024-07-08 RX ORDER — BUPRENORPHINE HYDROCHLORIDE AND NALOXONE HYDROCHLORIDE DIHYDRATE 8; 2 MG/1; MG/1
1 TABLET SUBLINGUAL 2 TIMES DAILY
Qty: 28 TABLET | Refills: 0 | Status: SHIPPED | OUTPATIENT
Start: 2024-07-11 | End: 2024-07-25

## 2024-07-08 NOTE — PROGRESS NOTES
OUD MAINTENANCE NOTE    Tavon Villatoro 59 y.o. male MRN: [id number]  PCP: Harjit Ortega MD      Assessment and Plan    Opioid use disorder, severe, dependence (HCC)  -Medication management - patient is doing well on current daily dose of 8-2mg BID daily, which is continued today.  -UDS and confirmation ordered.  -PDMP reviewed  -UDS positive for Suboxone       Follow-up interval: every 2 weeks    This patient has not required re-commitment contracts while using Buprenorphine in this practice.                HPI:   Tavon Villatoro presents for buprenorphine/naloxone follow-up visit visit.  I have reviewed the prior induction visit, follow-up visits, and telephone encounters relevant to opiate use disorder (OUD) treatment.    Current daily dose: 8-2 mg BID     Number weeks induction: since 4/21/2023     Current follow-up interval, in weeks: 2     UDS: positive for suboxone     Interval use history:     Opiate use: Clean since 4/3/2023  Alcohol daily use: none  Marijuana daily use: none  Other drug use: none  Overdoses: No prior overdose  Current behavioral health provider: None- stable      Sleep:5-6 hours  Bowels: none  Tobacco: Smokes 1/2 PPD cigarettes per day  Patient denies allergy to Buprenorphine and Naloxone; denies history of liver failure, denies suicidal or homicidal ideation, denies seizure disorder, denies treatment for HIV, denies hypnotic sedative use, denies benzodiazapine use, denies abuse of other drugs, denies ethanol abuse.     Review of Systems   Constitutional:  Negative for appetite change, chills, diaphoresis, fatigue and fever.   HENT:  Negative for congestion.    Respiratory:  Negative for cough, shortness of breath and wheezing.    Cardiovascular:  Negative for chest pain and palpitations.   Gastrointestinal:  Negative for abdominal pain, diarrhea, nausea and vomiting.   Musculoskeletal:  Negative for arthralgias, joint swelling and myalgias.   Skin:  Negative for rash.    Neurological:  Negative for seizures, syncope and weakness.   Psychiatric/Behavioral:  The patient is not nervous/anxious.        Objective:     Vitals:    24 1704   BP: 109/65   Pulse: 69   Resp: 16   Temp: 98 °F (36.7 °C)   SpO2: 97%         COWS Scale:0     Resting HR: 0  0 = <80  1 =   2 = 101-120  4 = >120     Sweatin  0 = for no chills/flushing  1 = for subjective chills/flushing  2 = flushed or observable sweat on the face  3 =for beads of sweat on brow/face  4 = for sweat streaming of of face     Restlessness:0  0 = able to sit still  1 = subjective difficulty sitting still  3 = for frequent shifting or extraneous movement  5 = for unable to sit still for more than a few seconds      Pupil size:0  0 = pinpoint or normal  1 = for possibly larger than normal  2 = for moderately dilated  5 = for only iris rim visible      Bone/joint pain:0  0 = not present  1 = mild diffuse comfort  2 = sever diffuse aching  4 = objectively rubbing joints/muscles and obviously in pain     Runny nose/tearin  0 = not present  1 = stuff nose/moist eyes  2 = nose running/tearing  4 = nose constantly running or tears streaming down cheeks     GI upset:0  0 = no GI symptoms  1 = stomach cramps  2 = nasuea or loose stools  3 = vomiting or diarrhea  5 = multiple episodes of vomiting or diarrhea     Tremor observation of outstretched hands:0  0 = no tremor  1= tremor felt but not observed  2= slight tremor observable  4= gross tremor or muscle twitching     Yawning 0  0= no yawning  1 = yawning once or twice during assessment  2 = yawing three or more time during assessment  4 = yawing several times a minute     Anxiety or irritability:0  0 = none  1 = patient reports increasing irritability or anxiousness  2 = patient obviously irritable/anxious  4 = patient so irritable/anxious that assessment is difficult     Gooseflesh:0  0 = skin is smooth  3 = piloerection of skin can be felt or seen  5 = prominent  piloerection     TOTAL COWS SCORE:0 (If inducing in office, recommend delaying buprenorphine until COWS scale > 12 to avoid precipitated withdrawal)         Physical Exam:     Physical Exam  Constitutional:       General: He is not in acute distress.     Appearance: Normal appearance. He is well-developed. He is not ill-appearing, toxic-appearing or diaphoretic.   HENT:      Head: Normocephalic and atraumatic.      Right Ear: External ear normal.      Left Ear: External ear normal.      Nose: Nose normal.      Mouth/Throat:      Pharynx: No oropharyngeal exudate.   Eyes:      General: No scleral icterus.        Right eye: No discharge.         Left eye: No discharge.      Extraocular Movements: Extraocular movements intact.      Conjunctiva/sclera: Conjunctivae normal.      Pupils: Pupils are equal, round, and reactive to light.   Cardiovascular:      Rate and Rhythm: Normal rate and regular rhythm.      Heart sounds: Normal heart sounds. No murmur heard.     No friction rub. No gallop.   Pulmonary:      Effort: Pulmonary effort is normal. No respiratory distress.      Breath sounds: Normal breath sounds. No wheezing.   Abdominal:      General: Bowel sounds are normal. There is no distension.      Palpations: Abdomen is soft.      Tenderness: There is no abdominal tenderness. There is no guarding.   Musculoskeletal:         General: Normal range of motion.      Cervical back: Normal range of motion.   Skin:     General: Skin is warm.      Capillary Refill: Capillary refill takes less than 2 seconds.   Neurological:      Mental Status: He is alert and oriented to person, place, and time.      Cranial Nerves: No cranial nerve deficit.      Motor: No weakness.      Gait: Gait normal.   Psychiatric:         Mood and Affect: Mood normal.         Behavior: Behavior normal.

## 2024-07-12 LAB
6MAM UR QL SCN: NEGATIVE NG/ML
ACCEPTABLE CREAT UR QL: 237 MG/DL
AMPHET UR QL SCN: NEGATIVE NG/ML
BARBITURATES UR QL SCN: NEGATIVE NG/ML
BENZODIAZ UR QL SCN: NEGATIVE NG/ML
BUPRENORPHINE UR QL CFM: ABNORMAL NG/ML
CANNABINOIDS UR QL SCN: NEGATIVE NG/ML
CARISOPRODOL UR QL: NEGATIVE NG/ML
COCAINE+BZE UR QL SCN: NEGATIVE NG/ML
ETHYL GLUCURONIDE UR QL SCN: NEGATIVE NG/ML
FENTANYL UR QL SCN: NEGATIVE NG/ML
GABAPENTIN SERPLBLD QL SCN: NEGATIVE UG/ML
METHADONE UR QL SCN: NEGATIVE NG/ML
NALOXONE UR CFM-MCNC: >422 NG/MG CREAT
NITRITE UR QL STRIP: NEGATIVE UG/ML
NORBUP/BUP RATIO: >1.57
NORBUPRENORPHINE UR CFM-MCNC: >422 NG/MG CREAT
NORBUPRENORPHINE/CREAT UR: 269 NG/MG CREAT
OPIATES UR QL SCN: NEGATIVE NG/ML
OXYCODONE+OXYMORPHONE UR QL SCN: NEGATIVE NG/ML
PCP UR QL SCN: NEGATIVE NG/ML
PROPOXYPH UR QL SCN: NEGATIVE NG/ML
SPECIMEN PH ACCEPTABLE UR: 6.1 (ref 4.5–8.9)
TAPENTADOL UR QL SCN: NEGATIVE NG/ML
TRAMADOL UR QL SCN: NEGATIVE NG/ML

## 2024-07-30 ENCOUNTER — OFFICE VISIT (OUTPATIENT)
Dept: FAMILY MEDICINE CLINIC | Facility: CLINIC | Age: 59
End: 2024-07-30

## 2024-07-30 VITALS
DIASTOLIC BLOOD PRESSURE: 68 MMHG | BODY MASS INDEX: 25.6 KG/M2 | RESPIRATION RATE: 18 BRPM | HEIGHT: 67 IN | SYSTOLIC BLOOD PRESSURE: 141 MMHG | TEMPERATURE: 98.3 F | WEIGHT: 163.1 LBS | HEART RATE: 60 BPM | OXYGEN SATURATION: 97 %

## 2024-07-30 DIAGNOSIS — F11.20 OPIOID USE DISORDER, SEVERE, DEPENDENCE (HCC): Primary | ICD-10-CM

## 2024-07-30 PROCEDURE — 99213 OFFICE O/P EST LOW 20 MIN: CPT | Performed by: FAMILY MEDICINE

## 2024-07-30 PROCEDURE — 80305 DRUG TEST PRSMV DIR OPT OBS: CPT | Performed by: FAMILY MEDICINE

## 2024-07-30 PROCEDURE — 80348 DRUG SCREENING BUPRENORPHINE: CPT | Performed by: FAMILY MEDICINE

## 2024-07-30 PROCEDURE — 80307 DRUG TEST PRSMV CHEM ANLYZR: CPT | Performed by: FAMILY MEDICINE

## 2024-07-30 PROCEDURE — 80362 OPIOIDS & OPIATE ANALOGS 1/2: CPT | Performed by: FAMILY MEDICINE

## 2024-07-30 RX ORDER — BUPRENORPHINE HYDROCHLORIDE AND NALOXONE HYDROCHLORIDE DIHYDRATE 8; 2 MG/1; MG/1
1 TABLET SUBLINGUAL 2 TIMES DAILY
Qty: 28 TABLET | Refills: 0 | Status: SHIPPED | OUTPATIENT
Start: 2024-07-30 | End: 2024-08-13

## 2024-07-30 NOTE — PROGRESS NOTES
OUD MAINTENANCE NOTE    Tavon Villatoro 59 y.o. male MRN: [id number]  PCP: Harjit Ortega MD      Assessment and Plan    Opioid use disorder, severe, dependence (HCC)  -Medication management - patient is doing well on current daily dose of 8-2mg BID daily, which is continued today.  -UDS and confirmation ordered.  -PDMP reviewed  -UDS positive for Suboxone       Follow-up interval: every 2 weeks    This patient has not required re-commitment contracts while using Buprenorphine in this practice.                  HPI:   Tavon Villatoro presents for buprenorphine/naloxone follow-up visit visit.  I have reviewed the prior induction visit, follow-up visits, and telephone encounters relevant to opiate use disorder (OUD) treatment.    Current daily dose: 8-2 mg BID     Number weeks induction: since 4/21/2023     Current follow-up interval, in weeks: 2     UDS: positive for suboxone     Interval use history:     Opiate use: Clean since 4/3/2023  Alcohol daily use: none  Marijuana daily use: none  Other drug use: none  Overdoses: No prior overdose  Current behavioral health provider: None- stable      Sleep:5-6 hours  Bowels: none  Tobacco: Smokes 1/2 PPD cigarettes per day  Patient denies allergy to Buprenorphine and Naloxone; denies history of liver failure, denies suicidal or homicidal ideation, denies seizure disorder, denies treatment for HIV, denies hypnotic sedative use, denies benzodiazapine use, denies abuse of other drugs, denies ethanol abuse.     Review of Systems   Constitutional:  Negative for appetite change, chills, diaphoresis, fatigue and fever.   HENT:  Negative for sore throat.    Eyes:  Negative for visual disturbance.   Respiratory:  Negative for cough, shortness of breath and wheezing.    Cardiovascular:  Negative for chest pain and palpitations.   Gastrointestinal:  Negative for abdominal pain, diarrhea, nausea and vomiting.   Endocrine: Negative for polydipsia, polyphagia and polyuria.    Musculoskeletal:  Negative for arthralgias, back pain and myalgias.   Skin:  Negative for rash.   Neurological:  Negative for dizziness, seizures, syncope, weakness and light-headedness.   Psychiatric/Behavioral:  The patient is not nervous/anxious.        Objective:     Vitals:    24 1720   BP: 141/68   Pulse: 60   Resp: 18   Temp: 98.3 °F (36.8 °C)   SpO2: 97%         COWS Scale:0     Resting HR: 0  0 = <80  1 =   2 = 101-120  4 = >120     Sweatin  0 = for no chills/flushing  1 = for subjective chills/flushing  2 = flushed or observable sweat on the face  3 =for beads of sweat on brow/face  4 = for sweat streaming of of face     Restlessness:0  0 = able to sit still  1 = subjective difficulty sitting still  3 = for frequent shifting or extraneous movement  5 = for unable to sit still for more than a few seconds      Pupil size:0  0 = pinpoint or normal  1 = for possibly larger than normal  2 = for moderately dilated  5 = for only iris rim visible      Bone/joint pain:0  0 = not present  1 = mild diffuse comfort  2 = sever diffuse aching  4 = objectively rubbing joints/muscles and obviously in pain     Runny nose/tearin  0 = not present  1 = stuff nose/moist eyes  2 = nose running/tearing  4 = nose constantly running or tears streaming down cheeks     GI upset:0  0 = no GI symptoms  1 = stomach cramps  2 = nasuea or loose stools  3 = vomiting or diarrhea  5 = multiple episodes of vomiting or diarrhea     Tremor observation of outstretched hands:0  0 = no tremor  1= tremor felt but not observed  2= slight tremor observable  4= gross tremor or muscle twitching     Yawning 0  0= no yawning  1 = yawning once or twice during assessment  2 = yawing three or more time during assessment  4 = yawing several times a minute     Anxiety or irritability:0  0 = none  1 = patient reports increasing irritability or anxiousness  2 = patient obviously irritable/anxious  4 = patient so irritable/anxious that  assessment is difficult     Gooseflesh:0  0 = skin is smooth  3 = piloerection of skin can be felt or seen  5 = prominent piloerection     TOTAL COWS SCORE:0 (If inducing in office, recommend delaying buprenorphine until COWS scale > 12 to avoid precipitated withdrawal)         Physical Exam:     Physical Exam  Constitutional:       General: He is not in acute distress.     Appearance: Normal appearance. He is well-developed. He is not ill-appearing, toxic-appearing or diaphoretic.   HENT:      Head: Normocephalic and atraumatic.      Right Ear: External ear normal.      Left Ear: External ear normal.      Nose: Nose normal.      Mouth/Throat:      Pharynx: No oropharyngeal exudate.   Eyes:      General: No scleral icterus.        Right eye: No discharge.         Left eye: No discharge.      Extraocular Movements: Extraocular movements intact.      Conjunctiva/sclera: Conjunctivae normal.      Pupils: Pupils are equal, round, and reactive to light.   Cardiovascular:      Rate and Rhythm: Normal rate and regular rhythm.      Heart sounds: Normal heart sounds. No murmur heard.     No friction rub. No gallop.   Pulmonary:      Effort: Pulmonary effort is normal. No respiratory distress.      Breath sounds: Normal breath sounds. No wheezing.   Abdominal:      General: Bowel sounds are normal. There is no distension.      Palpations: Abdomen is soft. There is no mass.      Tenderness: There is no abdominal tenderness. There is no guarding.      Hernia: No hernia is present.   Musculoskeletal:         General: Normal range of motion.      Cervical back: Normal range of motion.      Right lower leg: No edema.      Left lower leg: No edema.   Skin:     General: Skin is warm.      Capillary Refill: Capillary refill takes less than 2 seconds.   Neurological:      General: No focal deficit present.      Mental Status: He is alert and oriented to person, place, and time.      Cranial Nerves: No cranial nerve deficit.       Motor: No weakness.      Gait: Gait normal.   Psychiatric:         Mood and Affect: Mood normal.         Behavior: Behavior normal.

## 2024-08-05 LAB
6MAM UR QL SCN: NEGATIVE NG/ML
ACCEPTABLE CREAT UR QL: 88 MG/DL
AMPHET UR QL SCN: NEGATIVE NG/ML
BARBITURATES UR QL SCN: NEGATIVE NG/ML
BENZODIAZ UR QL SCN: NEGATIVE NG/ML
BUPRENORPHINE UR QL CFM: ABNORMAL NG/ML
CANNABINOIDS UR QL SCN: NEGATIVE NG/ML
CARISOPRODOL UR QL: NEGATIVE NG/ML
COCAINE+BZE UR QL SCN: NEGATIVE NG/ML
ETHYL GLUCURONIDE UR QL SCN: NEGATIVE NG/ML
FENTANYL UR QL SCN: NEGATIVE NG/ML
GABAPENTIN SERPLBLD QL SCN: NEGATIVE UG/ML
METHADONE UR QL SCN: NEGATIVE NG/ML
NALOXONE UR CFM-MCNC: 917 NG/MG CREAT
NITRITE UR QL STRIP: NEGATIVE UG/ML
NORBUP/BUP RATIO: 3.07
NORBUPRENORPHINE UR CFM-MCNC: 339 NG/MG CREAT
NORBUPRENORPHINE/CREAT UR: 110 NG/MG CREAT
OPIATES UR QL SCN: NEGATIVE NG/ML
OXYCODONE+OXYMORPHONE UR QL SCN: NEGATIVE NG/ML
PCP UR QL SCN: NEGATIVE NG/ML
PROPOXYPH UR QL SCN: NEGATIVE NG/ML
SPECIMEN PH ACCEPTABLE UR: 8.9 (ref 4.5–8.9)
TAPENTADOL UR QL SCN: NEGATIVE NG/ML
TRAMADOL UR QL SCN: NEGATIVE NG/ML

## 2024-08-15 ENCOUNTER — OFFICE VISIT (OUTPATIENT)
Dept: FAMILY MEDICINE CLINIC | Facility: CLINIC | Age: 59
End: 2024-08-15

## 2024-08-15 VITALS
SYSTOLIC BLOOD PRESSURE: 121 MMHG | BODY MASS INDEX: 25.52 KG/M2 | HEART RATE: 79 BPM | RESPIRATION RATE: 18 BRPM | DIASTOLIC BLOOD PRESSURE: 64 MMHG | WEIGHT: 162.6 LBS | HEIGHT: 67 IN | OXYGEN SATURATION: 96 % | TEMPERATURE: 97.6 F

## 2024-08-15 DIAGNOSIS — F11.20 OPIOID USE DISORDER, SEVERE, DEPENDENCE (HCC): Primary | ICD-10-CM

## 2024-08-15 LAB
BUPRENORPHINE UR QL CFM: ABNORMAL
SL AMB POCT AMPHETAMINES URINE: ABNORMAL
SL AMB POCT COCAINE URINE: ABNORMAL
SL AMB POCT METHAMPETAMINES URINE: ABNORMAL
SL AMB POCT OPIATES URINE: ABNORMAL
SL AMB POCT PHENCYCLIDINE URINE: ABNORMAL
SL AMB THC URINE: ABNORMAL

## 2024-08-15 PROCEDURE — 80361 OPIATES 1 OR MORE: CPT | Performed by: FAMILY MEDICINE

## 2024-08-15 PROCEDURE — 99213 OFFICE O/P EST LOW 20 MIN: CPT | Performed by: FAMILY MEDICINE

## 2024-08-15 PROCEDURE — 80348 DRUG SCREENING BUPRENORPHINE: CPT | Performed by: FAMILY MEDICINE

## 2024-08-15 PROCEDURE — 80362 OPIOIDS & OPIATE ANALOGS 1/2: CPT | Performed by: FAMILY MEDICINE

## 2024-08-15 PROCEDURE — 80305 DRUG TEST PRSMV DIR OPT OBS: CPT | Performed by: FAMILY MEDICINE

## 2024-08-15 PROCEDURE — 80307 DRUG TEST PRSMV CHEM ANLYZR: CPT | Performed by: FAMILY MEDICINE

## 2024-08-15 RX ORDER — BUPRENORPHINE HYDROCHLORIDE AND NALOXONE HYDROCHLORIDE DIHYDRATE 8; 2 MG/1; MG/1
1 TABLET SUBLINGUAL 2 TIMES DAILY
Qty: 28 TABLET | Refills: 0 | Status: SHIPPED | OUTPATIENT
Start: 2024-08-15 | End: 2024-08-29

## 2024-08-15 NOTE — PROGRESS NOTES
OUD MAINTENANCE NOTE    Tavon Villatoro 59 y.o. male MRN: [id number]  PCP: Harjit Ortega MD      Assessment and Plan    Opioid use disorder, severe, dependence (HCC)  -Medication management - patient is doing well on current daily dose of 8-2mg BID daily, which is continued today.  -UDS and confirmation ordered.    -UDS positive for Suboxone.  Morphine positive in POCT UDS.  Patient denies using any illicit substances  -Counseled patient on avoiding using illicit substances  -PDMP reviewed       Follow-up interval: every 2 weeks    This patient has not required re-commitment contracts while using Buprenorphine in this practice.      HPI:   Tavon Villatoro presents for buprenorphine/naloxone follow-up visit visit.  I have reviewed the prior induction visit, follow-up visits, and telephone encounters relevant to opiate use disorder (OUD) treatment.    Current daily dose: 8-2 mg BID     Number weeks induction: since 4/21/2023     Current follow-up interval, in weeks: 2     UDS: positive for suboxone and morphine.  Patient denies using any recreational substances     Interval use history:     Opiate use: Clean since 4/3/2023  Alcohol daily use: none  Marijuana daily use: none  Other drug use: none  Overdoses: No prior overdose  Current behavioral health provider: None- stable      Sleep:5-6 hours  Bowels: none  Tobacco: Smokes 1/2 PPD cigarettes per day  Patient denies allergy to Buprenorphine and Naloxone; denies history of liver failure, denies suicidal or homicidal ideation, denies seizure disorder, denies treatment for HIV, denies hypnotic sedative use, denies benzodiazapine use, denies abuse of other drugs, denies ethanol abuse.     Review of Systems   Constitutional:  Negative for appetite change, chills, diaphoresis, fatigue and fever.   HENT:  Negative for sore throat.    Eyes:  Negative for visual disturbance.   Respiratory:  Negative for cough, shortness of breath and wheezing.    Cardiovascular:   Negative for chest pain and palpitations.   Gastrointestinal:  Negative for abdominal pain, diarrhea, nausea and vomiting.   Endocrine: Negative for polydipsia, polyphagia and polyuria.   Musculoskeletal:  Negative for arthralgias, back pain and myalgias.   Skin:  Negative for rash.   Neurological:  Negative for dizziness, seizures, syncope, weakness and light-headedness.   Psychiatric/Behavioral:  The patient is not nervous/anxious.        Objective:     Vitals:    08/15/24 1711   BP: 121/64   Pulse: 79   Resp: 18   Temp: 97.6 °F (36.4 °C)   SpO2: 96%         COWS Scale:0     Resting HR: 0  0 = <80  1 =   2 = 101-120  4 = >120     Sweatin  0 = for no chills/flushing  1 = for subjective chills/flushing  2 = flushed or observable sweat on the face  3 =for beads of sweat on brow/face  4 = for sweat streaming of of face     Restlessness:0  0 = able to sit still  1 = subjective difficulty sitting still  3 = for frequent shifting or extraneous movement  5 = for unable to sit still for more than a few seconds      Pupil size:0  0 = pinpoint or normal  1 = for possibly larger than normal  2 = for moderately dilated  5 = for only iris rim visible      Bone/joint pain:0  0 = not present  1 = mild diffuse comfort  2 = sever diffuse aching  4 = objectively rubbing joints/muscles and obviously in pain     Runny nose/tearin  0 = not present  1 = stuff nose/moist eyes  2 = nose running/tearing  4 = nose constantly running or tears streaming down cheeks     GI upset:0  0 = no GI symptoms  1 = stomach cramps  2 = nasuea or loose stools  3 = vomiting or diarrhea  5 = multiple episodes of vomiting or diarrhea     Tremor observation of outstretched hands:0  0 = no tremor  1= tremor felt but not observed  2= slight tremor observable  4= gross tremor or muscle twitching     Yawning 0  0= no yawning  1 = yawning once or twice during assessment  2 = yawing three or more time during assessment  4 = yawing several times a  minute     Anxiety or irritability:0  0 = none  1 = patient reports increasing irritability or anxiousness  2 = patient obviously irritable/anxious  4 = patient so irritable/anxious that assessment is difficult     Gooseflesh:0  0 = skin is smooth  3 = piloerection of skin can be felt or seen  5 = prominent piloerection     TOTAL COWS SCORE:0 (If inducing in office, recommend delaying buprenorphine until COWS scale > 12 to avoid precipitated withdrawal)         Physical Exam:     Physical Exam  Constitutional:       General: He is not in acute distress.     Appearance: Normal appearance. He is well-developed. He is not ill-appearing, toxic-appearing or diaphoretic.   HENT:      Head: Normocephalic and atraumatic.      Right Ear: External ear normal.      Left Ear: External ear normal.      Nose: Nose normal.      Mouth/Throat:      Pharynx: No oropharyngeal exudate.   Eyes:      General: No scleral icterus.        Right eye: No discharge.         Left eye: No discharge.      Extraocular Movements: Extraocular movements intact.      Conjunctiva/sclera: Conjunctivae normal.      Pupils: Pupils are equal, round, and reactive to light.   Cardiovascular:      Rate and Rhythm: Normal rate and regular rhythm.      Heart sounds: Normal heart sounds. No murmur heard.     No friction rub. No gallop.   Pulmonary:      Effort: Pulmonary effort is normal. No respiratory distress.      Breath sounds: Normal breath sounds. No wheezing.   Abdominal:      General: Bowel sounds are normal. There is no distension.      Palpations: Abdomen is soft. There is no mass.      Tenderness: There is no abdominal tenderness. There is no guarding.      Hernia: No hernia is present.   Musculoskeletal:         General: Normal range of motion.      Cervical back: Normal range of motion.      Right lower leg: No edema.      Left lower leg: No edema.   Skin:     General: Skin is warm.      Capillary Refill: Capillary refill takes less than 2 seconds.    Neurological:      General: No focal deficit present.      Mental Status: He is alert and oriented to person, place, and time.      Cranial Nerves: No cranial nerve deficit.      Motor: No weakness.      Gait: Gait normal.   Psychiatric:         Mood and Affect: Mood normal.         Behavior: Behavior normal.

## 2024-08-15 NOTE — ASSESSMENT & PLAN NOTE
-Medication management - patient is doing well on current daily dose of 8-2mg BID daily, which is continued today.  -UDS and confirmation ordered.    -UDS positive for Suboxone.  Morphine positive in POCT UDS.  Patient denies using any illicit substances  -Counseled patient on avoiding using illicit substances  -PDMP reviewed       Follow-up interval: every 2 weeks    This patient has not required re-commitment contracts while using Buprenorphine in this practice.

## 2024-08-19 LAB
6MAM UR QL SCN: NEGATIVE NG/ML
ACCEPTABLE CREAT UR QL: 155 MG/DL
AMPHET UR QL SCN: NEGATIVE NG/ML
BARBITURATES UR QL SCN: NEGATIVE NG/ML
BENZODIAZ UR QL SCN: NEGATIVE NG/ML
BUPRENORPHINE UR QL CFM: ABNORMAL NG/ML
CANNABINOIDS UR QL SCN: NEGATIVE NG/ML
CARISOPRODOL UR QL: NEGATIVE NG/ML
COCAINE+BZE UR QL SCN: NEGATIVE NG/ML
CODEINE UR QL CFM: 120 NG/MG CREAT
DHC UR CFM-MCNC: NOT DETECTED NG/MG CREAT
ETHYL GLUCURONIDE UR QL SCN: NEGATIVE NG/ML
FENTANYL UR QL SCN: NEGATIVE NG/ML
GABAPENTIN SERPLBLD QL SCN: NEGATIVE UG/ML
HYDROCODONE UR QL CFM: NOT DETECTED NG/MG CREAT
HYDROMORPHONE UR QL CFM: NOT DETECTED NG/MG CREAT
METHADONE UR QL SCN: NEGATIVE NG/ML
MORPHINE UR QL CFM: 34 NG/MG CREAT
NALOXONE UR CFM-MCNC: >645 NG/MG CREAT
NITRITE UR QL STRIP: NEGATIVE UG/ML
NORBUP/BUP RATIO: 4.82
NORBUPRENORPHINE UR CFM-MCNC: 643 NG/MG CREAT
NORBUPRENORPHINE/CREAT UR: 134 NG/MG CREAT
NORCODEINE/CREAT UR CFM: NOT DETECTED NG/MG CREAT
NORHYDROCODONE UR CFM-MCNC: NOT DETECTED NG/MG CREAT
NORMORPHINE: NOT DETECTED NG/MG CREAT
OPIATES UR QL SCN: ABNORMAL NG/ML
OXYCODONE+OXYMORPHONE UR QL SCN: NEGATIVE NG/ML
PCP UR QL SCN: NEGATIVE NG/ML
PROPOXYPH UR QL SCN: NEGATIVE NG/ML
SPECIMEN PH ACCEPTABLE UR: 6 (ref 4.5–8.9)
TAPENTADOL UR QL SCN: NEGATIVE NG/ML
TRAMADOL UR QL SCN: NEGATIVE NG/ML

## 2024-08-29 ENCOUNTER — OFFICE VISIT (OUTPATIENT)
Dept: FAMILY MEDICINE CLINIC | Facility: CLINIC | Age: 59
End: 2024-08-29

## 2024-08-29 VITALS
DIASTOLIC BLOOD PRESSURE: 74 MMHG | WEIGHT: 161 LBS | BODY MASS INDEX: 25.27 KG/M2 | HEART RATE: 76 BPM | SYSTOLIC BLOOD PRESSURE: 130 MMHG | OXYGEN SATURATION: 98 % | RESPIRATION RATE: 16 BRPM | HEIGHT: 67 IN | TEMPERATURE: 98 F

## 2024-08-29 DIAGNOSIS — F11.20 OPIOID USE DISORDER, SEVERE, DEPENDENCE (HCC): Primary | ICD-10-CM

## 2024-08-29 PROCEDURE — 99213 OFFICE O/P EST LOW 20 MIN: CPT | Performed by: FAMILY MEDICINE

## 2024-08-29 PROCEDURE — 80305 DRUG TEST PRSMV DIR OPT OBS: CPT | Performed by: FAMILY MEDICINE

## 2024-08-29 PROCEDURE — 80362 OPIOIDS & OPIATE ANALOGS 1/2: CPT | Performed by: FAMILY MEDICINE

## 2024-08-29 PROCEDURE — 80348 DRUG SCREENING BUPRENORPHINE: CPT | Performed by: FAMILY MEDICINE

## 2024-08-29 PROCEDURE — 80307 DRUG TEST PRSMV CHEM ANLYZR: CPT | Performed by: FAMILY MEDICINE

## 2024-08-29 RX ORDER — BUPRENORPHINE HYDROCHLORIDE AND NALOXONE HYDROCHLORIDE DIHYDRATE 8; 2 MG/1; MG/1
1 TABLET SUBLINGUAL 2 TIMES DAILY
Qty: 28 TABLET | Refills: 0 | Status: SHIPPED | OUTPATIENT
Start: 2024-09-02 | End: 2024-09-16

## 2024-08-29 NOTE — ASSESSMENT & PLAN NOTE
-Medication management - patient is doing well on current daily dose of 8-2mg BID daily, which is continued today.  -UDS and confirmation ordered.    -UDS positive for Suboxone  Patient denies using any illicit substances  -Counseled patient on avoiding using illicit substances  -PDMP reviewed    He is unable to afford a month supply of Suboxone.  Will give him prescription every 2 weeks in between our monthly clinic follow-up  Follow-up interval: every 4 weeks      This patient has not required re-commitment contracts while using Buprenorphine in this practice.

## 2024-08-29 NOTE — PROGRESS NOTES
OUD MAINTENANCE NOTE    Tavon Villatoro 59 y.o. male MRN: [id number]  PCP: Harjit Ortega MD      Assessment and Plan    Opioid use disorder, severe, dependence (HCC)  -Medication management - patient is doing well on current daily dose of 8-2mg BID daily, which is continued today.  -UDS and confirmation ordered.    -UDS positive for Suboxone  Patient denies using any illicit substances  -Counseled patient on avoiding using illicit substances  -PDMP reviewed    He is unable to afford a month supply of Suboxone.  Will give him prescription every 2 weeks in between our monthly clinic follow-up  Follow-up interval: every 4 weeks      This patient has not required re-commitment contracts while using Buprenorphine in this practice.        HPI:   Tavon Villatoro presents for buprenorphine/naloxone follow-up visit visit.  I have reviewed the prior induction visit, follow-up visits, and telephone encounters relevant to opiate use disorder (OUD) treatment.    Current daily dose: 8-2 mg BID     Number weeks induction: since 4/21/2023     Current follow-up interval, in weeks: 4     UDS: positive for suboxone   Patient denies using any recreational substances     Interval use history:     Opiate use: Clean since 4/3/2023  Alcohol daily use: none  Marijuana daily use: none  Other drug use: none  Overdoses: No prior overdose  Current behavioral health provider: None- stable      Sleep:5-6 hours  Bowels: none  Tobacco: Smokes 1/2 PPD cigarettes per day  Patient denies allergy to Buprenorphine and Naloxone; denies history of liver failure, denies suicidal or homicidal ideation, denies seizure disorder, denies treatment for HIV, denies hypnotic sedative use, denies benzodiazapine use, denies abuse of other drugs, denies ethanol abuse.     Review of Systems   Constitutional:  Negative for appetite change, chills, diaphoresis, fatigue and fever.   HENT:  Negative for sore throat.    Eyes:  Negative for visual disturbance.    Respiratory:  Negative for cough, shortness of breath and wheezing.    Cardiovascular:  Negative for chest pain and palpitations.   Gastrointestinal:  Negative for abdominal pain, diarrhea, nausea and vomiting.   Endocrine: Negative for polydipsia, polyphagia and polyuria.   Musculoskeletal:  Negative for arthralgias, back pain and myalgias.   Skin:  Negative for rash.   Neurological:  Negative for dizziness, seizures, syncope, weakness and light-headedness.   Psychiatric/Behavioral:  The patient is not nervous/anxious.        Objective:     Vitals:    24 1634   BP: 130/74   Pulse: 76   Resp: 16   Temp: 98 °F (36.7 °C)   SpO2: 98%           COWS Scale:0     Resting HR: 0  0 = <80  1 =   2 = 101-120  4 = >120     Sweatin  0 = for no chills/flushing  1 = for subjective chills/flushing  2 = flushed or observable sweat on the face  3 =for beads of sweat on brow/face  4 = for sweat streaming of of face     Restlessness:0  0 = able to sit still  1 = subjective difficulty sitting still  3 = for frequent shifting or extraneous movement  5 = for unable to sit still for more than a few seconds      Pupil size:0  0 = pinpoint or normal  1 = for possibly larger than normal  2 = for moderately dilated  5 = for only iris rim visible      Bone/joint pain:0  0 = not present  1 = mild diffuse comfort  2 = sever diffuse aching  4 = objectively rubbing joints/muscles and obviously in pain     Runny nose/tearin  0 = not present  1 = stuff nose/moist eyes  2 = nose running/tearing  4 = nose constantly running or tears streaming down cheeks     GI upset:0  0 = no GI symptoms  1 = stomach cramps  2 = nasuea or loose stools  3 = vomiting or diarrhea  5 = multiple episodes of vomiting or diarrhea     Tremor observation of outstretched hands:0  0 = no tremor  1= tremor felt but not observed  2= slight tremor observable  4= gross tremor or muscle twitching     Yawning 0  0= no yawning  1 = yawning once or twice during  assessment  2 = yawing three or more time during assessment  4 = yawing several times a minute     Anxiety or irritability:0  0 = none  1 = patient reports increasing irritability or anxiousness  2 = patient obviously irritable/anxious  4 = patient so irritable/anxious that assessment is difficult     Gooseflesh:0  0 = skin is smooth  3 = piloerection of skin can be felt or seen  5 = prominent piloerection     TOTAL COWS SCORE:0 (If inducing in office, recommend delaying buprenorphine until COWS scale > 12 to avoid precipitated withdrawal)         Physical Exam:     Physical Exam  Constitutional:       General: He is not in acute distress.     Appearance: Normal appearance. He is well-developed. He is not ill-appearing, toxic-appearing or diaphoretic.   HENT:      Head: Normocephalic and atraumatic.      Right Ear: External ear normal.      Left Ear: External ear normal.      Nose: Nose normal.      Mouth/Throat:      Pharynx: No oropharyngeal exudate.   Eyes:      General: No scleral icterus.        Right eye: No discharge.         Left eye: No discharge.      Extraocular Movements: Extraocular movements intact.      Conjunctiva/sclera: Conjunctivae normal.      Pupils: Pupils are equal, round, and reactive to light.   Cardiovascular:      Rate and Rhythm: Normal rate and regular rhythm.      Heart sounds: Normal heart sounds. No murmur heard.     No friction rub. No gallop.   Pulmonary:      Effort: Pulmonary effort is normal. No respiratory distress.      Breath sounds: Normal breath sounds. No wheezing.   Abdominal:      General: Bowel sounds are normal. There is no distension.      Palpations: Abdomen is soft. There is no mass.      Tenderness: There is no abdominal tenderness. There is no guarding.      Hernia: No hernia is present.   Musculoskeletal:         General: Normal range of motion.      Cervical back: Normal range of motion.      Right lower leg: No edema.      Left lower leg: No edema.   Skin:      General: Skin is warm.      Capillary Refill: Capillary refill takes less than 2 seconds.   Neurological:      General: No focal deficit present.      Mental Status: He is alert and oriented to person, place, and time.      Cranial Nerves: No cranial nerve deficit.      Motor: No weakness.      Gait: Gait normal.   Psychiatric:         Mood and Affect: Mood normal.         Behavior: Behavior normal.

## 2024-09-04 LAB
6MAM UR QL SCN: NEGATIVE NG/ML
ACCEPTABLE CREAT UR QL: 100 MG/DL
AMPHET UR QL SCN: NEGATIVE NG/ML
BARBITURATES UR QL SCN: NEGATIVE NG/ML
BENZODIAZ UR QL SCN: NEGATIVE NG/ML
BUPRENORPHINE UR QL CFM: ABNORMAL NG/ML
CANNABINOIDS UR QL SCN: NEGATIVE NG/ML
CARISOPRODOL UR QL: NEGATIVE NG/ML
COCAINE+BZE UR QL SCN: NEGATIVE NG/ML
ETHYL GLUCURONIDE UR QL SCN: NEGATIVE NG/ML
FENTANYL UR QL SCN: NEGATIVE NG/ML
GABAPENTIN SERPLBLD QL SCN: NEGATIVE UG/ML
METHADONE UR QL SCN: NEGATIVE NG/ML
NALOXONE UR CFM-MCNC: 93 NG/MG CREAT
NITRITE UR QL STRIP: NEGATIVE UG/ML
NORBUP/BUP RATIO: 10.06
NORBUPRENORPHINE UR CFM-MCNC: 342 NG/MG CREAT
NORBUPRENORPHINE/CREAT UR: 34 NG/MG CREAT
OPIATES UR QL SCN: NEGATIVE NG/ML
OXYCODONE+OXYMORPHONE UR QL SCN: NEGATIVE NG/ML
PCP UR QL SCN: NEGATIVE NG/ML
PROPOXYPH UR QL SCN: NEGATIVE NG/ML
SPECIMEN PH ACCEPTABLE UR: 8.3 (ref 4.5–8.9)
TAPENTADOL UR QL SCN: NEGATIVE NG/ML
TRAMADOL UR QL SCN: NEGATIVE NG/ML

## 2024-09-17 ENCOUNTER — OFFICE VISIT (OUTPATIENT)
Dept: FAMILY MEDICINE CLINIC | Facility: CLINIC | Age: 59
End: 2024-09-17

## 2024-09-17 ENCOUNTER — TELEPHONE (OUTPATIENT)
Dept: FAMILY MEDICINE CLINIC | Facility: CLINIC | Age: 59
End: 2024-09-17

## 2024-09-17 VITALS
RESPIRATION RATE: 16 BRPM | TEMPERATURE: 97.5 F | WEIGHT: 163 LBS | HEIGHT: 67 IN | OXYGEN SATURATION: 95 % | BODY MASS INDEX: 25.58 KG/M2 | SYSTOLIC BLOOD PRESSURE: 130 MMHG | DIASTOLIC BLOOD PRESSURE: 70 MMHG | HEART RATE: 68 BPM

## 2024-09-17 DIAGNOSIS — Z12.11 SCREENING FOR COLORECTAL CANCER: ICD-10-CM

## 2024-09-17 DIAGNOSIS — Z79.4 TYPE 2 DIABETES MELLITUS WITHOUT COMPLICATION, WITH LONG-TERM CURRENT USE OF INSULIN (HCC): Primary | ICD-10-CM

## 2024-09-17 DIAGNOSIS — Z12.12 SCREENING FOR COLORECTAL CANCER: ICD-10-CM

## 2024-09-17 DIAGNOSIS — Z00.00 ANNUAL PHYSICAL EXAM: ICD-10-CM

## 2024-09-17 DIAGNOSIS — Z23 ENCOUNTER FOR IMMUNIZATION: ICD-10-CM

## 2024-09-17 DIAGNOSIS — Z12.5 SCREENING FOR PROSTATE CANCER: ICD-10-CM

## 2024-09-17 DIAGNOSIS — F11.20 OPIOID USE DISORDER, SEVERE, DEPENDENCE (HCC): ICD-10-CM

## 2024-09-17 DIAGNOSIS — E11.9 TYPE 2 DIABETES MELLITUS WITHOUT COMPLICATION, WITH LONG-TERM CURRENT USE OF INSULIN (HCC): Primary | ICD-10-CM

## 2024-09-17 DIAGNOSIS — E66.3 OVERWEIGHT (BMI 25.0-29.9): ICD-10-CM

## 2024-09-17 LAB — SL AMB POCT HEMOGLOBIN AIC: 5.9 (ref ?–6.5)

## 2024-09-17 PROCEDURE — 83036 HEMOGLOBIN GLYCOSYLATED A1C: CPT | Performed by: FAMILY MEDICINE

## 2024-09-17 PROCEDURE — 90673 RIV3 VACCINE NO PRESERV IM: CPT | Performed by: FAMILY MEDICINE

## 2024-09-17 PROCEDURE — 90471 IMMUNIZATION ADMIN: CPT | Performed by: FAMILY MEDICINE

## 2024-09-17 PROCEDURE — 99396 PREV VISIT EST AGE 40-64: CPT | Performed by: FAMILY MEDICINE

## 2024-09-17 RX ORDER — BUPRENORPHINE HYDROCHLORIDE AND NALOXONE HYDROCHLORIDE DIHYDRATE 8; 2 MG/1; MG/1
1 TABLET SUBLINGUAL 2 TIMES DAILY
Qty: 28 TABLET | Refills: 0 | Status: SHIPPED | OUTPATIENT
Start: 2024-09-17 | End: 2024-10-01

## 2024-09-17 NOTE — PROGRESS NOTES
Adult Annual Physical  Name: Tavon Villatoro      : 1965      MRN: [id number]  Encounter Provider: Harjit Ortega MD  Encounter Date: 2024   Encounter department: [facility]    Assessment & Plan  Type 2 diabetes mellitus without complication, with long-term current use of insulin (HCC)    Lab Results   Component Value Date    HGBA1C 5.9 2024   Well-controlled  Continue low carbohydrate diet      Orders:    POCT hemoglobin A1c    Encounter for immunization    Orders:    influenza vaccine, recombinant, PF, 0.5 mL IM (Flublok)    Annual physical exam         Screening for colorectal cancer    Orders:    Ambulatory referral to Gastroenterology; Future    Screening for prostate cancer    Orders:    PSA, Total Screen; Future    Opioid use disorder, severe, dependence (HCC)    Orders:    buprenorphine-naloxone (SUBOXONE) 8-2 mg per SL tablet; Place 1 tablet under the tongue 2 (two) times a day for 14 days [id number]    Overweight (BMI 25.0-29.9)           Immunizations and preventive care screenings were discussed with patient today. Appropriate education was printed on patient's after visit summary.    Discussed risks and benefits of prostate cancer screening. We discussed the controversial history of PSA screening for prostate cancer in the [place] as well as the risk of over detection and over treatment of prostate cancer by way of PSA screening.  The patient understands that PSA blood testing is an imperfect way to screen for prostate cancer and that elevated PSA levels in the blood may also be caused by infection, inflammation, prostatic trauma or manipulation, urological procedures, or by benign prostatic enlargement.    The role of the digital rectal examination in prostate cancer screening was also discussed and I discussed with him that there is large interobserver variability in the findings of digital rectal  examination.    Counseling:  Exercise: the importance of regular exercise/physical activity was discussed. Recommend exercise 3-5 times per week for at least 30 minutes.     BMI Counseling: Body mass index is 25.53 kg/m². The BMI is above normal. Nutrition recommendations include decreasing portion sizes, encouraging healthy choices of fruits and vegetables, decreasing fast food intake, consuming healthier snacks, limiting drinks that contain sugar, moderation in carbohydrate intake and reducing intake of cholesterol. Exercise recommendations include moderate physical activity 150 minutes/week. Rationale for BMI follow-up plan is due to patient being overweight or obese.         History of Present Illness     Adult Annual Physical:  Patient presents for annual physical. 59-year-old male with a history of type 2 diabetes, hypertension and opioid use disorder in remission who presents today for ongoing wellness visit..     Diet and Physical Activity:  - Diet/Nutrition: well balanced diet.  - Exercise: no formal exercise.    General Health:  - Sleep: sleeps well.  - Hearing: normal hearing bilateral ears.  - Vision: no vision problems.  - Dental: brushes teeth once daily.     Health:  - History of STDs: no.   - Urinary symptoms: none.     Review of Systems   Constitutional:  Negative for appetite change, chills, diaphoresis, fatigue and fever.   HENT:  Negative for postnasal drip and sore throat.    Eyes:  Negative for visual disturbance.   Respiratory:  Negative for cough, shortness of breath and wheezing.    Cardiovascular:  Negative for chest pain and palpitations.   Gastrointestinal:  Negative for abdominal pain, constipation, diarrhea, nausea and vomiting.   Endocrine: Negative for polydipsia, polyphagia and polyuria.   Genitourinary:  Negative for dysuria, hematuria and urgency.   Musculoskeletal:  Negative for arthralgias, back pain and myalgias.   Skin:  Negative for rash.   Neurological:  Negative for  "dizziness, seizures, syncope, weakness and light-headedness.   Psychiatric/Behavioral:  Negative for confusion and dysphoric mood. The patient is not nervous/anxious.          Objective     /70 (BP Location: Left arm, Patient Position: Sitting, Cuff Size: Standard)   Pulse 68   Temp 97.5 °F (36.4 °C) (Temporal)   Resp 16   Ht 5' 7\" (1.702 m)   Wt 73.9 kg (163 lb)   SpO2 95%   BMI 25.53 kg/m²     Physical Exam  Vitals and nursing note reviewed.   Constitutional:       General: He is not in acute distress.     Appearance: Normal appearance. He is well-developed. He is not ill-appearing, toxic-appearing or diaphoretic.   HENT:      Head: Normocephalic and atraumatic.      Right Ear: Tympanic membrane, ear canal and external ear normal. There is no impacted cerumen.      Left Ear: Tympanic membrane, ear canal and external ear normal. There is no impacted cerumen.      Nose: Nose normal. No rhinorrhea.      Mouth/Throat:      Pharynx: No oropharyngeal exudate or posterior oropharyngeal erythema.   Eyes:      General: No scleral icterus.        Right eye: No discharge.         Left eye: No discharge.      Extraocular Movements: Extraocular movements intact.      Conjunctiva/sclera: Conjunctivae normal.      Pupils: Pupils are equal, round, and reactive to light.   Cardiovascular:      Rate and Rhythm: Normal rate and regular rhythm.      Heart sounds: Normal heart sounds. No murmur heard.     No friction rub. No gallop.   Pulmonary:      Effort: Pulmonary effort is normal. No respiratory distress.      Breath sounds: Normal breath sounds. No stridor. No wheezing or rhonchi.   Abdominal:      General: There is no distension.      Palpations: Abdomen is soft. There is no mass.      Tenderness: There is no abdominal tenderness. There is no guarding or rebound.      Hernia: No hernia is present.   Musculoskeletal:      Cervical back: Normal range of motion.      Right lower leg: No edema.      Left lower leg: No " edema.   Skin:     General: Skin is warm and dry.      Capillary Refill: Capillary refill takes less than 2 seconds.      Findings: No rash.   Neurological:      General: No focal deficit present.      Mental Status: He is alert and oriented to person, place, and time.      Cranial Nerves: No cranial nerve deficit.      Sensory: No sensory deficit.      Motor: No weakness.      Coordination: Coordination normal.      Gait: Gait normal.   Psychiatric:         Mood and Affect: Mood normal.         Behavior: Behavior normal.

## 2024-09-17 NOTE — ASSESSMENT & PLAN NOTE
Lab Results   Component Value Date    HGBA1C 5.9 09/17/2024   Well-controlled  Continue low carbohydrate diet      Orders:    POCT hemoglobin A1c

## 2024-09-17 NOTE — ASSESSMENT & PLAN NOTE
Orders:    buprenorphine-naloxone (SUBOXONE) 8-2 mg per SL tablet; Place 1 tablet under the tongue 2 (two) times a day for 14 days [id number]

## 2024-09-17 NOTE — PATIENT INSTRUCTIONS
"Patient Education     Routine physical for adults   The Basics   Written by the doctors and editors at [facility]   What is a physical? -- A physical is a routine visit, or \"check-up,\" with your doctor. You might also hear it called a \"wellness visit\" or \"preventive visit.\"  During each visit, the doctor will:   Ask about your physical and mental health   Ask about your habits, behaviors, and lifestyle   Do an exam   Give you vaccines if needed   Talk to you about any medicines you take   Give advice about your health   Answer your questions  Getting regular check-ups is an important part of taking care of your health. It can help your doctor find and treat any problems you have. But it's also important for preventing health problems.  A routine physical is different from a \"sick visit.\" A sick visit is when you see a doctor because of a health concern or problem. Since physicals are scheduled ahead of time, you can think about what you want to ask the doctor.  How often should I get a physical? -- It depends on your age and health. In general, for people age 21 years and older:   If you are younger than 50 years, you might be able to get a physical every 3 years.   If you are 50 years or older, your doctor might recommend a physical every year.  If you have an ongoing health condition, like diabetes or high blood pressure, your doctor will probably want to see you more often.  What happens during a physical? -- In general, each visit will include:   Physical exam - The doctor or nurse will check your height, weight, heart rate, and blood pressure. They will also look at your eyes and ears. They will ask about how you are feeling and whether you have any symptoms that bother you.   Medicines - It's a good idea to bring a list of all the medicines you take to each doctor visit. Your doctor will talk to you about your medicines and answer any questions. Tell them if you are having any side effects that bother you. You " "should also tell them if you are having trouble paying for any of your medicines.   Habits and behaviors - This includes:   Your diet   Your exercise habits   Whether you smoke, drink alcohol, or use drugs   Whether you are sexually active   Whether you feel safe at home  Your doctor will talk to you about things you can do to improve your health and lower your risk of health problems. They will also offer help and support. For example, if you want to quit smoking, they can give you advice and might prescribe medicines. If you want to improve your diet or get more physical activity, they can help you with this, too.   Lab tests, if needed - The tests you get will depend on your age and situation. For example, your doctor might want to check your:   Cholesterol   Blood sugar   Iron level   Vaccines - The recommended vaccines will depend on your age, health, and what vaccines you already had. Vaccines are very important because they can prevent certain serious or deadly infections.   Discussion of screening - \"Screening\" means checking for diseases or other health problems before they cause symptoms. Your doctor can recommend screening based on your age, risk, and preferences. This might include tests to check for:   Cancer, such as breast, prostate, cervical, ovarian, colorectal, prostate, lung, or skin cancer   Sexually transmitted infections, such as chlamydia and gonorrhea   Mental health conditions like depression and anxiety  Your doctor will talk to you about the different types of screening tests. They can help you decide which screenings to have. They can also explain what the results might mean.   Answering questions - The physical is a good time to ask the doctor or nurse questions about your health. If needed, they can refer you to other doctors or specialists, too.  Adults older than 65 years often need other care, too. As you get older, your doctor will talk to you about:   How to prevent falling at " home   Hearing or vision tests   Memory testing   How to take your medicines safely   Making sure that you have the help and support you need at home  All topics are updated as new evidence becomes available and our peer review process is complete.  This topic retrieved from [employer] on: May 02, 2024.  Topic 234871 Version 1.0  Release: 32.4.3 - C32.122  © 2024 UpToDate, Inc. and/or its affiliates. All rights reserved.  Consumer Information Use and Disclaimer   Disclaimer: This generalized information is a limited summary of diagnosis, treatment, and/or medication information. It is not meant to be comprehensive and should be used as a tool to help the user understand and/or assess potential diagnostic and treatment options. It does NOT include all information about conditions, treatments, medications, side effects, or risks that may apply to a specific patient. It is not intended to be medical advice or a substitute for the medical advice, diagnosis, or treatment of a health care provider based on the health care provider's examination and assessment of a patient's specific and unique circumstances. Patients must speak with a health care provider for complete information about their health, medical questions, and treatment options, including any risks or benefits regarding use of medications. This information does not endorse any treatments or medications as safe, effective, or approved for treating a specific patient. UpToDate, Inc. and its affiliates disclaim any warranty or liability relating to this information or the use thereof.The use of this information is governed by the Terms of Use, available at https://www.woltersDiscountDocuwer.com/en/know/clinical-effectiveness-terms. 2024© UpToDate, Inc. and its affiliates and/or licensors. All rights reserved.  Copyright   © 2024 UpToDate, Inc. and/or its affiliates. All rights reserved.

## 2024-09-17 NOTE — TELEPHONE ENCOUNTER
Patient came in to request refill of following medication.     buprenorphine-naloxone (SUBOXONE) 8-2 mg per SL tablet [[id number]]  ENDED

## 2024-10-01 ENCOUNTER — TELEPHONE (OUTPATIENT)
Dept: FAMILY MEDICINE CLINIC | Facility: CLINIC | Age: 59
End: 2024-10-01

## 2024-10-01 DIAGNOSIS — F11.20 OPIOID USE DISORDER, SEVERE, DEPENDENCE (HCC): ICD-10-CM

## 2024-10-01 RX ORDER — BUPRENORPHINE HYDROCHLORIDE AND NALOXONE HYDROCHLORIDE DIHYDRATE 8; 2 MG/1; MG/1
1 TABLET SUBLINGUAL 2 TIMES DAILY
Qty: 28 TABLET | Refills: 0 | Status: SHIPPED | OUTPATIENT
Start: 2024-10-01 | End: 2024-10-15

## 2024-10-01 NOTE — TELEPHONE ENCOUNTER
Left message to inform patient that medication has been sent. We do need to schedule follow up for 2 weeks. Appointment on hold for patient on 10/16 at 5:40.

## 2024-10-08 ENCOUNTER — TELEPHONE (OUTPATIENT)
Dept: FAMILY MEDICINE CLINIC | Facility: CLINIC | Age: 59
End: 2024-10-08

## 2024-10-08 NOTE — TELEPHONE ENCOUNTER
Good morning Tavon Burnett came into office today, he states he really needs a diagnosis letter stating how long he has been in the Suboxone program. Please advise, he states he needs this immediately. Please advise?

## 2024-10-16 ENCOUNTER — OFFICE VISIT (OUTPATIENT)
Dept: FAMILY MEDICINE CLINIC | Facility: CLINIC | Age: 59
End: 2024-10-16

## 2024-10-16 VITALS
OXYGEN SATURATION: 98 % | TEMPERATURE: 97.8 F | HEART RATE: 58 BPM | DIASTOLIC BLOOD PRESSURE: 80 MMHG | HEIGHT: 67 IN | RESPIRATION RATE: 18 BRPM | WEIGHT: 168.3 LBS | SYSTOLIC BLOOD PRESSURE: 126 MMHG | BODY MASS INDEX: 26.42 KG/M2

## 2024-10-16 DIAGNOSIS — F11.20 OPIOID USE DISORDER, SEVERE, DEPENDENCE (HCC): Primary | ICD-10-CM

## 2024-10-16 PROCEDURE — 80305 DRUG TEST PRSMV DIR OPT OBS: CPT | Performed by: FAMILY MEDICINE

## 2024-10-16 PROCEDURE — 80362 OPIOIDS & OPIATE ANALOGS 1/2: CPT | Performed by: FAMILY MEDICINE

## 2024-10-16 PROCEDURE — 80307 DRUG TEST PRSMV CHEM ANLYZR: CPT | Performed by: FAMILY MEDICINE

## 2024-10-16 PROCEDURE — 99213 OFFICE O/P EST LOW 20 MIN: CPT | Performed by: FAMILY MEDICINE

## 2024-10-16 PROCEDURE — 80348 DRUG SCREENING BUPRENORPHINE: CPT | Performed by: FAMILY MEDICINE

## 2024-10-16 RX ORDER — BUPRENORPHINE HYDROCHLORIDE AND NALOXONE HYDROCHLORIDE DIHYDRATE 8; 2 MG/1; MG/1
1 TABLET SUBLINGUAL 2 TIMES DAILY
Qty: 28 TABLET | Refills: 0 | Status: SHIPPED | OUTPATIENT
Start: 2024-10-16 | End: 2024-10-30

## 2024-10-16 NOTE — PROGRESS NOTES
OUD MAINTENANCE NOTE    Tavon Villatoro 59 y.o. male MRN: [id number]  PCP: Harjit Ortega MD      Assessment and Plan    Problem List Items Addressed This Visit       Opioid use disorder, severe, dependence (HCC) - Primary    Relevant Medications    buprenorphine-naloxone (SUBOXONE) 8-2 mg per SL tablet    Other Relevant Orders    POCT urine drug screen (Completed)    Drug Screen Routine w /Conf and Adulteration, urine.      -Medication management - patient is doing well on current daily dose of 8-2mg BID daily, which is continued today.  -UDS and confirmation ordered.  -PDMP reviewed  -UDS positive for Suboxone        Follow-up interval: every month       HPI:   Tavon Villatoro presents for buprenorphine/naloxone follow-up visit visit.  I have reviewed the prior induction visit, follow-up visits, and telephone encounters relevant to opiate use disorder (OUD) treatment.    Current daily dose: 8-2 mg BID     Number weeks induction: since 4/21/2023     Current follow-up interval, in weeks: 4     UDS: positive for suboxone   Patient denies using any recreational substances     Interval use history:     Opiate use: Clean since 4/3/2023  Alcohol daily use: none  Marijuana daily use: none  Other drug use: none  Overdoses: No prior overdose  Current behavioral health provider: None- stable      Sleep:5-6 hours  Bowels: none  Tobacco: Smokes 1/2 PPD cigarettes per day  Patient denies allergy to Buprenorphine and Naloxone; denies history of liver failure, denies suicidal or homicidal ideation, denies seizure disorder, denies treatment for HIV, denies hypnotic sedative use, denies benzodiazapine use, denies abuse of other drugs, denies ethanol abuse.     Review of Systems   Constitutional:  Negative for appetite change, chills, diaphoresis, fatigue and fever.   HENT:  Negative for sore throat.    Eyes:  Negative for visual disturbance.   Respiratory:  Negative for cough, shortness of breath and wheezing.     Cardiovascular:  Negative for chest pain and palpitations.   Gastrointestinal:  Negative for abdominal pain, diarrhea, nausea and vomiting.   Endocrine: Negative for polydipsia, polyphagia and polyuria.   Musculoskeletal:  Negative for arthralgias, back pain and myalgias.   Skin:  Negative for rash.   Neurological:  Negative for dizziness, seizures, syncope, weakness and light-headedness.   Psychiatric/Behavioral:  The patient is not nervous/anxious.        Objective:     Vitals:    10/16/24 1642   BP: 126/80   Pulse: 58   Resp: 18   Temp: 97.8 °F (36.6 °C)   SpO2: 98%         COWS Scale:0     Resting HR: 0  0 = <80  1 =   2 = 101-120  4 = >120     Sweatin  0 = for no chills/flushing  1 = for subjective chills/flushing  2 = flushed or observable sweat on the face  3 =for beads of sweat on brow/face  4 = for sweat streaming of of face     Restlessness:0  0 = able to sit still  1 = subjective difficulty sitting still  3 = for frequent shifting or extraneous movement  5 = for unable to sit still for more than a few seconds      Pupil size:0  0 = pinpoint or normal  1 = for possibly larger than normal  2 = for moderately dilated  5 = for only iris rim visible      Bone/joint pain:0  0 = not present  1 = mild diffuse comfort  2 = sever diffuse aching  4 = objectively rubbing joints/muscles and obviously in pain     Runny nose/tearin  0 = not present  1 = stuff nose/moist eyes  2 = nose running/tearing  4 = nose constantly running or tears streaming down cheeks     GI upset:0  0 = no GI symptoms  1 = stomach cramps  2 = nasuea or loose stools  3 = vomiting or diarrhea  5 = multiple episodes of vomiting or diarrhea     Tremor observation of outstretched hands:0  0 = no tremor  1= tremor felt but not observed  2= slight tremor observable  4= gross tremor or muscle twitching     Yawning 0  0= no yawning  1 = yawning once or twice during assessment  2 = yawing three or more time during assessment  4 = yawing  several times a minute     Anxiety or irritability:0  0 = none  1 = patient reports increasing irritability or anxiousness  2 = patient obviously irritable/anxious  4 = patient so irritable/anxious that assessment is difficult     Gooseflesh:0  0 = skin is smooth  3 = piloerection of skin can be felt or seen  5 = prominent piloerection     TOTAL COWS SCORE:0 (If inducing in office, recommend delaying buprenorphine until COWS scale > 12 to avoid precipitated withdrawal)         Physical Exam:     Physical Exam  Constitutional:       General: He is not in acute distress.     Appearance: Normal appearance. He is well-developed. He is not ill-appearing, toxic-appearing or diaphoretic.   HENT:      Head: Normocephalic and atraumatic.      Right Ear: External ear normal.      Left Ear: External ear normal.      Nose: Nose normal.      Mouth/Throat:      Pharynx: No oropharyngeal exudate.   Eyes:      General: No scleral icterus.        Right eye: No discharge.         Left eye: No discharge.      Extraocular Movements: Extraocular movements intact.      Conjunctiva/sclera: Conjunctivae normal.      Pupils: Pupils are equal, round, and reactive to light.   Cardiovascular:      Rate and Rhythm: Normal rate and regular rhythm.      Heart sounds: Normal heart sounds. No murmur heard.     No friction rub. No gallop.   Pulmonary:      Effort: Pulmonary effort is normal. No respiratory distress.      Breath sounds: Normal breath sounds. No wheezing.   Abdominal:      General: Bowel sounds are normal. There is no distension.      Palpations: Abdomen is soft. There is no mass.      Tenderness: There is no abdominal tenderness. There is no guarding.      Hernia: No hernia is present.   Musculoskeletal:         General: Normal range of motion.      Cervical back: Normal range of motion.      Right lower leg: No edema.      Left lower leg: No edema.   Skin:     General: Skin is warm.      Capillary Refill: Capillary refill takes less  than 2 seconds.   Neurological:      General: No focal deficit present.      Mental Status: He is alert and oriented to person, place, and time.      Cranial Nerves: No cranial nerve deficit.      Motor: No weakness.      Gait: Gait normal.   Psychiatric:         Mood and Affect: Mood normal.         Behavior: Behavior normal.

## 2024-10-21 ENCOUNTER — TELEPHONE (OUTPATIENT)
Dept: FAMILY MEDICINE CLINIC | Facility: CLINIC | Age: 59
End: 2024-10-21

## 2024-10-21 NOTE — TELEPHONE ENCOUNTER
Good afternoon ,    Patient Tavon stopped by office to update his medical insurance. Patient now has medical insurance and asked dif you could now please send 1 months worth of Suboxone instead of every 2 weeks.     Please advise?

## 2024-10-23 LAB
6MAM UR QL SCN: NEGATIVE NG/ML
ACCEPTABLE CREAT UR QL: 139 MG/DL
AMPHET UR QL SCN: NEGATIVE NG/ML
BARBITURATES UR QL SCN: NEGATIVE NG/ML
BENZODIAZ UR QL SCN: NEGATIVE NG/ML
BUPRENORPHINE UR QL CFM: NORMAL NG/ML
BUPRENORPHINE: ABNORMAL
CANNABINOIDS UR QL SCN: NEGATIVE NG/ML
CARISOPRODOL UR QL: NEGATIVE NG/ML
COCAINE+BZE UR QL SCN: NEGATIVE NG/ML
ETHYL GLUCURONIDE UR QL SCN: NEGATIVE NG/ML
FENTANYL UR QL SCN: NEGATIVE NG/ML
GABAPENTIN SERPLBLD QL SCN: NEGATIVE UG/ML
METHADONE UR QL SCN: NEGATIVE NG/ML
NALOXONE UR CFM-MCNC: 578 NG/MG CREAT
NITRITE UR QL STRIP: NEGATIVE UG/ML
NORBUP/BUP RATIO: 5.6
NORBUPRENORPHINE UR CFM-MCNC: 649 NG/MG CREAT
NORBUPRENORPHINE/CREAT UR: 116 NG/MG CREAT
OPIATE ANTAGONIST: ABNORMAL
OPIATES UR QL SCN: NEGATIVE NG/ML
OXYCODONE+OXYMORPHONE UR QL SCN: NEGATIVE NG/ML
PCP UR QL SCN: NEGATIVE NG/ML
PROPOXYPH UR QL SCN: NEGATIVE NG/ML
SPECIMEN PH ACCEPTABLE UR: 6.3 (ref 4.5–8.9)
TAPENTADOL UR QL SCN: NEGATIVE NG/ML
TRAMADOL UR QL SCN: NEGATIVE NG/ML

## 2024-10-29 ENCOUNTER — TELEPHONE (OUTPATIENT)
Dept: FAMILY MEDICINE CLINIC | Facility: CLINIC | Age: 59
End: 2024-10-29

## 2024-10-29 DIAGNOSIS — F11.20 OPIOID USE DISORDER, SEVERE, DEPENDENCE (HCC): ICD-10-CM

## 2024-10-29 RX ORDER — BUPRENORPHINE HYDROCHLORIDE AND NALOXONE HYDROCHLORIDE DIHYDRATE 8; 2 MG/1; MG/1
1 TABLET SUBLINGUAL 2 TIMES DAILY
Qty: 28 TABLET | Refills: 0 | Status: SHIPPED | OUTPATIENT
Start: 2024-10-31 | End: 2024-11-14

## 2024-10-29 NOTE — TELEPHONE ENCOUNTER
first attempt to contact patient. no answer    Unable to inform pt, phone number not accepting phone calls at this time.

## 2024-10-29 NOTE — TELEPHONE ENCOUNTER
Pt came into our office requesting medication refills on,     buprenorphine-naloxone (SUBOXONE) 8-2 mg per SL tablet     Pt stated only has two left.     Pt's next visit is in November.

## 2024-11-11 ENCOUNTER — HOSPITAL ENCOUNTER (EMERGENCY)
Facility: HOSPITAL | Age: 59
Discharge: HOME | End: 2024-11-11
Attending: INTERNAL MEDICINE
Payer: COMMERCIAL

## 2024-11-11 VITALS
HEART RATE: 94 BPM | WEIGHT: 178.13 LBS | BODY MASS INDEX: 27.9 KG/M2 | TEMPERATURE: 97.9 F | SYSTOLIC BLOOD PRESSURE: 126 MMHG | OXYGEN SATURATION: 97 % | RESPIRATION RATE: 18 BRPM | DIASTOLIC BLOOD PRESSURE: 68 MMHG

## 2024-11-11 DIAGNOSIS — L73.1 INGROWN HAIR: ICD-10-CM

## 2024-11-11 DIAGNOSIS — L03.90 CELLULITIS: Primary | ICD-10-CM

## 2024-11-11 PROCEDURE — 99283 EMERGENCY DEPT VISIT LOW MDM: CPT

## 2024-11-11 PROCEDURE — 99284 EMERGENCY DEPT VISIT MOD MDM: CPT | Performed by: INTERNAL MEDICINE

## 2024-11-11 RX ORDER — CEPHALEXIN 500 MG/1
500 CAPSULE ORAL EVERY 6 HOURS SCHEDULED
Qty: 28 CAPSULE | Refills: 0 | Status: SHIPPED | OUTPATIENT
Start: 2024-11-11 | End: 2024-11-11

## 2024-11-11 RX ORDER — CEPHALEXIN 500 MG/1
500 CAPSULE ORAL EVERY 6 HOURS SCHEDULED
Qty: 28 CAPSULE | Refills: 0 | Status: SHIPPED | OUTPATIENT
Start: 2024-11-11 | End: 2024-11-18

## 2024-11-11 RX ADMIN — CEPHALEXIN 500 MG: 250 CAPSULE ORAL at 14:35

## 2024-11-11 NOTE — ED ATTENDING ATTESTATION
"11/11/2024  I, Jany Akins MD, saw and evaluated the patient. I have discussed the patient with the resident/non-physician practitioner and agree with the resident's/non-physician practitioner's findings, Plan of Care, and MDM as documented in the resident's/non-physician practitioner's note, except where noted. All available labs and Radiology studies were reviewed.  I was present for key portions of any procedure(s) performed by the resident/non-physician practitioner and I was immediately available to provide assistance.       At this point I agree with the current assessment done in the Emergency Department.  I have conducted an independent evaluation of this patient a history and physical is as follows:    ED Course  Chief Complaint   Patient presents with    Mass     Pt concerned for hernia or abscess in groin region d/t feeling a bump two days ago. Denies pain. Reports it is red in color.          59-year-old man presents to ED for evaluation of right inguinal crease \"bump.\"  He reports he felt a bump about 2 days ago.  He thinks he might of had an ingrown hair there previously.  Denies pain, bleeding or drainage.  Reports redness.  No previous injuries to inguinal or femoral hernias.  No dysuria, abdominal pain, flank pain, fevers or chills.  No other complaints or concerns. On exam the patient is awake, alert, and comfortable. Mucous membranes are moist. The patient's heart is regular. No respiratory distress.  Abdomen non-distended. Moves all extremities. Patient neurologically nonfocal. No skin rashes. Back without deformities.  Right inguinal crease with surrounding erythema and edema, no induration, no fluctuance, no crepitus, no pain out of proportion. MDM: Exam consistent with cellulitis, this is not consistent with necrotizing fasciitis or compartment syndrome or deep infection.  Will start antibiotics in the ED and send them to the pharmacy, discussed strict return precautions with patient " including return for any worsening redness, any pain any dysuria, abdominal pain or new symptoms.        Critical Care Time  Procedures

## 2024-11-11 NOTE — DISCHARGE INSTRUCTIONS
You have been seen for evaluation of a groin swelling. This appears to be an infection from an ingrown hair. We have prescribed antibiotics. Please take until completion and apply warm compresses to help with swelling. Tylenol/motrin can be used as needed for pain if pain develops. Return if significant worsening of symptoms or if no improvement after 3 days.   Follow up with PCP regarding today's visit.

## 2024-11-11 NOTE — ED PROVIDER NOTES
Time reflects when diagnosis was documented in both MDM as applicable and the Disposition within this note       Time User Action Codes Description Comment    11/11/2024  2:27 PM Jany Frost Add [R62.52] Has not grown in height     11/11/2024  2:27 PM Jany Frost Remove [R62.52] Has not grown in height     11/11/2024  2:28 PM Jany Frost Add [L73.1] Ingrown hair           ED Disposition       ED Disposition   Discharge    Condition   Stable    Date/Time   Mon Nov 11, 2024  2:28 PM    Comment   Tavon Villatoro discharge to home/self care.                   Assessment & Plan       Medical Decision Making  Patient is a 59-year-old male, past medical history significant for diabetes and hypertension, presenting today for evaluation of a groin mass that has been ongoing for the past 2 days.  He reports no systemic symptoms, and no associated pain with the area.  History and physical exam at this time most consistent with ingrown hair with associated infection and cellulitis.  There is no area of fluctuance that appears drainable.  No indication for further imaging at this time.  No change with the cell maneuvers to suggest hernia.  No bowel symptoms to suggest complication of hernia such as strangulation or incarceration.  Acute onset and associated erythema as well as coming to central point, less concern for underlying malignancy.    Workup and plan: Will treat patient with a 7-day course of Keflex, given strict return precautions and follow-up information.  Patient reports understanding, all questions answered.        Risk  Prescription drug management.             Medications   cephalexin (KEFLEX) capsule 500 mg (500 mg Oral Given 11/11/24 1435)       ED Risk Strat Scores                                               History of Present Illness       Chief Complaint   Patient presents with    Mass     Pt concerned for hernia or abscess in groin region d/t feeling a bump two days ago. Denies pain. Reports it  is red in color.         Past Medical History:   Diagnosis Date    Diabetes mellitus (HCC)     Hypertension     Substance abuse (HCC)       Past Surgical History:   Procedure Laterality Date    NO PAST SURGERIES      WOUND DEBRIDEMENT Right 8/6/2022    Procedure: DEBRIDEMENT RIGHT UPPER EXTREMITY (WASH OUT) SEPTIC WRIST;  Surgeon: Casey Hahn MD;  Location: [facility];  Service: Orthopedics      Family History   Problem Relation Age of Onset    Diabetes Mother     Alcohol abuse Father     Diabetes Sister     Asthma Brother       Social History     Tobacco Use    Smoking status: Every Day     Current packs/day: 0.50     Types: Cigarettes     Passive exposure: Never    Smokeless tobacco: Never    Tobacco comments:     about 12 cigarettes/daily   Vaping Use    Vaping status: Never Used   Substance Use Topics    Alcohol use: Not Currently    Drug use: Not Currently     Frequency: 7.0 times per week     Types: Fentanyl      E-Cigarette/Vaping    E-Cigarette Use Never User       E-Cigarette/Vaping Substances    Nicotine No     THC No     CBD No     Flavoring No     Other No     Unknown No       I have reviewed and agree with the history as documented.     Patient is a 59yoM, past medical history significant for diabetes, hypertension, asthma, presenting today for evaluation of a soft tissue swelling in his groin.  Patient states that he noticed this area in the right side of his inguinal region over the past 2 days.  He states that it appears that it is growing, it is red in character.  He denies any pain or drainage from the area.  He denies any fevers.  He has not had any urinary or bowel symptoms.  His last bowel movement was today and was normal.  He has no abdominal pain.  He has no history of the same.  Does not appear to fluctuate in size with activity.          Review of Systems   Constitutional:  Negative for chills, fatigue and fever.   HENT:  Negative for congestion.    Respiratory:  Negative for cough,  chest tightness and shortness of breath.    Cardiovascular:  Negative for chest pain.   Gastrointestinal:  Negative for abdominal pain, diarrhea, nausea and vomiting.   Genitourinary:  Negative for difficulty urinating.   Skin:  Negative for color change.   Neurological:  Negative for dizziness, syncope, weakness, numbness and headaches.           Objective       ED Triage Vitals   Temperature Pulse Blood Pressure Respirations SpO2 Patient Position - Orthostatic VS   11/11/24 1301 11/11/24 1301 11/11/24 1303 11/11/24 1301 11/11/24 1301 11/11/24 1301   97.9 °F (36.6 °C) 94 126/68 18 97 % Sitting      Temp Source Heart Rate Source BP Location FiO2 (%) Pain Score    11/11/24 1301 11/11/24 1301 11/11/24 1301 -- 11/11/24 1301    Oral Monitor Right arm  No Pain      Vitals      Date and Time Temp Pulse SpO2 Resp BP Pain Score FACES Pain Rating User   11/11/24 1303 -- -- -- -- 126/68 -- -- JS   11/11/24 1301 97.9 °F (36.6 °C) 94 97 % 18 -- No Pain -- JS            Physical Exam  Vitals and nursing note reviewed. Exam conducted with a chaperone present (Dr. Akins).   Constitutional:       General: He is not in acute distress.     Appearance: He is not ill-appearing or toxic-appearing.   HENT:      Head: Normocephalic and atraumatic.      Nose: No congestion.   Eyes:      Extraocular Movements: Extraocular movements intact.   Cardiovascular:      Rate and Rhythm: Normal rate.   Pulmonary:      Effort: Pulmonary effort is normal. No respiratory distress.   Abdominal:      General: Abdomen is flat. There is no distension.      Palpations: Abdomen is soft.      Tenderness: There is no abdominal tenderness.   Genitourinary:      Musculoskeletal:         General: Normal range of motion.   Skin:     General: Skin is warm and dry.   Neurological:      General: No focal deficit present.      Mental Status: He is alert.         Results Reviewed       None            No orders to display       Procedures    ED Medication and Procedure  Management   Prior to Admission Medications   Prescriptions Last Dose Informant Patient Reported? Taking?   ACCU-CHEK FASTCLIX LANCETS MISC   Yes No   Blood Glucose Monitoring Suppl (ACCU-CHEK GUIDE) w/Device KIT   Yes No   Diclofenac Sodium (VOLTAREN) 1 %   No No   Sig: Apply 2 g topically 4 (four) times a day   Insulin Pen Needle (BD Pen Needle Cely 2nd Gen) 32G X 4 MM MISC   No No   Sig: Inject under the skin daily   Lantus SoloStar 100 units/mL SOPN   No No   Sig: INJECT 0.1 ML (10 UNITS TOTAL) UNDER THE SKIN DAILY AT BEDTIME   acetaminophen (TYLENOL) 650 mg CR tablet   No No   Sig: Take 1 tablet (650 mg total) by mouth every 8 (eight) hours as needed for mild pain   aspirin (ECOTRIN LOW STRENGTH) 81 mg EC tablet   No No   Sig: Take 1 tablet (81 mg total) by mouth daily   atorvastatin (LIPITOR) 20 mg tablet   No No   Sig: TAKE 1 TABLET (20 MG TOTAL) BY MOUTH DAILY AT BEDTIME   buprenorphine-naloxone (SUBOXONE) 8-2 mg per SL tablet   No No   Sig: Place 1 tablet under the tongue 2 (two) times a day for 14 days [id number] Do not start before October 31, 2024.   glucose blood (Accu-Chek Guide) test strip   No No   Sig: Use 1 each 3 (three) times a day Test as directed   ketotifen (ZADITOR) 0.025 % ophthalmic solution   No No   Sig: Administer 1 drop to both eyes 2 (two) times a day   lidocaine (LIDODERM) 5 %   No No   Sig: Apply 1 patch topically over 12 hours every 24 hours for 15 days Remove & Discard patch within 12 hours or as directed by MD   lisinopril (ZESTRIL) 2.5 mg tablet   No No   Sig: Take 1 tablet (2.5 mg total) by mouth daily   metFORMIN (GLUCOPHAGE) 1000 MG tablet   No No   Sig: TAKE 1 TABLET (1,000 MG TOTAL) BY MOUTH 2 (TWO) TIMES A DAY WITH MEALS   naproxen (Naprosyn) 500 mg tablet   No No   Sig: Take 1 tablet (500 mg total) by mouth 2 (two) times a day with meals for 7 days   tadalafil (CIALIS) 10 MG tablet   No No   Sig: Take 1 tablet (10 mg total) by mouth daily as needed for erectile dysfunction       Facility-Administered Medications: None     Discharge Medication List as of 11/11/2024  2:33 PM        START taking these medications    Details   cephalexin (KEFLEX) 500 mg capsule Take 1 capsule (500 mg total) by mouth every 6 (six) hours for 7 days, Starting Mon 11/11/2024, Until Mon 11/18/2024, Normal           CONTINUE these medications which have NOT CHANGED    Details   ACCU-CHEK FASTCLIX LANCETS MISC Starting Tue 6/18/2019, Historical Med      acetaminophen (TYLENOL) 650 mg CR tablet Take 1 tablet (650 mg total) by mouth every 8 (eight) hours as needed for mild pain, Starting Thu 1/11/2024, Normal      aspirin (ECOTRIN LOW STRENGTH) 81 mg EC tablet Take 1 tablet (81 mg total) by mouth daily, Starting Fri 5/8/2020, Normal      atorvastatin (LIPITOR) 20 mg tablet TAKE 1 TABLET (20 MG TOTAL) BY MOUTH DAILY AT BEDTIME, Starting Fri 2/23/2024, Normal      Blood Glucose Monitoring Suppl (ACCU-CHEK GUIDE) w/Device KIT Starting Tue 6/18/2019, Historical Med      buprenorphine-naloxone (SUBOXONE) 8-2 mg per SL tablet Place 1 tablet under the tongue 2 (two) times a day for 14 days [id number] Do not start before October 31, 2024., Starting Thu 10/31/2024, Until Thu 11/14/2024, Normal      Diclofenac Sodium (VOLTAREN) 1 % Apply 2 g topically 4 (four) times a day, Starting Thu 1/11/2024, Normal      glucose blood (Accu-Chek Guide) test strip Use 1 each 3 (three) times a day Test as directed, Starting Fri 9/30/2022, Normal      Insulin Pen Needle (BD Pen Needle Cely 2nd Gen) 32G X 4 MM MISC Inject under the skin daily, Starting Thu 4/28/2022, Normal      ketotifen (ZADITOR) 0.025 % ophthalmic solution Administer 1 drop to both eyes 2 (two) times a day, Starting Sat 6/17/2023, Print      Lantus SoloStar 100 units/mL SOPN INJECT 0.1 ML (10 UNITS TOTAL) UNDER THE SKIN DAILY AT BEDTIME, Starting Thu 12/28/2023, Normal      lidocaine (LIDODERM) 5 % Apply 1 patch topically over 12 hours every 24 hours for 15 days Remove &  Discard patch within 12 hours or as directed by MD, Starting Thu 1/11/2024, Until Fri 1/26/2024, Normal      lisinopril (ZESTRIL) 2.5 mg tablet Take 1 tablet (2.5 mg total) by mouth daily, Starting Tue 1/3/2023, Normal      metFORMIN (GLUCOPHAGE) 1000 MG tablet TAKE 1 TABLET (1,000 MG TOTAL) BY MOUTH 2 (TWO) TIMES A DAY WITH MEALS, Starting Wed 4/24/2024, Until Tue 7/23/2024, Normal      naproxen (Naprosyn) 500 mg tablet Take 1 tablet (500 mg total) by mouth 2 (two) times a day with meals for 7 days, Starting Thu 11/10/2022, Until Thu 11/17/2022, Normal      tadalafil (CIALIS) 10 MG tablet Take 1 tablet (10 mg total) by mouth daily as needed for erectile dysfunction, Starting Thu 7/20/2023, Normal           No discharge procedures on file.  ED SEPSIS DOCUMENTATION   Time reflects when diagnosis was documented in both MDM as applicable and the Disposition within this note       Time User Action Codes Description Comment    11/11/2024  2:27 PM Jany Frost [R62.52] Has not grown in height     11/11/2024  2:27 PM Jany Frost Remove [R62.52] Has not grown in height     11/11/2024  2:28 PM Jany Frost [L73.1] Ingrown hair                  Jany Frost MD  11/11/24 3299

## 2024-11-12 ENCOUNTER — TELEPHONE (OUTPATIENT)
Dept: FAMILY MEDICINE CLINIC | Facility: CLINIC | Age: 59
End: 2024-11-12

## 2024-11-12 DIAGNOSIS — F11.20 OPIOID USE DISORDER, SEVERE, DEPENDENCE (HCC): ICD-10-CM

## 2024-11-12 RX ORDER — BUPRENORPHINE HYDROCHLORIDE AND NALOXONE HYDROCHLORIDE DIHYDRATE 8; 2 MG/1; MG/1
1 TABLET SUBLINGUAL 2 TIMES DAILY
Qty: 60 TABLET | Refills: 0 | Status: SHIPPED | OUTPATIENT
Start: 2024-11-15 | End: 2024-12-15

## 2024-11-12 NOTE — TELEPHONE ENCOUNTER
Patient came in and stated he is due for medication tomorrow. Appointment is scheduled with you for Monday, 11/18.      buprenorphine-naloxone (SUBOXONE) 8-2 mg per SL tablet

## 2024-11-15 ENCOUNTER — HOSPITAL ENCOUNTER (EMERGENCY)
Facility: HOSPITAL | Age: 59
Discharge: HOME | End: 2024-11-15
Attending: EMERGENCY MEDICINE
Payer: COMMERCIAL

## 2024-11-15 VITALS
WEIGHT: 189.15 LBS | BODY MASS INDEX: 29.63 KG/M2 | OXYGEN SATURATION: 100 % | HEART RATE: 85 BPM | TEMPERATURE: 97.6 F | SYSTOLIC BLOOD PRESSURE: 145 MMHG | RESPIRATION RATE: 20 BRPM | DIASTOLIC BLOOD PRESSURE: 80 MMHG

## 2024-11-15 DIAGNOSIS — G89.29 CHRONIC RIGHT SHOULDER PAIN: Primary | ICD-10-CM

## 2024-11-15 DIAGNOSIS — M25.511 CHRONIC RIGHT SHOULDER PAIN: Primary | ICD-10-CM

## 2024-11-15 PROCEDURE — 99284 EMERGENCY DEPT VISIT MOD MDM: CPT

## 2024-11-15 PROCEDURE — 99283 EMERGENCY DEPT VISIT LOW MDM: CPT

## 2024-11-15 RX ORDER — NAPROXEN 500 MG/1
500 TABLET ORAL 2 TIMES DAILY WITH MEALS
Qty: 10 TABLET | Refills: 0 | Status: SHIPPED | OUTPATIENT
Start: 2024-11-15 | End: 2025-11-15

## 2024-11-15 RX ORDER — LIDOCAINE 50 MG/G
1 PATCH TOPICAL ONCE
Status: DISCONTINUED | OUTPATIENT
Start: 2024-11-15 | End: 2024-11-15 | Stop reason: HOSPADM

## 2024-11-15 RX ORDER — NAPROXEN 500 MG/1
500 TABLET ORAL ONCE
Status: COMPLETED | OUTPATIENT
Start: 2024-11-15 | End: 2024-11-15

## 2024-11-15 RX ORDER — LIDOCAINE 50 MG/G
1 PATCH TOPICAL EVERY 24 HOURS
Qty: 15 PATCH | Refills: 0 | Status: SHIPPED | OUTPATIENT
Start: 2024-11-15 | End: 2024-11-30

## 2024-11-15 RX ADMIN — NAPROXEN 500 MG: 500 TABLET ORAL at 12:48

## 2024-11-15 RX ADMIN — LIDOCAINE 1 PATCH: 50 PATCH CUTANEOUS at 12:48

## 2024-11-15 NOTE — ED PROVIDER NOTES
"Time reflects when diagnosis was documented in both MDM as applicable and the Disposition within this note       Time User Action Codes Description Comment    11/15/2024 12:46 PM Darby Sarkar Alistair [M25.511,  G89.29] Chronic right shoulder pain           ED Disposition       ED Disposition   Discharge    Condition   Stable    Date/Time   Fri Nov 15, 2024 12:46 PM    Comment   Tavon Villatoro discharge to home/self care.                   Assessment & Plan       Medical Decision Making  No evidence of acute infection. No recent trauma. Patient declined imaging. No medications taken for this PTA. Plan short course nsaid, Orthopedic follow up.     All imaging and/or lab testing discussed with patient, strict return to ED precautions discussed. Patient recommended to follow up promptly with appropriate outpatient provider and risk of morbidity/mortality if patient does not follow up as recommended was discussed. Patient and/or family members verbalizes understanding and agrees with plan. Patient and/or family members were given opportunity to ask questions, all questions were answered at this time. Patient is stable for discharge.     Portions of the record may have been created with voice recognition software. Occasional wrong word or \"sound a like\" substitutions may have occurred due to the inherent limitations of voice recognition software. Read the chart carefully and recognize, using context, where substitutions have occurred.       Risk  Prescription drug management.        ED Course as of 11/15/24 1614   Fri Nov 15, 2024   1244 Patient declines imaging.        Medications   naproxen (NAPROSYN) tablet 500 mg (500 mg Oral Given 11/15/24 1248)       ED Risk Strat Scores                                               History of Present Illness       Chief Complaint   Patient presents with    Shoulder Pain     Reports Rt shoulder pain for a couple years now. Reports he has chronic arthritis. Denies taking anything today " for pain       Past Medical History:   Diagnosis Date    Diabetes mellitus (HCC)     Hypertension     Substance abuse (HCC)       Past Surgical History:   Procedure Laterality Date    NO PAST SURGERIES      WOUND DEBRIDEMENT Right 8/6/2022    Procedure: DEBRIDEMENT RIGHT UPPER EXTREMITY (WASH OUT) SEPTIC WRIST;  Surgeon: Casey Hahn MD;  [facility];  Service: Orthopedics      Family History   Problem Relation Age of Onset    Diabetes Mother     Alcohol abuse Father     Diabetes Sister     Asthma Brother       Social History     Tobacco Use    Smoking status: Every Day     Current packs/day: 0.50     Types: Cigarettes     Passive exposure: Never    Smokeless tobacco: Never    Tobacco comments:     about 12 cigarettes/daily   Vaping Use    Vaping status: Never Used   Substance Use Topics    Alcohol use: Not Currently    Drug use: Not Currently     Frequency: 7.0 times per week     Types: Fentanyl      E-Cigarette/Vaping    E-Cigarette Use Never User       E-Cigarette/Vaping Substances    Nicotine No     THC No     CBD No     Flavoring No     Other No     Unknown No       I have reviewed and agree with the history as documented.     59-year-old male past medical history of hypertension, diabetes mellitus, signs use disorder presented to emergency department complaining of chronic right shoulder pain.  States the arthritis has been bothering him more over the past month.  No new injuries.  No new symptoms.  No fevers, chills, chest pain, shortness of breath, swelling, numbness, tingling, weakness, change in range of motion.      History provided by:  Patient  Shoulder Pain  Ineffective treatments:  None tried  Associated symptoms: no back pain, no decreased range of motion, no fatigue, no fever, no muscle weakness, no neck pain, no numbness, no stiffness, no swelling and no tingling        Review of Systems   Constitutional:  Negative for fatigue and fever.   Musculoskeletal:  Positive for arthralgias.  Negative for back pain, joint swelling, neck pain and stiffness.   Neurological:  Negative for weakness.   All other systems reviewed and are negative.          Objective       ED Triage Vitals [11/15/24 1215]   Temperature Pulse Blood Pressure Respirations SpO2 Patient Position - Orthostatic VS   97.6 °F (36.4 °C) 85 145/80 20 100 % Lying      Temp Source Heart Rate Source BP Location FiO2 (%) Pain Score    Tympanic Monitor Left arm -- --      Vitals      Date and Time Temp Pulse SpO2 Resp BP Pain Score FACES Pain Rating User   11/15/24 1215 97.6 °F (36.4 °C) 85 100 % 20 145/80 -- -- [place]            Physical Exam  Vitals and nursing note reviewed.   Constitutional:       General: He is not in acute distress.     Appearance: Normal appearance. He is not ill-appearing.   HENT:      Head: Normocephalic and atraumatic.   Cardiovascular:      Rate and Rhythm: Normal rate and regular rhythm.      Pulses:           Radial pulses are 2+ on the right side.   Pulmonary:      Effort: Pulmonary effort is normal.   Musculoskeletal:      Right shoulder: Tenderness present. No swelling, effusion or crepitus. Normal range of motion. Normal strength.      Cervical back: Neck supple.   Skin:     General: Skin is warm and dry.      Capillary Refill: Capillary refill takes less than 2 seconds.      Findings: No bruising, erythema or rash.   Neurological:      Mental Status: He is alert.      Gait: Gait normal.   Psychiatric:         Mood and Affect: Mood normal.         Behavior: Behavior normal.         Results Reviewed       None            No orders to display       Procedures    ED Medication and Procedure Management   Prior to Admission Medications   Prescriptions Last Dose Informant Patient Reported? Taking?   ACCU-CHEK FASTCLIX LANCETS MISC   Yes No   Blood Glucose Monitoring Suppl (ACCU-CHEK GUIDE) w/Device KIT   Yes No   Diclofenac Sodium (VOLTAREN) 1 %   No No   Sig: Apply 2 g topically 4 (four) times a day   Insulin Pen  Needle (BD Pen Needle Cely 2nd Gen) 32G X 4 MM MISC   No No   Sig: Inject under the skin daily   Lantus SoloStar 100 units/mL SOPN   No No   Sig: INJECT 0.1 ML (10 UNITS TOTAL) UNDER THE SKIN DAILY AT BEDTIME   acetaminophen (TYLENOL) 650 mg CR tablet   No No   Sig: Take 1 tablet (650 mg total) by mouth every 8 (eight) hours as needed for mild pain   aspirin (ECOTRIN LOW STRENGTH) 81 mg EC tablet   No No   Sig: Take 1 tablet (81 mg total) by mouth daily   atorvastatin (LIPITOR) 20 mg tablet   No No   Sig: TAKE 1 TABLET (20 MG TOTAL) BY MOUTH DAILY AT BEDTIME   buprenorphine-naloxone (SUBOXONE) 8-2 mg per SL tablet   No No   Sig: Place 1 tablet under the tongue 2 (two) times a day [id number] Do not start before November 15, 2024.   cephalexin (KEFLEX) 500 mg capsule   No No   Sig: Take 1 capsule (500 mg total) by mouth every 6 (six) hours for 7 days   glucose blood (Accu-Chek Guide) test strip   No No   Sig: Use 1 each 3 (three) times a day Test as directed   ketotifen (ZADITOR) 0.025 % ophthalmic solution   No No   Sig: Administer 1 drop to both eyes 2 (two) times a day   lidocaine (LIDODERM) 5 %   No No   Sig: Apply 1 patch topically over 12 hours every 24 hours for 15 days Remove & Discard patch within 12 hours or as directed by MD   lisinopril (ZESTRIL) 2.5 mg tablet   No No   Sig: Take 1 tablet (2.5 mg total) by mouth daily   metFORMIN (GLUCOPHAGE) 1000 MG tablet   No No   Sig: TAKE 1 TABLET (1,000 MG TOTAL) BY MOUTH 2 (TWO) TIMES A DAY WITH MEALS   naproxen (Naprosyn) 500 mg tablet   No No   Sig: Take 1 tablet (500 mg total) by mouth 2 (two) times a day with meals for 7 days   tadalafil (CIALIS) 10 MG tablet   No No   Sig: Take 1 tablet (10 mg total) by mouth daily as needed for erectile dysfunction      Facility-Administered Medications: None     Discharge Medication List as of 11/15/2024 12:53 PM        START taking these medications    Details   naproxen (EC NAPROSYN) 500 MG EC tablet Take 1 tablet (500 mg  total) by mouth 2 (two) times a day with meals, Starting Fri 11/15/2024, Until Sat 11/15/2025, Normal           CONTINUE these medications which have CHANGED    Details   Diclofenac Sodium (VOLTAREN) 1 % Apply 2 g topically 4 (four) times a day, Starting Fri 11/15/2024, Normal      lidocaine (LIDODERM) 5 % Apply 1 patch topically over 12 hours every 24 hours for 15 days Remove & Discard patch within 12 hours or as directed by MD, Starting Fri 11/15/2024, Until Sat 11/30/2024, Normal           CONTINUE these medications which have NOT CHANGED    Details   ACCU-CHEK FASTCLIX LANCETS MISC Starting Tue 6/18/2019, Historical Med      acetaminophen (TYLENOL) 650 mg CR tablet Take 1 tablet (650 mg total) by mouth every 8 (eight) hours as needed for mild pain, Starting Thu 1/11/2024, Normal      aspirin (ECOTRIN LOW STRENGTH) 81 mg EC tablet Take 1 tablet (81 mg total) by mouth daily, Starting Fri 5/8/2020, Normal      atorvastatin (LIPITOR) 20 mg tablet TAKE 1 TABLET (20 MG TOTAL) BY MOUTH DAILY AT BEDTIME, Starting Fri 2/23/2024, Normal      Blood Glucose Monitoring Suppl (ACCU-CHEK GUIDE) w/Device KIT Starting Tue 6/18/2019, Historical Med      buprenorphine-naloxone (SUBOXONE) 8-2 mg per SL tablet Place 1 tablet under the tongue 2 (two) times a day [id number] Do not start before November 15, 2024., Starting Fri 11/15/2024, Until Sun 12/15/2024, Normal      cephalexin (KEFLEX) 500 mg capsule Take 1 capsule (500 mg total) by mouth every 6 (six) hours for 7 days, Starting Mon 11/11/2024, Until Mon 11/18/2024, Normal      glucose blood (Accu-Chek Guide) test strip Use 1 each 3 (three) times a day Test as directed, Starting Fri 9/30/2022, Normal      Insulin Pen Needle (BD Pen Needle Cely 2nd Gen) 32G X 4 MM MISC Inject under the skin daily, Starting Thu 4/28/2022, Normal      ketotifen (ZADITOR) 0.025 % ophthalmic solution Administer 1 drop to both eyes 2 (two) times a day, Starting Sat 6/17/2023, Print      Lantus SoloStar  100 units/mL SOPN INJECT 0.1 ML (10 UNITS TOTAL) UNDER THE SKIN DAILY AT BEDTIME, Starting Thu 12/28/2023, Normal      lisinopril (ZESTRIL) 2.5 mg tablet Take 1 tablet (2.5 mg total) by mouth daily, Starting Tue 1/3/2023, Normal      metFORMIN (GLUCOPHAGE) 1000 MG tablet TAKE 1 TABLET (1,000 MG TOTAL) BY MOUTH 2 (TWO) TIMES A DAY WITH MEALS, Starting Wed 4/24/2024, Until Tue 7/23/2024, Normal      tadalafil (CIALIS) 10 MG tablet Take 1 tablet (10 mg total) by mouth daily as needed for erectile dysfunction, Starting Thu 7/20/2023, Normal           STOP taking these medications       naproxen (Naprosyn) 500 mg tablet Comments:   Reason for Stopping:             No discharge procedures on file.  ED SEPSIS DOCUMENTATION   Time reflects when diagnosis was documented in both MDM as applicable and the Disposition within this note       Time User Action Codes Description Comment    11/15/2024 12:46 PM Darby Sarkar Add [M25.511,  G89.29] Chronic right shoulder pain                  Darby Sarkar PA-C  11/15/24 5168

## 2024-11-18 ENCOUNTER — OFFICE VISIT (OUTPATIENT)
Dept: FAMILY MEDICINE CLINIC | Facility: CLINIC | Age: 59
End: 2024-11-18

## 2024-11-18 VITALS
DIASTOLIC BLOOD PRESSURE: 90 MMHG | WEIGHT: 188 LBS | SYSTOLIC BLOOD PRESSURE: 140 MMHG | OXYGEN SATURATION: 97 % | HEART RATE: 87 BPM | TEMPERATURE: 98 F | RESPIRATION RATE: 16 BRPM | HEIGHT: 67 IN | BODY MASS INDEX: 29.51 KG/M2

## 2024-11-18 DIAGNOSIS — H53.9 VISUAL DISTURBANCE: ICD-10-CM

## 2024-11-18 DIAGNOSIS — M19.211 OTHER SECONDARY OSTEOARTHRITIS OF RIGHT SHOULDER: Primary | ICD-10-CM

## 2024-11-18 DIAGNOSIS — F11.20 OPIOID USE DISORDER, SEVERE, DEPENDENCE (HCC): ICD-10-CM

## 2024-11-18 PROCEDURE — 99214 OFFICE O/P EST MOD 30 MIN: CPT | Performed by: FAMILY MEDICINE

## 2024-11-18 PROCEDURE — 80355 GABAPENTIN NON-BLOOD: CPT | Performed by: FAMILY MEDICINE

## 2024-11-18 PROCEDURE — 80348 DRUG SCREENING BUPRENORPHINE: CPT | Performed by: FAMILY MEDICINE

## 2024-11-18 PROCEDURE — 80307 DRUG TEST PRSMV CHEM ANLYZR: CPT | Performed by: FAMILY MEDICINE

## 2024-11-18 PROCEDURE — 80362 OPIOIDS & OPIATE ANALOGS 1/2: CPT | Performed by: FAMILY MEDICINE

## 2024-11-18 PROCEDURE — 80354 DRUG SCREENING FENTANYL: CPT | Performed by: FAMILY MEDICINE

## 2024-11-18 PROCEDURE — 80305 DRUG TEST PRSMV DIR OPT OBS: CPT | Performed by: FAMILY MEDICINE

## 2024-11-18 PROCEDURE — 80366 DRUG SCREENING PREGABALIN: CPT | Performed by: FAMILY MEDICINE

## 2024-11-18 NOTE — PROGRESS NOTES
OUD MAINTENANCE NOTE    Tavon Villatoro 59 y.o. male MRN: [id number]  PCP: Harjit Ortega MD      Assessment and Plan    Problem List Items Addressed This Visit       Opioid use disorder, severe, dependence (HCC)    Relevant Orders    POCT urine drug screen (Completed)    Drug Screen Routine w /Conf and Adulteration, urine.    Osteoarthritis of right shoulder - Primary     Other Visit Diagnoses         Visual disturbance        Relevant Orders    Ambulatory Referral to Ophthalmology         Tavon was seen today for follow-up.    Diagnoses and all orders for this visit:    Other secondary osteoarthritis of right shoulder    Opioid use disorder, severe, dependence (HCC)  -     POCT urine drug screen  -     Drug Screen Routine w /Conf and Adulteration, urine.; Future    Visual disturbance  -     Ambulatory Referral to Ophthalmology; Future          Acute on chronic right shoulder pain.  History of shoulder arthritis.  Suspect subacromial bursitis.  Continue NSAID.  We will schedule for shoulder injection      -Medication management - patient is doing well on current daily dose of 8-2mg BID daily, which is continued today.  -UDS and confirmation ordered.  -PDMP reviewed  -UDS positive for Suboxone        Follow-up interval: every month       HPI:   Tavon Villatoro presents for buprenorphine/naloxone follow-up visit visit.  I have reviewed the prior induction visit, follow-up visits, and telephone encounters relevant to opiate use disorder (OUD) treatment.  Shoulder pain has been bothering him for the past few weeks.  He denies any recent injury.  He went to the ER recently.  He is taking NSAIDs as needed.  Pain is not getting better.  He has a busy work schedule.  He does not have time for physical therapy.  He also reports chronic blurry vision bilaterally.  He would like to see an eye doctor.    Current daily dose: 8-2 mg BID     Number weeks induction: since 4/21/2023     Current follow-up interval, in  weeks: 4     UDS: positive for suboxone   Patient denies using any recreational substances     Interval use history:     Opiate use: Clean since 4/3/2023  Alcohol daily use: none  Marijuana daily use: none  Other drug use: none  Overdoses: No prior overdose  Current behavioral health provider: None- stable      Sleep:5-6 hours  Bowels: none  Tobacco: Smokes 1/2 PPD cigarettes per day  Patient denies allergy to Buprenorphine and Naloxone; denies history of liver failure, denies suicidal or homicidal ideation, denies seizure disorder, denies treatment for HIV, denies hypnotic sedative use, denies benzodiazapine use, denies abuse of other drugs, denies ethanol abuse.     Review of Systems   Constitutional:  Negative for appetite change, chills, diaphoresis, fatigue and fever.   HENT:  Negative for sore throat.    Eyes:  Negative for visual disturbance.   Respiratory:  Negative for cough, shortness of breath and wheezing.    Cardiovascular:  Negative for chest pain and palpitations.   Gastrointestinal:  Negative for abdominal pain, nausea and vomiting.   Endocrine: Negative for polydipsia, polyphagia and polyuria.   Genitourinary:  Negative for dysuria, hematuria and urgency.   Musculoskeletal:  Positive for arthralgias. Negative for myalgias.   Skin:  Negative for rash.   Neurological:  Negative for dizziness, seizures, syncope, weakness and light-headedness.   Psychiatric/Behavioral:  The patient is not nervous/anxious.        Objective:     Vitals:    24 1035   BP: 140/90   Pulse: 87   Resp: 16   Temp: 98 °F (36.7 °C)   SpO2: 97%         COWS Scale:1     Resting HR: 0  0 = <80  1 =   2 = 101-120  4 = >120     Sweatin  0 = for no chills/flushing  1 = for subjective chills/flushing  2 = flushed or observable sweat on the face  3 =for beads of sweat on brow/face  4 = for sweat streaming of of face     Restlessness:0  0 = able to sit still  1 = subjective difficulty sitting still  3 = for frequent shifting  or extraneous movement  5 = for unable to sit still for more than a few seconds      Pupil size:0  0 = pinpoint or normal  1 = for possibly larger than normal  2 = for moderately dilated  5 = for only iris rim visible      Bone/joint pain:0  0 = not present  1 = mild diffuse comfort  2 = sever diffuse aching  4 = objectively rubbing joints/muscles and obviously in pain     Runny nose/tearin  0 = not present  1 = stuff nose/moist eyes  2 = nose running/tearing  4 = nose constantly running or tears streaming down cheeks     GI upset:0  0 = no GI symptoms  1 = stomach cramps  2 = nasuea or loose stools  3 = vomiting or diarrhea  5 = multiple episodes of vomiting or diarrhea     Tremor observation of outstretched hands:0  0 = no tremor  1= tremor felt but not observed  2= slight tremor observable  4= gross tremor or muscle twitching     Yawning 0  0= no yawning  1 = yawning once or twice during assessment  2 = yawing three or more time during assessment  4 = yawing several times a minute     Anxiety or irritability:0  0 = none  1 = patient reports increasing irritability or anxiousness  2 = patient obviously irritable/anxious  4 = patient so irritable/anxious that assessment is difficult     Gooseflesh:0  0 = skin is smooth  3 = piloerection of skin can be felt or seen  5 = prominent piloerection     TOTAL COWS SCORE:1 (If inducing in office, recommend delaying buprenorphine until COWS scale > 12 to avoid precipitated withdrawal)         Physical Exam:     Physical Exam  Constitutional:       General: He is not in acute distress.     Appearance: Normal appearance. He is well-developed. He is not ill-appearing, toxic-appearing or diaphoretic.   HENT:      Head: Normocephalic and atraumatic.      Right Ear: External ear normal.      Left Ear: External ear normal.      Nose: Nose normal.      Mouth/Throat:      Pharynx: No oropharyngeal exudate.   Eyes:      General: No scleral icterus.        Right eye: No discharge.          Left eye: No discharge.      Extraocular Movements: Extraocular movements intact.      Conjunctiva/sclera: Conjunctivae normal.      Pupils: Pupils are equal, round, and reactive to light.   Cardiovascular:      Rate and Rhythm: Normal rate and regular rhythm.      Heart sounds: Normal heart sounds. No murmur heard.     No friction rub. No gallop.   Pulmonary:      Effort: Pulmonary effort is normal. No respiratory distress.      Breath sounds: Normal breath sounds. No wheezing.   Abdominal:      General: Bowel sounds are normal. There is no distension.      Palpations: Abdomen is soft. There is no mass.      Tenderness: There is no abdominal tenderness. There is no guarding.      Hernia: No hernia is present.   Musculoskeletal:         General: Normal range of motion.      Cervical back: Normal range of motion.      Right lower leg: No edema.      Left lower leg: No edema.      Comments: Right shoulder positive Simon/ Walter test   Skin:     General: Skin is warm.      Capillary Refill: Capillary refill takes less than 2 seconds.   Neurological:      General: No focal deficit present.      Mental Status: He is alert and oriented to person, place, and time.      Cranial Nerves: No cranial nerve deficit.      Motor: No weakness.      Gait: Gait normal.   Psychiatric:         Mood and Affect: Mood normal.         Behavior: Behavior normal.

## 2024-11-18 NOTE — PATIENT INSTRUCTIONS
AGENCY ADDRESS TYPE OF SERVICE INSURANCE ACCEPTED   [employer]. [phone] [address] [facility] [address] Screening & Assessment, Methadone (MAT), OP Ind/Grp Counseling (for individuals receiving MAT), IOP Ind/Grp Counseling (for individuals receiving MAT) No insurance (funded by [facility]), Medical Assistance: [facility], [facility], [facility]    [employer] [address] [facility] [address] Screening & Assessment, OP and IOP Ind/Grp Counseling (Bilingual) No insurance (funded by [facility], Medical Assistance   [employer]--[phone] [facility] [place] [address] Intervention Programs, Screening & Assessment Bilingual), Outreach, SAP Assessments, Hospital Opioid Support Services, OP and IOP Ind/Grp Counseling, Naloxone Distribution, Codependency Groups, Bereavement Groups No insurance (funded by [facility], Medical Assistance), Most [facility] [phone] [address] [place] [address] Center of Excellence - For Opioid Use Disorder [facility] Recovery: Recovery Center [phone] (formerly the [facility]) [address] [facility] [address] (anticipated relocation by 2/1/18- - will remain in [facility]) Partial, OP and IOP Ind/Grp Counseling, Dual OP and IOP Ind/Grp Counseling, MAT (Suboxone/Naltrexone) Family Counseling, [place] [facility] [place] Most insurances accepted, Medical Assistance ([place])   [facility] Treatment [place], [facility]. [phone] [address] [facility] [address] [place] [address] Screening & Assessment (bilingual), OP and IOP Ind/Grp Counseling No insurance (funded by [facility]), Medical Assistance: [facility]   [employer] [facility] [phone] [facility] [address] [facility] [address] [facility] [address] Overdose Prevention  Education, Naloxone Administration, Rx Drug, Current Trend, [place], Intervention, [facility], Screening & Assessment (bilingual), SAP Assessment, Partial, OP and IOP Ind/Grp Counseling, Dual OP and IOP Grp/Ind Counseling, Certified , MAT (Vivitrol and Suboxone), MAT OP and IOP Ind/Grp Counseling, MAT custodial Program No insurance (funded by [facility]), Medical Assistance, Commercial Insurances Accepted   [employer] [phone] [address] [facility] [address] Screening & Assessment (Bilingual), OP Ind Counseling, dual OP counseling, MAT (Vivitrol and Suboxone), MAT OP Ind Counseling No insurance (funded by [facility], Medical Assistance   [facility] [employer], [facility] Centers of Excellence [address] [place] [address] Center of Excellence - For Opioid Use Disorder No insurance (funded by [facility], Medical Assistance   [phone] Treatment [employer], [facility] [phone] [address] [facility] [address] Screening & Assessment (Bilingual), OP and IOP Ind/Grp Counseling, Certified Recovery Specialists, MAT (Vivitrol) No insurance (funded by [facility], Medical Assistance   [facility], [facility] [phone] [address] [facility] [address] Outpatient Treatment, Adolescent IOP Private Pay   [employer] - [place] [phone] [address] [facility] [address] Screening & Assessment, OP and IOP Ind/Grp Counseling, Intervention ([facility]), Foundation Services No insurance (funded by [facility], Medical Assistance   [employer] - [place] [phone] [address] [place] [address] Inpatient Treatment No insurance (funded by [facility], Medical Assistance   link [employer] - [facility] [phone] [address] [place] [address] Detoxification, Inpatient Treatment, Dual Inpatient Treatment No  insurance (funded by [facility]), Medical Assistance, Private Insurances

## 2024-11-23 LAB
6MAM UR QL SCN: NEGATIVE NG/ML
ACCEPTABLE CREAT UR QL: 196 MG/DL
AMPHET UR QL SCN: NEGATIVE NG/ML
ANTICONVULSANTS: NEGATIVE
BARBITURATES UR QL SCN: NEGATIVE NG/ML
BENZODIAZ UR QL SCN: NEGATIVE NG/ML
BUPRENORPHINE UR QL CFM: NORMAL NG/ML
BUPRENORPHINE: ABNORMAL
CANNABINOIDS UR QL SCN: NEGATIVE NG/ML
CARISOPRODOL UR QL: NEGATIVE NG/ML
COCAINE+BZE UR QL SCN: NEGATIVE NG/ML
ETHYL GLUCURONIDE UR QL SCN: NEGATIVE NG/ML
FENTANYL & ANALOGUES: NEGATIVE
FENTANYL UR QL CFM: NOT DETECTED NG/MG CREAT
FENTANYL UR QL SCN: NORMAL NG/ML
GABAPENTIN SERPLBLD QL SCN: NORMAL UG/ML
GABAPENTIN UR QL CFM: NOT DETECTED
METHADONE UR QL SCN: NEGATIVE NG/ML
NALOXONE UR CFM-MCNC: 498 NG/MG CREAT
NITRITE UR QL STRIP: NEGATIVE UG/ML
NORBUP/BUP RATIO: >1.39
NORBUPRENORPHINE UR CFM-MCNC: >510 NG/MG CREAT
NORBUPRENORPHINE/CREAT UR: 366 NG/MG CREAT
NORFENTANYL UR QL CFM: NOT DETECTED NG/MG CREAT
OPIATE ANTAGONIST: ABNORMAL
OPIATES UR QL SCN: NEGATIVE NG/ML
OXYCODONE+OXYMORPHONE UR QL SCN: NEGATIVE NG/ML
PCP UR QL SCN: NEGATIVE NG/ML
PREGABALIN UR QL CFM: NOT DETECTED
PROPOXYPH UR QL SCN: NEGATIVE NG/ML
SPECIMEN PH ACCEPTABLE UR: 6.2 (ref 4.5–8.9)
TAPENTADOL UR QL SCN: NEGATIVE NG/ML
TRAMADOL UR QL SCN: NEGATIVE NG/ML

## 2024-12-04 ENCOUNTER — PROCEDURE VISIT (OUTPATIENT)
Dept: FAMILY MEDICINE CLINIC | Facility: CLINIC | Age: 59
End: 2024-12-04

## 2024-12-04 VITALS
SYSTOLIC BLOOD PRESSURE: 136 MMHG | DIASTOLIC BLOOD PRESSURE: 70 MMHG | OXYGEN SATURATION: 96 % | HEART RATE: 98 BPM | BODY MASS INDEX: 29.03 KG/M2 | RESPIRATION RATE: 16 BRPM | TEMPERATURE: 98 F | WEIGHT: 185 LBS | HEIGHT: 67 IN

## 2024-12-04 DIAGNOSIS — M13.811 OTHER SPECIFIED ARTHRITIS, RIGHT SHOULDER: Primary | ICD-10-CM

## 2024-12-04 PROCEDURE — 3078F DIAST BP <80 MM HG: CPT

## 2024-12-04 PROCEDURE — 20610 DRAIN/INJ JOINT/BURSA W/O US: CPT

## 2024-12-04 PROCEDURE — 3075F SYST BP GE 130 - 139MM HG: CPT

## 2024-12-04 PROCEDURE — 99214 OFFICE O/P EST MOD 30 MIN: CPT

## 2024-12-04 RX ORDER — TRIAMCINOLONE ACETONIDE 40 MG/ML
40 INJECTION, SUSPENSION INTRA-ARTICULAR; INTRAMUSCULAR
Status: COMPLETED | OUTPATIENT
Start: 2024-12-04 | End: 2024-12-04

## 2024-12-04 RX ORDER — LIDOCAINE HYDROCHLORIDE 10 MG/ML
3 INJECTION, SOLUTION INFILTRATION; PERINEURAL
Status: COMPLETED | OUTPATIENT
Start: 2024-12-04 | End: 2024-12-04

## 2024-12-04 RX ADMIN — LIDOCAINE HYDROCHLORIDE 3 ML: 10 INJECTION, SOLUTION INFILTRATION; PERINEURAL at 10:40

## 2024-12-04 RX ADMIN — TRIAMCINOLONE ACETONIDE 40 MG: 40 INJECTION, SUSPENSION INTRA-ARTICULAR; INTRAMUSCULAR at 10:40

## 2024-12-04 NOTE — PROGRESS NOTES
Name: Tavon Villatoro      : 1965      MRN: [id number]  Encounter Provider: ISMA Frazier  Encounter Date: 2024   Encounter department: [facility]  :  Assessment & Plan  Other specified arthritis, right shoulder  Patient was explained that in the next 4 to 6 hours he will have pain relief secondary to anesthetic. he was advised that he may have increase in her pain within the next 6 to 24 hours as the anesthetic wears off. During this time she was advised to apply ice and use Tylenol for pain. Patient was explained that corticosteroid injection will kick in within 24 to 48 hours and pain should significantly improve. Patient was advised that if her knee becomes hot, red, or swollen then she should return to the clinic as soon as possible or go to local ED for evaluation.                 History of Present Illness     Tavon Villatoro is a 59 y.o. male  has a past medical history of Diabetes mellitus ([facility]), Hypertension, and Substance abuse (HCC).  has a past surgical history that includes No past surgeries and Wound debridement (Right, 2022).    He presents today for a right shoulder injection      Review of Systems   Constitutional:  Negative for chills and fever.   HENT:  Negative for ear pain and sore throat.    Eyes:  Negative for pain and visual disturbance.   Respiratory:  Negative for cough and shortness of breath.    Cardiovascular:  Negative for chest pain and palpitations.   Gastrointestinal:  Negative for abdominal pain and vomiting.   Genitourinary:  Negative for dysuria and hematuria.   Musculoskeletal:  Positive for arthralgias. Negative for back pain.   Skin:  Negative for color change and rash.   Neurological:  Negative for seizures and syncope.   All other systems reviewed and are negative.         Objective   /70 (BP Location: Left arm, Patient Position: Sitting, Cuff Size: Large)   Pulse 98   Temp 98 °F (36.7 °C) (Temporal)    "Resp 16   Ht 5' 7\" (1.702 m)   Wt 83.9 kg (185 lb)   SpO2 96%   BMI 28.98 kg/m²      Physical Exam  Vitals and nursing note reviewed.   Constitutional:       General: He is not in acute distress.     Appearance: He is well-developed.   HENT:      Head: Normocephalic and atraumatic.   Eyes:      Conjunctiva/sclera: Conjunctivae normal.   Cardiovascular:      Rate and Rhythm: Normal rate.   Pulmonary:      Effort: Pulmonary effort is normal.   Musculoskeletal:         General: No swelling.      Left shoulder: Normal.      Cervical back: Neck supple.   Skin:     General: Skin is warm and dry.   Neurological:      General: No focal deficit present.      Mental Status: He is alert and oriented to person, place, and time. Mental status is at baseline.   Psychiatric:         Mood and Affect: Mood normal.         Behavior: Behavior normal.         Thought Content: Thought content normal.         Judgment: Judgment normal.     Large joint arthrocentesis: R subacromial bursa  Universal Protocol:  Consent: Verbal consent obtained.  Risks and benefits: risks, benefits and alternatives were discussed  Patient understanding: patient states understanding of the procedure being performed  Site marked: the operative site was marked  Patient identity confirmed: verbally with patient  Supporting Documentation  Indications: pain   Procedure Details  Location: shoulder - R subacromial bursa  Needle size: 25 G  Ultrasound guidance: no  Approach: posterior  Medications administered: 3 mL lidocaine 1 %; 40 mg triamcinolone acetonide 40 mg/mL    Patient tolerance: patient tolerated the procedure well with no immediate complications  Dressing:  Sterile dressing applied            "

## 2024-12-12 ENCOUNTER — TELEPHONE (OUTPATIENT)
Dept: FAMILY MEDICINE CLINIC | Facility: CLINIC | Age: 59
End: 2024-12-12

## 2024-12-12 ENCOUNTER — OFFICE VISIT (OUTPATIENT)
Dept: FAMILY MEDICINE CLINIC | Facility: CLINIC | Age: 59
End: 2024-12-12

## 2024-12-12 VITALS
RESPIRATION RATE: 16 BRPM | WEIGHT: 187 LBS | TEMPERATURE: 98 F | HEART RATE: 97 BPM | SYSTOLIC BLOOD PRESSURE: 142 MMHG | HEIGHT: 67 IN | DIASTOLIC BLOOD PRESSURE: 84 MMHG | BODY MASS INDEX: 29.35 KG/M2 | OXYGEN SATURATION: 98 %

## 2024-12-12 DIAGNOSIS — F11.20 OPIOID USE DISORDER, SEVERE, DEPENDENCE (HCC): Primary | ICD-10-CM

## 2024-12-12 PROCEDURE — 99213 OFFICE O/P EST LOW 20 MIN: CPT | Performed by: FAMILY MEDICINE

## 2024-12-12 PROCEDURE — 3079F DIAST BP 80-89 MM HG: CPT | Performed by: FAMILY MEDICINE

## 2024-12-12 PROCEDURE — 80362 OPIOIDS & OPIATE ANALOGS 1/2: CPT | Performed by: FAMILY MEDICINE

## 2024-12-12 PROCEDURE — 3077F SYST BP >= 140 MM HG: CPT | Performed by: FAMILY MEDICINE

## 2024-12-12 PROCEDURE — 80348 DRUG SCREENING BUPRENORPHINE: CPT | Performed by: FAMILY MEDICINE

## 2024-12-12 PROCEDURE — 80305 DRUG TEST PRSMV DIR OPT OBS: CPT | Performed by: FAMILY MEDICINE

## 2024-12-12 PROCEDURE — 80307 DRUG TEST PRSMV CHEM ANLYZR: CPT | Performed by: FAMILY MEDICINE

## 2024-12-12 RX ORDER — BUPRENORPHINE HYDROCHLORIDE AND NALOXONE HYDROCHLORIDE DIHYDRATE 8; 2 MG/1; MG/1
1 TABLET SUBLINGUAL 2 TIMES DAILY
Qty: 60 TABLET | Refills: 0 | Status: SHIPPED | OUTPATIENT
Start: 2024-12-14 | End: 2025-01-13

## 2024-12-12 NOTE — TELEPHONE ENCOUNTER
"Pt called and left two voicemails.     First voicemail pt left \"Maltese bitches. Yes, they're all Maltese and they're all on the phone talking to other people. That's why they don't .\"    Second voicemail pt left he was requesting if we can call him bacvk with time of appt.     Called pt back and left vm with appt time.   "

## 2024-12-12 NOTE — PROGRESS NOTES
OUD MAINTENANCE NOTE    Tavon Villatoro 59 y.o. male MRN: [id number]  PCP: Harjit Ortega MD      Assessment and Plan    Problem List Items Addressed This Visit       Opioid use disorder, severe, dependence (HCC) - Primary    Relevant Medications    buprenorphine-naloxone (SUBOXONE) 8-2 mg per SL tablet (Start on 12/14/2024)    Other Relevant Orders    Drug Screen Routine w /Conf and Adulteration, urine.    POCT urine drug screen (Completed)    Tavon was seen today for follow-up.    Diagnoses and all orders for this visit:    Opioid use disorder, severe, dependence (HCC)  -     Drug Screen Routine w /Conf and Adulteration, urine.  -     POCT urine drug screen  -     Cancel: Drug Screen Routine w /Conf and Adulteration, urine.; Future  -     buprenorphine-naloxone (SUBOXONE) 8-2 mg per SL tablet; Place 1 tablet under the tongue 2 (two) times a day [id number] Do not start before December 14, 2024.              -Medication management - patient is doing well on current daily dose of 8-2mg BID daily, which is continued today.  -UDS and confirmation ordered.  -PDMP reviewed  -UDS positive for Suboxone        Follow-up interval: every month       HPI:   Tavon Villatoro presents for buprenorphine/naloxone follow-up visit visit.  I have reviewed the prior induction visit, follow-up visits, and telephone encounters relevant to opiate use disorder (OUD) treatment.   Current daily dose: 8-2 mg BID     Number weeks induction: since 4/21/2023     Current follow-up interval, in weeks: 4     UDS: positive for suboxone   Patient denies using any recreational substances     Interval use history:     Opiate use: Clean since 4/3/2023  Alcohol daily use: none  Marijuana daily use: none  Other drug use: none  Overdoses: No prior overdose  Current behavioral health provider: None- stable      Sleep:5-6 hours  Bowels: none  Tobacco: Smokes 1/2 PPD cigarettes per day  Patient denies allergy to Buprenorphine and Naloxone; denies  history of liver failure, denies suicidal or homicidal ideation, denies seizure disorder, denies treatment for HIV, denies hypnotic sedative use, denies benzodiazapine use, denies abuse of other drugs, denies ethanol abuse.     Review of Systems   Constitutional:  Negative for appetite change, chills, diaphoresis, fatigue and fever.   HENT:  Negative for sore throat.    Eyes:  Negative for visual disturbance.   Respiratory:  Negative for cough, shortness of breath and wheezing.    Cardiovascular:  Negative for chest pain and palpitations.   Gastrointestinal:  Negative for abdominal pain, nausea and vomiting.   Endocrine: Negative for polydipsia, polyphagia and polyuria.   Genitourinary:  Negative for dysuria, hematuria and urgency.   Musculoskeletal:  Negative for arthralgias and myalgias.   Skin:  Negative for rash.   Neurological:  Negative for dizziness, seizures, syncope, weakness and light-headedness.   Psychiatric/Behavioral:  The patient is not nervous/anxious.        Objective:     Vitals:    24 1447   BP: 142/84   Pulse: 97   Resp: 16   Temp: 98 °F (36.7 °C)   SpO2: 98%         COWS Scale:1     Resting HR: 1  0 = <80  1 =   2 = 101-120  4 = >120     Sweatin  0 = for no chills/flushing  1 = for subjective chills/flushing  2 = flushed or observable sweat on the face  3 =for beads of sweat on brow/face  4 = for sweat streaming of of face     Restlessness:0  0 = able to sit still  1 = subjective difficulty sitting still  3 = for frequent shifting or extraneous movement  5 = for unable to sit still for more than a few seconds      Pupil size:0  0 = pinpoint or normal  1 = for possibly larger than normal  2 = for moderately dilated  5 = for only iris rim visible      Bone/joint pain:0  0 = not present  1 = mild diffuse comfort  2 = sever diffuse aching  4 = objectively rubbing joints/muscles and obviously in pain     Runny nose/tearin  0 = not present  1 = stuff nose/moist eyes  2 = nose  running/tearing  4 = nose constantly running or tears streaming down cheeks     GI upset:0  0 = no GI symptoms  1 = stomach cramps  2 = nasuea or loose stools  3 = vomiting or diarrhea  5 = multiple episodes of vomiting or diarrhea     Tremor observation of outstretched hands:0  0 = no tremor  1= tremor felt but not observed  2= slight tremor observable  4= gross tremor or muscle twitching     Yawning 0  0= no yawning  1 = yawning once or twice during assessment  2 = yawing three or more time during assessment  4 = yawing several times a minute     Anxiety or irritability:0  0 = none  1 = patient reports increasing irritability or anxiousness  2 = patient obviously irritable/anxious  4 = patient so irritable/anxious that assessment is difficult     Gooseflesh:0  0 = skin is smooth  3 = piloerection of skin can be felt or seen  5 = prominent piloerection     TOTAL COWS SCORE:1 (If inducing in office, recommend delaying buprenorphine until COWS scale > 12 to avoid precipitated withdrawal)         Physical Exam:     Physical Exam  Constitutional:       General: He is not in acute distress.     Appearance: Normal appearance. He is well-developed. He is not ill-appearing, toxic-appearing or diaphoretic.   HENT:      Head: Normocephalic and atraumatic.      Right Ear: External ear normal.      Left Ear: External ear normal.      Nose: Nose normal.      Mouth/Throat:      Pharynx: No oropharyngeal exudate.   Eyes:      General: No scleral icterus.        Right eye: No discharge.         Left eye: No discharge.      Extraocular Movements: Extraocular movements intact.      Conjunctiva/sclera: Conjunctivae normal.      Pupils: Pupils are equal, round, and reactive to light.   Cardiovascular:      Rate and Rhythm: Normal rate and regular rhythm.      Heart sounds: Normal heart sounds. No murmur heard.     No friction rub. No gallop.   Pulmonary:      Effort: Pulmonary effort is normal. No respiratory distress.      Breath  sounds: Normal breath sounds. No wheezing.   Abdominal:      General: Bowel sounds are normal. There is no distension.      Palpations: Abdomen is soft. There is no mass.      Tenderness: There is no abdominal tenderness. There is no guarding.      Hernia: No hernia is present.   Musculoskeletal:         General: Normal range of motion.      Cervical back: Normal range of motion.      Right lower leg: No edema.      Left lower leg: No edema.   Skin:     General: Skin is warm.      Capillary Refill: Capillary refill takes less than 2 seconds.   Neurological:      General: No focal deficit present.      Mental Status: He is alert and oriented to person, place, and time.      Cranial Nerves: No cranial nerve deficit.      Motor: No weakness.      Gait: Gait normal.   Psychiatric:         Mood and Affect: Mood normal.         Behavior: Behavior normal.

## 2024-12-16 LAB
6MAM UR QL SCN: NEGATIVE NG/ML
ACCEPTABLE CREAT UR QL: 25 MG/DL
AMPHET UR QL SCN: NEGATIVE NG/ML
BARBITURATES UR QL SCN: NEGATIVE NG/ML
BENZODIAZ UR QL SCN: NEGATIVE NG/ML
BUPRENORPHINE UR QL CFM: NORMAL NG/ML
BUPRENORPHINE: ABNORMAL
CANNABINOIDS UR QL SCN: NEGATIVE NG/ML
CARISOPRODOL UR QL: NEGATIVE NG/ML
COCAINE+BZE UR QL SCN: NEGATIVE NG/ML
ETHYL GLUCURONIDE UR QL SCN: NEGATIVE NG/ML
FENTANYL UR QL SCN: NEGATIVE NG/ML
GABAPENTIN SERPLBLD QL SCN: NEGATIVE UG/ML
METHADONE UR QL SCN: NEGATIVE NG/ML
NALOXONE UR CFM-MCNC: 1248 NG/MG CREAT
NITRITE UR QL STRIP: NEGATIVE UG/ML
NORBUP/BUP RATIO: 3.15
NORBUPRENORPHINE UR CFM-MCNC: 756 NG/MG CREAT
NORBUPRENORPHINE/CREAT UR: 240 NG/MG CREAT
OPIATE ANTAGONIST: ABNORMAL
OPIATES UR QL SCN: NEGATIVE NG/ML
OXYCODONE+OXYMORPHONE UR QL SCN: NEGATIVE NG/ML
PCP UR QL SCN: NEGATIVE NG/ML
PROPOXYPH UR QL SCN: NEGATIVE NG/ML
SPECIMEN PH ACCEPTABLE UR: 6.6 (ref 4.5–8.9)
TAPENTADOL UR QL SCN: NEGATIVE NG/ML
TRAMADOL UR QL SCN: NEGATIVE NG/ML

## 2025-01-09 ENCOUNTER — OFFICE VISIT (OUTPATIENT)
Dept: FAMILY MEDICINE CLINIC | Facility: CLINIC | Age: 60
End: 2025-01-09

## 2025-01-09 VITALS
BODY MASS INDEX: 29.03 KG/M2 | WEIGHT: 185 LBS | TEMPERATURE: 97.6 F | RESPIRATION RATE: 16 BRPM | HEIGHT: 67 IN | OXYGEN SATURATION: 95 % | DIASTOLIC BLOOD PRESSURE: 80 MMHG | SYSTOLIC BLOOD PRESSURE: 130 MMHG | HEART RATE: 87 BPM

## 2025-01-09 DIAGNOSIS — F11.20 OPIOID USE DISORDER, SEVERE, DEPENDENCE (HCC): ICD-10-CM

## 2025-01-09 PROCEDURE — 80305 DRUG TEST PRSMV DIR OPT OBS: CPT | Performed by: FAMILY MEDICINE

## 2025-01-09 PROCEDURE — 3079F DIAST BP 80-89 MM HG: CPT | Performed by: FAMILY MEDICINE

## 2025-01-09 PROCEDURE — 80307 DRUG TEST PRSMV CHEM ANLYZR: CPT | Performed by: FAMILY MEDICINE

## 2025-01-09 PROCEDURE — 3075F SYST BP GE 130 - 139MM HG: CPT | Performed by: FAMILY MEDICINE

## 2025-01-09 PROCEDURE — 99214 OFFICE O/P EST MOD 30 MIN: CPT | Performed by: FAMILY MEDICINE

## 2025-01-09 PROCEDURE — 80362 OPIOIDS & OPIATE ANALOGS 1/2: CPT | Performed by: FAMILY MEDICINE

## 2025-01-09 PROCEDURE — 80348 DRUG SCREENING BUPRENORPHINE: CPT | Performed by: FAMILY MEDICINE

## 2025-01-09 RX ORDER — BUPRENORPHINE HYDROCHLORIDE AND NALOXONE HYDROCHLORIDE DIHYDRATE 8; 2 MG/1; MG/1
1 TABLET SUBLINGUAL 2 TIMES DAILY
Qty: 60 TABLET | Refills: 0 | Status: SHIPPED | OUTPATIENT
Start: 2025-01-13 | End: 2025-02-12

## 2025-01-09 NOTE — PROGRESS NOTES
OUD MAINTENANCE NOTE    Tavon Villatoro 59 y.o. male MRN: [id number]  PCP: Harjit Ortega MD      Assessment and Plan    Problem List Items Addressed This Visit       Opioid use disorder, severe, dependence (HCC)    Relevant Orders    Drug Screen Routine w /Conf and Adulteration, urine.    Tavon was seen today for follow-up.    Diagnoses and all orders for this visit:    Opioid use disorder, severe, dependence (HCC)  -     Drug Screen Routine w /Conf and Adulteration, urine.              -Medication management - patient is doing well on current daily dose of 8-2mg BID daily, which is continued today.  -UDS and confirmation ordered.  -PDMP reviewed  -UDS positive for Suboxone        Follow-up interval: every month       HPI:   Tavon Villatoro presents for buprenorphine/naloxone follow-up visit visit.  I have reviewed the prior induction visit, follow-up visits, and telephone encounters relevant to opiate use disorder (OUD) treatment.   Current daily dose: 8-2 mg BID     Number weeks induction: since 4/21/2023     Current follow-up interval, in weeks: 4     UDS: positive for suboxone   Patient denies using any recreational substances     Interval use history:     Opiate use: Clean since 4/3/2023  Alcohol daily use: none  Marijuana daily use: none  Other drug use: none  Overdoses: No prior overdose  Current behavioral health provider: None- stable      Sleep:5-6 hours  Bowels: none  Tobacco: Smokes 1/2 PPD cigarettes per day  Patient denies allergy to Buprenorphine and Naloxone; denies history of liver failure, denies suicidal or homicidal ideation, denies seizure disorder, denies treatment for HIV, denies hypnotic sedative use, denies benzodiazapine use, denies abuse of other drugs, denies ethanol abuse.     Review of Systems   Constitutional:  Negative for appetite change, chills, diaphoresis, fatigue and fever.   HENT:  Negative for sore throat.    Eyes:  Negative for visual disturbance.   Respiratory:   Negative for cough, shortness of breath and wheezing.    Cardiovascular:  Negative for chest pain and palpitations.   Gastrointestinal:  Negative for abdominal pain, nausea and vomiting.   Endocrine: Negative for polydipsia, polyphagia and polyuria.   Genitourinary:  Negative for dysuria, hematuria and urgency.   Musculoskeletal:  Negative for arthralgias and myalgias.   Skin:  Negative for rash.   Neurological:  Negative for dizziness, seizures, syncope, weakness and light-headedness.   Psychiatric/Behavioral:  The patient is not nervous/anxious.        Objective:     Vitals:    25 1503   BP: 130/80   Pulse: 87   Resp: 16   Temp: 97.6 °F (36.4 °C)   SpO2: 95%         COWS Scale:1     Resting HR: 1  0 = <80  1 =   2 = 101-120  4 = >120     Sweatin  0 = for no chills/flushing  1 = for subjective chills/flushing  2 = flushed or observable sweat on the face  3 =for beads of sweat on brow/face  4 = for sweat streaming of of face     Restlessness:0  0 = able to sit still  1 = subjective difficulty sitting still  3 = for frequent shifting or extraneous movement  5 = for unable to sit still for more than a few seconds      Pupil size:0  0 = pinpoint or normal  1 = for possibly larger than normal  2 = for moderately dilated  5 = for only iris rim visible      Bone/joint pain:0  0 = not present  1 = mild diffuse comfort  2 = sever diffuse aching  4 = objectively rubbing joints/muscles and obviously in pain     Runny nose/tearin  0 = not present  1 = stuff nose/moist eyes  2 = nose running/tearing  4 = nose constantly running or tears streaming down cheeks     GI upset:0  0 = no GI symptoms  1 = stomach cramps  2 = nasuea or loose stools  3 = vomiting or diarrhea  5 = multiple episodes of vomiting or diarrhea     Tremor observation of outstretched hands:0  0 = no tremor  1= tremor felt but not observed  2= slight tremor observable  4= gross tremor or muscle twitching     Yawning 0  0= no yawning  1 = yawning  once or twice during assessment  2 = yawing three or more time during assessment  4 = yawing several times a minute     Anxiety or irritability:0  0 = none  1 = patient reports increasing irritability or anxiousness  2 = patient obviously irritable/anxious  4 = patient so irritable/anxious that assessment is difficult     Gooseflesh:0  0 = skin is smooth  3 = piloerection of skin can be felt or seen  5 = prominent piloerection     TOTAL COWS SCORE:1 (If inducing in office, recommend delaying buprenorphine until COWS scale > 12 to avoid precipitated withdrawal)         Physical Exam:     Physical Exam  Constitutional:       General: He is not in acute distress.     Appearance: Normal appearance. He is well-developed. He is not ill-appearing, toxic-appearing or diaphoretic.   HENT:      Head: Normocephalic and atraumatic.      Right Ear: External ear normal.      Left Ear: External ear normal.      Nose: Nose normal.      Mouth/Throat:      Pharynx: No oropharyngeal exudate.   Eyes:      General: No scleral icterus.        Right eye: No discharge.         Left eye: No discharge.      Extraocular Movements: Extraocular movements intact.      Conjunctiva/sclera: Conjunctivae normal.   Cardiovascular:      Rate and Rhythm: Normal rate and regular rhythm.      Heart sounds: Normal heart sounds. No murmur heard.     No friction rub. No gallop.   Pulmonary:      Effort: Pulmonary effort is normal. No respiratory distress.      Breath sounds: Normal breath sounds. No wheezing.   Abdominal:      General: Bowel sounds are normal. There is no distension.      Palpations: Abdomen is soft. There is no mass.      Tenderness: There is no abdominal tenderness. There is no guarding.      Hernia: No hernia is present.   Musculoskeletal:         General: Normal range of motion.      Cervical back: Normal range of motion.      Right lower leg: No edema.      Left lower leg: No edema.   Skin:     General: Skin is warm.   Neurological:       General: No focal deficit present.      Mental Status: He is alert and oriented to person, place, and time.      Gait: Gait normal.   Psychiatric:         Mood and Affect: Mood normal.         Behavior: Behavior normal.

## 2025-01-14 LAB
6MAM UR QL SCN: NEGATIVE NG/ML
ACCEPTABLE CREAT UR QL: 21 MG/DL
AMPHET UR QL SCN: NEGATIVE NG/ML
BARBITURATES UR QL SCN: NEGATIVE NG/ML
BENZODIAZ UR QL SCN: NEGATIVE NG/ML
BUPRENORPHINE UR QL CFM: NORMAL NG/ML
BUPRENORPHINE: ABNORMAL
CANNABINOIDS UR QL SCN: NEGATIVE NG/ML
CARISOPRODOL UR QL: NEGATIVE NG/ML
COCAINE+BZE UR QL SCN: NEGATIVE NG/ML
ETHYL GLUCURONIDE UR QL SCN: NEGATIVE NG/ML
FENTANYL UR QL SCN: NEGATIVE NG/ML
GABAPENTIN SERPLBLD QL SCN: NEGATIVE UG/ML
METHADONE UR QL SCN: NEGATIVE NG/ML
NALOXONE UR CFM-MCNC: 1900 NG/MG CREAT
NITRITE UR QL STRIP: NEGATIVE UG/ML
NORBUP/BUP RATIO: 1.98
NORBUPRENORPHINE UR CFM-MCNC: 538 NG/MG CREAT
NORBUPRENORPHINE/CREAT UR: 271 NG/MG CREAT
OPIATE ANTAGONIST: ABNORMAL
OPIATES UR QL SCN: NEGATIVE NG/ML
OXYCODONE+OXYMORPHONE UR QL SCN: NEGATIVE NG/ML
PCP UR QL SCN: NEGATIVE NG/ML
PROPOXYPH UR QL SCN: NEGATIVE NG/ML
SPECIMEN PH ACCEPTABLE UR: 7.5 (ref 4.5–8.9)
TAPENTADOL UR QL SCN: NEGATIVE NG/ML
TRAMADOL UR QL SCN: NEGATIVE NG/ML

## 2025-02-06 ENCOUNTER — OFFICE VISIT (OUTPATIENT)
Dept: FAMILY MEDICINE CLINIC | Facility: CLINIC | Age: 60
End: 2025-02-06

## 2025-02-06 ENCOUNTER — TELEPHONE (OUTPATIENT)
Dept: FAMILY MEDICINE CLINIC | Facility: CLINIC | Age: 60
End: 2025-02-06

## 2025-02-06 VITALS
OXYGEN SATURATION: 96 % | RESPIRATION RATE: 18 BRPM | BODY MASS INDEX: 30.78 KG/M2 | HEART RATE: 100 BPM | SYSTOLIC BLOOD PRESSURE: 120 MMHG | TEMPERATURE: 98.4 F | HEIGHT: 67 IN | DIASTOLIC BLOOD PRESSURE: 86 MMHG | WEIGHT: 196.1 LBS

## 2025-02-06 DIAGNOSIS — F11.20 OPIOID USE DISORDER, SEVERE, DEPENDENCE (HCC): Primary | ICD-10-CM

## 2025-02-06 PROCEDURE — 99213 OFFICE O/P EST LOW 20 MIN: CPT | Performed by: FAMILY MEDICINE

## 2025-02-06 PROCEDURE — 3074F SYST BP LT 130 MM HG: CPT | Performed by: FAMILY MEDICINE

## 2025-02-06 PROCEDURE — 80305 DRUG TEST PRSMV DIR OPT OBS: CPT | Performed by: FAMILY MEDICINE

## 2025-02-06 PROCEDURE — 3079F DIAST BP 80-89 MM HG: CPT | Performed by: FAMILY MEDICINE

## 2025-02-06 PROCEDURE — 80362 OPIOIDS & OPIATE ANALOGS 1/2: CPT | Performed by: FAMILY MEDICINE

## 2025-02-06 PROCEDURE — 80348 DRUG SCREENING BUPRENORPHINE: CPT | Performed by: FAMILY MEDICINE

## 2025-02-06 PROCEDURE — 80307 DRUG TEST PRSMV CHEM ANLYZR: CPT | Performed by: FAMILY MEDICINE

## 2025-02-06 RX ORDER — BUPRENORPHINE HYDROCHLORIDE AND NALOXONE HYDROCHLORIDE DIHYDRATE 8; 2 MG/1; MG/1
1 TABLET SUBLINGUAL 2 TIMES DAILY
Qty: 60 TABLET | Refills: 0 | Status: SHIPPED | OUTPATIENT
Start: 2025-02-13 | End: 2025-03-15

## 2025-02-06 NOTE — PROGRESS NOTES
OUD MAINTENANCE NOTE    Tavon Villatoro 59 y.o. male MRN: [id number]  PCP: Harjit Ortega MD      Assessment and Plan    Problem List Items Addressed This Visit       Opioid use disorder, severe, dependence (HCC) - Primary    Relevant Medications    buprenorphine-naloxone (SUBOXONE) 8-2 mg per SL tablet (Start on 2/13/2025)    Other Relevant Orders    Drug Screen Routine w /Conf and Adulteration, urine.    POCT urine drug screen (Completed)    Tavon was seen today for follow-up.    Diagnoses and all orders for this visit:    Opioid use disorder, severe, dependence (HCC)  -     Drug Screen Routine w /Conf and Adulteration, urine.  -     buprenorphine-naloxone (SUBOXONE) 8-2 mg per SL tablet; Place 1 tablet under the tongue 2 (two) times a day [id number] Do not start before February 13, 2025.  -     POCT urine drug screen              -Medication management - patient is doing well on current daily dose of 8-2mg BID daily, which is continued today.  -UDS and confirmation ordered.  -PDMP reviewed  -UDS positive for Suboxone        Follow-up interval: every month       HPI:   Tavon Villatoro presents for buprenorphine/naloxone follow-up visit visit.  I have reviewed the prior induction visit, follow-up visits, and telephone encounters relevant to opiate use disorder (OUD) treatment.   Current daily dose: 8-2 mg BID     Number weeks induction: since 4/21/2023     Current follow-up interval, in weeks: 4     UDS: positive for suboxone   Patient denies using any recreational substances     Interval use history:     Opiate use: Clean since 4/3/2023  Alcohol daily use: none  Marijuana daily use: none  Other drug use: none  Overdoses: No prior overdose  Current behavioral health provider: None- stable      Sleep: 6 hours  Bowels: none  Tobacco: Smokes 1/2 PPD cigarettes per day  Patient denies allergy to Buprenorphine and Naloxone; denies history of liver failure, denies suicidal or homicidal ideation, denies seizure  disorder, denies treatment for HIV, denies hypnotic sedative use, denies benzodiazapine use, denies abuse of other drugs, denies ethanol abuse.     Review of Systems   Constitutional:  Negative for appetite change, chills, diaphoresis, fatigue and fever.   HENT:  Negative for sore throat.    Eyes:  Negative for visual disturbance.   Respiratory:  Negative for cough, shortness of breath and wheezing.    Cardiovascular:  Negative for chest pain and palpitations.   Gastrointestinal:  Negative for abdominal pain, nausea and vomiting.   Endocrine: Negative for polydipsia, polyphagia and polyuria.   Genitourinary:  Negative for dysuria, hematuria and urgency.   Musculoskeletal:  Negative for arthralgias and myalgias.   Skin:  Negative for rash.   Neurological:  Negative for dizziness, seizures, syncope, weakness and light-headedness.   Psychiatric/Behavioral:  The patient is not nervous/anxious.        Objective:     Vitals:    25 1411   BP: 120/86   Pulse: 100   Resp: 18   Temp: 98.4 °F (36.9 °C)   SpO2: 96%         COWS Scale:1     Resting HR: 1  0 = <80  1 =   2 = 101-120  4 = >120     Sweatin  0 = for no chills/flushing  1 = for subjective chills/flushing  2 = flushed or observable sweat on the face  3 =for beads of sweat on brow/face  4 = for sweat streaming of of face     Restlessness:0  0 = able to sit still  1 = subjective difficulty sitting still  3 = for frequent shifting or extraneous movement  5 = for unable to sit still for more than a few seconds      Pupil size:0  0 = pinpoint or normal  1 = for possibly larger than normal  2 = for moderately dilated  5 = for only iris rim visible      Bone/joint pain:0  0 = not present  1 = mild diffuse comfort  2 = sever diffuse aching  4 = objectively rubbing joints/muscles and obviously in pain     Runny nose/tearin  0 = not present  1 = stuff nose/moist eyes  2 = nose running/tearing  4 = nose constantly running or tears streaming down cheeks     GI  upset:0  0 = no GI symptoms  1 = stomach cramps  2 = nasuea or loose stools  3 = vomiting or diarrhea  5 = multiple episodes of vomiting or diarrhea     Tremor observation of outstretched hands:0  0 = no tremor  1= tremor felt but not observed  2= slight tremor observable  4= gross tremor or muscle twitching     Yawning 0  0= no yawning  1 = yawning once or twice during assessment  2 = yawing three or more time during assessment  4 = yawing several times a minute     Anxiety or irritability:0  0 = none  1 = patient reports increasing irritability or anxiousness  2 = patient obviously irritable/anxious  4 = patient so irritable/anxious that assessment is difficult     Gooseflesh:0  0 = skin is smooth  3 = piloerection of skin can be felt or seen  5 = prominent piloerection     TOTAL COWS SCORE:1 (If inducing in office, recommend delaying buprenorphine until COWS scale > 12 to avoid precipitated withdrawal)         Physical Exam:     Physical Exam  Constitutional:       General: He is not in acute distress.     Appearance: Normal appearance. He is well-developed. He is not ill-appearing, toxic-appearing or diaphoretic.   HENT:      Head: Normocephalic and atraumatic.      Right Ear: External ear normal.      Left Ear: External ear normal.      Nose: Nose normal.      Mouth/Throat:      Pharynx: No oropharyngeal exudate.   Eyes:      General: No scleral icterus.        Right eye: No discharge.         Left eye: No discharge.      Extraocular Movements: Extraocular movements intact.      Conjunctiva/sclera: Conjunctivae normal.   Cardiovascular:      Rate and Rhythm: Normal rate and regular rhythm.      Heart sounds: Normal heart sounds. No murmur heard.     No friction rub. No gallop.   Pulmonary:      Effort: Pulmonary effort is normal. No respiratory distress.      Breath sounds: Normal breath sounds. No wheezing.   Abdominal:      General: Bowel sounds are normal. There is no distension.      Palpations: Abdomen is  soft. There is no mass.      Tenderness: There is no abdominal tenderness. There is no guarding.      Hernia: No hernia is present.   Musculoskeletal:         General: Normal range of motion.      Cervical back: Normal range of motion.      Right lower leg: No edema.      Left lower leg: No edema.   Skin:     General: Skin is warm.   Neurological:      General: No focal deficit present.      Mental Status: He is alert and oriented to person, place, and time.      Gait: Gait normal.   Psychiatric:         Mood and Affect: Mood normal.         Behavior: Behavior normal.

## 2025-02-10 LAB
6MAM UR QL SCN: NEGATIVE NG/ML
ACCEPTABLE CREAT UR QL: 31 MG/DL
AMPHET UR QL SCN: NEGATIVE NG/ML
BARBITURATES UR QL SCN: NEGATIVE NG/ML
BENZODIAZ UR QL SCN: NEGATIVE NG/ML
BUPRENORPHINE UR QL CFM: NORMAL NG/ML
BUPRENORPHINE: ABNORMAL
CANNABINOIDS UR QL SCN: NEGATIVE NG/ML
CARISOPRODOL UR QL: NEGATIVE NG/ML
COCAINE+BZE UR QL SCN: NEGATIVE NG/ML
ETHYL GLUCURONIDE UR QL SCN: NEGATIVE NG/ML
FENTANYL UR QL SCN: NEGATIVE NG/ML
GABAPENTIN SERPLBLD QL SCN: NEGATIVE UG/ML
METHADONE UR QL SCN: NEGATIVE NG/ML
NALOXONE UR CFM-MCNC: 890 NG/MG CREAT
NITRITE UR QL STRIP: NEGATIVE UG/ML
NORBUP/BUP RATIO: 4.17
NORBUPRENORPHINE UR CFM-MCNC: 806 NG/MG CREAT
NORBUPRENORPHINE/CREAT UR: 194 NG/MG CREAT
OPIATE ANTAGONIST: ABNORMAL
OPIATES UR QL SCN: NEGATIVE NG/ML
OXYCODONE+OXYMORPHONE UR QL SCN: NEGATIVE NG/ML
PCP UR QL SCN: NEGATIVE NG/ML
PROPOXYPH UR QL SCN: NEGATIVE NG/ML
SPECIMEN PH ACCEPTABLE UR: 6.7 (ref 4.5–8.9)
TAPENTADOL UR QL SCN: NEGATIVE NG/ML
TRAMADOL UR QL SCN: NEGATIVE NG/ML

## 2025-03-06 ENCOUNTER — OFFICE VISIT (OUTPATIENT)
Dept: FAMILY MEDICINE CLINIC | Facility: CLINIC | Age: 60
End: 2025-03-06

## 2025-03-06 VITALS
TEMPERATURE: 98.1 F | SYSTOLIC BLOOD PRESSURE: 132 MMHG | HEART RATE: 112 BPM | BODY MASS INDEX: 31.03 KG/M2 | RESPIRATION RATE: 18 BRPM | WEIGHT: 197.7 LBS | HEIGHT: 67 IN | DIASTOLIC BLOOD PRESSURE: 90 MMHG | OXYGEN SATURATION: 95 %

## 2025-03-06 DIAGNOSIS — F11.20 OPIOID USE DISORDER, SEVERE, DEPENDENCE (HCC): Primary | ICD-10-CM

## 2025-03-06 PROCEDURE — 80348 DRUG SCREENING BUPRENORPHINE: CPT | Performed by: FAMILY MEDICINE

## 2025-03-06 PROCEDURE — 99213 OFFICE O/P EST LOW 20 MIN: CPT | Performed by: FAMILY MEDICINE

## 2025-03-06 PROCEDURE — 80305 DRUG TEST PRSMV DIR OPT OBS: CPT | Performed by: FAMILY MEDICINE

## 2025-03-06 PROCEDURE — 80307 DRUG TEST PRSMV CHEM ANLYZR: CPT | Performed by: FAMILY MEDICINE

## 2025-03-06 PROCEDURE — 3075F SYST BP GE 130 - 139MM HG: CPT | Performed by: FAMILY MEDICINE

## 2025-03-06 PROCEDURE — 3080F DIAST BP >= 90 MM HG: CPT | Performed by: FAMILY MEDICINE

## 2025-03-06 PROCEDURE — 80362 OPIOIDS & OPIATE ANALOGS 1/2: CPT | Performed by: FAMILY MEDICINE

## 2025-03-06 RX ORDER — BUPRENORPHINE HYDROCHLORIDE AND NALOXONE HYDROCHLORIDE DIHYDRATE 8; 2 MG/1; MG/1
1 TABLET SUBLINGUAL 2 TIMES DAILY
Qty: 60 TABLET | Refills: 0 | Status: SHIPPED | OUTPATIENT
Start: 2025-03-11 | End: 2025-04-10

## 2025-03-06 NOTE — PROGRESS NOTES
OUD MAINTENANCE NOTE    Tavon Villatoro 59 y.o. male MRN: [id number]  PCP: Harjit Ortega MD      Assessment and Plan    Problem List Items Addressed This Visit       Opioid use disorder, severe, dependence (HCC) - Primary    Relevant Medications    buprenorphine-naloxone (SUBOXONE) 8-2 mg per SL tablet (Start on 3/11/2025)    Other Relevant Orders    Drug Screen Routine w /Conf and Adulteration, urine.    POCT urine drug screen (Completed)      Tavon was seen today for follow-up.    Diagnoses and all orders for this visit:    Opioid use disorder, severe, dependence (HCC)  -     buprenorphine-naloxone (SUBOXONE) 8-2 mg per SL tablet; Place 1 tablet under the tongue 2 (two) times a day [id number] Do not start before March 11, 2025.  -     Drug Screen Routine w /Conf and Adulteration, urine.  -     POCT urine drug screen                -Medication management - patient is doing well on current daily dose of 8-2mg BID daily, which is continued today.  -UDS and confirmation ordered.  -PDMP reviewed  -UDS positive for Suboxone        Follow-up interval: every 2 months for MAT       HPI:   Tavon Villatoro presents for buprenorphine/naloxone follow-up visit visit.  I have reviewed the prior induction visit, follow-up visits, and telephone encounters relevant to opiate use disorder (OUD) treatment.   Current daily dose: 8-2 mg BID     Number weeks induction: since 4/21/2023     Current follow-up interval, in weeks: 2 months     UDS: positive for suboxone only   Patient denies using any recreational substances     Interval use history:     Opiate use: Clean since 4/3/2023  Alcohol daily use: none  Marijuana daily use: none  Other drug use: none  Overdoses: No prior overdose  Current behavioral health provider: None- stable mental health     Sleep: 5-6 hours  Bowels: none  Tobacco: Smokes 1/2 PPD cigarettes per day  Patient denies allergy to Buprenorphine and Naloxone; denies history of liver failure, denies  suicidal or homicidal ideation, denies seizure disorder, denies treatment for HIV, denies hypnotic sedative use, denies benzodiazapine use, denies abuse of other drugs, denies ethanol abuse.     Review of Systems   Constitutional:  Negative for appetite change, chills, diaphoresis, fatigue and fever.   Respiratory:  Negative for shortness of breath and wheezing.    Cardiovascular:  Negative for chest pain and palpitations.   Gastrointestinal:  Negative for abdominal pain, nausea and vomiting.   Genitourinary:  Negative for dysuria, hematuria and urgency.   Musculoskeletal:  Negative for arthralgias.   Skin:  Negative for rash.   Neurological:  Negative for dizziness, seizures, syncope, weakness and light-headedness.       Objective:     Vitals:    25 1433   BP: 132/90   Pulse: (!) 112   Resp: 18   Temp: 98.1 °F (36.7 °C)   SpO2: 95%         COWS Scale:2     Resting HR: 2  0 = <80  1 =   2 = 101-120  4 = >120     Sweatin  0 = for no chills/flushing  1 = for subjective chills/flushing  2 = flushed or observable sweat on the face  3 =for beads of sweat on brow/face  4 = for sweat streaming of of face     Restlessness:0  0 = able to sit still  1 = subjective difficulty sitting still  3 = for frequent shifting or extraneous movement  5 = for unable to sit still for more than a few seconds      Pupil size:0  0 = pinpoint or normal  1 = for possibly larger than normal  2 = for moderately dilated  5 = for only iris rim visible      Bone/joint pain:0  0 = not present  1 = mild diffuse comfort  2 = sever diffuse aching  4 = objectively rubbing joints/muscles and obviously in pain     Runny nose/tearin  0 = not present  1 = stuff nose/moist eyes  2 = nose running/tearing  4 = nose constantly running or tears streaming down cheeks     GI upset:0  0 = no GI symptoms  1 = stomach cramps  2 = nasuea or loose stools  3 = vomiting or diarrhea  5 = multiple episodes of vomiting or diarrhea     Tremor observation of  outstretched hands:0  0 = no tremor  1= tremor felt but not observed  2= slight tremor observable  4= gross tremor or muscle twitching     Yawning 0  0= no yawning  1 = yawning once or twice during assessment  2 = yawing three or more time during assessment  4 = yawing several times a minute     Anxiety or irritability:0  0 = none  1 = patient reports increasing irritability or anxiousness  2 = patient obviously irritable/anxious  4 = patient so irritable/anxious that assessment is difficult     Gooseflesh:0  0 = skin is smooth  3 = piloerection of skin can be felt or seen  5 = prominent piloerection     TOTAL COWS SCORE:2 (If inducing in office, recommend delaying buprenorphine until COWS scale > 12 to avoid precipitated withdrawal)         Physical Exam:     Physical Exam  Constitutional:       General: He is not in acute distress.     Appearance: Normal appearance. He is well-developed. He is not ill-appearing, toxic-appearing or diaphoretic.   HENT:      Head: Normocephalic and atraumatic.      Right Ear: External ear normal.      Left Ear: External ear normal.      Nose: Nose normal.      Mouth/Throat:      Pharynx: No oropharyngeal exudate.   Eyes:      General: No scleral icterus.        Right eye: No discharge.         Left eye: No discharge.      Extraocular Movements: Extraocular movements intact.      Conjunctiva/sclera: Conjunctivae normal.   Cardiovascular:      Rate and Rhythm: Normal rate and regular rhythm.      Heart sounds: Normal heart sounds. No murmur heard.     No friction rub. No gallop.   Pulmonary:      Effort: Pulmonary effort is normal. No respiratory distress.      Breath sounds: Normal breath sounds. No wheezing.   Abdominal:      General: Bowel sounds are normal. There is no distension.      Palpations: Abdomen is soft. There is no mass.      Tenderness: There is no abdominal tenderness. There is no guarding.   Musculoskeletal:         General: Normal range of motion.      Cervical  back: Normal range of motion.   Skin:     General: Skin is warm.   Neurological:      General: No focal deficit present.      Mental Status: He is alert and oriented to person, place, and time.   Psychiatric:         Mood and Affect: Mood normal.         Behavior: Behavior normal.

## 2025-03-10 LAB
6MAM UR QL SCN: NEGATIVE NG/ML
ACCEPTABLE CREAT UR QL: 30 MG/DL
AMPHET UR QL SCN: NEGATIVE NG/ML
BARBITURATES UR QL SCN: NEGATIVE NG/ML
BENZODIAZ UR QL SCN: NEGATIVE NG/ML
BUPRENORPHINE UR QL CFM: NORMAL NG/ML
BUPRENORPHINE: ABNORMAL
CANNABINOIDS UR QL SCN: NEGATIVE NG/ML
CARISOPRODOL UR QL: NEGATIVE NG/ML
COCAINE+BZE UR QL SCN: NEGATIVE NG/ML
ETHYL GLUCURONIDE UR QL SCN: NEGATIVE NG/ML
FENTANYL UR QL SCN: NEGATIVE NG/ML
GABAPENTIN SERPLBLD QL SCN: NEGATIVE UG/ML
METHADONE UR QL SCN: NEGATIVE NG/ML
NALOXONE UR CFM-MCNC: 567 NG/MG CREAT
NITRITE UR QL STRIP: NEGATIVE UG/ML
NORBUP/BUP RATIO: 6.13
NORBUPRENORPHINE UR CFM-MCNC: 633 NG/MG CREAT
NORBUPRENORPHINE/CREAT UR: 103 NG/MG CREAT
OPIATE ANTAGONIST: ABNORMAL
OPIATES UR QL SCN: NEGATIVE NG/ML
OXYCODONE+OXYMORPHONE UR QL SCN: NEGATIVE NG/ML
PCP UR QL SCN: NEGATIVE NG/ML
PROPOXYPH UR QL SCN: NEGATIVE NG/ML
SPECIMEN PH ACCEPTABLE UR: 6 (ref 4.5–8.9)
TAPENTADOL UR QL SCN: NEGATIVE NG/ML
TRAMADOL UR QL SCN: NEGATIVE NG/ML

## 2025-04-07 ENCOUNTER — TELEPHONE (OUTPATIENT)
Dept: FAMILY MEDICINE CLINIC | Facility: CLINIC | Age: 60
End: 2025-04-07

## 2025-04-07 NOTE — TELEPHONE ENCOUNTER
Good afternoon ,    Pt came into office requesting a refill for medication:    buprenorphine-naloxone (SUBOXONE) 8-2 mg per SL tablet [[id number]]     Pt was schedule for monthly follow up for MAT appt on 4/8/25

## 2025-04-08 ENCOUNTER — OFFICE VISIT (OUTPATIENT)
Dept: FAMILY MEDICINE CLINIC | Facility: CLINIC | Age: 60
End: 2025-04-08

## 2025-04-08 VITALS
BODY MASS INDEX: 31.08 KG/M2 | SYSTOLIC BLOOD PRESSURE: 140 MMHG | WEIGHT: 198 LBS | OXYGEN SATURATION: 97 % | RESPIRATION RATE: 18 BRPM | TEMPERATURE: 98 F | HEIGHT: 67 IN | HEART RATE: 101 BPM | DIASTOLIC BLOOD PRESSURE: 80 MMHG

## 2025-04-08 DIAGNOSIS — E78.49 OTHER HYPERLIPIDEMIA: ICD-10-CM

## 2025-04-08 DIAGNOSIS — F11.20 OPIOID USE DISORDER, SEVERE, DEPENDENCE (HCC): ICD-10-CM

## 2025-04-08 DIAGNOSIS — Z72.0 TOBACCO ABUSE: ICD-10-CM

## 2025-04-08 DIAGNOSIS — E11.9 DIABETIC EYE EXAM (HCC): ICD-10-CM

## 2025-04-08 DIAGNOSIS — Z79.4 TYPE 2 DIABETES MELLITUS WITHOUT COMPLICATION, WITH LONG-TERM CURRENT USE OF INSULIN (HCC): Primary | ICD-10-CM

## 2025-04-08 DIAGNOSIS — I10 PRIMARY HYPERTENSION: ICD-10-CM

## 2025-04-08 DIAGNOSIS — E11.9 TYPE 2 DIABETES MELLITUS WITHOUT COMPLICATION, WITH LONG-TERM CURRENT USE OF INSULIN (HCC): Primary | ICD-10-CM

## 2025-04-08 DIAGNOSIS — Z01.00 DIABETIC EYE EXAM (HCC): ICD-10-CM

## 2025-04-08 LAB — SL AMB POCT HEMOGLOBIN AIC: 7.1 (ref ?–6.5)

## 2025-04-08 PROCEDURE — 3077F SYST BP >= 140 MM HG: CPT | Performed by: FAMILY MEDICINE

## 2025-04-08 PROCEDURE — 83036 HEMOGLOBIN GLYCOSYLATED A1C: CPT | Performed by: FAMILY MEDICINE

## 2025-04-08 PROCEDURE — 3079F DIAST BP 80-89 MM HG: CPT | Performed by: FAMILY MEDICINE

## 2025-04-08 PROCEDURE — 99214 OFFICE O/P EST MOD 30 MIN: CPT | Performed by: FAMILY MEDICINE

## 2025-04-08 RX ORDER — LISINOPRIL 5 MG/1
5 TABLET ORAL DAILY
Qty: 30 TABLET | Refills: 3 | Status: SHIPPED | OUTPATIENT
Start: 2025-04-08

## 2025-04-08 RX ORDER — BUPRENORPHINE HYDROCHLORIDE AND NALOXONE HYDROCHLORIDE DIHYDRATE 8; 2 MG/1; MG/1
1 TABLET SUBLINGUAL 2 TIMES DAILY
Qty: 60 TABLET | Refills: 0 | Status: SHIPPED | OUTPATIENT
Start: 2025-04-14 | End: 2025-05-14

## 2025-04-08 RX ORDER — ATORVASTATIN CALCIUM 20 MG/1
20 TABLET, FILM COATED ORAL
Qty: 90 TABLET | Refills: 1 | Status: SHIPPED | OUTPATIENT
Start: 2025-04-08

## 2025-04-08 NOTE — PROGRESS NOTES
Name: Tavon Villatoro      : 1965      MRN: [id number]  Encounter Provider: Harjit Ortega MD  Encounter Date: 2025   Encounter department: [facility]  :  Assessment & Plan  Type 2 diabetes mellitus without complication, with long-term current use of insulin (HCC)    Lab Results   Component Value Date    HGBA1C 7.1 (A) 2025   Hemoglobin A1c has increased  He was previously on metformin which he self discontinued  Extensive dietary and exercise counseling provided  Referral to ophthalmology for eye exam  Recommend statin for primary prophylaxis of cardiovascular disease      Orders:    POCT hemoglobin A1c    lisinopril (ZESTRIL) 5 mg tablet; Take 1 tablet (5 mg total) by mouth daily    atorvastatin (LIPITOR) 20 mg tablet; Take 1 tablet (20 mg total) by mouth daily at bedtime    CBC and differential; Future    Comprehensive metabolic panel; Future    Lipid panel; Future    Ambulatory Referral to Ophthalmology; Future    Primary hypertension  Blood pressure elevated  Recommend to restart lisinopril  Recommend DASH diet       Diabetic eye exam (HCC)    Orders:    Ambulatory Referral to Ophthalmology; Future    Other hyperlipidemia  Recommend statin  Recheck lipid panel       Tobacco abuse  Smokes half a pack of cigarettes per day.  He is not ready to quit smoking  Smoking cessation counseling provided             Depression Screening and Follow-up Plan: Patient was screened for depression during today's encounter. They screened negative with a PHQ-2 score of 0.      Tobacco Cessation Counseling: Tobacco cessation counseling was provided. The patient is sincerely urged to quit consumption of tobacco. He is not ready to quit tobacco.       History of Present Illness   60-year-old male with a history of opioid use disorder, hypertension, hyperlipidemia, and diabetes who presents today for follow-up.  He is doing well overall.  He has gained some weight  "recently.  He admits to poor dietary consumption for the past few months.  He self discontinued his diabetes and hypertension medication a while ago.  He still smokes cigarettes.      Review of Systems   Constitutional:  Negative for appetite change, chills, diaphoresis, fatigue and fever.   Eyes:  Negative for visual disturbance.   Respiratory:  Negative for shortness of breath and wheezing.    Cardiovascular:  Negative for chest pain and palpitations.   Gastrointestinal:  Negative for abdominal pain, diarrhea, nausea and vomiting.   Endocrine: Negative for polydipsia, polyphagia and polyuria.   Genitourinary:  Negative for dysuria, hematuria and urgency.   Musculoskeletal:  Negative for arthralgias.   Skin:  Negative for rash.   Neurological:  Negative for dizziness, seizures, syncope, weakness and light-headedness.       Objective   /80 (BP Location: Left arm, Patient Position: Sitting, Cuff Size: Standard)   Pulse 101   Temp 98 °F (36.7 °C) (Temporal)   Resp 18   Ht 5' 7\" (1.702 m)   Wt 89.8 kg (198 lb)   SpO2 97%   BMI 31.01 kg/m²      Physical Exam  Vitals reviewed.   Constitutional:       General: He is not in acute distress.     Appearance: Normal appearance. He is well-developed. He is not ill-appearing, toxic-appearing or diaphoretic.   HENT:      Head: Normocephalic and atraumatic.      Right Ear: External ear normal.      Left Ear: External ear normal.      Nose: Nose normal.      Mouth/Throat:      Mouth: Mucous membranes are moist.      Pharynx: No oropharyngeal exudate or posterior oropharyngeal erythema.   Eyes:      General: No scleral icterus.        Right eye: No discharge.         Left eye: No discharge.      Extraocular Movements: Extraocular movements intact.      Conjunctiva/sclera: Conjunctivae normal.   Cardiovascular:      Rate and Rhythm: Normal rate and regular rhythm.      Pulses: no weak pulses.           Dorsalis pedis pulses are 2+ on the right side and 2+ on the left " side.        Posterior tibial pulses are 2+ on the right side and 2+ on the left side.      Heart sounds: Normal heart sounds. No murmur heard.     No friction rub. No gallop.   Pulmonary:      Effort: Pulmonary effort is normal. No respiratory distress.      Breath sounds: Normal breath sounds. No stridor. No wheezing or rhonchi.   Abdominal:      General: Bowel sounds are normal. There is no distension.      Palpations: Abdomen is soft. There is no mass.      Tenderness: There is no abdominal tenderness. There is no guarding.      Hernia: No hernia is present.   Musculoskeletal:         General: Normal range of motion.      Cervical back: Normal range of motion.      Right lower leg: No edema.      Left lower leg: No edema.   Feet:      Right foot:      Skin integrity: No ulcer, skin breakdown, erythema, warmth, callus or dry skin.      Left foot:      Skin integrity: No ulcer, skin breakdown, erythema, warmth, callus or dry skin.   Skin:     General: Skin is warm.      Capillary Refill: Capillary refill takes less than 2 seconds.   Neurological:      General: No focal deficit present.      Mental Status: He is alert and oriented to person, place, and time.      Gait: Gait normal.   Psychiatric:         Mood and Affect: Mood normal.         Behavior: Behavior normal.       Diabetic Foot Exam    Patient's shoes and socks removed.    Right Foot/Ankle   Right Foot Inspection  Skin Exam: skin normal and skin intact. No dry skin, no warmth, no callus, no erythema, no maceration, no abnormal color, no pre-ulcer, no ulcer and no callus.     Toe Exam: ROM and strength within normal limits. No swelling, no tenderness, erythema and  no right toe deformity    Sensory   Proprioception: intact  Monofilament testing: intact    Vascular  Capillary refills: < 3 seconds  The right DP pulse is 2+. The right PT pulse is 2+.     Left Foot/Ankle  Left Foot Inspection  Skin Exam: skin normal and skin intact. No dry skin, no warmth, no  erythema, no maceration, normal color, no pre-ulcer, no ulcer and no callus.     Toe Exam: ROM and strength within normal limits. No swelling, no tenderness, no erythema and no left toe deformity.     Sensory   Proprioception: intact  Monofilament testing: intact    Vascular  Capillary refills: < 3 seconds  The left DP pulse is 2+. The left PT pulse is 2+.     Assign Risk Category  No deformity present  No loss of protective sensation  No weak pulses  Risk: 0

## 2025-04-08 NOTE — ASSESSMENT & PLAN NOTE
Lab Results   Component Value Date    HGBA1C 7.1 (A) 04/08/2025   Hemoglobin A1c has increased  He was previously on metformin which he self discontinued  Extensive dietary and exercise counseling provided  Referral to ophthalmology for eye exam  Recommend statin for primary prophylaxis of cardiovascular disease      Orders:    POCT hemoglobin A1c    lisinopril (ZESTRIL) 5 mg tablet; Take 1 tablet (5 mg total) by mouth daily    atorvastatin (LIPITOR) 20 mg tablet; Take 1 tablet (20 mg total) by mouth daily at bedtime    CBC and differential; Future    Comprehensive metabolic panel; Future    Lipid panel; Future    Ambulatory Referral to Ophthalmology; Future

## 2025-04-09 NOTE — ASSESSMENT & PLAN NOTE
Smokes half a pack of cigarettes per day.  He is not ready to quit smoking  Smoking cessation counseling provided

## 2025-04-10 ENCOUNTER — APPOINTMENT (OUTPATIENT)
Dept: LAB | Facility: CLINIC | Age: 60
End: 2025-04-10
Payer: COMMERCIAL

## 2025-04-10 DIAGNOSIS — Z12.5 SCREENING FOR PROSTATE CANCER: ICD-10-CM

## 2025-04-10 DIAGNOSIS — Z79.4 TYPE 2 DIABETES MELLITUS WITHOUT COMPLICATION, WITH LONG-TERM CURRENT USE OF INSULIN (HCC): ICD-10-CM

## 2025-04-10 DIAGNOSIS — I10 PRIMARY HYPERTENSION: ICD-10-CM

## 2025-04-10 DIAGNOSIS — E11.9 TYPE 2 DIABETES MELLITUS WITHOUT COMPLICATION, WITH LONG-TERM CURRENT USE OF INSULIN (HCC): ICD-10-CM

## 2025-04-10 LAB
ALBUMIN SERPL BCG-MCNC: 4.9 G/DL (ref 3.5–5)
ALP SERPL-CCNC: 82 U/L (ref 34–104)
ALT SERPL W P-5'-P-CCNC: 27 U/L (ref 7–52)
ANION GAP SERPL CALCULATED.3IONS-SCNC: 9 MMOL/L (ref 4–13)
AST SERPL W P-5'-P-CCNC: 22 U/L (ref 13–39)
BASOPHILS # BLD AUTO: 0.07 THOUSANDS/ÂΜL (ref 0–0.1)
BASOPHILS NFR BLD AUTO: 1 % (ref 0–1)
BILIRUB SERPL-MCNC: 0.65 MG/DL (ref 0.2–1)
BUN SERPL-MCNC: 15 MG/DL (ref 5–25)
CALCIUM SERPL-MCNC: 9.7 MG/DL (ref 8.4–10.2)
CHLORIDE SERPL-SCNC: 98 MMOL/L (ref 96–108)
CHOLEST SERPL-MCNC: 134 MG/DL (ref ?–200)
CO2 SERPL-SCNC: 28 MMOL/L (ref 21–32)
CREAT SERPL-MCNC: 0.98 MG/DL (ref 0.6–1.3)
EOSINOPHIL # BLD AUTO: 0.47 THOUSAND/ÂΜL (ref 0–0.61)
EOSINOPHIL NFR BLD AUTO: 3 % (ref 0–6)
ERYTHROCYTE [DISTWIDTH] IN BLOOD BY AUTOMATED COUNT: 12.8 % (ref 11.6–15.1)
GFR SERPL CREATININE-BSD FRML MDRD: 83 ML/MIN/1.73SQ M
GLUCOSE P FAST SERPL-MCNC: 192 MG/DL (ref 65–99)
HCT VFR BLD AUTO: 47.7 % (ref 36.5–49.3)
HDLC SERPL-MCNC: 42 MG/DL
HGB BLD-MCNC: 16 G/DL (ref 12–17)
IMM GRANULOCYTES # BLD AUTO: 0.14 THOUSAND/UL (ref 0–0.2)
IMM GRANULOCYTES NFR BLD AUTO: 1 % (ref 0–2)
LDLC SERPL CALC-MCNC: 78 MG/DL (ref 0–100)
LYMPHOCYTES # BLD AUTO: 3.12 THOUSANDS/ÂΜL (ref 0.6–4.47)
LYMPHOCYTES NFR BLD AUTO: 21 % (ref 14–44)
MCH RBC QN AUTO: 30.8 PG (ref 26.8–34.3)
MCHC RBC AUTO-ENTMCNC: 33.5 G/DL (ref 31.4–37.4)
MCV RBC AUTO: 92 FL (ref 82–98)
MONOCYTES # BLD AUTO: 0.91 THOUSAND/ÂΜL (ref 0.17–1.22)
MONOCYTES NFR BLD AUTO: 6 % (ref 4–12)
NEUTROPHILS # BLD AUTO: 10.12 THOUSANDS/ÂΜL (ref 1.85–7.62)
NEUTS SEG NFR BLD AUTO: 68 % (ref 43–75)
NONHDLC SERPL-MCNC: 92 MG/DL
NRBC BLD AUTO-RTO: 0 /100 WBCS
PLATELET # BLD AUTO: 308 THOUSANDS/UL (ref 149–390)
PMV BLD AUTO: 10.1 FL (ref 8.9–12.7)
POTASSIUM SERPL-SCNC: 5 MMOL/L (ref 3.5–5.3)
PROT SERPL-MCNC: 8.1 G/DL (ref 6.4–8.4)
PSA SERPL-MCNC: 0.43 NG/ML (ref 0–4)
RBC # BLD AUTO: 5.19 MILLION/UL (ref 3.88–5.62)
SODIUM SERPL-SCNC: 135 MMOL/L (ref 135–147)
TRIGL SERPL-MCNC: 70 MG/DL (ref ?–150)
WBC # BLD AUTO: 14.83 THOUSAND/UL (ref 4.31–10.16)

## 2025-04-10 PROCEDURE — G0103 PSA SCREENING: HCPCS

## 2025-04-10 PROCEDURE — 80053 COMPREHEN METABOLIC PANEL: CPT

## 2025-04-10 PROCEDURE — 36415 COLL VENOUS BLD VENIPUNCTURE: CPT

## 2025-04-10 PROCEDURE — 85025 COMPLETE CBC W/AUTO DIFF WBC: CPT

## 2025-04-10 PROCEDURE — 80061 LIPID PANEL: CPT

## 2025-05-06 ENCOUNTER — OFFICE VISIT (OUTPATIENT)
Dept: FAMILY MEDICINE CLINIC | Facility: CLINIC | Age: 60
End: 2025-05-06

## 2025-05-06 VITALS
RESPIRATION RATE: 16 BRPM | DIASTOLIC BLOOD PRESSURE: 84 MMHG | HEIGHT: 67 IN | SYSTOLIC BLOOD PRESSURE: 128 MMHG | HEART RATE: 73 BPM | TEMPERATURE: 97.5 F | WEIGHT: 186.7 LBS | BODY MASS INDEX: 29.3 KG/M2 | OXYGEN SATURATION: 96 %

## 2025-05-06 DIAGNOSIS — E11.9 TYPE 2 DIABETES MELLITUS WITHOUT COMPLICATION, WITH LONG-TERM CURRENT USE OF INSULIN (HCC): Primary | ICD-10-CM

## 2025-05-06 DIAGNOSIS — Z79.4 TYPE 2 DIABETES MELLITUS WITHOUT COMPLICATION, WITH LONG-TERM CURRENT USE OF INSULIN (HCC): Primary | ICD-10-CM

## 2025-05-06 DIAGNOSIS — F11.20 OPIOID USE DISORDER, SEVERE, DEPENDENCE (HCC): ICD-10-CM

## 2025-05-06 LAB
BUPRENORPHINE UR QL CFM: NORMAL
CREAT UR-MCNC: 115 MG/DL
MICROALBUMIN UR-MCNC: 34.8 MG/L
MICROALBUMIN/CREAT 24H UR: 30 MG/G CREATININE (ref 0–30)
SL AMB POCT AMPHETAMINES URINE: NORMAL
SL AMB POCT COCAINE URINE: NORMAL
SL AMB POCT METHAMPETAMINES URINE: NORMAL
SL AMB POCT OPIATES URINE: NORMAL
SL AMB POCT PHENCYCLIDINE URINE: NORMAL
SL AMB THC URINE: NORMAL

## 2025-05-06 PROCEDURE — 82043 UR ALBUMIN QUANTITATIVE: CPT | Performed by: FAMILY MEDICINE

## 2025-05-06 PROCEDURE — 82570 ASSAY OF URINE CREATININE: CPT | Performed by: FAMILY MEDICINE

## 2025-05-06 PROCEDURE — 99213 OFFICE O/P EST LOW 20 MIN: CPT | Performed by: FAMILY MEDICINE

## 2025-05-06 PROCEDURE — 3074F SYST BP LT 130 MM HG: CPT | Performed by: FAMILY MEDICINE

## 2025-05-06 PROCEDURE — 3079F DIAST BP 80-89 MM HG: CPT | Performed by: FAMILY MEDICINE

## 2025-05-06 PROCEDURE — 80305 DRUG TEST PRSMV DIR OPT OBS: CPT | Performed by: FAMILY MEDICINE

## 2025-05-06 PROCEDURE — 80348 DRUG SCREENING BUPRENORPHINE: CPT | Performed by: FAMILY MEDICINE

## 2025-05-06 PROCEDURE — 80362 OPIOIDS & OPIATE ANALOGS 1/2: CPT | Performed by: FAMILY MEDICINE

## 2025-05-06 PROCEDURE — 80307 DRUG TEST PRSMV CHEM ANLYZR: CPT | Performed by: FAMILY MEDICINE

## 2025-05-06 RX ORDER — BUPRENORPHINE HYDROCHLORIDE AND NALOXONE HYDROCHLORIDE DIHYDRATE 8; 2 MG/1; MG/1
1 TABLET SUBLINGUAL 2 TIMES DAILY
Qty: 28 TABLET | Refills: 0 | Status: SHIPPED | OUTPATIENT
Start: 2025-05-06 | End: 2025-05-20

## 2025-05-06 NOTE — PROGRESS NOTES
OUD MAINTENANCE NOTE    Tavon Villatoro 60 y.o. male MRN: [id number]  PCP: Harjit Ortega MD      Assessment and Plan    Problem List Items Addressed This Visit       Opioid use disorder, severe, dependence (HCC)    Relevant Medications    buprenorphine-naloxone (SUBOXONE) 8-2 mg per SL tablet    Other Relevant Orders    POCT urine drug screen (Completed)    Drug Screen Routine w /Conf and Adulteration, urine.    Type 2 diabetes mellitus without complication, with long-term current use of insulin (HCC) - Primary    Relevant Orders    Albumin / creatinine urine ratio      Tavon was seen today for follow-up.    Diagnoses and all orders for this visit:    Type 2 diabetes mellitus without complication, with long-term current use of insulin (HCC)  -     Albumin / creatinine urine ratio    Opioid use disorder, severe, dependence (HCC)  -     POCT urine drug screen  -     Cancel: Drug Screen Routine w /Conf and Adulteration, urine.  -     Drug Screen Routine w /Conf and Adulteration, urine.  -     buprenorphine-naloxone (SUBOXONE) 8-2 mg per SL tablet; Place 1 tablet under the tongue 2 (two) times a day for 14 days [id number]                -Medication management - patient is doing well on current daily dose of 8-2mg BID daily, which is continued today.  -UDS and confirmation ordered.  -We will give him early prescription refills for Suboxone today since he misplaced his remaining Suboxone medication.  He has been compliant with our MAT treatment program.  We reviewed our controlled substance agreement policy today.  -We discussed that he should obtain a safe for his prescription medications to avoid future misplacement  -PDMP reviewed  -Will bring her back in 2 weeks for follow-up  -Advised him to bring his Suboxone prescription films next visit  -UDS positive for Suboxone        Follow-up interval: every 2 weeks for MAT       HPI:   Tavon Villatoro presents for buprenorphine/naloxone follow-up visit visit.  I  have reviewed the prior induction visit, follow-up visits, and telephone encounters relevant to opiate use disorder (OUD) treatment.     Patient stated that he lost his Suboxone prescription about 2 days ago.  He last used Suboxone 2 days ago.  He hasd about a week supply of Suboxone left.  He currently lives in a transitional housing.  He is not sure if someone stole his prescription or if he misplaced it.  He reported that he has been under a lot of stress recently as he is in the process of moving to a new place.  He states that he contacted his insurance company to discuss the misplaced prescription.    Current daily dose: 8-2 mg BID     Number weeks induction: since 4/21/2023     Current follow-up interval, in weeks: 2 weeks     UDS: positive for suboxone only  Patient denies using any recreational substances     Interval use history:     Opiate use: Clean since 4/3/2023  Alcohol daily use: none  Marijuana daily use: none  Other drug use: none  Overdoses: No prior overdose  Current behavioral health provider: None- stable mental health     Sleep: 5-6 hours  Bowels: none  Tobacco: Smokes 1/2 PPD cigarettes per day  Patient denies allergy to Buprenorphine and Naloxone; denies history of liver failure, denies suicidal or homicidal ideation, denies seizure disorder, denies treatment for HIV, denies hypnotic sedative use, denies benzodiazapine use, denies abuse of other drugs, denies ethanol abuse.     Review of Systems   Constitutional:  Negative for appetite change, chills, diaphoresis, fatigue and fever.   Respiratory:  Negative for shortness of breath and wheezing.    Cardiovascular:  Negative for chest pain and palpitations.   Gastrointestinal:  Negative for abdominal pain, nausea and vomiting.   Genitourinary:  Negative for dysuria, hematuria and urgency.   Musculoskeletal:  Negative for arthralgias.   Skin:  Negative for rash.   Neurological:  Negative for dizziness, seizures, syncope, weakness and  light-headedness.       Objective:     Vitals:    25 1406   BP: 128/84   Pulse: 73   Resp: 16   Temp: 97.5 °F (36.4 °C)   SpO2: 96%         COWS Scale:1     Resting HR: 1  0 = <80  1 =   2 = 101-120  4 = >120     Sweatin  0 = for no chills/flushing  1 = for subjective chills/flushing  2 = flushed or observable sweat on the face  3 =for beads of sweat on brow/face  4 = for sweat streaming of of face     Restlessness:0  0 = able to sit still  1 = subjective difficulty sitting still  3 = for frequent shifting or extraneous movement  5 = for unable to sit still for more than a few seconds      Pupil size:0  0 = pinpoint or normal  1 = for possibly larger than normal  2 = for moderately dilated  5 = for only iris rim visible      Bone/joint pain:0  0 = not present  1 = mild diffuse comfort  2 = sever diffuse aching  4 = objectively rubbing joints/muscles and obviously in pain     Runny nose/tearin  0 = not present  1 = stuff nose/moist eyes  2 = nose running/tearing  4 = nose constantly running or tears streaming down cheeks     GI upset:0  0 = no GI symptoms  1 = stomach cramps  2 = nasuea or loose stools  3 = vomiting or diarrhea  5 = multiple episodes of vomiting or diarrhea     Tremor observation of outstretched hands:0  0 = no tremor  1= tremor felt but not observed  2= slight tremor observable  4= gross tremor or muscle twitching     Yawning 0  0= no yawning  1 = yawning once or twice during assessment  2 = yawing three or more time during assessment  4 = yawing several times a minute     Anxiety or irritability:0  0 = none  1 = patient reports increasing irritability or anxiousness  2 = patient obviously irritable/anxious  4 = patient so irritable/anxious that assessment is difficult     Gooseflesh:0  0 = skin is smooth  3 = piloerection of skin can be felt or seen  5 = prominent piloerection     TOTAL COWS SCORE:1 (If inducing in office, recommend delaying buprenorphine until COWS scale > 12 to  avoid precipitated withdrawal)         Physical Exam:     Physical Exam  Constitutional:       General: He is not in acute distress.     Appearance: Normal appearance. He is well-developed. He is not ill-appearing, toxic-appearing or diaphoretic.   HENT:      Head: Normocephalic and atraumatic.      Right Ear: External ear normal.      Left Ear: External ear normal.      Nose: Nose normal.      Mouth/Throat:      Pharynx: No oropharyngeal exudate.   Eyes:      General: No scleral icterus.        Right eye: No discharge.         Left eye: No discharge.      Extraocular Movements: Extraocular movements intact.      Conjunctiva/sclera: Conjunctivae normal.   Cardiovascular:      Rate and Rhythm: Normal rate and regular rhythm.      Heart sounds: Normal heart sounds. No murmur heard.     No friction rub. No gallop.   Pulmonary:      Effort: Pulmonary effort is normal. No respiratory distress.      Breath sounds: Normal breath sounds. No wheezing.   Abdominal:      General: Bowel sounds are normal. There is no distension.      Palpations: Abdomen is soft. There is no mass.      Tenderness: There is no abdominal tenderness. There is no guarding.   Musculoskeletal:         General: Normal range of motion.      Cervical back: Normal range of motion.   Skin:     General: Skin is warm.   Neurological:      General: No focal deficit present.      Mental Status: He is alert and oriented to person, place, and time.   Psychiatric:         Mood and Affect: Mood normal.         Behavior: Behavior normal.

## 2025-05-10 LAB
6-ACETYLMORPHINE IA: NEGATIVE NG/ML
ACCEPTABLE CREAT UR QL: 115 MG/DL
AMPHET UR QL SCN: NEGATIVE NG/ML
BARBITURATES UR QL SCN: NEGATIVE NG/ML
BENZODIAZ UR QL SCN: NEGATIVE NG/ML
BUPRENORPHINE UR QL CFM: NORMAL NG/ML
BUPRENORPHINE: ABNORMAL
CANNABINOIDS UR QL SCN: NEGATIVE NG/ML
CARISOPRODOL UR QL: NEGATIVE NG/ML
COCAINE+BZE UR QL SCN: NEGATIVE NG/ML
ETHYL GLUCURONIDE UR QL SCN: NEGATIVE NG/ML
FENTANYL UR QL SCN: NEGATIVE NG/ML
GABAPENTIN SERPLBLD QL SCN: NEGATIVE UG/ML
METHADONE UR QL SCN: NEGATIVE NG/ML
NALOXONE UR CFM-MCNC: 697 NG/MG CREAT
NITRITE UR QL STRIP: NEGATIVE UG/ML
NORBUP/BUP RATIO: 4.59
NORBUPRENORPHINE UR CFM-MCNC: 598 NG/MG CREAT
NORBUPRENORPHINE/CREAT UR: 130 NG/MG CREAT
OPIATE ANTAGONIST: ABNORMAL
OPIATES UR QL SCN: NEGATIVE NG/ML
OXYCODONE+OXYMORPHONE UR QL SCN: NEGATIVE NG/ML
PCP UR QL SCN: NEGATIVE NG/ML
PROPOXYPH UR QL SCN: NEGATIVE NG/ML
SPECIMEN PH ACCEPTABLE UR: 5.7 (ref 4.5–8.9)
TAPENTADOL UR QL SCN: NEGATIVE NG/ML
TRAMADOL UR QL SCN: NEGATIVE NG/ML

## 2025-05-20 ENCOUNTER — OFFICE VISIT (OUTPATIENT)
Dept: FAMILY MEDICINE CLINIC | Facility: CLINIC | Age: 60
End: 2025-05-20

## 2025-05-20 VITALS
SYSTOLIC BLOOD PRESSURE: 130 MMHG | DIASTOLIC BLOOD PRESSURE: 80 MMHG | HEART RATE: 69 BPM | OXYGEN SATURATION: 99 % | TEMPERATURE: 98 F | WEIGHT: 183.4 LBS | RESPIRATION RATE: 16 BRPM | HEIGHT: 67 IN | BODY MASS INDEX: 28.79 KG/M2

## 2025-05-20 DIAGNOSIS — F11.20 OPIOID USE DISORDER, SEVERE, DEPENDENCE (HCC): Primary | ICD-10-CM

## 2025-05-20 PROCEDURE — 3075F SYST BP GE 130 - 139MM HG: CPT | Performed by: FAMILY MEDICINE

## 2025-05-20 PROCEDURE — 80305 DRUG TEST PRSMV DIR OPT OBS: CPT | Performed by: FAMILY MEDICINE

## 2025-05-20 PROCEDURE — 99213 OFFICE O/P EST LOW 20 MIN: CPT | Performed by: FAMILY MEDICINE

## 2025-05-20 PROCEDURE — 3079F DIAST BP 80-89 MM HG: CPT | Performed by: FAMILY MEDICINE

## 2025-05-20 PROCEDURE — 80307 DRUG TEST PRSMV CHEM ANLYZR: CPT | Performed by: FAMILY MEDICINE

## 2025-05-20 RX ORDER — BUPRENORPHINE HYDROCHLORIDE AND NALOXONE HYDROCHLORIDE DIHYDRATE 8; 2 MG/1; MG/1
1 TABLET SUBLINGUAL 2 TIMES DAILY
Qty: 60 TABLET | Refills: 0 | Status: SHIPPED | OUTPATIENT
Start: 2025-05-20 | End: 2025-06-19

## 2025-05-20 NOTE — PROGRESS NOTES
OUD MAINTENANCE NOTE    Tavon Villatoro 60 y.o. male MRN: [id number]  PCP: Harjit Ortega MD      Assessment and Plan    Problem List Items Addressed This Visit       Opioid use disorder, severe, dependence (HCC) - Primary    Relevant Medications    buprenorphine-naloxone (SUBOXONE) 8-2 mg per SL tablet    Other Relevant Orders    POCT urine drug screen (Completed)    Drug Screen Routine w /Conf and Adulteration, urine.      Tavon was seen today for follow-up.    Diagnoses and all orders for this visit:    Opioid use disorder, severe, dependence (HCC)  -     POCT urine drug screen  -     Drug Screen Routine w /Conf and Adulteration, urine.  -     buprenorphine-naloxone (SUBOXONE) 8-2 mg per SL tablet; Place 1 tablet under the tongue in the morning and 1 tablet before bedtime. [id number].            -Medication management - patient is doing well on current daily dose of 8-2mg BID daily, which is continued today.  -UDS and confirmation ordered.  -PDMP reviewed  -Will bring her back in 4 weeks for follow-up  -UDS positive for Suboxone        Follow-up interval: every 4 weeks for MAT       HPI:   Tavon Villatoro presents for buprenorphine/naloxone follow-up visit visit.  I have reviewed the prior induction visit, follow-up visits, and telephone encounters relevant to opiate use disorder (OUD) treatment.     Current daily dose: 8-2 mg BID     Number weeks induction: since 4/21/2023     Current follow-up interval, in weeks: 4 weeks     UDS: positive for suboxone only  Patient denies using any recreational substances     Interval use history:     Opiate use: Clean since 4/3/2023  Alcohol daily use: none  Marijuana daily use: none  Other drug use: none  Overdoses: No prior overdose  Current behavioral health provider: None- stable mental health     Sleep: 5-6 hours  Bowels: none  Tobacco: Smokes 1/2 PPD cigarettes per day  Patient denies allergy to Buprenorphine and Naloxone; denies history of liver failure,  denies suicidal or homicidal ideation, denies seizure disorder, denies treatment for HIV, denies hypnotic sedative use, denies benzodiazapine use, denies abuse of other drugs, denies ethanol abuse.     Review of Systems   Constitutional:  Negative for appetite change, chills, diaphoresis, fatigue and fever.   Respiratory:  Negative for shortness of breath and wheezing.    Cardiovascular:  Negative for chest pain and palpitations.   Gastrointestinal:  Negative for abdominal pain, nausea and vomiting.   Genitourinary:  Negative for dysuria, hematuria and urgency.   Musculoskeletal:  Negative for arthralgias.   Skin:  Negative for rash.   Neurological:  Negative for dizziness, seizures, syncope, weakness and light-headedness.       Objective:     Vitals:    25 1301   BP: 130/80   Pulse: 69   Resp: 16   Temp: 98 °F (36.7 °C)   SpO2: 99%         COWS Scale:0     Resting HR: 0  0 = <80  1 =   2 = 101-120  4 = >120     Sweatin  0 = for no chills/flushing  1 = for subjective chills/flushing  2 = flushed or observable sweat on the face  3 =for beads of sweat on brow/face  4 = for sweat streaming of of face     Restlessness:0  0 = able to sit still  1 = subjective difficulty sitting still  3 = for frequent shifting or extraneous movement  5 = for unable to sit still for more than a few seconds      Pupil size:0  0 = pinpoint or normal  1 = for possibly larger than normal  2 = for moderately dilated  5 = for only iris rim visible      Bone/joint pain:0  0 = not present  1 = mild diffuse comfort  2 = sever diffuse aching  4 = objectively rubbing joints/muscles and obviously in pain     Runny nose/tearin  0 = not present  1 = stuff nose/moist eyes  2 = nose running/tearing  4 = nose constantly running or tears streaming down cheeks     GI upset:0  0 = no GI symptoms  1 = stomach cramps  2 = nasuea or loose stools  3 = vomiting or diarrhea  5 = multiple episodes of vomiting or diarrhea     Tremor observation of  outstretched hands:0  0 = no tremor  1= tremor felt but not observed  2= slight tremor observable  4= gross tremor or muscle twitching     Yawning 0  0= no yawning  1 = yawning once or twice during assessment  2 = yawing three or more time during assessment  4 = yawing several times a minute     Anxiety or irritability:0  0 = none  1 = patient reports increasing irritability or anxiousness  2 = patient obviously irritable/anxious  4 = patient so irritable/anxious that assessment is difficult     Gooseflesh:0  0 = skin is smooth  3 = piloerection of skin can be felt or seen  5 = prominent piloerection     TOTAL COWS SCORE:0 (If inducing in office, recommend delaying buprenorphine until COWS scale > 12 to avoid precipitated withdrawal)         Physical Exam:     Physical Exam  Constitutional:       General: He is not in acute distress.     Appearance: Normal appearance. He is well-developed. He is not ill-appearing, toxic-appearing or diaphoretic.   HENT:      Head: Normocephalic and atraumatic.      Right Ear: External ear normal.      Left Ear: External ear normal.      Nose: Nose normal.      Mouth/Throat:      Pharynx: No oropharyngeal exudate.     Eyes:      General: No scleral icterus.        Right eye: No discharge.         Left eye: No discharge.      Extraocular Movements: Extraocular movements intact.      Conjunctiva/sclera: Conjunctivae normal.       Cardiovascular:      Rate and Rhythm: Normal rate and regular rhythm.      Heart sounds: Normal heart sounds. No murmur heard.     No friction rub. No gallop.   Pulmonary:      Effort: Pulmonary effort is normal. No respiratory distress.      Breath sounds: Normal breath sounds. No wheezing.   Abdominal:      General: Bowel sounds are normal. There is no distension.      Palpations: Abdomen is soft. There is no mass.      Tenderness: There is no abdominal tenderness. There is no guarding.     Musculoskeletal:         General: Normal range of motion.       Cervical back: Normal range of motion.     Skin:     General: Skin is warm.     Neurological:      General: No focal deficit present.      Mental Status: He is alert and oriented to person, place, and time.     Psychiatric:         Mood and Affect: Mood normal.         Behavior: Behavior normal.

## 2025-05-25 LAB
6-ACETYLMORPHINE IA: NEGATIVE NG/ML
ACCEPTABLE CREAT UR QL: 104 MG/DL
AMPHET UR QL SCN: NEGATIVE NG/ML
BARBITURATES UR QL SCN: NEGATIVE NG/ML
BENZODIAZ UR QL SCN: NEGATIVE NG/ML
BUPRENORPHINE UR QL CFM: NORMAL NG/ML
BUPRENORPHINE: ABNORMAL
CANNABINOIDS UR QL SCN: NEGATIVE NG/ML
CARISOPRODOL UR QL: NEGATIVE NG/ML
COCAINE+BZE UR QL SCN: NEGATIVE NG/ML
ETHYL GLUCURONIDE UR QL SCN: NEGATIVE NG/ML
FENTANYL UR QL SCN: NEGATIVE NG/ML
GABAPENTIN SERPLBLD QL SCN: NEGATIVE UG/ML
METHADONE UR QL SCN: NEGATIVE NG/ML
NALOXONE UR CFM-MCNC: >962 NG/MG CREAT
NITRITE UR QL STRIP: NEGATIVE UG/ML
NORBUP/BUP RATIO: 1.77
NORBUPRENORPHINE UR CFM-MCNC: 642 NG/MG CREAT
NORBUPRENORPHINE/CREAT UR: 363 NG/MG CREAT
OPIATE ANTAGONIST: ABNORMAL
OPIATES UR QL SCN: NEGATIVE NG/ML
OXYCODONE+OXYMORPHONE UR QL SCN: NEGATIVE NG/ML
PCP UR QL SCN: NEGATIVE NG/ML
PROPOXYPH UR QL SCN: NEGATIVE NG/ML
SPECIMEN PH ACCEPTABLE UR: 6.1 (ref 4.5–8.9)
TAPENTADOL UR QL SCN: NEGATIVE NG/ML
TRAMADOL UR QL SCN: NEGATIVE NG/ML

## 2025-06-19 ENCOUNTER — TELEPHONE (OUTPATIENT)
Dept: FAMILY MEDICINE CLINIC | Facility: CLINIC | Age: 60
End: 2025-06-19

## 2025-06-19 DIAGNOSIS — F11.20 OPIOID USE DISORDER, SEVERE, DEPENDENCE (HCC): ICD-10-CM

## 2025-06-19 RX ORDER — BUPRENORPHINE HYDROCHLORIDE AND NALOXONE HYDROCHLORIDE DIHYDRATE 8; 2 MG/1; MG/1
1 TABLET SUBLINGUAL 2 TIMES DAILY
Qty: 60 TABLET | Refills: 0 | Status: SHIPPED | OUTPATIENT
Start: 2025-06-20 | End: 2025-07-20

## 2025-07-10 ENCOUNTER — OFFICE VISIT (OUTPATIENT)
Dept: FAMILY MEDICINE CLINIC | Facility: CLINIC | Age: 60
End: 2025-07-10

## 2025-07-10 VITALS
DIASTOLIC BLOOD PRESSURE: 88 MMHG | RESPIRATION RATE: 18 BRPM | OXYGEN SATURATION: 97 % | SYSTOLIC BLOOD PRESSURE: 134 MMHG | HEIGHT: 67 IN | WEIGHT: 178.8 LBS | BODY MASS INDEX: 28.06 KG/M2 | TEMPERATURE: 98.3 F | HEART RATE: 89 BPM

## 2025-07-10 DIAGNOSIS — G89.29 CHRONIC RIGHT SHOULDER PAIN: ICD-10-CM

## 2025-07-10 DIAGNOSIS — M25.511 CHRONIC RIGHT SHOULDER PAIN: ICD-10-CM

## 2025-07-10 DIAGNOSIS — F11.20 OPIOID USE DISORDER, SEVERE, DEPENDENCE (HCC): ICD-10-CM

## 2025-07-10 DIAGNOSIS — M19.211 OTHER SECONDARY OSTEOARTHRITIS OF RIGHT SHOULDER: Primary | ICD-10-CM

## 2025-07-10 PROBLEM — F11.10 OPIOID ABUSE (HCC): Status: RESOLVED | Noted: 2023-04-22 | Resolved: 2025-07-10

## 2025-07-10 PROCEDURE — 80348 DRUG SCREENING BUPRENORPHINE: CPT | Performed by: FAMILY MEDICINE

## 2025-07-10 PROCEDURE — 99214 OFFICE O/P EST MOD 30 MIN: CPT | Performed by: FAMILY MEDICINE

## 2025-07-10 PROCEDURE — 3075F SYST BP GE 130 - 139MM HG: CPT | Performed by: FAMILY MEDICINE

## 2025-07-10 PROCEDURE — 80305 DRUG TEST PRSMV DIR OPT OBS: CPT | Performed by: FAMILY MEDICINE

## 2025-07-10 PROCEDURE — 80362 OPIOIDS & OPIATE ANALOGS 1/2: CPT | Performed by: FAMILY MEDICINE

## 2025-07-10 PROCEDURE — 3079F DIAST BP 80-89 MM HG: CPT | Performed by: FAMILY MEDICINE

## 2025-07-10 PROCEDURE — 80307 DRUG TEST PRSMV CHEM ANLYZR: CPT | Performed by: FAMILY MEDICINE

## 2025-07-10 RX ORDER — BUPRENORPHINE HYDROCHLORIDE AND NALOXONE HYDROCHLORIDE DIHYDRATE 8; 2 MG/1; MG/1
1 TABLET SUBLINGUAL 2 TIMES DAILY
Qty: 60 TABLET | Refills: 0 | Status: SHIPPED | OUTPATIENT
Start: 2025-07-19 | End: 2025-08-18

## 2025-07-10 RX ORDER — SENNOSIDES 8.6 MG
650 CAPSULE ORAL EVERY 8 HOURS PRN
Qty: 30 TABLET | Refills: 3 | Status: SHIPPED | OUTPATIENT
Start: 2025-07-10

## 2025-07-10 RX ORDER — NAPROXEN 500 MG/1
500 TABLET ORAL 2 TIMES DAILY WITH MEALS
Qty: 60 TABLET | Refills: 1 | Status: SHIPPED | OUTPATIENT
Start: 2025-07-10

## 2025-07-10 NOTE — PROGRESS NOTES
OUD MAINTENANCE NOTE    Tavon Villatoro 60 y.o. male MRN: [id number]  PCP: Harjit Ortega MD      Assessment and Plan    Problem List Items Addressed This Visit       Opioid use disorder, severe, dependence (HCC)    Relevant Medications    buprenorphine-naloxone (SUBOXONE) 8-2 mg per SL tablet (Start on 7/19/2025)    Other Relevant Orders    POCT urine drug screen (Completed)    Drug Screen Routine w /Conf and Adulteration, urine.    Osteoarthritis of right shoulder - Primary    Relevant Orders    Ambulatory Referral to Physical Therapy     Other Visit Diagnoses         Chronic right shoulder pain        Relevant Medications    naproxen (EC NAPROSYN) 500 MG EC tablet    acetaminophen (TYLENOL) 650 mg CR tablet    Other Relevant Orders    Ambulatory Referral to Physical Therapy           Tavon was seen today for follow-up.    Diagnoses and all orders for this visit:    Other secondary osteoarthritis of right shoulder  -     Ambulatory Referral to Physical Therapy; Future    Chronic right shoulder pain  -     Ambulatory Referral to Physical Therapy; Future  -     naproxen (EC NAPROSYN) 500 MG EC tablet; Take 1 tablet (500 mg total) by mouth 2 (two) times a day with meals  -     acetaminophen (TYLENOL) 650 mg CR tablet; Take 1 tablet (650 mg total) by mouth every 8 (eight) hours as needed for mild pain    Opioid use disorder, severe, dependence (HCC)  -     POCT urine drug screen  -     Drug Screen Routine w /Conf and Adulteration, urine.  -     buprenorphine-naloxone (SUBOXONE) 8-2 mg per SL tablet; Place 1 tablet under the tongue in the morning and 1 tablet before bedtime. [id number]. Do not start before July 19, 2025.      Chronic right shoulder pain  Acute on chronic right shoulder pain  History of moderate osteoarthritis of glenohumeral joint  Also suspect possible rotator cuff tear  Start NSAIDs and Tylenol as needed  Referral to PT        Opioid use disorder  -Medication management - patient is doing  well on current daily dose of 8-2mg BID daily, which is continued today.  -UDS and confirmation ordered.  -PDMP reviewed  - Follow-up every 2 months  -UDS positive for Suboxone only        Follow-up interval: every 2 months for MAT       HPI:   Tavon Villatoro presents for buprenorphine/naloxone follow-up visit visit.  I have reviewed the prior induction visit, follow-up visits, and telephone encounters relevant to opiate use disorder (OUD) treatment.     Patient reports worsening right shoulder pain for the past couple of weeks.  He has chronic right shoulder pain for many years.  He is right-hand dominant.  He has been lifting heavy boxes at work lately.  He has reduced range of motion of his shoulder.  He denies any  injury to his shoulder      Current daily dose: 8-2 mg BID     Number weeks induction: since 4/21/2023     Current follow-up interval, in weeks: 4 weeks     UDS: positive for suboxone only  Patient denies using any recreational substances     Interval use history:     Opiate use: Clean since 4/3/2023  Alcohol daily use: none  Marijuana daily use: none  Other drug use: none  Overdoses: No prior overdose  Current behavioral health provider: None- stable mental health     Sleep: 5-6 hours  Bowels: none  Tobacco: Smokes 1/2 PPD cigarettes per day  Patient denies allergy to Buprenorphine and Naloxone; denies history of liver failure, denies suicidal or homicidal ideation, denies seizure disorder, denies treatment for HIV, denies hypnotic sedative use, denies benzodiazapine use, denies abuse of other drugs, denies ethanol abuse.     Review of Systems   Constitutional:  Negative for appetite change, chills, diaphoresis, fatigue and fever.   Respiratory:  Negative for shortness of breath and wheezing.    Cardiovascular:  Negative for chest pain and palpitations.   Gastrointestinal:  Negative for abdominal pain, nausea and vomiting.   Genitourinary:  Negative for dysuria, hematuria and urgency.    Musculoskeletal:  Positive for arthralgias.   Skin:  Negative for rash.   Neurological:  Negative for dizziness, seizures, syncope, weakness and light-headedness.       Objective:     Vitals:    07/10/25 1033   BP: 134/88   Pulse: 89   Resp: 18   Temp: 98.3 °F (36.8 °C)   SpO2: 97%         COWS Scale:0     Resting HR: 0  0 = <80  1 =   2 = 101-120  4 = >120     Sweatin  0 = for no chills/flushing  1 = for subjective chills/flushing  2 = flushed or observable sweat on the face  3 =for beads of sweat on brow/face  4 = for sweat streaming of of face     Restlessness:0  0 = able to sit still  1 = subjective difficulty sitting still  3 = for frequent shifting or extraneous movement  5 = for unable to sit still for more than a few seconds      Pupil size:0  0 = pinpoint or normal  1 = for possibly larger than normal  2 = for moderately dilated  5 = for only iris rim visible      Bone/joint pain:0  0 = not present  1 = mild diffuse comfort  2 = sever diffuse aching  4 = objectively rubbing joints/muscles and obviously in pain     Runny nose/tearin  0 = not present  1 = stuff nose/moist eyes  2 = nose running/tearing  4 = nose constantly running or tears streaming down cheeks     GI upset:0  0 = no GI symptoms  1 = stomach cramps  2 = nasuea or loose stools  3 = vomiting or diarrhea  5 = multiple episodes of vomiting or diarrhea     Tremor observation of outstretched hands:0  0 = no tremor  1= tremor felt but not observed  2= slight tremor observable  4= gross tremor or muscle twitching     Yawning 0  0= no yawning  1 = yawning once or twice during assessment  2 = yawing three or more time during assessment  4 = yawing several times a minute     Anxiety or irritability:0  0 = none  1 = patient reports increasing irritability or anxiousness  2 = patient obviously irritable/anxious  4 = patient so irritable/anxious that assessment is difficult     Gooseflesh:0  0 = skin is smooth  3 = piloerection of skin can be  felt or seen  5 = prominent piloerection     TOTAL COWS SCORE:0 (If inducing in office, recommend delaying buprenorphine until COWS scale > 12 to avoid precipitated withdrawal)         Physical Exam:     Physical Exam  Constitutional:       General: He is not in acute distress.     Appearance: Normal appearance. He is well-developed. He is not ill-appearing, toxic-appearing or diaphoretic.   HENT:      Head: Normocephalic and atraumatic.      Right Ear: External ear normal.      Left Ear: External ear normal.      Nose: Nose normal.      Mouth/Throat:      Pharynx: No oropharyngeal exudate.     Eyes:      General: No scleral icterus.        Right eye: No discharge.         Left eye: No discharge.      Extraocular Movements: Extraocular movements intact.      Conjunctiva/sclera: Conjunctivae normal.       Cardiovascular:      Rate and Rhythm: Normal rate and regular rhythm.      Heart sounds: Normal heart sounds. No murmur heard.     No friction rub. No gallop.   Pulmonary:      Effort: Pulmonary effort is normal. No respiratory distress.      Breath sounds: Normal breath sounds. No wheezing.   Abdominal:      General: Bowel sounds are normal. There is no distension.      Palpations: Abdomen is soft. There is no mass.      Tenderness: There is no abdominal tenderness. There is no guarding.     Musculoskeletal:      Right shoulder: Tenderness present. Decreased range of motion.      Left shoulder: Normal range of motion.      Cervical back: Normal range of motion.     Skin:     General: Skin is warm.     Neurological:      General: No focal deficit present.      Mental Status: He is alert and oriented to person, place, and time.     Psychiatric:         Mood and Affect: Mood normal.         Behavior: Behavior normal.

## 2025-07-14 ENCOUNTER — DOCUMENTATION (OUTPATIENT)
Dept: ADMINISTRATIVE | Facility: OTHER | Age: 60
End: 2025-07-14

## 2025-07-14 LAB
6-ACETYLMORPHINE IA: NEGATIVE NG/ML
ACCEPTABLE CREAT UR QL: 175 MG/DL
AMPHET UR QL SCN: NEGATIVE NG/ML
BARBITURATES UR QL SCN: NEGATIVE NG/ML
BENZODIAZ UR QL SCN: NEGATIVE NG/ML
BUPRENORPHINE UR QL CFM: NORMAL NG/ML
BUPRENORPHINE: ABNORMAL
CANNABINOIDS UR QL SCN: NEGATIVE NG/ML
CARISOPRODOL UR QL: NEGATIVE NG/ML
COCAINE+BZE UR QL SCN: NEGATIVE NG/ML
ETHYL GLUCURONIDE UR QL SCN: NEGATIVE NG/ML
FENTANYL UR QL SCN: NEGATIVE NG/ML
GABAPENTIN SERPLBLD QL SCN: NEGATIVE UG/ML
METHADONE UR QL SCN: NEGATIVE NG/ML
NALOXONE UR CFM-MCNC: >571 NG/MG CREAT
NITRITE UR QL STRIP: NEGATIVE UG/ML
NORBUP/BUP RATIO: >2.02
NORBUPRENORPHINE UR CFM-MCNC: >571 NG/MG CREAT
NORBUPRENORPHINE/CREAT UR: 283 NG/MG CREAT
OPIATE ANTAGONIST: ABNORMAL
OPIATES UR QL SCN: NEGATIVE NG/ML
OXYCODONE+OXYMORPHONE UR QL SCN: NEGATIVE NG/ML
PCP UR QL SCN: NEGATIVE NG/ML
PROPOXYPH UR QL SCN: NEGATIVE NG/ML
SPECIMEN PH ACCEPTABLE UR: 5.9 (ref 4.5–8.9)
TAPENTADOL UR QL SCN: NEGATIVE NG/ML
TRAMADOL UR QL SCN: NEGATIVE NG/ML

## 2025-08-01 ENCOUNTER — VBI (OUTPATIENT)
Dept: ADMINISTRATIVE | Facility: OTHER | Age: 60
End: 2025-08-01

## 2025-08-15 ENCOUNTER — TELEPHONE (OUTPATIENT)
Dept: FAMILY MEDICINE CLINIC | Facility: CLINIC | Age: 60
End: 2025-08-15

## 2025-08-15 DIAGNOSIS — F11.20 OPIOID USE DISORDER, SEVERE, DEPENDENCE (HCC): ICD-10-CM

## 2025-08-18 RX ORDER — BUPRENORPHINE HYDROCHLORIDE AND NALOXONE HYDROCHLORIDE DIHYDRATE 8; 2 MG/1; MG/1
1 TABLET SUBLINGUAL 2 TIMES DAILY
Qty: 60 TABLET | Refills: 0 | Status: SHIPPED | OUTPATIENT
Start: 2025-08-19 | End: 2025-09-18

## 2025-08-19 ENCOUNTER — TELEPHONE (OUTPATIENT)
Dept: OTHER | Facility: OTHER | Age: 60
End: 2025-08-19

## (undated) DEVICE — SUT ETHIBOND 2-0 SH/SH 36 IN X523H

## (undated) DEVICE — NEEDLE 25G X 1 1/2

## (undated) DEVICE — UNIVERSAL MAJOR EXTREMITY,KIT: Brand: CARDINAL HEALTH

## (undated) DEVICE — CULTURE TUBE AEROBIC

## (undated) DEVICE — SUT MONOCRYL PLUS 3-0 PS-2 27 IN MCP427H

## (undated) DEVICE — CULTURE TUBE ANAEROBIC

## (undated) DEVICE — INTENDED FOR TISSUE SEPARATION, AND OTHER PROCEDURES THAT REQUIRE A SHARP SURGICAL BLADE TO PUNCTURE OR CUT.: Brand: BARD-PARKER SAFETY BLADES SIZE 15, STERILE

## (undated) DEVICE — GLOVE SRG BIOGEL ORTHOPEDIC 8

## (undated) DEVICE — BULB SYRINGE,IRRIGATION WITH PROTECTIVE CAP: Brand: DOVER

## (undated) DEVICE — CHLORAPREP HI-LITE 26ML ORANGE

## (undated) DEVICE — GLOVE INDICATOR PI UNDERGLOVE SZ 8 BLUE

## (undated) DEVICE — SUT ETHILON 3-0 FS-1 18 IN 663G